# Patient Record
Sex: FEMALE | Race: WHITE | NOT HISPANIC OR LATINO | Employment: OTHER | URBAN - METROPOLITAN AREA
[De-identification: names, ages, dates, MRNs, and addresses within clinical notes are randomized per-mention and may not be internally consistent; named-entity substitution may affect disease eponyms.]

---

## 2017-01-01 ENCOUNTER — HOSPITAL ENCOUNTER (OUTPATIENT)
Dept: LAB | Facility: MEDICAL CENTER | Age: 72
End: 2017-10-05
Attending: NURSE PRACTITIONER
Payer: MEDICARE

## 2017-01-01 ENCOUNTER — APPOINTMENT (OUTPATIENT)
Dept: BEHAVIORAL HEALTH | Facility: PHYSICIAN GROUP | Age: 72
End: 2017-01-01
Payer: MEDICARE

## 2017-01-01 ENCOUNTER — TELEPHONE (OUTPATIENT)
Dept: MEDICAL GROUP | Facility: CLINIC | Age: 72
End: 2017-01-01

## 2017-01-01 ENCOUNTER — OFFICE VISIT (OUTPATIENT)
Dept: BEHAVIORAL HEALTH | Facility: PHYSICIAN GROUP | Age: 72
End: 2017-01-01
Payer: MEDICARE

## 2017-01-01 ENCOUNTER — HOSPITAL ENCOUNTER (OUTPATIENT)
Dept: RADIOLOGY | Facility: MEDICAL CENTER | Age: 72
End: 2017-07-11
Attending: NURSE PRACTITIONER
Payer: MEDICARE

## 2017-01-01 ENCOUNTER — HOSPITAL ENCOUNTER (OUTPATIENT)
Dept: LAB | Facility: MEDICAL CENTER | Age: 72
End: 2017-06-29
Attending: NURSE PRACTITIONER
Payer: MEDICARE

## 2017-01-01 ENCOUNTER — OFFICE VISIT (OUTPATIENT)
Dept: MEDICAL GROUP | Facility: CLINIC | Age: 72
End: 2017-01-01
Payer: MEDICARE

## 2017-01-01 ENCOUNTER — HOSPITAL ENCOUNTER (OUTPATIENT)
Facility: MEDICAL CENTER | Age: 72
End: 2017-08-08
Attending: NURSE PRACTITIONER
Payer: MEDICARE

## 2017-01-01 VITALS
SYSTOLIC BLOOD PRESSURE: 159 MMHG | WEIGHT: 165 LBS | DIASTOLIC BLOOD PRESSURE: 90 MMHG | BODY MASS INDEX: 29.23 KG/M2 | HEIGHT: 63 IN | HEART RATE: 125 BPM

## 2017-01-01 VITALS
HEIGHT: 63 IN | TEMPERATURE: 97 F | DIASTOLIC BLOOD PRESSURE: 80 MMHG | HEART RATE: 117 BPM | SYSTOLIC BLOOD PRESSURE: 120 MMHG | OXYGEN SATURATION: 94 % | BODY MASS INDEX: 28.53 KG/M2 | WEIGHT: 161 LBS | RESPIRATION RATE: 16 BRPM

## 2017-01-01 VITALS
RESPIRATION RATE: 18 BRPM | SYSTOLIC BLOOD PRESSURE: 108 MMHG | HEIGHT: 64 IN | TEMPERATURE: 98.1 F | OXYGEN SATURATION: 94 % | HEART RATE: 90 BPM | BODY MASS INDEX: 27.49 KG/M2 | DIASTOLIC BLOOD PRESSURE: 64 MMHG | WEIGHT: 161 LBS

## 2017-01-01 VITALS
RESPIRATION RATE: 16 BRPM | SYSTOLIC BLOOD PRESSURE: 126 MMHG | DIASTOLIC BLOOD PRESSURE: 72 MMHG | HEIGHT: 63 IN | OXYGEN SATURATION: 95 % | TEMPERATURE: 97.2 F | HEART RATE: 91 BPM | BODY MASS INDEX: 28.35 KG/M2 | WEIGHT: 160 LBS

## 2017-01-01 VITALS
RESPIRATION RATE: 20 BRPM | SYSTOLIC BLOOD PRESSURE: 120 MMHG | OXYGEN SATURATION: 92 % | WEIGHT: 160 LBS | HEART RATE: 113 BPM | DIASTOLIC BLOOD PRESSURE: 80 MMHG | HEIGHT: 64 IN | TEMPERATURE: 97.5 F | BODY MASS INDEX: 27.31 KG/M2

## 2017-01-01 VITALS
HEART RATE: 112 BPM | BODY MASS INDEX: 27.31 KG/M2 | DIASTOLIC BLOOD PRESSURE: 91 MMHG | HEIGHT: 64 IN | WEIGHT: 160 LBS | SYSTOLIC BLOOD PRESSURE: 119 MMHG

## 2017-01-01 DIAGNOSIS — E11.65 TYPE 2 DIABETES MELLITUS WITH HYPERGLYCEMIA, WITHOUT LONG-TERM CURRENT USE OF INSULIN (HCC): ICD-10-CM

## 2017-01-01 DIAGNOSIS — G89.29 CHRONIC BACK PAIN, UNSPECIFIED BACK LOCATION, UNSPECIFIED BACK PAIN LATERALITY: ICD-10-CM

## 2017-01-01 DIAGNOSIS — R53.83 FATIGUE, UNSPECIFIED TYPE: ICD-10-CM

## 2017-01-01 DIAGNOSIS — F32.A DEPRESSIVE DISORDER: ICD-10-CM

## 2017-01-01 DIAGNOSIS — Z12.11 COLON CANCER SCREENING: ICD-10-CM

## 2017-01-01 DIAGNOSIS — M25.512 CHRONIC LEFT SHOULDER PAIN: ICD-10-CM

## 2017-01-01 DIAGNOSIS — F51.04 CHRONIC INSOMNIA: ICD-10-CM

## 2017-01-01 DIAGNOSIS — Z12.31 ENCOUNTER FOR SCREENING MAMMOGRAM FOR BREAST CANCER: ICD-10-CM

## 2017-01-01 DIAGNOSIS — M62.838 NECK MUSCLE SPASM: ICD-10-CM

## 2017-01-01 DIAGNOSIS — Z79.891 CHRONIC USE OF OPIATE DRUGS THERAPEUTIC PURPOSES: ICD-10-CM

## 2017-01-01 DIAGNOSIS — E11.69 HYPERLIPIDEMIA ASSOCIATED WITH TYPE 2 DIABETES MELLITUS (HCC): ICD-10-CM

## 2017-01-01 DIAGNOSIS — E78.5 HYPERLIPIDEMIA ASSOCIATED WITH TYPE 2 DIABETES MELLITUS (HCC): ICD-10-CM

## 2017-01-01 DIAGNOSIS — F41.1 GAD (GENERALIZED ANXIETY DISORDER): Primary | ICD-10-CM

## 2017-01-01 DIAGNOSIS — H57.9 EYE SYMPTOM: ICD-10-CM

## 2017-01-01 DIAGNOSIS — T40.2X5A THERAPEUTIC OPIOID INDUCED CONSTIPATION: ICD-10-CM

## 2017-01-01 DIAGNOSIS — K59.03 THERAPEUTIC OPIOID INDUCED CONSTIPATION: ICD-10-CM

## 2017-01-01 DIAGNOSIS — F41.1 GAD (GENERALIZED ANXIETY DISORDER): ICD-10-CM

## 2017-01-01 DIAGNOSIS — G47.00 INSOMNIA, UNSPECIFIED TYPE: ICD-10-CM

## 2017-01-01 DIAGNOSIS — F13.20 SEDATIVE, HYPNOTIC OR ANXIOLYTIC DEPENDENCE (HCC): ICD-10-CM

## 2017-01-01 DIAGNOSIS — Z23 NEED FOR VACCINATION: ICD-10-CM

## 2017-01-01 DIAGNOSIS — G89.29 CHRONIC LEFT SHOULDER PAIN: ICD-10-CM

## 2017-01-01 DIAGNOSIS — M54.9 CHRONIC BACK PAIN, UNSPECIFIED BACK LOCATION, UNSPECIFIED BACK PAIN LATERALITY: ICD-10-CM

## 2017-01-01 DIAGNOSIS — F41.9 ANXIETY DISORDER, UNSPECIFIED TYPE: ICD-10-CM

## 2017-01-01 DIAGNOSIS — F33.1 MAJOR DEPRESSIVE DISORDER, RECURRENT EPISODE, MODERATE (HCC): ICD-10-CM

## 2017-01-01 DIAGNOSIS — E11.43 TYPE 2 DIABETES MELLITUS WITH DIABETIC AUTONOMIC NEUROPATHY, WITHOUT LONG-TERM CURRENT USE OF INSULIN (HCC): ICD-10-CM

## 2017-01-01 DIAGNOSIS — Z12.31 VISIT FOR SCREENING MAMMOGRAM: ICD-10-CM

## 2017-01-01 LAB
EST. AVERAGE GLUCOSE BLD GHB EST-MCNC: 148 MG/DL
HBA1C MFR BLD: 6.8 % (ref 0–5.6)
HBA1C MFR BLD: 7.1 % (ref ?–5.8)
HEMOCCULT STL QL IA: NEGATIVE
INT CON NEG: NEGATIVE
INT CON POS: POSITIVE
TSH SERPL DL<=0.005 MIU/L-ACNC: 0.59 UIU/ML (ref 0.3–3.7)
VIT B12 SERPL-MCNC: 374 PG/ML (ref 211–911)

## 2017-01-01 PROCEDURE — 99214 OFFICE O/P EST MOD 30 MIN: CPT | Performed by: NURSE PRACTITIONER

## 2017-01-01 PROCEDURE — 36415 COLL VENOUS BLD VENIPUNCTURE: CPT

## 2017-01-01 PROCEDURE — 82607 VITAMIN B-12: CPT

## 2017-01-01 PROCEDURE — 83036 HEMOGLOBIN GLYCOSYLATED A1C: CPT | Performed by: NURSE PRACTITIONER

## 2017-01-01 PROCEDURE — 83036 HEMOGLOBIN GLYCOSYLATED A1C: CPT

## 2017-01-01 PROCEDURE — 99214 OFFICE O/P EST MOD 30 MIN: CPT | Mod: 25 | Performed by: NURSE PRACTITIONER

## 2017-01-01 PROCEDURE — 90662 IIV NO PRSV INCREASED AG IM: CPT | Performed by: NURSE PRACTITIONER

## 2017-01-01 PROCEDURE — 77063 BREAST TOMOSYNTHESIS BI: CPT

## 2017-01-01 PROCEDURE — 99214 OFFICE O/P EST MOD 30 MIN: CPT | Performed by: PSYCHIATRY & NEUROLOGY

## 2017-01-01 PROCEDURE — G0008 ADMIN INFLUENZA VIRUS VAC: HCPCS | Performed by: NURSE PRACTITIONER

## 2017-01-01 PROCEDURE — 84443 ASSAY THYROID STIM HORMONE: CPT

## 2017-01-01 PROCEDURE — 82274 ASSAY TEST FOR BLOOD FECAL: CPT

## 2017-01-01 RX ORDER — HYDROCODONE BITARTRATE AND ACETAMINOPHEN 5; 300 MG/1; MG/1
1-1.5 TABLET ORAL 3 TIMES DAILY PRN
Qty: 105 TAB | Refills: 0 | Status: SHIPPED | OUTPATIENT
Start: 2017-01-01 | End: 2017-01-01 | Stop reason: SDUPTHER

## 2017-01-01 RX ORDER — TRAZODONE HYDROCHLORIDE 50 MG/1
50 TABLET ORAL NIGHTLY PRN
Qty: 90 TAB | Refills: 0 | Status: SHIPPED | OUTPATIENT
Start: 2017-01-01 | End: 2017-01-01 | Stop reason: SDUPTHER

## 2017-01-01 RX ORDER — VENLAFAXINE HYDROCHLORIDE 75 MG/1
CAPSULE, EXTENDED RELEASE ORAL
Qty: 90 CAP | Refills: 0
Start: 2017-01-01 | End: 2017-01-01

## 2017-01-01 RX ORDER — MUPIROCIN CALCIUM 20 MG/G
1 CREAM TOPICAL 2 TIMES DAILY
Qty: 1 TUBE | Refills: 0 | Status: SHIPPED | OUTPATIENT
Start: 2017-01-01 | End: 2017-01-01 | Stop reason: SDUPTHER

## 2017-01-01 RX ORDER — MUPIROCIN CALCIUM 20 MG/G
1 CREAM TOPICAL 2 TIMES DAILY
Qty: 1 TUBE | Refills: 5 | Status: SHIPPED | OUTPATIENT
Start: 2017-01-01 | End: 2018-01-01

## 2017-01-01 RX ORDER — GABAPENTIN 100 MG/1
100 CAPSULE ORAL 3 TIMES DAILY
Qty: 90 CAP | Refills: 11 | Status: SHIPPED | OUTPATIENT
Start: 2017-01-01 | End: 2018-01-01 | Stop reason: SDUPTHER

## 2017-01-01 RX ORDER — RANITIDINE 150 MG/1
150 TABLET ORAL 2 TIMES DAILY
COMMUNITY

## 2017-01-01 RX ORDER — HYDROCODONE BITARTRATE AND ACETAMINOPHEN 5; 300 MG/1; MG/1
1-1.5 TABLET ORAL 3 TIMES DAILY PRN
Qty: 105 TAB | Refills: 0 | Status: SHIPPED | OUTPATIENT
Start: 2017-01-01 | End: 2018-01-01 | Stop reason: SDUPTHER

## 2017-01-01 RX ORDER — BACLOFEN 10 MG/1
TABLET ORAL
COMMUNITY
Start: 2017-05-31 | End: 2018-01-01

## 2017-01-01 RX ORDER — ALPRAZOLAM 1 MG/1
1 TABLET ORAL 3 TIMES DAILY PRN
Qty: 70 TAB | Refills: 2 | Status: SHIPPED
Start: 2017-01-01 | End: 2018-01-01 | Stop reason: SDUPTHER

## 2017-01-01 RX ORDER — ALPRAZOLAM 1 MG/1
1 TABLET ORAL 3 TIMES DAILY PRN
Qty: 70 TAB | Refills: 2 | Status: SHIPPED
Start: 2017-01-01 | End: 2017-01-01 | Stop reason: SDUPTHER

## 2017-01-01 RX ORDER — TRAZODONE HYDROCHLORIDE 50 MG/1
50 TABLET ORAL NIGHTLY PRN
Qty: 90 TAB | Refills: 0 | Status: SHIPPED | OUTPATIENT
Start: 2017-01-01 | End: 2018-01-01

## 2017-01-01 RX ORDER — DOCUSATE SODIUM 100 MG/1
100 CAPSULE, LIQUID FILLED ORAL 2 TIMES DAILY PRN
Qty: 180 CAP | Refills: 3 | Status: SHIPPED | OUTPATIENT
Start: 2017-01-01

## 2017-01-01 ASSESSMENT — PATIENT HEALTH QUESTIONNAIRE - PHQ9
CLINICAL INTERPRETATION OF PHQ2 SCORE: 1
5. POOR APPETITE OR OVEREATING: 0 - NOT AT ALL

## 2017-01-01 ASSESSMENT — PAIN SCALES - GENERAL: PAINLEVEL: 4=SLIGHT-MODERATE PAIN

## 2017-01-03 ENCOUNTER — TELEPHONE (OUTPATIENT)
Dept: RADIOLOGY | Facility: MEDICAL CENTER | Age: 72
End: 2017-01-03

## 2017-01-03 NOTE — TELEPHONE ENCOUNTER
"Pt contacted APRN regarding her \"jumping eyes.\" Dr. Lam declined the referral, recommending neurologist referral instead (per pt report). She is asking about a referral from this office to Dr. Lam or other neurologist. She has seen her ophthalmologist. Pt has a current PCP and an upcoming appt next week and was advised that this is a new problem and she should seek the referral from her PCP. It is unlikely her problem is related to the aneurysm. 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 Billings determined that no further routine surveillance was indicated. She contacted our office in December with concerns that the aneurysm was causing this new problem, but she has not had the MRA she requested in December due to copay. Pt indicates she will continue to f/u with her PCP for the referral.     "

## 2017-01-11 ENCOUNTER — OFFICE VISIT (OUTPATIENT)
Dept: MEDICAL GROUP | Facility: CLINIC | Age: 72
End: 2017-01-11
Payer: MEDICARE

## 2017-01-11 VITALS
SYSTOLIC BLOOD PRESSURE: 126 MMHG | RESPIRATION RATE: 16 BRPM | BODY MASS INDEX: 27.32 KG/M2 | DIASTOLIC BLOOD PRESSURE: 76 MMHG | HEART RATE: 76 BPM | TEMPERATURE: 97.3 F | OXYGEN SATURATION: 94 % | HEIGHT: 65 IN | WEIGHT: 164 LBS

## 2017-01-11 DIAGNOSIS — M62.838 NECK MUSCLE SPASM: ICD-10-CM

## 2017-01-11 DIAGNOSIS — M25.512 CHRONIC LEFT SHOULDER PAIN: ICD-10-CM

## 2017-01-11 DIAGNOSIS — M54.9 CHRONIC BACK PAIN, UNSPECIFIED BACK LOCATION, UNSPECIFIED BACK PAIN LATERALITY: ICD-10-CM

## 2017-01-11 DIAGNOSIS — E11.65 TYPE 2 DIABETES MELLITUS WITH HYPERGLYCEMIA, WITHOUT LONG-TERM CURRENT USE OF INSULIN (HCC): ICD-10-CM

## 2017-01-11 DIAGNOSIS — G89.29 CHRONIC LEFT SHOULDER PAIN: ICD-10-CM

## 2017-01-11 DIAGNOSIS — H55.09 VERTICAL NYSTAGMUS: ICD-10-CM

## 2017-01-11 DIAGNOSIS — E55.9 VITAMIN D DEFICIENCY DISEASE: ICD-10-CM

## 2017-01-11 DIAGNOSIS — F11.90 CHRONIC, CONTINUOUS USE OF OPIOIDS: ICD-10-CM

## 2017-01-11 DIAGNOSIS — G89.29 CHRONIC BACK PAIN, UNSPECIFIED BACK LOCATION, UNSPECIFIED BACK PAIN LATERALITY: ICD-10-CM

## 2017-01-11 PROCEDURE — 99214 OFFICE O/P EST MOD 30 MIN: CPT | Performed by: NURSE PRACTITIONER

## 2017-01-11 RX ORDER — HYDROCODONE BITARTRATE AND ACETAMINOPHEN 5; 300 MG/1; MG/1
1-1.5 TABLET ORAL 3 TIMES DAILY PRN
Qty: 105 TAB | Refills: 0 | Status: SHIPPED | OUTPATIENT
Start: 2017-01-11 | End: 2017-01-11 | Stop reason: SDUPTHER

## 2017-01-11 RX ORDER — HYDROCODONE BITARTRATE AND ACETAMINOPHEN 5; 300 MG/1; MG/1
1-1.5 TABLET ORAL 3 TIMES DAILY PRN
Qty: 105 TAB | Refills: 0 | Status: SHIPPED | OUTPATIENT
Start: 2017-01-11 | End: 2017-04-11 | Stop reason: SDUPTHER

## 2017-01-11 NOTE — PROGRESS NOTES
CC: Follow-Up; Medication Refill; and Referral Update Request        HPI:     Rosangela presents today for the followin. Type 2 diabetes mellitus with hyperglycemia, without long-term current use of insulin ([facility])  Here to follow up on diabetes. She did bring a small amount of blood sugar checks over the last 2 or 3 weeks. Highest fasting was 180 however usually she's falling around 140. Fasting and postprandially. No reports of hypoglycemia. Was unable to get her blood work done.    2. Vertical nystagmus  Cheerful today with a lot of concerns related to her eye jerking movements. She did see Dr. Olguin who did send a letter to her office. She is called and had a lot of concerns related to referrals to neuro-ophthalmology. Upon review of Dr. Olguin's office office note he referred her to neurology only. She did also call and talk to neurology interventional list regarding her history of aneurysms and coiling. They did not feel that this was related. They did offer her MRA however she states she would have difficulty affording this is that she didn't pursue.  Symptoms of an ongoing for 5 or 6 months. Next the difficult for her to read small print her to focus her eyes on things. Glasses or magnifying glasses help.    3. Chronic left shoulder pain/Chronic back pain, unspecified back location, unspecified back pain laterality/Chronic, continuous use of opioids/Neck muscle spasm  Chronic Opiate therapy:  (5 A's)  Analgesia:  improved  Activity:  improved  Adverse Events:  denies  Aberrant Behaviors:  denies  Affect/Mood: normal reasoning, tearful today.  She sees psych for this as this is chronic      See Opiate risk score 5    7. Vitamin D deficiency disease  Asked for refills of her vitamin D supplement. She does still have refills at the pharmacy. Once no fish and recheck labs.    Current Outpatient Prescriptions   Medication Sig Dispense Refill   • Hydrocodone-Acetaminophen 5-300 MG Tab Take 1-1.5 Tabs by mouth 3  "times a day as needed. 105 Tab 0   • metformin (GLUCOPHAGE) 1000 MG tablet TAKE ONE TABLET BY MOUTH TWICE DAILY WITH MEALS 180 Tab 3   • alprazolam (XANAX) 1 MG Tab Take 1 Tab by mouth 2 times a day as needed for Anxiety. 60 Tab 2   • alprazolam (XANAX) 0.5 MG Tab Take 1 Tab by mouth 1 time daily as needed for Anxiety. 30 Tab 2   • venlafaxine (EFFEXOR XR) 150 MG extended-release capsule Take 1 Cap by mouth every day. Take in addition to 37.5mg. Dose is 187.5mg. 30 Cap 1   • venlafaxine XR (EFFEXOR XR) 37.5 MG CAPSULE SR 24 HR Take 1 Cap by mouth every day. Take in addition to 150mg. Dose is 187.5mg. 30 Cap 1   • trazodone (DESYREL) 50 MG Tab Take 1 Tab by mouth at bedtime as needed for Sleep (as needed for sleep). 30 Tab 2   • lisinopril (PRINIVIL) 2.5 MG Tab Take 1 Tab by mouth every day. 90 Tab 3   • glipiZIDE SR (GLUCOTROL) 2.5 MG TABLET SR 24 HR Take 1 Tab by mouth every day. 90 Tab 1   • rosuvastatin (CRESTOR) 20 MG Tab Take 1 Tab by mouth every evening. 90 Tab 3   • ONE TOUCH ULTRA TEST strip      • Omega-3 Fatty Acids (FISH OIL PO) Take  by mouth.     • vitamin D, Ergocalciferol, (DRISDOL) 01386 UNITS Cap capsule Take 1 Cap by mouth every 7 days. 12 Cap 3   • aspirin (ASA) 81 MG CHEW Take 81 mg by mouth every day.     • therapeutic multivitamin-minerals (THERAGRAN-M) TABS Take 1 Tab by mouth every day.       No current facility-administered medications for this visit.     Social History   Substance Use Topics   • Smoking status: Former Smoker -- 1.00 packs/day for 40 years     Types: Cigarettes     Quit date: 01/01/2006   • Smokeless tobacco: Never Used   • Alcohol Use: No     I reviewed patients allergies, problem list and medications today in EPIC.    ROS: Any/all pertinent positives listed in the HPI, otherwise all others reviewed are negative today.      /76 mmHg  Pulse 76  Temp(Src) 36.3 °C (97.3 °F)  Resp 16  Ht 1.651 m (5' 5\")  Wt 74.39 kg (164 lb)  BMI 27.29 kg/m2  SpO2 94%    Exam:  "   Gen: Alert and oriented, No apparent distress. WDWN  Psych: A+Ox3, normal affect and mood  Skin: Warm, dry and intact. Good turgor   No rashes in visible areas.  Eye: Conjunctiva clear, lids normal.  No nystagmus noted in the office  ENMT: Lips without lesions, good dentition  Lungs: Clear to auscultation bilaterally, no rales or rhonchi   Unlabored respiratory effort.   CV: Regular rate and rhythm, S1, S2. No murmurs.   No Edema        Assessment and Plan.   71 y.o. female with the following issues.    1. Type 2 diabetes mellitus with hyperglycemia, without long-term current use of insulin (HCC)  Stable. She has a pending labs to complete prior to next appointment. Currently will continue current medication  - VITAMIN D,25 HYDROXY; Future    2. Vertical nystagmus  Stable. Referral placed to neurology for further evaluation. If needed at that point they can be referred to neuro-ophthalmology  - REFERRAL TO NEUROLOGY    3. Chronic left shoulder pain/Chronic back pain, unspecified back location, unspecified back pain laterality/Chronic, continuous use of opioids/Neck muscle spasm  Clinically stable. No reports of new symptoms. Well-controlled on current medication.  obtained/reviewed from state pharmacy database.  Medications found to be medically necessary/appropriate.  3 months of medication supply at this visit. Followup in the office for further refills.  Urine drug test completed today.    - PAIN MANAGEMENT SCRN, UR; Future  - Hydrocodone-Acetaminophen 5-300 MG Tab; Take 1-1.5 Tabs by mouth 3 times a day as needed.  Dispense: 105 Tab; Refill: 0  - Controlled Substance Treatment Agreement    7. Vitamin D deficiency disease  Stable can continue refills. Labs at her next routine drop            Last dose of controlled substance medication: last pain pill 1/10/17   reviewed:  Yes      Current Drug contract date: today  Current UDT date: routine repeat today  Signs of oversedation: Negative   Non-narcotic  treatments used in past or currently:  Doing yoga and her old PT harry    I have advised patient to keep medication in a safe place and caution with driving

## 2017-01-11 NOTE — MR AVS SNAPSHOT
"        Rosangela Burnette   2017 11:00 AM   Office Visit   MRN: [id number]    Department:  [facility]   Dept Phone:  [phone]    Description:  Female : 1945   Provider:  PILI Humphries           Reason for Visit     Follow-Up diabetes    Medication Refill norco 5-300mg    Referral Update Request to dr. lawrence       Allergies as of 2017     Allergen Noted Reactions    Keflex 2009         You were diagnosed with     Type 2 diabetes mellitus with hyperglycemia, without long-term current use of insulin ([facility])   [7040141]       Vertical nystagmus   [570536]       Chronic left shoulder pain   [696030]       Chronic back pain, unspecified back location, unspecified back pain laterality   [0553677]       Chronic, continuous use of opioids   [492652]       Neck muscle spasm   [234952]         Vital Signs     Blood Pressure Pulse Temperature Respirations Height Weight    126/76 mmHg 76 36.3 °C (97.3 °F) 16 1.651 m (5' 5\") 74.39 kg (164 lb)    Body Mass Index Oxygen Saturation Smoking Status             27.29 kg/m2 94% Former Smoker         Basic Information     Date Of Birth Sex Race Ethnicity Preferred Language    1945 Female White Non- English      Your appointments     2017 10:30 AM   Follow Up Med Management with Angelica Ogden M.D.   [facility] [address] [place] [address]   [phone]            2017 11:00 AM   Individual Therapy with TERRI Malone   [facility] [address] [place] [address])    [address] [place] [address]   [phone]            2017 10:00 AM   Individual Therapy with TERRI Malone   [facility] [address] [place] [address]   [phone]            2017 11:00 AM   Established Patient with PILI Humphries   [address]  [place] [address]   [phone]           You will be receiving a confirmation call a few days before your appointment from our automated call confirmation system.              Problem List              ICD-10-CM Priority Class Noted - Resolved    History of subarachnoid hemorrhage Z86.79 Low  8/12/2012 - Present    Type 2 diabetes mellitus with hyperglycemia (HCC) E11.65 Medium  8/13/2012 - Present    History of atrial fibrillation without current medication Z86.79 Low  8/18/2012 - Present    Cerebral aneurysm, nonruptured  s/p coiling I67.1 High  2/25/2014 - Present    Chronic left shoulder pain M25.512, G89.29 Low  1/11/2016 - Present    Insomnia G47.00 Low  1/11/2016 - Present    Neck muscle spasm M62.838 Low  1/11/2016 - Present    Chronic back pain M54.9, G89.29 Low  3/9/2016 - Present    Chronic, continuous use of opioids  ORT=5 F11.90 Low  8/24/2016 - Present    Sedative, 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 Low  8/24/2016 - Present    Major depressive disorder, recurrent episode, moderate (HCC) F33.1 Low  9/29/2016 - Present    Anxiety disorder F41.9 Low  9/29/2016 - Present    Hyperlipidemia associated with type 2 diabetes mellitus (HCC) E11.69, E78.5 Medium  10/11/2016 - Present    Mild benzodiazepine use disorder F13.10   10/27/2016 - Present    Depressive disorder F32.9   10/27/2016 - Present      Health Maintenance        Date Due Completion Dates    DIABETES MONOFILAMTENT / LE EXAM 1945 ---    IMM ZOSTER VACCINE 6/1/2004 ---    URINE ACR / MICROALBUMIN 3/9/2013 3/9/2012    IMM PNEUMOCOCCAL 65+ (ADULT) LOW/MEDIUM RISK SERIES (2 of 2 - PCV13) 12/16/2015 12/16/2014, 7/25/2013    FASTING LIPID PROFILE 11/3/2016 11/3/2015, 10/12/2015, 8/10/2015, 5/8/2015, 2/11/2015, 11/10/2014, 8/8/2014, 6/16/2014, 4/15/2014, 12/27/2012, 10/3/2012, 9/6/2012, 3/9/2012, 9/9/2011    SERUM CREATININE 11/3/2016 11/3/2015, 8/10/2015, 7/10/2015, 7/7/2015, 5/8/2015, 2/11/2015, 11/10/2014, 9/11/2014, 8/8/2014,  6/16/2014, 2/27/2014, 2/21/2014, 9/9/2013, 7/26/2013, 7/25/2013, 7/24/2013, 7/16/2013, 7/5/2013, 9/6/2012, 8/23/2012, 8/22/2012, 8/21/2012, 8/20/2012, 8/19/2012, 8/18/2012, 8/17/2012, 8/16/2012, 8/15/2012, 8/14/2012, 8/13/2012, 8/12/2012, 9/9/2011    A1C SCREENING 12/2/2016 6/2/2016, 3/9/2016, 11/3/2015, 10/12/2015, 8/10/2015, 5/8/2015, 2/11/2015, 11/10/2014, 8/8/2014, 6/16/2014, 4/15/2014, 2/12/2014, 11/18/2013, 9/9/2013, 6/24/2013, 3/18/2013, 12/27/2012, 10/3/2012, 9/6/2012, 8/13/2012, 3/9/2012    MAMMOGRAM 12/29/2016 12/29/2015, 9/25/2014, 9/24/2014, 9/24/2014, 10/25/2011    COLON CANCER SCREENING ANNUAL FIT 1/11/2017 1/11/2016 (Postponed)    Override on 1/11/2016: Postponed    RETINAL SCREENING 10/24/2017 10/24/2016, 6/2/2016    BONE DENSITY 9/16/2021 9/16/2016, 1/1/2006 (Done)    Override on 1/1/2006: Done (nl)    IMM DTaP/Tdap/Td Vaccine (2 - Td) 9/15/2023 9/15/2013            Current Immunizations     Influenza TIV (IM) 1/18/2011    Influenza Vaccine Adult HD 10/3/2016, 11/7/2015    Pneumococcal Vaccine (UF)Historical Data 8/18/1970    Pneumococcal polysaccharide vaccine (PPSV-23) 12/16/2014, 7/25/2013  9:30 AM    Tdap Vaccine 9/15/2013  8:00 PM    Tuberculin Skin Test 6/25/2014      Below and/or attached are the medications your provider expects you to take. Review all of your home medications and newly ordered medications with your provider and/or pharmacist. Follow medication instructions as directed by your provider and/or pharmacist. Please keep your medication list with you and share with your provider. Update the information when medications are discontinued, doses are changed, or new medications (including over-the-counter products) are added; and carry medication information at all times in the event of emergency situations     Allergies:  KEFLEX - (reactions not documented)               Medications  Valid as of: January 11, 2017 - 12:08 PM    Generic Name Brand Name Tablet Size Instructions for use     ALPRAZolam (Tab) XANAX 1 MG Take 1 Tab by mouth 2 times a day as needed for Anxiety.        ALPRAZolam (Tab) XANAX 0.5 MG Take 1 Tab by mouth 1 time daily as needed for Anxiety.        Aspirin (Chew Tab) ASA 81 MG Take 81 mg by mouth every day.        Ergocalciferol (Cap) DRISDOL 74934 UNITS Take 1 Cap by mouth every 7 days.        GlipiZIDE (TABLET SR 24 HR) GLUCOTROL 2.5 MG Take 1 Tab by mouth every day.        Glucose Blood (Strip) ONE TOUCH ULTRA TEST          Hydrocodone-Acetaminophen (Tab) Hydrocodone-Acetaminophen 5-300 MG Take 1-1.5 Tabs by mouth 3 times a day as needed.        Lisinopril (Tab) PRINIVIL 2.5 MG Take 1 Tab by mouth every day.        MetFORMIN HCl (Tab) GLUCOPHAGE 1000 MG TAKE ONE TABLET BY MOUTH TWICE DAILY WITH MEALS        Multiple Vitamins-Minerals (Tab) THERAGRAN-M  Take 1 Tab by mouth every day.        Omega-3 Fatty Acids   Take  by mouth.        Rosuvastatin Calcium (Tab) CRESTOR 20 MG Take 1 Tab by mouth every evening.        TraZODone HCl (Tab) DESYREL 50 MG Take 1 Tab by mouth at bedtime as needed for Sleep (as needed for sleep).        Venlafaxine HCl (CAPSULE SR 24 HR) EFFEXOR- MG Take 1 Cap by mouth every day. Take in addition to 37.5mg. Dose is 187.5mg.        Venlafaxine HCl (CAPSULE SR 24 HR) EFFEXOR XR 37.5 MG Take 1 Cap by mouth every day. Take in addition to 150mg. Dose is 187.5mg.        .                 Medicines prescribed today were sent to:     [facility] NICOLE BOLDEN - [address] [address] [facility] [place] [address]    Phone: [phone] Fax: [phone]    Open 24 Hours?: No      Medication refill instructions:       If your prescription bottle indicates you have medication refills left, it is not necessary to call your provider’s office. Please contact your pharmacy and they will refill your medication.    If your prescription bottle indicates you do not have any refills left, you may request refills at any time through one of the  following ways: The online [employer] system (except Urgent Care), by calling your provider’s office, or by asking your pharmacy to contact your provider’s office with a refill request. Medication refills are processed only during regular business hours and may not be available until the next business day. Your provider may request additional information or to have a follow-up visit with you prior to refilling your medication.   *Please Note: Medication refills are assigned a new Rx number when refilled electronically. Your pharmacy may indicate that no refills were authorized even though a new prescription for the same medication is available at the pharmacy. Please request the medicine by name with the pharmacy before contacting your provider for a refill.        Your To Do List     Future Labs/Procedures Complete By Mercedes    PAIN MANAGEMENT NED NETTLES  As directed 1/11/2018    Comments:    Current Meds (name, sig, last dose):   Current outpatient prescriptions:   •  metformin, TAKE ONE TABLET BY MOUTH TWICE DAILY WITH MEALS  •  alprazolam, 1 mg, Oral, BID PRN  •  alprazolam, 0.5 mg, Oral, QDAY PRN  •  venlafaxine, 150 mg, Oral, DAILY  •  venlafaxine XR, 37.5 mg, Oral, DAILY  •  trazodone, 50 mg, Oral, HS PRN  •  lisinopril, 2.5 mg, Oral, DAILY  •  Hydrocodone-Acetaminophen, 1-1.5 Tab, Oral, TID PRN  •  glipiZIDE SR, 2.5 mg, Oral, DAILY  •  rosuvastatin, 20 mg, Oral, Q EVENING  •  ONE TOUCH ULTRA TEST, , 1/11/2016  •  Omega-3 Fatty Acids (FISH OIL PO), Take  by mouth.  •  vitamin D (Ergocalciferol), 50,000 Units, Oral, Q7 DAYS  •  aspirin, 81 mg, Oral, DAILY  •  therapeutic multivitamin-minerals, 1 Tab, Oral, DAILY, 1/11/2016 at 1000          VITAMIN D,25 HYDROXY  As directed 1/12/2018      Referral     A referral request has been sent to our patient care coordination department. Please allow 3-5 business days for us to process this request and contact you either by phone or mail. If you do not hear from us by the  5th business day, please call us at [phone].           MyChart Status: Patient Declined

## 2017-01-24 RX ORDER — VENLAFAXINE HYDROCHLORIDE 37.5 MG/1
37.5 CAPSULE, EXTENDED RELEASE ORAL DAILY
Qty: 90 CAP | Refills: 0 | Status: SHIPPED | OUTPATIENT
Start: 2017-01-24 | End: 2017-01-26

## 2017-01-24 RX ORDER — VENLAFAXINE HYDROCHLORIDE 150 MG/1
150 CAPSULE, EXTENDED RELEASE ORAL DAILY
Qty: 90 CAP | Refills: 0 | Status: SHIPPED | OUTPATIENT
Start: 2017-01-24 | End: 2017-04-26

## 2017-01-26 ENCOUNTER — OFFICE VISIT (OUTPATIENT)
Dept: BEHAVIORAL HEALTH | Facility: PHYSICIAN GROUP | Age: 72
End: 2017-01-26
Payer: MEDICARE

## 2017-01-26 ENCOUNTER — APPOINTMENT (OUTPATIENT)
Dept: BEHAVIORAL HEALTH | Facility: PHYSICIAN GROUP | Age: 72
End: 2017-01-26
Payer: MEDICARE

## 2017-01-26 VITALS
WEIGHT: 165 LBS | BODY MASS INDEX: 27.49 KG/M2 | SYSTOLIC BLOOD PRESSURE: 141 MMHG | DIASTOLIC BLOOD PRESSURE: 80 MMHG | HEIGHT: 65 IN | HEART RATE: 112 BPM

## 2017-01-26 DIAGNOSIS — F32.A DEPRESSIVE DISORDER: ICD-10-CM

## 2017-01-26 DIAGNOSIS — F41.9 ANXIETY DISORDER, UNSPECIFIED TYPE: Primary | ICD-10-CM

## 2017-01-26 PROCEDURE — 1101F PT FALLS ASSESS-DOCD LE1/YR: CPT | Performed by: PSYCHIATRY & NEUROLOGY

## 2017-01-26 PROCEDURE — 3014F SCREEN MAMMO DOC REV: CPT | Performed by: PSYCHIATRY & NEUROLOGY

## 2017-01-26 PROCEDURE — 1036F TOBACCO NON-USER: CPT | Performed by: PSYCHIATRY & NEUROLOGY

## 2017-01-26 PROCEDURE — 99214 OFFICE O/P EST MOD 30 MIN: CPT | Performed by: PSYCHIATRY & NEUROLOGY

## 2017-01-26 PROCEDURE — G8432 DEP SCR NOT DOC, RNG: HCPCS | Performed by: PSYCHIATRY & NEUROLOGY

## 2017-01-26 PROCEDURE — 4040F PNEUMOC VAC/ADMIN/RCVD: CPT | Performed by: PSYCHIATRY & NEUROLOGY

## 2017-01-26 PROCEDURE — G8420 CALC BMI NORM PARAMETERS: HCPCS | Performed by: PSYCHIATRY & NEUROLOGY

## 2017-01-26 PROCEDURE — 3017F COLORECTAL CA SCREEN DOC REV: CPT | Mod: 8P | Performed by: PSYCHIATRY & NEUROLOGY

## 2017-01-26 PROCEDURE — G8482 FLU IMMUNIZE ORDER/ADMIN: HCPCS | Performed by: PSYCHIATRY & NEUROLOGY

## 2017-01-26 RX ORDER — ALPRAZOLAM 0.25 MG/1
0.25 TABLET ORAL NIGHTLY PRN
Qty: 30 TAB | Refills: 2 | Status: SHIPPED
Start: 2017-01-26 | End: 2017-04-26

## 2017-01-26 RX ORDER — ALPRAZOLAM 1 MG/1
1 TABLET ORAL 2 TIMES DAILY PRN
Qty: 60 TAB | Refills: 2 | Status: SHIPPED
Start: 2017-01-26 | End: 2017-04-26 | Stop reason: SDUPTHER

## 2017-01-26 RX ORDER — VENLAFAXINE HYDROCHLORIDE 75 MG/1
75 CAPSULE, EXTENDED RELEASE ORAL DAILY
Qty: 90 CAP | Refills: 0 | Status: SHIPPED | OUTPATIENT
Start: 2017-01-26 | End: 2017-04-26 | Stop reason: SDUPTHER

## 2017-01-26 RX ORDER — TRAZODONE HYDROCHLORIDE 50 MG/1
50 TABLET ORAL NIGHTLY PRN
Qty: 90 TAB | Refills: 0 | Status: SHIPPED | OUTPATIENT
Start: 2017-01-26 | End: 2017-04-26 | Stop reason: SDUPTHER

## 2017-01-26 NOTE — PROGRESS NOTES
"PSYCHIATRY FOLLOW-UP NOTE      Chief Complaint   Patient presents with   • Follow-Up     anxiety, depression         History Of Present Illness:  Rosangela Burnette is a 71 y.o. old female with depressive disorder, anxiety disorder, anxiolytic (Xanax) use disorder, DM, HTN, HLD, cerebral aneurysm, atrial fibrillation, chronic low back and shoulder pain comes in today for follow up, was last seen 3 months ago. She reports doing okay since her last visit. She had a tough time in December as that is when her  passed away. She is also having some issues with her eyes and recently saw a neurologist for the same. She has been having some vertical nystagmus in her eyes for almost a year which is causing her extreme discomfort and even some pain at the end of the day. She was told that she might have had \"a silent stroke\" and they just need some records from her aneurysm coiling and will follow up with her on that. She was told that it might be prominent symptom for her. She continues to have anxiety mainly surrounding her physical health and her kids. She has really good support from her daughters and grand kids. She continues to have crying spells which looks like this because she is a very sensitive person. She denies feeling depressed since her last visit that anxiety has been up and down. 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 Denies any recent balance problems or falls. Energy has been up and down. Denies problems with motivation. She has been using Xanax half a tablet whenever she needs and there are times when she doesn't take her normal dosage. She feels that trazodone really helped her with sleep and she has used the 0.5 mg Xanax less often now. Continues to be compliant with Effexor but is not sure if it really helps her anxiety are not. Denies having thoughts of wanting to hurt herself or others.    Social History:   Single and lives " alone in an apartment in [place].  and  x 1, was  for 27 years and   in . She has 2 daughters and 4 grand kids. Both her daughters live in [place] but one of her daughters is moving to [place] soon. 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    Substance Use:  Alcohol - Denies  Nicotine - Denies    Illicit drugs - Denies     Medications:  Current Outpatient Prescriptions   Medication Sig Dispense Refill   • venlafaxine (EFFEXOR XR) 150 MG extended-release capsule Take 1 Cap by mouth every day. Take in addition to 37.5mg. Dose is 187.5mg. 90 Cap 0   • venlafaxine XR (EFFEXOR XR) 37.5 MG CAPSULE SR 24 HR Take 1 Cap by mouth every day. Take in addition to 150mg. Dose is 187.5mg. 90 Cap 0   • Hydrocodone-Acetaminophen 5-300 MG Tab Take 1-1.5 Tabs by mouth 3 times a day as needed. 105 Tab 0   • metformin (GLUCOPHAGE) 1000 MG tablet TAKE ONE TABLET BY MOUTH TWICE DAILY WITH MEALS 180 Tab 3   • alprazolam (XANAX) 1 MG Tab Take 1 Tab by mouth 2 times a day as needed for Anxiety. 60 Tab 2   • alprazolam (XANAX) 0.5 MG Tab Take 1 Tab by mouth 1 time daily as needed for Anxiety. 30 Tab 2   • trazodone (DESYREL) 50 MG Tab Take 1 Tab by mouth at bedtime as needed for Sleep (as needed for sleep). 30 Tab 2   • lisinopril (PRINIVIL) 2.5 MG Tab Take 1 Tab by mouth every day. 90 Tab 3   • glipiZIDE SR (GLUCOTROL) 2.5 MG TABLET SR 24 HR Take 1 Tab by mouth every day. 90 Tab 1   • rosuvastatin (CRESTOR) 20 MG Tab Take 1 Tab by mouth every evening. 90 Tab 3   • ONE TOUCH ULTRA TEST strip      • Omega-3 Fatty Acids (FISH OIL PO) Take  by mouth.     • vitamin D, Ergocalciferol, (DRISDOL) 14265 UNITS Cap capsule Take 1 Cap by mouth every 7 days. 12 Cap 3   • aspirin (ASA) 81 MG CHEW Take 81 mg by mouth every day.     • therapeutic multivitamin-minerals (THERAGRAN-M) TABS Take 1 Tab by mouth every day.       No current  "facility-administered medications for this visit.       Review Of Systems:    Constitutional - Negative for fatigue  HEENT - Positive for nystagmus   Respiratory - Negative for shortness of breath, cough  CVS - Negative for chest pain, palpitations  GI - Negative for nausea, vomiting, abdominal pain, diarrhea, constipation  Musculoskeletal - Positive for back and shoulder pain  Neurological - Negative for headaches  Psychiatric - Positive for anxiety (improved)    Physical Examination:  Vital signs: /80 mmHg  Pulse 112  Ht 1.651 m (5' 5\")  Wt 74.844 kg (165 lb)  BMI 27.46 kg/m2    Musculoskeletal: Slow but stable gait. No abnormal movements.     Mental Status Evaluation:   General: Elderly white female, tearful few times, dressed in casual attire, good grooming and hygiene, in no apparent distress, calm and cooperative, good eye contact, no psychomotor agitation or retardation  Orientation: Alert and oriented to person, place and time  Recent and remote memory: Grossly intact  Attention span and concentration: Grossly intact  Speech: Spontaneous, normal rate, rhythm and tone  Thought Process: Linear, logical and goal directed  Thought Content: Denies suicidal or homicidal ideations, intent or plan  Perception: Denies auditory or visual hallucinations. No delusions noted  Associations: Intact  Language: Appropriate  Fund of knowledge and vocabulary: Grossly adequate  Mood: \"am fine\"  Affect: Anxious, mood congruent  Insight: Good  Judgment: Good      Impression:  1. Unspecified anxiety disorder  2. Unspecified depressive disorder  3. Sedative, hypnotic and anxiolytic (Xanax) use disorder    Medical Records/Labs/Diagnostic Tests Reviewed:  [facility] records - no abuse suspected, appropriate refills.     Plan:  1. Decrease Xanax to 2.25 mg daily as needed for anxiety. Faxed 2 scripts to her pharmacy - 1 mg with 60 tabs & 2 refills and 0.25 mg with 30 tabs & 2 refills. Instructed her to refill her Xanax only when " she has 2 days of supply left, so that we can assess how much Xanax as she actually using as she tends to use half a tablet at one time. Will continue to slowly taper her off Xanax.  2. Increase Effexor to 225 mg in the morning for anxiety and depression  3. Continue Trazodone 50 mg at bedtime as needed for sleep.    4. Continue psychotherapy with DAVID Johnson.     Return to clinic in 3 months or sooner if symptoms worsen    The proposed treatment plan was discussed with the patient who was provided the opportunity to ask questions and make suggestions regarding alternative treatment. Patient verbalized understanding and expressed agreement with the plan.     Angelica Ogden M.D.  01/26/2017    This note was created using voice recognition software (Dragon). The accuracy of the dictation is limited by the abilities of the software. I have reviewed the note prior to signing, however some errors in grammar and context are still possible. If you have any questions related to this note please do not hesitate to contact our office.

## 2017-01-26 NOTE — MR AVS SNAPSHOT
"        Rosangela Burnette   2017 10:30 AM   Office Visit   MRN: [id number]    Department[facility]  [facility]    Dept Phone:  [phone]    Description:  Female : 1945   Provider:  Angelica Ogden M.D.           Reason for Visit     Follow-Up anxiety, depression      Allergies as of 2017     Allergen Noted Reactions    Keflex 2009         You were diagnosed with     Anxiety disorder, unspecified type   [6619808]  -  Primary     Depressive disorder   [560711]         Vital Signs     Blood Pressure Pulse Height Weight Body Mass Index Smoking Status    141/80 mmHg 112 1.651 m (5' 5\") 74.844 kg (165 lb) 27.46 kg/m2 Former Smoker      Basic Information     Date Of Birth Sex Race Ethnicity Preferred Language    1945 Female White Non- English      Your appointments     2017 10:00 AM   Individual Therapy with TERRI Malone   [facility] [address]    [address] [place] [address]  [facility] [address]   [phone]            2017  3:00 PM   Individual Therapy with TERRI Malone   BE[facility] [address])    [address] [place] [address]  [facility] [address]   [phone]            2017 11:00 AM   Established Patient with PILI Humphries   [facility]    [address] [place] [address] [address]  [facility] [id number] [phone]           You will be receiving a confirmation call a few days before your appointment from our automated call confirmation system.              Problem List              ICD-10-CM Priority Class Noted - Resolved    History of subarachnoid hemorrhage Z86.79   2012 - Present    Type 2 diabetes mellitus with hyperglycemia (CMS-HCC) E11.65   2012 - Present    History of atrial fibrillation without current medication Z86.79   2012 - Present    Cerebral aneurysm, nonruptured  s/p coiling I67.1   2014 - Present    Chronic left shoulder pain M25.512, G89.29   " 1/11/2016 - Present    Insomnia G47.00   1/11/2016 - Present    Neck muscle spasm M62.838   1/11/2016 - Present    Chronic back pain M54.9, G89.29   3/9/2016 - Present    Chronic, continuous use of opioids  ORT=5 F11.90   8/24/2016 - Present    Sedative, 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   8/24/2016 - Present    Major depressive disorder, recurrent episode, moderate (CMS-HCC) F33.1   9/29/2016 - Present    Anxiety disorder F41.9   9/29/2016 - Present    Hyperlipidemia associated with type 2 diabetes mellitus (CMS-HCC) E11.69, E78.5   10/11/2016 - Present    Mild benzodiazepine use disorder F13.10   10/27/2016 - Present    Depressive disorder F32.9   10/27/2016 - Present      Health Maintenance        Date Due Completion Dates    DIABETES MONOFILAMENT / LE EXAM 1945 ---    IMM ZOSTER VACCINE 6/1/2004 ---    URINE ACR / MICROALBUMIN 3/9/2013 3/9/2012    IMM PNEUMOCOCCAL 65+ (ADULT) LOW/MEDIUM RISK SERIES (2 of 2 - PCV13) 12/16/2015 12/16/2014, 7/25/2013    FASTING LIPID PROFILE 11/3/2016 11/3/2015, 10/12/2015, 8/10/2015, 5/8/2015, 2/11/2015, 11/10/2014, 8/8/2014, 6/16/2014, 4/15/2014, 12/27/2012, 10/3/2012, 9/6/2012, 3/9/2012, 9/9/2011    SERUM CREATININE 11/3/2016 11/3/2015, 8/10/2015, 7/10/2015, 7/7/2015, 5/8/2015, 2/11/2015, 11/10/2014, 9/11/2014, 8/8/2014, 6/16/2014, 2/27/2014, 2/21/2014, 9/9/2013, 7/26/2013, 7/25/2013, 7/24/2013, 7/16/2013, 7/5/2013, 9/6/2012, 8/23/2012, 8/22/2012, 8/21/2012, 8/20/2012, 8/19/2012, 8/18/2012, 8/17/2012, 8/16/2012, 8/15/2012, 8/14/2012, 8/13/2012, 8/12/2012, 9/9/2011    A1C SCREENING 12/2/2016 6/2/2016, 3/9/2016, 11/3/2015, 10/12/2015, 8/10/2015, 5/8/2015, 2/11/2015, 11/10/2014, 8/8/2014, 6/16/2014, 4/15/2014, 2/12/2014, 11/18/2013, 9/9/2013, 6/24/2013, 3/18/2013, 12/27/2012, 10/3/2012, 9/6/2012, 8/13/2012, 3/9/2012    MAMMOGRAM 12/29/2016 12/29/2015, 9/25/2014, 9/24/2014, 9/24/2014, 10/25/2011    COLON CANCER SCREENING ANNUAL FIT 1/11/2017 1/11/2016  (Postponed)    Override on 1/11/2016: Postponed    RETINAL SCREENING 10/24/2017 10/24/2016, 6/2/2016    BONE DENSITY 9/16/2021 9/16/2016, 1/1/2006 (Done)    Override on 1/1/2006: Done (nl)    IMM DTaP/Tdap/Td Vaccine (2 - Td) 9/15/2023 9/15/2013            Current Immunizations     Influenza TIV (IM) 1/18/2011    Influenza Vaccine Adult HD 10/3/2016, 11/7/2015    Pneumococcal Vaccine (UF)Historical Data 8/18/1970    Pneumococcal polysaccharide vaccine (PPSV-23) 12/16/2014, 7/25/2013  9:30 AM    Tdap Vaccine 9/15/2013  8:00 PM    Tuberculin Skin Test 6/25/2014      Below and/or attached are the medications your provider expects you to take. Review all of your home medications and newly ordered medications with your provider and/or pharmacist. Follow medication instructions as directed by your provider and/or pharmacist. Please keep your medication list with you and share with your provider. Update the information when medications are discontinued, doses are changed, or new medications (including over-the-counter products) are added; and carry medication information at all times in the event of emergency situations     Allergies:  KEFLEX - (reactions not documented)               Medications  Valid as of: January 26, 2017 - 10:58 AM    Generic Name Brand Name Tablet Size Instructions for use    ALPRAZolam (Tab) XANAX 0.25 MG Take 1 Tab by mouth at bedtime as needed for Sleep.        ALPRAZolam (Tab) XANAX 1 MG Take 1 Tab by mouth 2 times a day as needed for Anxiety.        Aspirin (Chew Tab) ASA 81 MG Take 81 mg by mouth every day.        Ergocalciferol (Cap) DRISDOL 03277 UNITS Take 1 Cap by mouth every 7 days.        GlipiZIDE (TABLET SR 24 HR) GLUCOTROL 2.5 MG Take 1 Tab by mouth every day.        Glucose Blood (Strip) ONE TOUCH ULTRA TEST          Hydrocodone-Acetaminophen (Tab) Hydrocodone-Acetaminophen 5-300 MG Take 1-1.5 Tabs by mouth 3 times a day as needed.        Lisinopril (Tab) PRINIVIL 2.5 MG Take 1 Tab  by mouth every day.        MetFORMIN HCl (Tab) GLUCOPHAGE 1000 MG TAKE ONE TABLET BY MOUTH TWICE DAILY WITH MEALS        Multiple Vitamins-Minerals (Tab) THERAGRAN-M  Take 1 Tab by mouth every day.        Omega-3 Fatty Acids   Take  by mouth.        Rosuvastatin Calcium (Tab) CRESTOR 20 MG Take 1 Tab by mouth every evening.        TraZODone HCl (Tab) DESYREL 50 MG Take 1 Tab by mouth at bedtime as needed for Sleep (as needed for sleep).        Venlafaxine HCl (CAPSULE SR 24 HR) EFFEXOR- MG Take 1 Cap by mouth every day. Take in addition to 37.5mg. Dose is 187.5mg.        Venlafaxine HCl (CAPSULE SR 24 HR) EFFEXOR XR 75 MG Take 1 Cap by mouth every day. To be taken along with 150 mg. Total dose - 225 mg.        .                 Medicines prescribed today were sent to:     [facility] [address] [place] [address], [place] - [id number] [facility] [place] [address]    Phone: [phone] Fax: [phone]    Open 24 Hours?: No      Medication refill instructions:       If your prescription bottle indicates you have medication refills left, it is not necessary to call your provider’s office. Please contact your pharmacy and they will refill your medication.    If your prescription bottle indicates you do not have any refills left, you may request refills at any time through one of the following ways: The online eTipping system (except Urgent Care), by calling your provider’s office, or by asking your pharmacy to contact your provider’s office with a refill request. Medication refills are processed only during regular business hours and may not be available until the next business day. Your provider may request additional information or to have a follow-up visit with you prior to refilling your medication.   *Please Note: Medication refills are assigned a new Rx number when refilled electronically. Your pharmacy may indicate that no refills were authorized even though a new prescription for the same medication is  available at the pharmacy. Please request the medicine by name with the pharmacy before contacting your provider for a refill.        Instructions    - Increase Effexor to 225 mg. Take 1 capsule of 150 mg and 2 capsules of 37.5 mg daily. When you pick a refill, take 1 capsule of 150 mg and 1 capsule of 75 mg daily.  - Continue Trazodone 50 mg at bedtime as needed for sleep.  - Decreased Xanax to 1 mg twice daily as needed and 0.25 mg daily as needed for anxiety. Try not to take 0.25 mg tablet for sleep. Refill your Xanax only when you are left with 2 days supply left.           MyChart Status: Patient Declined

## 2017-01-26 NOTE — PATIENT INSTRUCTIONS
- Increase Effexor to 225 mg. Take 1 capsule of 150 mg and 2 capsules of 37.5 mg daily. When you pick a refill, take 1 capsule of 150 mg and 1 capsule of 75 mg daily.  - Continue Trazodone 50 mg at bedtime as needed for sleep.  - Decrease Xanax to 1 mg twice daily as needed and 0.25 mg daily as needed for anxiety. Try not to take 0.25 mg tablet for sleep. Refill your Xanax only when you are left with 2 days supply left.

## 2017-01-31 ENCOUNTER — PATIENT OUTREACH (OUTPATIENT)
Dept: HEALTH INFORMATION MANAGEMENT | Facility: OTHER | Age: 72
End: 2017-01-31

## 2017-01-31 NOTE — PROGRESS NOTES
Received referral from PCP indicating pt maybe interested in Home-Delivered Meals. Outreach call to pt to introduce self and discuss pt's identified needs. Pt did not answer. VM left requesting pt to call LSW back.     Plan:   · LSW will attempt to reach pt at later time/date.

## 2017-02-03 ENCOUNTER — PATIENT OUTREACH (OUTPATIENT)
Dept: HEALTH INFORMATION MANAGEMENT | Facility: OTHER | Age: 72
End: 2017-02-03

## 2017-02-03 NOTE — PROGRESS NOTES
Received referral from PCP indicating pt maybe interested in Home-Delivered Meals. 2nd attempt to reach pt to introduce self and discuss pt's identified needs, unsuccessful. Pt did not answer. VM left requesting pt to call LSW back.     Plan:  · Will attempt to reach pt at a later time/date.

## 2017-02-07 ENCOUNTER — PATIENT OUTREACH (OUTPATIENT)
Dept: HEALTH INFORMATION MANAGEMENT | Facility: OTHER | Age: 72
End: 2017-02-07

## 2017-02-07 NOTE — Clinical Note
February 7, 2017        Rosangela Burnette  [address] [place] [address]        Dear Rosangela:    Your primary care provider, YAYA Oreilly, placed a referral to  indicating you may have interest in Home-Delivered Meal program. Previous attempts to reach you via phone have been unsuccessful.     If you would like assistance or more information regarding this resources or others please contact the office. You may also contact [facility] (agency that hosts Home-Delivered Meals program) directly at [phone].    With this letter you fill find additional information and resources attached.     If you have any questions or concerns, please don't hesitate to call, [phone].        Sincerely,        BARBARA Mckeon, MSW    Electronically Signed

## 2017-02-07 NOTE — PROGRESS NOTES
Received referral from PCP indicating pt maybe interested in Home-Delivered Meals. 3rd attempt to reach pt to introduce self and discuss pt's identified needs, unsuccessful. Pt did not answer. VM left requesting pt to call LSW back.     Plan:  · Final attempt made to reach pt. Letter mailed to pt to offer services. At this time referral will be closed.

## 2017-02-09 ENCOUNTER — HOSPITAL ENCOUNTER (OUTPATIENT)
Dept: LAB | Facility: MEDICAL CENTER | Age: 72
End: 2017-02-09
Attending: NURSE PRACTITIONER
Payer: MEDICARE

## 2017-02-09 ENCOUNTER — HOSPITAL ENCOUNTER (OUTPATIENT)
Dept: LAB | Facility: MEDICAL CENTER | Age: 72
End: 2017-02-09
Attending: PSYCHIATRY & NEUROLOGY
Payer: MEDICARE

## 2017-02-09 DIAGNOSIS — G89.29 CHRONIC LEFT SHOULDER PAIN: ICD-10-CM

## 2017-02-09 DIAGNOSIS — F11.20 OPIOID TYPE DEPENDENCE, CONTINUOUS (HCC): ICD-10-CM

## 2017-02-09 DIAGNOSIS — G89.29 CHRONIC BACK PAIN: ICD-10-CM

## 2017-02-09 DIAGNOSIS — F13.20: ICD-10-CM

## 2017-02-09 DIAGNOSIS — E11.65 TYPE 2 DIABETES MELLITUS WITH HYPERGLYCEMIA (HCC): ICD-10-CM

## 2017-02-09 DIAGNOSIS — M25.512 CHRONIC LEFT SHOULDER PAIN: ICD-10-CM

## 2017-02-09 DIAGNOSIS — F41.1 GAD (GENERALIZED ANXIETY DISORDER): ICD-10-CM

## 2017-02-09 DIAGNOSIS — M54.9 CHRONIC BACK PAIN: ICD-10-CM

## 2017-02-09 DIAGNOSIS — E11.65 TYPE 2 DIABETES MELLITUS WITH HYPERGLYCEMIA, WITHOUT LONG-TERM CURRENT USE OF INSULIN (HCC): ICD-10-CM

## 2017-02-09 LAB
25(OH)D3 SERPL-MCNC: 42 NG/ML (ref 30–100)
ALBUMIN SERPL BCP-MCNC: 3.9 G/DL (ref 3.2–4.9)
ALBUMIN/GLOB SERPL: 1.3 G/DL
ALP SERPL-CCNC: 42 U/L (ref 30–99)
ALT SERPL-CCNC: 26 U/L (ref 2–50)
ANION GAP SERPL CALC-SCNC: 6 MMOL/L (ref 0–11.9)
AST SERPL-CCNC: 22 U/L (ref 12–45)
BILIRUB SERPL-MCNC: 0.4 MG/DL (ref 0.1–1.5)
BUN SERPL-MCNC: 12 MG/DL (ref 8–22)
CALCIUM SERPL-MCNC: 9.1 MG/DL (ref 8.5–10.5)
CHLORIDE SERPL-SCNC: 105 MMOL/L (ref 96–112)
CHOLEST SERPL-MCNC: 109 MG/DL (ref 100–199)
CO2 SERPL-SCNC: 27 MMOL/L (ref 20–33)
CREAT SERPL-MCNC: 1.01 MG/DL (ref 0.5–1.4)
CREAT UR-MCNC: 61.5 MG/DL
EST. AVERAGE GLUCOSE BLD GHB EST-MCNC: 151 MG/DL
GLOBULIN SER CALC-MCNC: 3.1 G/DL (ref 1.9–3.5)
GLUCOSE SERPL-MCNC: 126 MG/DL (ref 65–99)
HBA1C MFR BLD: 6.9 % (ref 0–5.6)
HDLC SERPL-MCNC: 36 MG/DL
LDLC SERPL CALC-MCNC: 51 MG/DL
MICROALBUMIN UR-MCNC: <0.7 MG/DL
MICROALBUMIN/CREAT UR: NORMAL MG/G (ref 0–30)
POTASSIUM SERPL-SCNC: 4.6 MMOL/L (ref 3.6–5.5)
PROT SERPL-MCNC: 7 G/DL (ref 6–8.2)
SODIUM SERPL-SCNC: 138 MMOL/L (ref 135–145)
TRIGL SERPL-MCNC: 108 MG/DL (ref 0–149)

## 2017-02-09 PROCEDURE — 80053 COMPREHEN METABOLIC PANEL: CPT

## 2017-02-09 PROCEDURE — 80061 LIPID PANEL: CPT

## 2017-02-09 PROCEDURE — 36415 COLL VENOUS BLD VENIPUNCTURE: CPT

## 2017-02-09 PROCEDURE — 82570 ASSAY OF URINE CREATININE: CPT

## 2017-02-09 PROCEDURE — 82043 UR ALBUMIN QUANTITATIVE: CPT

## 2017-02-09 PROCEDURE — 82306 VITAMIN D 25 HYDROXY: CPT

## 2017-02-09 PROCEDURE — 83036 HEMOGLOBIN GLYCOSYLATED A1C: CPT

## 2017-02-14 ENCOUNTER — HOSPITAL ENCOUNTER (OUTPATIENT)
Dept: RADIOLOGY | Facility: MEDICAL CENTER | Age: 72
End: 2017-02-14
Attending: PSYCHIATRY & NEUROLOGY
Payer: MEDICARE

## 2017-02-14 DIAGNOSIS — H53.9 UNSPECIFIED VISUAL DISTURBANCE: ICD-10-CM

## 2017-02-14 PROCEDURE — 700117 HCHG RX CONTRAST REV CODE 255: Performed by: PSYCHIATRY & NEUROLOGY

## 2017-02-14 PROCEDURE — 70553 MRI BRAIN STEM W/O & W/DYE: CPT

## 2017-02-14 PROCEDURE — A9577 INJ MULTIHANCE: HCPCS | Performed by: PSYCHIATRY & NEUROLOGY

## 2017-02-14 RX ADMIN — GADOBENATE DIMEGLUMINE 10 ML: 529 INJECTION, SOLUTION INTRAVENOUS at 15:22

## 2017-02-27 ENCOUNTER — TELEPHONE (OUTPATIENT)
Dept: RADIOLOGY | Facility: MEDICAL CENTER | Age: 72
End: 2017-02-27

## 2017-02-27 NOTE — TELEPHONE ENCOUNTER
Call placed to pt in f/u to MRI brain, ordered by Dr. Varela. She did not have MRA head yet- message left for pt to discuss. Will need MRA head to evaluate aneurysms.

## 2017-02-28 ENCOUNTER — APPOINTMENT (OUTPATIENT)
Dept: BEHAVIORAL HEALTH | Facility: PHYSICIAN GROUP | Age: 72
End: 2017-02-28
Payer: MEDICARE

## 2017-03-13 ENCOUNTER — TELEPHONE (OUTPATIENT)
Dept: MEDICAL GROUP | Facility: CLINIC | Age: 72
End: 2017-03-13

## 2017-03-13 NOTE — TELEPHONE ENCOUNTER
1. Caller Name: Rosangela Burnette                      Call Back Number: [phone] (home)       2. Message: pt called stating the day before yesterday she felt a small tickle now some light cough and usually z-pack takes care of it wants Katalina to send a Rx for z-pack to [facility] to treat her symptoms.     3. Patient approves office to leave a detailed voicemail/MyChart message: yes

## 2017-03-14 NOTE — TELEPHONE ENCOUNTER
Left message for pt that she needs to call [phone] scheduling dept for a visit due to office policy & making sure she receives the correct treatment.

## 2017-03-25 ENCOUNTER — HOSPITAL ENCOUNTER (OUTPATIENT)
Dept: RADIOLOGY | Facility: MEDICAL CENTER | Age: 72
End: 2017-03-25
Attending: NURSE PRACTITIONER
Payer: MEDICARE

## 2017-03-25 DIAGNOSIS — I67.1 CEREBRAL ANEURYSM, NONRUPTURED: ICD-10-CM

## 2017-03-25 PROCEDURE — 70544 MR ANGIOGRAPHY HEAD W/O DYE: CPT

## 2017-03-29 ENCOUNTER — OFFICE VISIT (OUTPATIENT)
Dept: URGENT CARE | Facility: CLINIC | Age: 72
End: 2017-03-29
Payer: MEDICARE

## 2017-03-29 ENCOUNTER — APPOINTMENT (OUTPATIENT)
Dept: RADIOLOGY | Facility: MEDICAL CENTER | Age: 72
DRG: 390 | End: 2017-03-29
Attending: EMERGENCY MEDICINE
Payer: MEDICARE

## 2017-03-29 ENCOUNTER — HOSPITAL ENCOUNTER (INPATIENT)
Facility: MEDICAL CENTER | Age: 72
LOS: 4 days | DRG: 390 | End: 2017-04-03
Attending: EMERGENCY MEDICINE | Admitting: HOSPITALIST
Payer: MEDICARE

## 2017-03-29 VITALS
SYSTOLIC BLOOD PRESSURE: 118 MMHG | RESPIRATION RATE: 14 BRPM | OXYGEN SATURATION: 95 % | BODY MASS INDEX: 26.99 KG/M2 | HEIGHT: 65 IN | HEART RATE: 100 BPM | TEMPERATURE: 98.5 F | DIASTOLIC BLOOD PRESSURE: 64 MMHG | WEIGHT: 162 LBS

## 2017-03-29 DIAGNOSIS — R11.2 NAUSEA AND VOMITING, INTRACTABILITY OF VOMITING NOT SPECIFIED, UNSPECIFIED VOMITING TYPE: ICD-10-CM

## 2017-03-29 DIAGNOSIS — R11.2 NON-INTRACTABLE VOMITING WITH NAUSEA, UNSPECIFIED VOMITING TYPE: ICD-10-CM

## 2017-03-29 DIAGNOSIS — R10.33 PERIUMBILICAL ABDOMINAL PAIN: ICD-10-CM

## 2017-03-29 DIAGNOSIS — R10.10 PAIN OF UPPER ABDOMEN: ICD-10-CM

## 2017-03-29 DIAGNOSIS — K56.609 SBO (SMALL BOWEL OBSTRUCTION) (HCC): ICD-10-CM

## 2017-03-29 DIAGNOSIS — D72.829 LEUKOCYTOSIS, UNSPECIFIED TYPE: ICD-10-CM

## 2017-03-29 DIAGNOSIS — R73.9 HYPERGLYCEMIA: ICD-10-CM

## 2017-03-29 LAB
ALBUMIN SERPL BCP-MCNC: 4.5 G/DL (ref 3.2–4.9)
ALBUMIN/GLOB SERPL: 1.3 G/DL
ALP SERPL-CCNC: 44 U/L (ref 30–99)
ALT SERPL-CCNC: 28 U/L (ref 2–50)
ANION GAP SERPL CALC-SCNC: 10 MMOL/L (ref 0–11.9)
AST SERPL-CCNC: 22 U/L (ref 12–45)
BASOPHILS # BLD AUTO: 0.5 % (ref 0–1.8)
BASOPHILS # BLD: 0.08 K/UL (ref 0–0.12)
BILIRUB SERPL-MCNC: 0.3 MG/DL (ref 0.1–1.5)
BUN SERPL-MCNC: 19 MG/DL (ref 8–22)
CALCIUM SERPL-MCNC: 10 MG/DL (ref 8.5–10.5)
CHLORIDE SERPL-SCNC: 101 MMOL/L (ref 96–112)
CO2 SERPL-SCNC: 27 MMOL/L (ref 20–33)
CREAT SERPL-MCNC: 1.07 MG/DL (ref 0.5–1.4)
EOSINOPHIL # BLD AUTO: 0.09 K/UL (ref 0–0.51)
EOSINOPHIL NFR BLD: 0.6 % (ref 0–6.9)
ERYTHROCYTE [DISTWIDTH] IN BLOOD BY AUTOMATED COUNT: 48.5 FL (ref 35.9–50)
GFR SERPL CREATININE-BSD FRML MDRD: 50 ML/MIN/1.73 M 2
GLOBULIN SER CALC-MCNC: 3.6 G/DL (ref 1.9–3.5)
GLUCOSE SERPL-MCNC: 194 MG/DL (ref 65–99)
HCT VFR BLD AUTO: 40.3 % (ref 37–47)
HGB BLD-MCNC: 13.2 G/DL (ref 12–16)
IMM GRANULOCYTES # BLD AUTO: 0.09 K/UL (ref 0–0.11)
IMM GRANULOCYTES NFR BLD AUTO: 0.6 % (ref 0–0.9)
LIPASE SERPL-CCNC: 56 U/L (ref 11–82)
LYMPHOCYTES # BLD AUTO: 2.13 K/UL (ref 1–4.8)
LYMPHOCYTES NFR BLD: 13 % (ref 22–41)
MCH RBC QN AUTO: 30.7 PG (ref 27–33)
MCHC RBC AUTO-ENTMCNC: 32.8 G/DL (ref 33.6–35)
MCV RBC AUTO: 93.7 FL (ref 81.4–97.8)
MONOCYTES # BLD AUTO: 0.45 K/UL (ref 0–0.85)
MONOCYTES NFR BLD AUTO: 2.8 % (ref 0–13.4)
NEUTROPHILS # BLD AUTO: 13.51 K/UL (ref 2–7.15)
NEUTROPHILS NFR BLD: 82.5 % (ref 44–72)
NRBC # BLD AUTO: 0 K/UL
NRBC BLD AUTO-RTO: 0 /100 WBC
PLATELET # BLD AUTO: 277 K/UL (ref 164–446)
PMV BLD AUTO: 10.8 FL (ref 9–12.9)
POTASSIUM SERPL-SCNC: 4.8 MMOL/L (ref 3.6–5.5)
PROT SERPL-MCNC: 8.1 G/DL (ref 6–8.2)
RBC # BLD AUTO: 4.3 M/UL (ref 4.2–5.4)
SODIUM SERPL-SCNC: 138 MMOL/L (ref 135–145)
WBC # BLD AUTO: 16.4 K/UL (ref 4.8–10.8)

## 2017-03-29 PROCEDURE — 96361 HYDRATE IV INFUSION ADD-ON: CPT

## 2017-03-29 PROCEDURE — 99285 EMERGENCY DEPT VISIT HI MDM: CPT

## 2017-03-29 PROCEDURE — G8432 DEP SCR NOT DOC, RNG: HCPCS | Performed by: NURSE PRACTITIONER

## 2017-03-29 PROCEDURE — 85025 COMPLETE CBC W/AUTO DIFF WBC: CPT

## 2017-03-29 PROCEDURE — 83690 ASSAY OF LIPASE: CPT

## 2017-03-29 PROCEDURE — 96376 TX/PRO/DX INJ SAME DRUG ADON: CPT

## 2017-03-29 PROCEDURE — 74177 CT ABD & PELVIS W/CONTRAST: CPT

## 2017-03-29 PROCEDURE — 96374 THER/PROPH/DIAG INJ IV PUSH: CPT

## 2017-03-29 PROCEDURE — 700111 HCHG RX REV CODE 636 W/ 250 OVERRIDE (IP): Performed by: EMERGENCY MEDICINE

## 2017-03-29 PROCEDURE — G8482 FLU IMMUNIZE ORDER/ADMIN: HCPCS | Performed by: NURSE PRACTITIONER

## 2017-03-29 PROCEDURE — G8417 CALC BMI ABV UP PARAM F/U: HCPCS | Performed by: NURSE PRACTITIONER

## 2017-03-29 PROCEDURE — 4040F PNEUMOC VAC/ADMIN/RCVD: CPT | Performed by: NURSE PRACTITIONER

## 2017-03-29 PROCEDURE — 80053 COMPREHEN METABOLIC PANEL: CPT

## 2017-03-29 PROCEDURE — 3017F COLORECTAL CA SCREEN DOC REV: CPT | Mod: 8P | Performed by: NURSE PRACTITIONER

## 2017-03-29 PROCEDURE — 1101F PT FALLS ASSESS-DOCD LE1/YR: CPT | Performed by: NURSE PRACTITIONER

## 2017-03-29 PROCEDURE — 3014F SCREEN MAMMO DOC REV: CPT | Performed by: NURSE PRACTITIONER

## 2017-03-29 PROCEDURE — 99214 OFFICE O/P EST MOD 30 MIN: CPT | Performed by: NURSE PRACTITIONER

## 2017-03-29 PROCEDURE — 700117 HCHG RX CONTRAST REV CODE 255: Performed by: EMERGENCY MEDICINE

## 2017-03-29 PROCEDURE — 1036F TOBACCO NON-USER: CPT | Performed by: NURSE PRACTITIONER

## 2017-03-29 RX ORDER — SODIUM CHLORIDE, SODIUM LACTATE, POTASSIUM CHLORIDE, CALCIUM CHLORIDE 600; 310; 30; 20 MG/100ML; MG/100ML; MG/100ML; MG/100ML
1000 INJECTION, SOLUTION INTRAVENOUS ONCE
Status: COMPLETED | OUTPATIENT
Start: 2017-03-29 | End: 2017-03-29

## 2017-03-29 RX ORDER — ONDANSETRON 4 MG/1
4 TABLET, ORALLY DISINTEGRATING ORAL ONCE
Status: COMPLETED | OUTPATIENT
Start: 2017-03-29 | End: 2017-03-29

## 2017-03-29 RX ADMIN — SODIUM CHLORIDE, POTASSIUM CHLORIDE, SODIUM LACTATE AND CALCIUM CHLORIDE 1000 ML: 600; 310; 30; 20 INJECTION, SOLUTION INTRAVENOUS at 21:49

## 2017-03-29 RX ADMIN — ONDANSETRON 4 MG: 4 TABLET, ORALLY DISINTEGRATING ORAL at 19:05

## 2017-03-29 RX ADMIN — IOHEXOL 100 ML: 350 INJECTION, SOLUTION INTRAVENOUS at 23:48

## 2017-03-29 RX ADMIN — HYDROMORPHONE HYDROCHLORIDE 0.5 MG: 1 INJECTION, SOLUTION INTRAMUSCULAR; INTRAVENOUS; SUBCUTANEOUS at 22:59

## 2017-03-29 RX ADMIN — HYDROMORPHONE HYDROCHLORIDE 0.5 MG: 1 INJECTION, SOLUTION INTRAMUSCULAR; INTRAVENOUS; SUBCUTANEOUS at 21:49

## 2017-03-29 ASSESSMENT — ENCOUNTER SYMPTOMS
VOMITING: 1
MYALGIAS: 0
CONSTIPATION: 0
FEVER: 0
ABDOMINAL PAIN: 1
BELCHING: 0
BRUISES/BLEEDS EASILY: 0
HEADACHES: 1
NAUSEA: 1
DIARRHEA: 0
FLATUS: 0
CHILLS: 0

## 2017-03-29 ASSESSMENT — PAIN SCALES - GENERAL: PAINLEVEL_OUTOF10: 6

## 2017-03-29 ASSESSMENT — CROHNS DISEASE ACTIVITY INDEX (CDAI): CDAI SCORE: 0

## 2017-03-29 NOTE — MR AVS SNAPSHOT
"        Rosangela Burnette   3/29/2017 5:45 PM   Office Visit   MRN: [id number]    Department:  [facility]   Dept Phone:  [phone]    Description:  Female : 1945   Provider:  JULIAN Alvarado           Reason for Visit     Other stomach pain since this morning feeling nauseous.      Allergies as of 3/29/2017     Allergen Noted Reactions    Keflex 2009         You were diagnosed with     Nausea and vomiting, intractability of vomiting not specified, unspecified vomiting type   [3775726]       Pain of upper abdomen   [116332]         Vital Signs     Blood Pressure Pulse Temperature Respirations Height Weight    118/64 mmHg 100 36.9 °C (98.5 °F) 14 1.651 m (5' 5\") 73.483 kg (162 lb)    Body Mass Index Oxygen Saturation Smoking Status             26.96 kg/m2 95% Former Smoker         Basic Information     Date Of Birth Sex Race Ethnicity Preferred Language    1945 Female White Non- English      Your appointments     2017 11:00 AM   Established Patient with PILI Humphries   [address]  [facility] [address] [phone]           You will be receiving a confirmation call a few days before your appointment from our automated call confirmation system.            2017 12:00 PM   Follow Up Med Management with Angelica Ogden M.D.   [facility] [address] [place] [address])    [address] [place] [address]  [address]  [facility] [address]   [phone]              Problem List              ICD-10-CM Priority Class Noted - Resolved    History of subarachnoid hemorrhage Z86.79 Low  2012 - Present    Type 2 diabetes mellitus with hyperglycemia (CMS-HCC) E11.65 Medium  2012 - Present    History of atrial fibrillation without current medication Z86.79 Low  2012 - Present    Cerebral aneurysm, nonruptured  s/p coiling I67.1 High  2014 - Present    Chronic left shoulder pain M25.512, G89.29 Low  2016 - Present   "    Insomnia G47.00 Low  1/11/2016 - Present    Neck muscle spasm M62.838 Low  1/11/2016 - Present    Chronic back pain M54.9, G89.29 Low  3/9/2016 - Present    Chronic, continuous use of opioids  ORT=5 F11.90 Low  8/24/2016 - Present    Sedative, 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 Low  8/24/2016 - Present    Major depressive disorder, recurrent episode, moderate (CMS-HCC) F33.1 Low  9/29/2016 - Present    Anxiety disorder F41.9 Low  9/29/2016 - Present    Hyperlipidemia associated with type 2 diabetes mellitus (CMS-HCC) E11.69, E78.5 Medium  10/11/2016 - Present    Mild benzodiazepine use disorder F13.10   10/27/2016 - Present    Depressive disorder F32.9   10/27/2016 - Present      Health Maintenance        Date Due Completion Dates    DIABETES MONOFILAMENT / LE EXAM 1945 ---    IMM ZOSTER VACCINE 6/1/2004 ---    IMM PNEUMOCOCCAL 65+ (ADULT) LOW/MEDIUM RISK SERIES (2 of 2 - PCV13) 12/16/2015 12/16/2014, 7/25/2013    MAMMOGRAM 12/29/2016 12/29/2015, 9/25/2014, 9/24/2014, 9/24/2014, 10/25/2011    COLON CANCER SCREENING ANNUAL FIT 1/11/2017 1/11/2016 (Postponed)    Override on 1/11/2016: Postponed    A1C SCREENING 8/9/2017 2/9/2017, 6/2/2016, 3/9/2016, 11/3/2015, 10/12/2015, 8/10/2015, 5/8/2015, 2/11/2015, 11/10/2014, 8/8/2014, 6/16/2014, 4/15/2014, 2/12/2014, 11/18/2013, 9/9/2013, 6/24/2013, 3/18/2013, 12/27/2012, 10/3/2012, 9/6/2012, 8/13/2012, 3/9/2012    RETINAL SCREENING 10/24/2017 10/24/2016, 6/2/2016    FASTING LIPID PROFILE 2/9/2018 2/9/2017, 11/3/2015, 10/12/2015, 8/10/2015, 5/8/2015, 2/11/2015, 11/10/2014, 8/8/2014, 6/16/2014, 4/15/2014, 12/27/2012, 10/3/2012, 9/6/2012, 3/9/2012, 9/9/2011    URINE ACR / MICROALBUMIN 2/9/2018 2/9/2017, 3/9/2012    SERUM CREATININE 2/9/2018 2/9/2017, 11/3/2015, 8/10/2015, 7/10/2015, 7/7/2015, 5/8/2015, 2/11/2015, 11/10/2014, 9/11/2014, 8/8/2014, 6/16/2014, 2/27/2014, 2/21/2014, 9/9/2013, 7/26/2013, 7/25/2013, 7/24/2013, 7/16/2013, 7/5/2013, 9/6/2012,  8/23/2012, 8/22/2012, 8/21/2012, 8/20/2012, 8/19/2012, 8/18/2012, 8/17/2012, 8/16/2012, 8/15/2012, 8/14/2012, 8/13/2012, 8/12/2012, 9/9/2011    BONE DENSITY 9/16/2021 9/16/2016, 1/1/2006 (Done)    Override on 1/1/2006: Done (nl)    IMM DTaP/Tdap/Td Vaccine (2 - Td) 9/15/2023 9/15/2013            Current Immunizations     Influenza TIV (IM) 1/18/2011    Influenza Vaccine Adult HD 10/3/2016, 11/7/2015    Pneumococcal Vaccine (UF)Historical Data 8/18/1970    Pneumococcal polysaccharide vaccine (PPSV-23) 12/16/2014, 7/25/2013  9:30 AM    Tdap Vaccine 9/15/2013  8:00 PM    Tuberculin Skin Test 6/25/2014      Below and/or attached are the medications your provider expects you to take. Review all of your home medications and newly ordered medications with your provider and/or pharmacist. Follow medication instructions as directed by your provider and/or pharmacist. Please keep your medication list with you and share with your provider. Update the information when medications are discontinued, doses are changed, or new medications (including over-the-counter products) are added; and carry medication information at all times in the event of emergency situations     Allergies:  KEFLEX - (reactions not documented)               Medications  Valid as of: March 29, 2017 -  7:08 PM    Generic Name Brand Name Tablet Size Instructions for use    ALPRAZolam (Tab) XANAX 0.25 MG Take 1 Tab by mouth at bedtime as needed for Sleep.        ALPRAZolam (Tab) XANAX 1 MG Take 1 Tab by mouth 2 times a day as needed for Anxiety.        Aspirin (Chew Tab) ASA 81 MG Take 81 mg by mouth every day.        Ergocalciferol (Cap) DRISDOL 56033 UNITS Take 1 Cap by mouth every 7 days.        GlipiZIDE (TABLET SR 24 HR) GLUCOTROL 2.5 MG Take 1 Tab by mouth every day.        Glucose Blood (Strip) ONE TOUCH ULTRA TEST          Hydrocodone-Acetaminophen (Tab) Hydrocodone-Acetaminophen 5-300 MG Take 1-1.5 Tabs by mouth 3 times a day as needed.         hyoscyamine-maalox plus-lidocaine viscous (GI COCKTAIL) GI COCKTAIL  Take 30 mL by mouth Once for 1 dose.        Lisinopril (Tab) PRINIVIL 2.5 MG Take 1 Tab by mouth every day.        MetFORMIN HCl (Tab) GLUCOPHAGE 1000 MG TAKE ONE TABLET BY MOUTH TWICE DAILY WITH MEALS        Multiple Vitamins-Minerals (Tab) THERAGRAN-M  Take 1 Tab by mouth every day.        Omega-3 Fatty Acids   Take  by mouth.        Rosuvastatin Calcium (Tab) CRESTOR 20 MG Take 1 Tab by mouth every evening.        TraZODone HCl (Tab) DESYREL 50 MG Take 1 Tab by mouth at bedtime as needed for Sleep (as needed for sleep).        Venlafaxine HCl (CAPSULE SR 24 HR) EFFEXOR- MG Take 1 Cap by mouth every day. Take in addition to 37.5mg. Dose is 187.5mg.        Venlafaxine HCl (CAPSULE SR 24 HR) EFFEXOR XR 75 MG Take 1 Cap by mouth every day. To be taken along with 150 mg. Total dose - 225 mg.        .                 Medicines prescribed today were sent to:     [id number] - [place], [place] - [address] [place] [address]    Phone: [phone] Fax: [phone]    Open 24 Hours?: No      Medication refill instructions:       If your prescription bottle indicates you have medication refills left, it is not necessary to call your provider’s office. Please contact your pharmacy and they will refill your medication.    If your prescription bottle indicates you do not have any refills left, you may request refills at any time through one of the following ways: The online [employer] system (except Urgent Care), by calling your provider’s office, or by asking your pharmacy to contact your provider’s office with a refill request. Medication refills are processed only during regular business hours and may not be available until the next business day. Your provider may request additional information or to have a follow-up visit with you prior to refilling your medication.   *Please Note: Medication refills are assigned a new Rx  number when refilled electronically. Your pharmacy may indicate that no refills were authorized even though a new prescription for the same medication is available at the pharmacy. Please request the medicine by name with the pharmacy before contacting your provider for a refill.           [employer] Access Code: [email or web address]  Expires: 4/28/2017  5:38 PM    [employer]  A secure, online tool to manage your health information     [employer]’s [employer]® is a secure, online tool that connects you to your personalized health information from the privacy of your home -- day or night - making it very easy for you to manage your healthcare. Once the activation process is completed, you can even access your medical information using the H[employer] Space yevgeniy, which is available for free in the Apple Yevgeniy store or Google Play store.     H[employer] Space provides the following levels of access (as shown below):   My Chart Features   [facility] Primary Care Doctor [facility]  Specialists [facility]  Urgent  Care Non-[facility]  Primary Care  Doctor   Email your healthcare team securely and privately 24/7 X X X    Manage appointments: schedule your next appointment; view details of past/upcoming appointments X      Request prescription refills. X      View recent personal medical records, including lab and immunizations X X X X   View health record, including health history, allergies, medications X X X X   Read reports about your outpatient visits, procedures, consult and ER notes X X X X   See your discharge summary, which is a recap of your hospital and/or ER visit that includes your diagnosis, lab results, and care plan. X X       How to register for H[employer]:  1. Go to  [email or web address].  2. Click on the Sign Up Now box, which takes you to the New Member Sign Up page. You will need to provide the following information:  a. Enter your [employer] Access Code exactly as it appears at the top of this page. (You will not need to use this code after you’ve  completed the sign-up process. If you do not sign up before the expiration date, you must request a new code.)   b. Enter your date of birth.   c. Enter your home email address.   d. Click Submit, and follow the next screen’s instructions.  3. Create a [employer]t ID. This will be your [employer]t login ID and cannot be changed, so think of one that is secure and easy to remember.  4. Create a [employer] password. You can change your password at any time.  5. Enter your Password Reset Question and Answer. This can be used at a later time if you forget your password.   6. Enter your e-mail address. This allows you to receive e-mail notifications when new information is available in [employer].  7. Click Sign Up. You can now view your health information.    For assistance activating your [employer] account, call [phone]

## 2017-03-29 NOTE — IP AVS SNAPSHOT
" Home Care Instructions                                                                                                                  Name:Rosangela Burnette  Medical Record Number:[id number]  CSN: [id number]    YOB: 1945   Age: 71 y.o.  Sex: female  HT:1.651 m (5' 5\") WT: 73.483 kg (162 lb)          Admit Date: 3/29/2017     Discharge Date:   Today's Date: 4/3/2017  Attending Doctor:  Rachele Drew M.D.                  Allergies:  Keflex            Discharge Instructions       Small Bowel Obstruction  A small bowel obstruction means something is blocking the small intestine. The small intestine is the long tube that connects the stomach to the colon. Treatment depends on what is causing the problem. Treatment also depends on how bad the problem is.  HOME CARE  Your doctor may let you go home if your small bowel is not completely blocked.  · Rest.  · Follow your diet as told by your doctor.  · Only drink clear liquids until you start to get better.  · Avoid solid foods as told by your doctor.  · Only take medicine as told by your doctor.  GET HELP RIGHT AWAY IF:  1. You have pain or cramps that get worse.  2. You throw up (vomit) blood.  3. You feel sick to your stomach (nauseous), or you cannot stop throwing up.  4. You cannot drink fluids.  5. You feel confused.  6. You feel dry or thirsty (dehydrated).  7. Your belly gets more puffy (bloated).  8. You have chills.  9. You have a fever.  10. You feel weak, or you pass out (faint).  MAKE SURE YOU:  1. Understand these instructions.  2. Will watch your condition.  3. Will get help right away if you are not doing well or get worse.     This information is not intended to replace advice given to you by your health care provider. Make sure you discuss any questions you have with your health care provider.     Document Released: 01/25/2006 Document Revised: 01/08/2016 Document Reviewed: 02/11/2016  Elsevier Interactive Patient Education ©2016 Elsevier " Inc.          Discharge Instructions    Discharged to home by car with relative. Discharged via wheelchair, hospital escort: Yes.  Special equipment needed: Not Applicable    Be sure to schedule a follow-up appointment with your primary care doctor or any specialists as instructed.     Discharge Plan:   Pneumococcal Vaccine Given - only chart on this line when given: Given (See MAR)  Influenza Vaccine Indication: Not indicated: Previously immunized this influenza season and > 8 years of age    I understand that a diet low in cholesterol, fat, and sodium is recommended for good health. Unless I have been given specific instructions below for another diet, I accept this instruction as my diet prescription.   Other diet: Diabetic    Special Instructions: None    · Is patient discharged on Warfarin / Coumadin?   No     · Is patient Post Blood Transfusion?  No    Depression / Suicide Risk    As you are discharged from this [facility] facility, it is important to learn how to keep safe from harming yourself.    Recognize the warning signs:  · Abrupt changes in personality, positive or negative- including increase in energy   · Giving away possessions  · Change in eating patterns- significant weight changes-  positive or negative  · Change in sleeping patterns- unable to sleep or sleeping all the time   · Unwillingness or inability to communicate  · Depression  · Unusual sadness, discouragement and loneliness  · Talk of wanting to die  · Neglect of personal appearance   · Rebelliousness- reckless behavior  · Withdrawal from people/activities they love  · Confusion- inability to concentrate     If you or a loved one observes any of these behaviors or has concerns about self-harm, here's what you can do:  · Talk about it- your feelings and reasons for harming yourself  · Remove any means that you might use to hurt yourself (examples: pills, rope, extension cords, firearm)  · Get professional help from the community  (Mental Health, Substance Abuse, psychological counseling)  · Do not be alone:Call your Safe Contact- someone whom you trust who will be there for you.  · Call your local CRISIS HOTLINE 697-3926 or 138-356-9521  · Call your local Children's Mobile Crisis Response Team [place] (314) 819-0818 or www.[employer]  · Call the toll free National Suicide Prevention Hotlines   · National Suicide Prevention Lifeline 993-361-LIZX (4809)  · [employer] Hope Line Network 800-SUICIDE (262-8532)          Your appointments     Apr 11, 2017 11:00 AM   Established Patient with PILI Humphries   [facility])    [address] [place] [address] [address]  [facility] [id number] [phone]           You will be receiving a confirmation call a few days before your appointment from our automated call confirmation system.            Apr 26, 2017 12:00 PM   Follow Up Med Management with Angelica Ogden M.D.   BEHAVIORAL HEALTH [address] [place] [address])    [address] [place] [address]   [phone]                 Discharge Medication Instructions:    Below are the medications your physician expects you to take upon discharge:    Review all your home medications and newly ordered medications with your doctor and/or pharmacist. Follow medication instructions as directed by your doctor and/or pharmacist.    Please keep your medication list with you and share with your physician.               Medication List      START taking these medications        Instructions    senna-docusate 8.6-50 MG Tabs   Last time this was given:  2 Tabs on 4/3/2017  8:45 AM   Commonly known as:  PERICOLACE or SENOKOT S   Next Dose Due:  4/3/17 PM    Take 2 Tabs by mouth every day.   Dose:  2 Tab         CONTINUE taking these medications        Instructions    * alprazolam 0.25 MG Tabs   Last time this was given:  1 mg on 4/3/2017  8:43 AM   Commonly known as:  XANAX    Take 1 Tab by mouth at bedtime as needed for Sleep.   Dose:  0.25  mg       * alprazolam 1 MG Tabs   Last time this was given:  1 mg on 4/3/2017  8:43 AM   Commonly known as:  XANAX    Take 1 Tab by mouth 2 times a day as needed for Anxiety.   Dose:  1 mg       aspirin 81 MG Chew chewable tablet   Commonly known as:  ASA   Next Dose Due:  Resume    Take 81 mg by mouth every day.   Dose:  81 mg       FISH OIL PO    Take  by mouth.       glipiZIDE SR 2.5 MG Tb24   Commonly known as:  GLUCOTROL   Next Dose Due:  Resume    Doctor's comments:  Decrease dose to 2.5! NOT MG   Take 1 Tab by mouth every day.   Dose:  2.5 mg       Hydrocodone-Acetaminophen 5-300 MG Tabs    Doctor's comments:  See 1/11/17 ok to fill 3/7/17   Take 1-1.5 Tabs by mouth 3 times a day as needed.   Dose:  1-1.5 Tab       lisinopril 2.5 MG Tabs   Commonly known as:  PRINIVIL   Next Dose Due:  Resume    Take 1 Tab by mouth every day.   Dose:  2.5 mg       metformin 1000 MG tablet   Commonly known as:  GLUCOPHAGE   Next Dose Due:  Resume    TAKE ONE TABLET BY MOUTH TWICE DAILY WITH MEALS       ONE TOUCH ULTRA TEST strip   Generic drug:  glucose blood        rosuvastatin 20 MG Tabs   Commonly known as:  CRESTOR   Next Dose Due:  Resume    Doctor's comments:  Cancel simvasatin   Take 1 Tab by mouth every evening.   Dose:  20 mg       therapeutic multivitamin-minerals Tabs   Next Dose Due:  Resume    Take 1 Tab by mouth every day.   Dose:  1 Tab       trazodone 50 MG Tabs   Commonly known as:  DESYREL    Take 1 Tab by mouth at bedtime as needed for Sleep (as needed for sleep).   Dose:  50 mg       * venlafaxine 150 MG extended-release capsule   Commonly known as:  EFFEXOR XR   Next Dose Due:  Resume    Take 1 Cap by mouth every day. Take in addition to 37.5mg. Dose is 187.5mg.   Dose:  150 mg       * venlafaxine XR 75 MG Cp24   Commonly known as:  EFFEXOR XR    Doctor's comments:  Dose change. Okay for an early refill.   Take 1 Cap by mouth every day. To be taken along with 150 mg. Total dose - 225 mg.   Dose:  75 mg        vitamin D (Ergocalciferol) 90461 UNITS Caps capsule   Commonly known as:  DRISDOL   Next Dose Due:  Resume    Take 1 Cap by mouth every 7 days.   Dose:  01416 Units       * Notice:  This list has 4 medication(s) that are the same as other medications prescribed for you. Read the directions carefully, and ask your doctor or other care provider to review them with you.      ASK your doctor about these medications        Instructions    hyoscyamine-maalox plus-lidocaine viscous   Commonly known as:  GI COCKTAIL   Ask about: Should I take this medication?    Take 30 mL by mouth Once for 1 dose.   Dose:  30 mL               Instructions           Diet / Nutrition:    Follow any diet instructions given to you by your doctor or the dietician, including how much salt (sodium) you are allowed each day.    If you are overweight, talk to your doctor about a weight reduction plan.    Activity:    Remain physically active following your doctor's instructions about exercise and activity.    Rest often.     Any time you become even a little tired or short of breath, SIT DOWN and rest.    Worsening Symptoms:    Report any of the following signs and symptoms to the doctor's office immediately:    *Pain of jaw, arm, or neck  *Chest pain not relieved by medication                               *Dizziness or loss of consciousness  *Difficulty breathing even when at rest   *More tired than usual                                       *Bleeding drainage or swelling of surgical site  *Swelling of feet, ankles, legs or stomach                 *Fever (>100ºF)  *Pink or blood tinged sputum  *Weight gain (3lbs/day or 5lbs /week)           *Shock from internal defibrillator (if applicable)  *Palpitations or irregular heartbeats                *Cool and/or numb extremities    Stroke Awareness    Common Risk Factors for Stroke include:    Age  Atrial Fibrillation  Carotid Artery Stenosis  Diabetes Mellitus  Excessive alcohol consumption  High  blood pressure  Overweight   Physical inactivity  Smoking    Warning signs and symptoms of a stroke include:    *Sudden numbness or weakness of the face, arm or leg (especially on one side of the body).  *Sudden confusion, trouble speaking or understanding.  *Sudden trouble seeing in one or both eyes.  *Sudden trouble walking, dizziness, loss of balance or coordination.Sudden severe headache with no known cause.    It is very important to get treatment quickly when a stroke occurs. If you experience any of the above warning signs, call 911 immediately.                   Disclaimer         Quit Smoking / Tobacco Use:    I understand the use of any tobacco products increases my chance of suffering from future heart disease or stroke and could cause other illnesses which may shorten my life. Quitting the use of tobacco products is the single most important thing I can do to improve my health. For further information on smoking / tobacco cessation call a Toll Free Quit Line at 1-533.329.2752 (*National Cancer [place]) or 1-951.380.6904 (American Lung Association) or you can access the web based program at www.[employer].org.    [place] Tobacco Users Help Line:  (616) 577-5297       Toll Free: 1-378.549.2519  Quit Tobacco Program [facility] (636)304-9532    Crisis Hotline:    [place] Crisis Hotline:  2-818-XGTLUGD or 1-961.695.1230    [place] Crisis Hotline:    1-354.193.3376 or 515-612-4699    Discharge Survey:   Thank you for choosing [facility]. We hope we did everything we could to make your hospital stay a pleasant one. You may be receiving a phone survey and we would appreciate your time and participation in answering the questions. Your input is very valuable to us in our efforts to improve our service to our patients and their families.        My signature on this form indicates that:    1. I have reviewed and understand the above information.  2. My questions regarding this information  have been answered to my satisfaction.  3. I have formulated a plan with my discharge nurse to obtain my prescribed medications for home.                  Disclaimer         __________________________________                     __________       ________                       Patient Signature                                                 Date                    Time

## 2017-03-29 NOTE — IP AVS SNAPSHOT
" <p align=\"LEFT\"><IMG SRC=\"//[email or web address]/blob$/[email or web address]\" alt=\"Image\" WIDTH=\"50%\" HEIGHT=\"200\" BORDER=\"\"></p>                   Name:Rosangela Burnette  Medical Record Number:[id number]  CSN: [id number]    YOB: 1945   Age: 71 y.o.  Sex: female  HT:1.651 m (5' 5\") WT: 73.483 kg (162 lb)          Admit Date: 3/29/2017     Discharge Date:   Today's Date: 4/3/2017  Attending Doctor:  Rachele Drew M.D.                  Allergies:  Keflex          Your appointments     Apr 11, 2017 11:00 AM   Established Patient with PILI Humphries   [address]  [facility] [id number] [phone]           You will be receiving a confirmation call a few days before your appointment from our automated call confirmation system.            Apr 26, 2017 12:00 PM   Follow Up Med Management with Angelica Ogden M.D.   BEHAVIORAL HEALTH [address] [place] [address])    [address] [place] [address]   [phone]                 Medication List      Take these Medications        Instructions    * alprazolam 0.25 MG Tabs   Commonly known as:  XANAX    Take 1 Tab by mouth at bedtime as needed for Sleep.   Dose:  0.25 mg       * alprazolam 1 MG Tabs   Commonly known as:  XANAX    Take 1 Tab by mouth 2 times a day as needed for Anxiety.   Dose:  1 mg       aspirin 81 MG Chew chewable tablet   Commonly known as:  ASA    Take 81 mg by mouth every day.   Dose:  81 mg       FISH OIL PO    Take  by mouth.       glipiZIDE SR 2.5 MG Tb24   Commonly known as:  GLUCOTROL    Doctor's comments:  Decrease dose to 2.5! NOT MG   Take 1 Tab by mouth every day.   Dose:  2.5 mg       Hydrocodone-Acetaminophen 5-300 MG Tabs    Doctor's comments:  See 1/11/17 ok to fill 3/7/17   Take 1-1.5 Tabs by mouth 3 times a day as needed.   Dose:  1-1.5 Tab       lisinopril 2.5 MG Tabs   Commonly known as:  PRINIVIL    Take 1 Tab by mouth every day.   Dose:  2.5 mg       metformin 1000 MG tablet   "   Commonly known as:  GLUCOPHAGE    TAKE ONE TABLET BY MOUTH TWICE DAILY WITH MEALS       ONE TOUCH ULTRA TEST strip   Generic drug:  glucose blood        rosuvastatin 20 MG Tabs   Commonly known as:  CRESTOR    Doctor's comments:  Cancel simvasatin   Take 1 Tab by mouth every evening.   Dose:  20 mg       senna-docusate 8.6-50 MG Tabs   Commonly known as:  PERICOLACE or SENOKOT S    Take 2 Tabs by mouth every day.   Dose:  2 Tab       therapeutic multivitamin-minerals Tabs    Take 1 Tab by mouth every day.   Dose:  1 Tab       trazodone 50 MG Tabs   Commonly known as:  DESYREL    Take 1 Tab by mouth at bedtime as needed for Sleep (as needed for sleep).   Dose:  50 mg       * venlafaxine 150 MG extended-release capsule   Commonly known as:  EFFEXOR XR    Take 1 Cap by mouth every day. Take in addition to 37.5mg. Dose is 187.5mg.   Dose:  150 mg       * venlafaxine XR 75 MG Cp24   Commonly known as:  EFFEXOR XR    Doctor's comments:  Dose change. Okay for an early refill.   Take 1 Cap by mouth every day. To be taken along with 150 mg. Total dose - 225 mg.   Dose:  75 mg       vitamin D (Ergocalciferol) 22434 UNITS Caps capsule   Commonly known as:  DRISDOL    Take 1 Cap by mouth every 7 days.   Dose:  50556 Units       * Notice:  This list has 4 medication(s) that are the same as other medications prescribed for you. Read the directions carefully, and ask your doctor or other care provider to review them with you.      Ask your Physician about these medications        Instructions    hyoscyamine-maalox plus-lidocaine viscous   Commonly known as:  GI COCKTAIL   Ask about: Should I take this medication?    Take 30 mL by mouth Once for 1 dose.   Dose:  30 mL

## 2017-03-29 NOTE — IP AVS SNAPSHOT
4/3/2017          Rosangela Burnette  [address] [place] [address]  [place] [address]    Dear Rosangela:    Critical access hospital wants to ensure your discharge home is safe and you or your loved ones have had all your questions answered regarding your care after you leave the hospital.    You may receive a telephone call within two days of your discharge.  This call is to make certain you understand your discharge instructions as well as ensure we provided you with the best care possible during your stay with us.     The call will only last approximately 3-5 minutes and will be done by a nurse.    Once again, we want to ensure your discharge home is safe and that you have a clear understanding of any next steps in your care.  If you have any questions or concerns, please do not hesitate to contact us, we are here for you.  Thank you for choosing [facility] for your healthcare needs.    Sincerely,    Danny Vega    [facility]

## 2017-03-29 NOTE — IP AVS SNAPSHOT
[employer] Access Code: [email or web address]  Expires: 4/28/2017  5:38 PM    Manipal Ac[employer]ova  A secure, online tool to manage your health information     [employer]’s Manipal Acunova® is a secure, online tool that connects you to your personalized health information from the privacy of your home -- day or night - making it very easy for you to manage your healthcare. Once the activation process is completed, you can even access your medical information using the Manipal Acunova yevgeniy, which is available for free in the Apple Yevgeniy store or Google Play store.     [employer]ova provides the following levels of access (as shown below):   My Chart Features   [facility] Primary Care Doctor [facility]  Specialists [facility]  Urgent  Care Non-[facility]  Primary Care  Doctor   Email your healthcare team securely and privately 24/7 X X X X   Manage appointments: schedule your next appointment; view details of past/upcoming appointments X      Request prescription refills. X      View recent personal medical records, including lab and immunizations X X X X   View health record, including health history, allergies, medications X X X X   Read reports about your outpatient visits, procedures, consult and ER notes X X X X   See your discharge summary, which is a recap of your hospital and/or ER visit that includes your diagnosis, lab results, and care plan. X X       How to register for [employer]ipal [employer]ova:  1. Go to  [email or web address].  2. Click on the Sign Up Now box, which takes you to the New Member Sign Up page. You will need to provide the following information:  a. Enter your [employer] Access Code exactly as it appears at the top of this page. (You will not need to use this code after you’ve completed the sign-up process. If you do not sign up before the expiration date, you must request a new code.)   b. Enter your date of birth.   c. Enter your home email address.   d. Click Submit, and follow the next screen’s instructions.  3. Create a Manipal [employer]ova ID. This will be your [employer]ova  login ID and cannot be changed, so think of one that is secure and easy to remember.  4. Create a [employer] password. You can change your password at any time.  5. Enter your Password Reset Question and Answer. This can be used at a later time if you forget your password.   6. Enter your e-mail address. This allows you to receive e-mail notifications when new information is available in Quintura.  7. Click Sign Up. You can now view your health information.    For assistance activating your [employer] account, call [phone]

## 2017-03-30 ENCOUNTER — APPOINTMENT (OUTPATIENT)
Dept: RADIOLOGY | Facility: MEDICAL CENTER | Age: 72
DRG: 390 | End: 2017-03-30
Attending: HOSPITALIST
Payer: MEDICARE

## 2017-03-30 ENCOUNTER — TELEPHONE (OUTPATIENT)
Dept: RADIOLOGY | Facility: MEDICAL CENTER | Age: 72
End: 2017-03-30

## 2017-03-30 ENCOUNTER — RESOLUTE PROFESSIONAL BILLING HOSPITAL PROF FEE (OUTPATIENT)
Dept: HOSPITALIST | Facility: MEDICAL CENTER | Age: 72
End: 2017-03-30
Payer: MEDICARE

## 2017-03-30 PROBLEM — K56.609 SBO (SMALL BOWEL OBSTRUCTION) (HCC): Status: ACTIVE | Noted: 2017-03-30

## 2017-03-30 LAB
APPEARANCE UR: CLEAR
BILIRUB UR QL STRIP.AUTO: NEGATIVE
COLOR UR: NORMAL
GLUCOSE BLD-MCNC: 110 MG/DL (ref 65–99)
GLUCOSE BLD-MCNC: 116 MG/DL (ref 65–99)
GLUCOSE BLD-MCNC: 128 MG/DL (ref 65–99)
GLUCOSE BLD-MCNC: 134 MG/DL (ref 65–99)
GLUCOSE UR STRIP.AUTO-MCNC: NEGATIVE MG/DL
KETONES UR STRIP.AUTO-MCNC: NEGATIVE MG/DL
LEUKOCYTE ESTERASE UR QL STRIP.AUTO: NEGATIVE
MICRO URNS: NORMAL
NITRITE UR QL STRIP.AUTO: NEGATIVE
PH UR STRIP.AUTO: 6 [PH]
PROT UR QL STRIP: NEGATIVE MG/DL
RBC UR QL AUTO: NEGATIVE
SP GR UR STRIP.AUTO: 1.02

## 2017-03-30 PROCEDURE — 90471 IMMUNIZATION ADMIN: CPT

## 2017-03-30 PROCEDURE — 81003 URINALYSIS AUTO W/O SCOPE: CPT

## 2017-03-30 PROCEDURE — 90670 PCV13 VACCINE IM: CPT | Performed by: HOSPITALIST

## 2017-03-30 PROCEDURE — 3E0234Z INTRODUCTION OF SERUM, TOXOID AND VACCINE INTO MUSCLE, PERCUTANEOUS APPROACH: ICD-10-PCS | Performed by: INTERNAL MEDICINE

## 2017-03-30 PROCEDURE — 99223 1ST HOSP IP/OBS HIGH 75: CPT | Mod: AI | Performed by: HOSPITALIST

## 2017-03-30 PROCEDURE — 700111 HCHG RX REV CODE 636 W/ 250 OVERRIDE (IP): Performed by: NURSE PRACTITIONER

## 2017-03-30 PROCEDURE — A9270 NON-COVERED ITEM OR SERVICE: HCPCS | Performed by: NURSE PRACTITIONER

## 2017-03-30 PROCEDURE — 700111 HCHG RX REV CODE 636 W/ 250 OVERRIDE (IP): Performed by: HOSPITALIST

## 2017-03-30 PROCEDURE — 700105 HCHG RX REV CODE 258: Performed by: HOSPITALIST

## 2017-03-30 PROCEDURE — 96361 HYDRATE IV INFUSION ADD-ON: CPT

## 2017-03-30 PROCEDURE — 700102 HCHG RX REV CODE 250 W/ 637 OVERRIDE(OP): Performed by: NURSE PRACTITIONER

## 2017-03-30 PROCEDURE — 82962 GLUCOSE BLOOD TEST: CPT

## 2017-03-30 PROCEDURE — 700102 HCHG RX REV CODE 250 W/ 637 OVERRIDE(OP): Performed by: HOSPITALIST

## 2017-03-30 PROCEDURE — 96375 TX/PRO/DX INJ NEW DRUG ADDON: CPT

## 2017-03-30 PROCEDURE — 770006 HCHG ROOM/CARE - MED/SURG/GYN SEMI*

## 2017-03-30 PROCEDURE — 71010 DX-CHEST-LIMITED (1 VIEW): CPT

## 2017-03-30 RX ORDER — SODIUM CHLORIDE 9 MG/ML
INJECTION, SOLUTION INTRAVENOUS CONTINUOUS
Status: DISCONTINUED | OUTPATIENT
Start: 2017-03-30 | End: 2017-04-02

## 2017-03-30 RX ORDER — ALPRAZOLAM 0.5 MG/1
1 TABLET ORAL 2 TIMES DAILY
Status: DISCONTINUED | OUTPATIENT
Start: 2017-03-30 | End: 2017-03-30

## 2017-03-30 RX ORDER — ALPRAZOLAM 0.5 MG/1
1 TABLET ORAL 2 TIMES DAILY PRN
Status: DISCONTINUED | OUTPATIENT
Start: 2017-03-30 | End: 2017-04-03 | Stop reason: HOSPADM

## 2017-03-30 RX ORDER — SODIUM CHLORIDE, SODIUM LACTATE, POTASSIUM CHLORIDE, CALCIUM CHLORIDE 600; 310; 30; 20 MG/100ML; MG/100ML; MG/100ML; MG/100ML
INJECTION, SOLUTION INTRAVENOUS CONTINUOUS
Status: DISCONTINUED | OUTPATIENT
Start: 2017-03-30 | End: 2017-03-31

## 2017-03-30 RX ORDER — OXYCODONE HYDROCHLORIDE 5 MG/1
5 TABLET ORAL
Status: DISCONTINUED | OUTPATIENT
Start: 2017-03-30 | End: 2017-04-02

## 2017-03-30 RX ORDER — MORPHINE SULFATE 4 MG/ML
4 INJECTION, SOLUTION INTRAMUSCULAR; INTRAVENOUS
Status: DISCONTINUED | OUTPATIENT
Start: 2017-03-30 | End: 2017-03-31

## 2017-03-30 RX ORDER — LORAZEPAM 2 MG/ML
0.5 INJECTION INTRAMUSCULAR ONCE
Status: COMPLETED | OUTPATIENT
Start: 2017-03-30 | End: 2017-03-30

## 2017-03-30 RX ORDER — ENALAPRILAT 1.25 MG/ML
1.25 INJECTION INTRAVENOUS EVERY 6 HOURS PRN
Status: DISCONTINUED | OUTPATIENT
Start: 2017-03-30 | End: 2017-04-03 | Stop reason: HOSPADM

## 2017-03-30 RX ORDER — DEXTROSE MONOHYDRATE 25 G/50ML
25 INJECTION, SOLUTION INTRAVENOUS
Status: DISCONTINUED | OUTPATIENT
Start: 2017-03-30 | End: 2017-04-03 | Stop reason: HOSPADM

## 2017-03-30 RX ORDER — OXYCODONE HYDROCHLORIDE 10 MG/1
10 TABLET ORAL
Status: DISCONTINUED | OUTPATIENT
Start: 2017-03-30 | End: 2017-04-02

## 2017-03-30 RX ORDER — ONDANSETRON 2 MG/ML
4 INJECTION INTRAMUSCULAR; INTRAVENOUS EVERY 4 HOURS PRN
Status: DISCONTINUED | OUTPATIENT
Start: 2017-03-30 | End: 2017-04-03 | Stop reason: HOSPADM

## 2017-03-30 RX ORDER — ONDANSETRON 4 MG/1
4 TABLET, ORALLY DISINTEGRATING ORAL EVERY 4 HOURS PRN
Status: DISCONTINUED | OUTPATIENT
Start: 2017-03-30 | End: 2017-04-03 | Stop reason: HOSPADM

## 2017-03-30 RX ADMIN — SODIUM CHLORIDE: 9 INJECTION, SOLUTION INTRAVENOUS at 04:00

## 2017-03-30 RX ADMIN — MORPHINE SULFATE 4 MG: 4 INJECTION INTRAVENOUS at 01:27

## 2017-03-30 RX ADMIN — MORPHINE SULFATE 4 MG: 4 INJECTION INTRAVENOUS at 07:39

## 2017-03-30 RX ADMIN — ALPRAZOLAM 1 MG: 0.5 TABLET ORAL at 20:23

## 2017-03-30 RX ADMIN — MORPHINE SULFATE 4 MG: 4 INJECTION INTRAVENOUS at 17:38

## 2017-03-30 RX ADMIN — SODIUM CHLORIDE: 9 INJECTION, SOLUTION INTRAVENOUS at 22:00

## 2017-03-30 RX ADMIN — LORAZEPAM 0.5 MG: 2 INJECTION INTRAMUSCULAR; INTRAVENOUS at 13:10

## 2017-03-30 RX ADMIN — MORPHINE SULFATE 4 MG: 4 INJECTION INTRAVENOUS at 11:35

## 2017-03-30 RX ADMIN — ONDANSETRON 4 MG: 2 INJECTION, SOLUTION INTRAMUSCULAR; INTRAVENOUS at 08:53

## 2017-03-30 RX ADMIN — MORPHINE SULFATE 4 MG: 4 INJECTION INTRAVENOUS at 23:48

## 2017-03-30 RX ADMIN — PNEUMOCOCCAL 13-VALENT CONJUGATE VACCINE 0.5 ML: 2.2; 2.2; 2.2; 2.2; 2.2; 4.4; 2.2; 2.2; 2.2; 2.2; 2.2; 2.2; 2.2 INJECTION, SUSPENSION INTRAMUSCULAR at 08:47

## 2017-03-30 ASSESSMENT — PAIN SCALES - GENERAL
PAINLEVEL_OUTOF10: 5
PAINLEVEL_OUTOF10: 0

## 2017-03-30 ASSESSMENT — LIFESTYLE VARIABLES
EVER_SMOKED: YES
DO YOU DRINK ALCOHOL: NO

## 2017-03-30 NOTE — H&P
"CHIEF COMPLAINT:  Abdominal pain.    PRIMARY MEDICAL PHYSICIAN:  YAYA Oreilly    HISTORY OF PRESENT ILLNESS:  This is a 71-year-old female who comes in with   complaints of abdominal pain.  The patient states that her symptoms began   approximately 1 week ago with mild constipation.  She has frequently had the   urge to have a bowel movement, but has been unable to produce for the last   week.  Today around noon, she again had abdominal pain, which she described as   intermittent, centralized around the umbilicus, and \"grabbing\" pain.  She   initially thought this was related to acid reflux and took a proton pump   inhibitor, but had no alleviation.  At approximately 4 o'clock this afternoon,   she again have the urge to have a bowel movement, but was unable to.  She has   not had any flatus in the last day.  Because her pain continued to worsening,   she presented herself to an urgent care where she was prescribed an   antiemetic and a GI cocktail and was discharged home.  Unfortunately, she has   had no improvement and therefore came immediately to the emergency room.    Otherwise, the patient states that she has been in her usual state of health   with no headaches, chest pains or shortness of breath.  No cough, no dysuria   or diarrhea.    REVIEW OF SYSTEMS:  A full review of systems was completed and all pertinent   positives and negatives are included in the HPI above.    PAST MEDICAL HISTORY:  1.  Hypertension.  2.  Hyperlipidemia.  3.  Diabetes mellitus.  4.  Ruptured cerebral aneurysm.  5.  Bipolar disorder.    PAST SURGICAL HISTORY:  1.  Cataract repair.  2.  Cerebral aneurysm coil.  3.  .  4.  Hysterectomy.    SOCIAL HISTORY:  Patient is a former smoker, she quit in , but she does   carry a 40-pack-year history.  No alcohol or illicit drugs.    FAMILY HISTORY:  Mother with CHF, lymphoma and diabetes mellitus.  Sister with   lung cancer.    PHYSICAL EXAMINATION:  VITAL SIGNS:  " Temperature 96.6, heart rate 93, respirations 16, blood pressure   151/87, patient is saturating at 96% on room air.  GENERAL:  This is a well-appearing older white female, who is in no acute   distress.  HEENT:  Normocephalic, atraumatic.  EOMI, moist mucous membranes.  NECK:  Supple.  There is no JVD.  There is no supraclavicular adenopathy.  CARDIOVASCULAR:  Positive S1, S2, regular rate and rhythm.  No murmurs, rubs,   or gallops appreciated.  PULMONARY:  Clear to auscultation bilaterally.  No wheezes, rubs, or rhonchi   heard.  ABDOMEN:  Soft, nontender, and nondistended.  No rebound tenderness.  No   guarding.  Patient has hypoactive to no bowel sounds.  EXTREMITIES:  Warm, well-perfused:  No clubbing, cyanosis, or edema.    Capillary refill less than 2 seconds.  Distal pulses are intact.  NEUROLOGIC:  A and O x3.  Cranial nerves II-XII grossly intact.    STUDIES:  WBC 16.4, hemoglobin 13.2, hematocrit 40.3, platelet 277.  Sodium   138, potassium 4.8, chloride 101, CO2 27, glucose 194, BUN 19, creatinine   1.07, GFR is 50.  CT of the abdomen and pelvis:  Partial mechanical small   bowel obstruction with dilated small bowel loops in the left abdomen.    ASSESSMENT AND PLAN:  This is a 71-year-old female who comes in with abdominal   pain, further assessment on CT shows a mechanical small bowel obstruction.  1.  Mechanical small bowel obstruction:  The patient will be kept n.p.o. and   started on IV fluid resuscitation.  I will treat her symptomatically for her   nausea and pain.  At this point, she is fairly comfortable and there is no   need for NG tube placement; however, should she develop worsening pain   associated with nausea and vomiting, then I will have low tolerance for   inserting NG tube.  Dr. Mcdonald of general surgery has been consulted and I   look forward to his recommendations.  2.  Leukocytosis:  I do suspect that this is reactive to the pain and small   bowel obstruction.  She is otherwise  nontoxic appearing.  I will check a chest   x-ray and a UA for completeness, but I am holding off on antibiotic therapy   for now.  4.  Hyperlipidemia:  Holding home Crestor while n.p.o.  5.  Hypertension.  Holding home Prinivil while n.p.o.  I will cover her with   p.r.n. IV antihypertensive.  6.  Diabetes mellitus:  I will monitor the patient with Accu-Cheks and covered   with insulin sliding scale call, holding home oral hypoglycemics for now.  7.  Psychiatric disorder.  By report, patient has bipolar:  Holding home   Effexor until she is able to take p.o. medications.    DISPOSITION:  Surgical floor.    PROPHYLAXIS:  Sequential compression devices for DVT prophylaxis, no PPI at   this point, holding stool softeners until further assessment by surgery.    CODE:  Full.       ____________________________________     MD SENTHIL MAYA / DENNIS    DD:  03/30/2017 02:15:06  DT:  03/30/2017 04:26:44    D#:  [id number]  Job#:  [id number]

## 2017-03-30 NOTE — PROGRESS NOTES
Patient refusing bed alarm. Educated patient regarding the risk for falls and injury. Pt. demonstrates understanding and continues to refuse. Alternative high fall risk precautions in place: call light in reach, treaded sock, bed in lowest position, educated patient about the need to call prior to getting up. Will continue to monitor.

## 2017-03-30 NOTE — ED NOTES
Pt resting on cart, c/o increase ab pain. ERP made aware, Repeat dose order per ERP. PT medicated. Resp even unlabored, skin pink warm dry. Daughter remains at cart side

## 2017-03-30 NOTE — DISCHARGE PLANNING
Care Transition Team Assessment    IHD met with patient bedside. She stated she lives alone, and she has family who checks in with her and her neighbors check in on her as well. She stated she takes care of herself, but she does get lonely when she's cooking for herself. We discussed community resources and I was able to give her a list of communal lunch programs. She uses a walker and cane at home on a regular basis.     Information Source  Orientation : Oriented x 4  Information Given By: Patient  Informant's Name: Rosangela  Who is responsible for making decisions for patient? : Patient         Elopement Risk  Legal Hold: No  Ambulatory or Self Mobile in Wheelchair: Yes  Disoriented: No  Psychiatric Symptoms: None  History of Wandering: No  Elopement this Admit: No  Vocalizing Wanting to Leave: No  Displays Behaviors, Body Language Wanting to Leave: No-Not at Risk for Elopement    Interdisciplinary Discharge Planning  Does Admitting Nurse Feel This Could be a Complex Discharge?: No  Primary Care Physician: Dr. Katalina Nuñez  Lives with - Patient's Self Care Capacity: Alone and Able to Care For Self  Patient or legal guardian wants to designate a caregiver (see row info): No  Support Systems: Children, Family Member(s)  Housing / Facility: 2 Story Apartment / Condo (Has elevators to apartment)  Do You Take your Prescribed Medications Regularly: Yes  Able to Return to Previous ADL's: Yes  Mobility Issues: Yes  Prior Services: Home-Independent  Patient Expects to be Discharged to:: Home  Assistance Needed: No  Durable Medical Equipment: Walker, Other - Specify (Cane)    Discharge Preparedness  What is your plan after discharge?: Home with help  What are your discharge supports?: Child  Prior Functional Level: Uses Walker, Uses Cane, Independent with Medication Management, Drives Self, Independent with Activities of Daily Living  Difficulity with ADLs: Walking  Difficulty with ADLs Comment: Uses cane and  walker  Difficulity with IADLs: None    Functional Assesment  Prior Functional Level: Uses Walker, Uses Cane, Independent with Medication Management, Drives Self, Independent with Activities of Daily Living    Finances  Financial Barriers to Discharge: No  Average Monthly Income: 1700 $  Source of Income: Social Security, Employed  Prescription Coverage: Yes ([employer] [facility] [address])    Vision / Hearing Impairment  Vision Impairment : Yes  Right Eye Vision: Wears Glasses  Left Eye Vision: Wears Glasses  Hearing Impairment : No    Values / Beliefs / Concerns  Values / Beliefs Concerns : No    Advance Directive  Advance Directive?: DPOA for Health Care  Durable Power of  Name and Contact : Iram Carey    Domestic Abuse  Have you ever been the victim of abuse or violence?: No  Physical Abuse or Sexual Abuse: No  Verbal Abuse or Emotional Abuse: No  Possible Abuse Reported to:: Not Applicable    Psychological Assessment  History of Substance Abuse: None  History of Psychiatric Problems: No  Non-compliant with Treatment: No  Newly Diagnosed Illness: No    Discharge Risks or Barriers  Discharge risks or barriers?: Lives alone, no community support  Patient risk factors: Lives alone and no community support    Anticipated Discharge Information  Anticipated discharge disposition: Discharge needs currently unknown  Discharge Address: [address] [place] [address], [address], [place] [address]  Discharge Contact Phone Number: [phone]

## 2017-03-30 NOTE — ED NOTES
Pt medicated, fluids running. Pt resp even unlabored, skin pink warm dry. Denies needs, family at cart side.

## 2017-03-30 NOTE — CONSULTS
Surgical Consultation    Date: 3/30/2017    Requesting Physician: Dr. Arriaza  PCP: PILI Humphries  Attending Physician: Gregorio Mcdonald M.D. [facility]    CC: abdominal pain    HPI: This is a 71 y.o. female who is presenting with what sounds like a couple weeks of vague abdominal pain which became somewhat worse yesterday. She left work due to the pain and went to urgent care. She had one episode of vomiting yesterday/last night. Her pain is moderate, located near the umbilicus, does not radiate, is dull in nature, and has no exacerbating or alleviating symptoms. Denies flatus currently but had a bowel movement yesterday. She is currently quite nauseated.     Past Medical History   Diagnosis Date   • Indigestion    • Diabetes      oral meds   • Arthritis    • Psychiatric problem      poss bi polar per pt   • Dental disorder      upper and lower dentures   • Breath shortness      2015 denies    • CATARACT      bela iol   • Heart burn 2015     on zantac   • Arrhythmia      notation of A fib in her chart   • Stroke (CMS-HCC)      aneursym rupture    • Hyperlipidemia    • Hypertension        Past Surgical History   Procedure Laterality Date   • Cataract phaco with iol  11/21/2011     Performed by YANY COLEY at SURGERY SURGICAL [facility] ORS   • Cataract phaco with iol  12/19/2011     Performed by YANY COLEY at SURGERY SURGICAL [facility] ORS   • Other       cerebral aneurysm rupture with coil   • Recovery  7/23/2013     Performed by [facility] at SURGERY [facility] ORS   • Recovery  2/25/2014     Performed by [facility] at SURGERY SAME DAY [facility] ORS   • Recovery  7/8/2015     Procedure: IR2-CEREBRAL ANGIOGRAM-;  Surgeon: [facility];  [place] [facility]:    • Primary c section     • Hysterectomy, total abdominal       with bso done 1 week after her c/s       Current Facility-Administered Medications   Medication Dose  Route Frequency Provider Last Rate Last Dose   • lactated ringers infusion   Intravenous Continuous Mat TIMOTHY Pizarro D.O.       • NS infusion   Intravenous Continuous Sherrell Higgins M.D. 100 mL/hr at 03/30/17 0400     • enalaprilat (VASOTEC) injection 1.25 mg  1.25 mg Intravenous Q6HRS PRN Sherrell Higgins M.D.       • oxycodone immediate-release (ROXICODONE) tablet 5 mg  5 mg Oral Q3HRS PRN Sherrell Higgins M.D.        And   • oxycodone immediate release (ROXICODONE) tablet 10 mg  10 mg Oral Q3HRS PRN Sherrell Higgins M.D.        And   • morphine (pf) 4 mg/ml injection 4 mg  4 mg Intravenous Q3HRS PRN Sherrell Higgins M.D.   4 mg at 03/30/17 1135   • insulin lispro (HUMALOG) injection 2-9 Units  2-9 Units Subcutaneous Q6HRS Sherrell Higgins M.D.   Stopped at 03/30/17 0600   • glucose 4 g chewable tablet 16 g  16 g Oral Q15 MIN PRN Sherrell Higgins M.D.        And   • dextrose 50% (D50W) injection 25 mL  25 mL Intravenous Q15 MIN PRN Sherrell Higgins M.D.       • ondansetron (ZOFRAN) syringe/vial injection 4 mg  4 mg Intravenous Q4HRS PRN Sherrell Higgins M.D.   4 mg at 03/30/17 0853   • ondansetron (ZOFRAN ODT) dispertab 4 mg  4 mg Oral Q4HRS PRN Sherrell Higgins M.D.       • lorazepam (ATIVAN) injection 0.5 mg  0.5 mg Intravenous Once Scot Bruce, A.P.R.N.           Social History     Social History   • Marital Status:      Spouse Name: N/A   • Number of Children: N/A   • Years of Education: N/A     Occupational History   • Not on file.     Social History Main Topics   • Smoking status: Former Smoker -- 1.00 packs/day for 40 years     Types: Cigarettes     Quit date: 01/01/2006   • Smokeless tobacco: Never Used   • Alcohol Use: No   • Drug Use: No   • Sexual Activity: Not Currently     Other Topics Concern   • Not on file     Social History Narrative       Family History   Problem Relation Age of Onset   • Heart Disease Mother      chf   • Cancer Mother      chronic lymphoma   • Diabetes Mother    • Cancer Sister      lung ca  "  • Other Brother      brain aneursym       Allergies:  Keflex    Review of Systems:  Constitutional: Negative for fever, chills, weight loss, malaise/fatigue and diaphoresis.   HENT: Negative for hearing loss, ear pain, nosebleeds, congestion, sore throat, neck pain, tinnitus and ear discharge.    Eyes: Negative for blurred vision, double vision, photophobia, pain, discharge and redness.   Respiratory: Negative for cough, hemoptysis, sputum production, shortness of breath, wheezing and stridor.    Cardiovascular: Negative for chest pain, palpitations, orthopnea, claudication, leg swelling and PND.   Gastrointestinal: per hpi.  Genitourinary: Negative for dysuria, urgency, frequency, hematuria and flank pain.   Musculoskeletal: Negative for myalgias, back pain, joint pain and falls.   Skin: Negative for itching and rash.  Neurological: Negative for dizziness, tingling, tremors, sensory change, speech change, focal weakness, seizures, loss of consciousness, weakness and headaches.   Endo/Heme/Allergies: Negative for environmental allergies and polydipsia. Does not bruise/bleed easily.   Psychiatric/Behavioral: Negative for depression, memory loss, and substance abuse. The patient is nervous/anxious    Physical Exam:  Blood pressure 135/52, pulse 81, temperature 36.6 °C (97.8 °F), resp. rate 18, height 1.651 m (5' 5\"), weight 73.483 kg (162 lb), SpO2 93 %, not currently breastfeeding.    Constitutional: she is oriented to person, place, and time.  she appears well-developed and well-nourished. No distress.   Head: Normocephalic and atraumatic.   Neck: Normal range of motion. Neck supple. No JVD present. No tracheal deviation present. No thyromegaly present.   Cardiovascular: Normal rate, regular rhythm, normal heart sounds and intact distal pulses.  Exam reveals no gallop and no friction rub.  No murmur heard.  Pulmonary/Chest: Effort normal and breath sounds normal. No stridor. No respiratory distress. she has no " wheezes. There are no rales.   Abdominal: Soft. Lower midline scar. she exhibits no distension and no mass. She has mild tenderness to palpation in the upper abdomen. There is no rebound and no guarding.   Musculoskeletal: Normal range of motion. she exhibits no edema and no tenderness.   Neurological: she is alert and oriented to person, place, and time. she has normal reflexes. No cranial nerve deficit. Coordination normal.   Skin: Skin is warm and dry. No rash noted. she is not diaphoretic. No erythema. No pallor.   Psychiatric: she has a normal mood and affect.  Behavior is normal.       Labs:  Recent Labs      03/29/17 2134   WBC  16.4*   RBC  4.30   HEMOGLOBIN  13.2   HEMATOCRIT  40.3   MCV  93.7   MCH  30.7   MCHC  32.8*   RDW  48.5   PLATELETCT  277   MPV  10.8     Recent Labs      03/29/17 2134   SODIUM  138   POTASSIUM  4.8   CHLORIDE  101   CO2  27   GLUCOSE  194*   BUN  19   CREATININE  1.07   CALCIUM  10.0         Recent Labs      03/29/17 2134   ASTSGOT  22   ALTSGPT  28   TBILIRUBIN  0.3   ALKPHOSPHAT  44   GLOBULIN  3.6*       Radiology:  DX-CHEST-LIMITED (1 VIEW)   Final Result         1. No acute cardiopulmonary abnormalities are identified.      CT-ABDOMEN-PELVIS WITH   Final Result         1. Partial mechanical small bowel obstruction with dilated small bowel loops in the left abdomen.          Assessment: This is a 70 yo female with an early bowel obstruction. He is nauseated and retching and I have therefore ordered an NG tube.       Plan: conservative therapy. IVF, bowel rest, NG tube.  DVT prophylaxis ok with me.  Will follow    Thank you very much for this consultation.    Gregorio Mcdonald MD  [facility]  [phone]    Time spent: 39 minutes

## 2017-03-30 NOTE — PROGRESS NOTES
"Subjective:      Rosangela Burnette is a 71 y.o. female who presents with Other            HPI Comments: Medications, Allergies and Prior Medical Hx reviewed and updated in [facility].with patient/family today     Bib family     Abdominal Pain  This is a new problem. The current episode started today. The onset quality is sudden. The problem occurs constantly. The problem has been unchanged. The pain is located in the LUQ, RUQ and epigastric region. The pain is at a severity of 7/10. The quality of the pain is aching. The abdominal pain does not radiate. Associated symptoms include headaches, nausea and vomiting. Pertinent negatives include no belching, constipation, diarrhea, dysuria, fever, flatus, frequency, hematuria or myalgias. Nothing aggravates the pain. The pain is relieved by nothing. She has tried nothing for the symptoms. The treatment provided no relief. There is no history of abdominal surgery, colon cancer, Crohn's disease, gallstones, GERD, irritable bowel syndrome, pancreatitis, PUD or ulcerative colitis.       Review of Systems   Constitutional: Negative for fever, chills and malaise/fatigue.   Cardiovascular: Negative for chest pain.   Gastrointestinal: Positive for nausea, vomiting and abdominal pain. Negative for diarrhea, constipation and flatus.   Genitourinary: Negative for dysuria, frequency and hematuria.   Musculoskeletal: Negative for myalgias.   Neurological: Positive for headaches.   Endo/Heme/Allergies: Does not bruise/bleed easily.          Objective:     /64 mmHg  Pulse 100  Temp(Src) 36.9 °C (98.5 °F)  Resp 14  Ht 1.651 m (5' 5\")  Wt 73.483 kg (162 lb)  BMI 26.96 kg/m2  SpO2 95%     Physical Exam   Constitutional: She appears well-developed and well-nourished. No distress.   HENT:   Head: Normocephalic and atraumatic.   Eyes: Conjunctivae are normal. Pupils are equal, round, and reactive to light.   Neck: Neck supple.   Cardiovascular: Normal rate, regular rhythm and normal " heart sounds.    Pulmonary/Chest: Effort normal and breath sounds normal. No respiratory distress.   Abdominal: Soft. She exhibits no distension and no fluid wave. Bowel sounds are decreased. There is tenderness in the right upper quadrant, epigastric area and left upper quadrant. There is positive Velásquez's sign (questionable). There is no rigidity, no guarding, no CVA tenderness and no tenderness at McBurney's point.   Musculoskeletal: She exhibits no edema.   Neurological: She is alert.   Awake, alert, answering questions appropriately, moving all extremeties   Skin: Skin is warm and dry.   Psychiatric: She has a normal mood and affect. Her behavior is normal.   Vitals reviewed.              Assessment/Plan:       1. Nausea and vomiting, intractability of vomiting not specified, unspecified vomiting type  ondansetron (ZOFRAN ODT) dispertab 4 mg    hyoscyamine-maalox plus-lidocaine viscous (GI COCKTAIL)   2. Pain of upper abdomen  ondansetron (ZOFRAN ODT) dispertab 4 mg    hyoscyamine-maalox plus-lidocaine viscous (GI COCKTAIL)         In clinic zofran 4 mg odt with improvement in nausea   Gi cocktail without change in abdominal pain       D/w pt/family recommendation to transfer to ED for evaluation and treatment not available at this facility, they verbalize agreement and request [facility]  D/w Dr ZENOBIA Martini who accepted patient   Pt will be transported via pvt vehicle

## 2017-03-30 NOTE — ED NOTES
Pt on call light asking for pain meds. Rates ab pain 6/10. Pt resp even unlabored, skin pink warm dry. Pt medicated per prn orders. Denies further needs.

## 2017-03-30 NOTE — ED NOTES
Pt to floor at this time showing no signs of distress/discomfort. Offered further needs, pt denies. PT resp even unlabored, skin pink warm dry, alert and oriented x3. Pt denies further needs.

## 2017-03-30 NOTE — ED NOTES
"Chief Complaint   Patient presents with   • Abdominal Pain     started at noon, got worse at 1600. Denies GI hx.   • N/V   • Other     seen at , was given gi cocktail and po nausea medication, pt reports minimal improvement     Pt BIB family from  for above complaints. Pt appears uncomfortably but says the nausea is under control, just has pain. Educated on triage process, placed in senior lounge, instructed to notify staff of any issues. Abd pain protocol initiated.    /87 mmHg  Pulse 93  Temp(Src) 35.9 °C (96.6 °F)  Resp 16  Ht 1.651 m (5' 5\")  Wt 73.483 kg (162 lb)  BMI 26.96 kg/m2  SpO2 96%    "

## 2017-03-30 NOTE — TELEPHONE ENCOUNTER
MRA reviewed with Dr. 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 No routine follow up needed per Dr. Billings as the aneurysms have been stable for several years without evidence of recurrence.

## 2017-03-30 NOTE — ED PROVIDER NOTES
"ED Provider Note    Scribed for Mat Pizarro D.O. by Pattie Cooper. 3/29/2017  9:27 PM    Primary care provider: PILI Humphries   History obtained from: Patient and her daughter  History limited by: None     CHIEF COMPLAINT  Chief Complaint   Patient presents with   • Abdominal Pain     started at noon, got worse at 1600. Denies GI hx.   • N/V   • Other     seen at , was given gi cocktail and po nausea medication, pt reports minimal improvement        HPI    Rosangela Burnette is a 71 y.o. female who presents to the ED sent by urgent care complaining of constant abdominal pain with associated nausea and vomiting, onset 12:00PM today and worsening since onset. She was given a Gi cocktail and nausea medication at urgent care with minimal alleviation. She describes the pain as a \"nagging and wavy pain\". The patient denies any history of GI issues. She denies any radiation of the pain to her back or urinary symptoms. She denies any fevers, diarrhea, or constipation. She has a history of diabetes, stroke, brain aneurysm, and hypertension.     Patient presents with her daughter to the ED for constant periumbilical abdominal pain since noon today pain is constant and has progressively been worsening. She denies radiation of the pain or pain anywhere else. Nothing seems to be helping her pain. She tried eating some peanut butter and banana previously but it did not make the pain worse or better. She denies any fever. She was seen at urgent care and was told to come to the ED for evaluation.    REVIEW OF SYSTEMS  Please see HPI for pertinent positives/negatives.  All other systems reviewed and are negative. C.    PAST MEDICAL HISTORY  Past Medical History   Diagnosis Date   • Indigestion    • Diabetes      oral meds   • Arthritis    • Psychiatric problem      poss bi polar per pt   • Dental disorder      upper and lower dentures   • Breath shortness      2015 denies    • CATARACT      bela iol   • Heart " burn 2015     on zantac   • Arrhythmia      notation of A fib in her chart   • Stroke (CMS-HCC)      aneursym rupture    • Hyperlipidemia    • Hypertension         SURGICAL HISTORY  Past Surgical History   Procedure Laterality Date   • Cataract phaco with iol  11/21/2011     Performed by YANY COLEY at SURGERY SURG[facility] ORS   • Cataract phaco with iol  12/19/2011     Performed by YANY COLEY at SURGERY SURG[facility] ORS   • Other       cerebral aneurysm rupture with coil   • Recovery  7/23/2013     Performed by [facility] at SURGERY [facility] ORS   • Recovery  2/25/2014     Performed by [facility] at SURGERY SAME DAY [facility] ORS   • Recovery  7/8/2015     Procedure: IR2-CEREBRAL ANGIOGRAM-;  Surgeon: Recoveryonly Surgery;  Location: SURGERY PRE-POST PROC UNIT [facility];  Service:    • Primary c section     • Hysterectomy, total abdominal       with bso done 1 week after her c/s        SOCIAL HISTORY  Social History     Social History Main Topics   • Smoking status: Former Smoker -- 1.00 packs/day for 40 years     Types: Cigarettes     Quit date: 01/01/2006   • Smokeless tobacco: Never Used   • Alcohol Use: No   • Drug Use: No   • Sexual Activity: Not Currently        FAMILY HISTORY  Family History   Problem Relation Age of Onset   • Heart Disease Mother      chf   • Cancer Mother      chronic lymphoma   • Diabetes Mother    • Cancer Sister      lung ca   • Other Brother      brain aneursym        CURRENT MEDICATIONS  No current facility-administered medications on file prior to encounter.     Current Outpatient Prescriptions on File Prior to Encounter   Medication Sig Dispense Refill   • alprazolam (XANAX) 0.25 MG Tab Take 1 Tab by mouth at bedtime as needed for Sleep. 30 Tab 2   • alprazolam (XANAX) 1 MG Tab Take 1 Tab by mouth 2 times a day as needed for Anxiety. 60 Tab 2   • trazodone (DESYREL) 50 MG Tab Take 1 Tab by mouth at bedtime as needed for Sleep (as needed for  "sleep). 90 Tab 0   • venlafaxine XR (EFFEXOR XR) 75 MG CAPSULE SR 24 HR Take 1 Cap by mouth every day. To be taken along with 150 mg. Total dose - 225 mg. 90 Cap 0   • venlafaxine (EFFEXOR XR) 150 MG extended-release capsule Take 1 Cap by mouth every day. Take in addition to 37.5mg. Dose is 187.5mg. 90 Cap 0   • Hydrocodone-Acetaminophen 5-300 MG Tab Take 1-1.5 Tabs by mouth 3 times a day as needed. 105 Tab 0   • metformin (GLUCOPHAGE) 1000 MG tablet TAKE ONE TABLET BY MOUTH TWICE DAILY WITH MEALS 180 Tab 3   • lisinopril (PRINIVIL) 2.5 MG Tab Take 1 Tab by mouth every day. 90 Tab 3   • glipiZIDE SR (GLUCOTROL) 2.5 MG TABLET SR 24 HR Take 1 Tab by mouth every day. 90 Tab 1   • rosuvastatin (CRESTOR) 20 MG Tab Take 1 Tab by mouth every evening. 90 Tab 3   • ONE TOUCH ULTRA TEST strip      • Omega-3 Fatty Acids (FISH OIL PO) Take  by mouth.     • vitamin D, Ergocalciferol, (DRISDOL) 54634 UNITS Cap capsule Take 1 Cap by mouth every 7 days. 12 Cap 3   • aspirin (ASA) 81 MG CHEW Take 81 mg by mouth every day.     • therapeutic multivitamin-minerals (THERAGRAN-M) TABS Take 1 Tab by mouth every day.         ALLERGIES  Allergies   Allergen Reactions   • Keflex         PHYSICAL EXAM  VITAL SIGNS: /82 mmHg  Pulse 110  Temp(Src) 35.9 °C (96.6 °F)  Resp 20  Ht 1.651 m (5' 5\")  Wt 73.483 kg (162 lb)  BMI 26.96 kg/m2  SpO2 96%     Pulse ox interpretation: 96% I interpret this pulse ox as normal     Constitutional: Well developed, well nourished, alert in no apparent distress, nontoxic appearance    HENT: No external signs of trauma, normocephalic, bilateral external ears normal, oropharynx moist and clear, nose normal    Eyes: PERRL, conjunctiva without erythema, no discharge, no icterus    Neck: Soft and supple, trachea midline, no stridor, no tenderness, no LAD, no JVD, good ROM    Cardiovascular: Regular rate and rhythm, no murmurs/rubs/gallops, strong distal pulses and good perfusion    Thorax & Lungs: No " respiratory distress, CTAB, no chest tenderness    Abdomen: Soft, mild diffuse tenderness most tender in the periumbilical region with mild guarding, nondistended, no rebound, diminished bowel sounds  Back: No CVAT    Extremities: No clubbing, no cyanosis, no edema, no gross deformity, good ROM, no tenderness, intact distal pulses with brisk cap refill    Skin: Warm, dry, no pallor/cyanosis, no rash noted    Lymphatic: No lymphadenopathy noted    Neuro: A/O times 3, no focal deficits noted    Psychiatric: Cooperative, normal mood and affect, normal judgement, appropriate for clinical situation          DIAGNOSTIC STUDIES / PROCEDURES    LABS  Results for orders placed or performed during the hospital encounter of 03/29/17   CBC WITH DIFFERENTIAL   Result Value Ref Range    WBC 16.4 (H) 4.8 - 10.8 K/uL    RBC 4.30 4.20 - 5.40 M/uL    Hemoglobin 13.2 12.0 - 16.0 g/dL    Hematocrit 40.3 37.0 - 47.0 %    MCV 93.7 81.4 - 97.8 fL    MCH 30.7 27.0 - 33.0 pg    MCHC 32.8 (L) 33.6 - 35.0 g/dL    RDW 48.5 35.9 - 50.0 fL    Platelet Count 277 164 - 446 K/uL    MPV 10.8 9.0 - 12.9 fL    Neutrophils-Polys 82.50 (H) 44.00 - 72.00 %    Lymphocytes 13.00 (L) 22.00 - 41.00 %    Monocytes 2.80 0.00 - 13.40 %    Eosinophils 0.60 0.00 - 6.90 %    Basophils 0.50 0.00 - 1.80 %    Immature Granulocytes 0.60 0.00 - 0.90 %    Nucleated RBC 0.00 /100 WBC    Neutrophils (Absolute) 13.51 (H) 2.00 - 7.15 K/uL    Lymphs (Absolute) 2.13 1.00 - 4.80 K/uL    Monos (Absolute) 0.45 0.00 - 0.85 K/uL    Eos (Absolute) 0.09 0.00 - 0.51 K/uL    Baso (Absolute) 0.08 0.00 - 0.12 K/uL    Immature Granulocytes (abs) 0.09 0.00 - 0.11 K/uL    NRBC (Absolute) 0.00 K/uL   COMP METABOLIC PANEL   Result Value Ref Range    Sodium 138 135 - 145 mmol/L    Potassium 4.8 3.6 - 5.5 mmol/L    Chloride 101 96 - 112 mmol/L    Co2 27 20 - 33 mmol/L    Anion Gap 10.0 0.0 - 11.9    Glucose 194 (H) 65 - 99 mg/dL    Bun 19 8 - 22 mg/dL    Creatinine 1.07 0.50 - 1.40 mg/dL     Calcium 10.0 8.5 - 10.5 mg/dL    AST(SGOT) 22 12 - 45 U/L    ALT(SGPT) 28 2 - 50 U/L    Alkaline Phosphatase 44 30 - 99 U/L    Total Bilirubin 0.3 0.1 - 1.5 mg/dL    Albumin 4.5 3.2 - 4.9 g/dL    Total Protein 8.1 6.0 - 8.2 g/dL    Globulin 3.6 (H) 1.9 - 3.5 g/dL    A-G Ratio 1.3 g/dL   LIPASE   Result Value Ref Range    Lipase 56 11 - 82 U/L   ESTIMATED GFR   Result Value Ref Range    GFR If African American >60 >60 mL/min/1.73 m 2    GFR If Non African American 50 (A) >60 mL/min/1.73 m 2   All labs reviewed by me.      RADIOLOGY  CT-ABDOMEN-PELVIS WITH   Final Result         1. Partial mechanical small bowel obstruction with dilated small bowel loops in the left abdomen.      The radiologist's interpretation of all radiological studies have been reviewed by me.       COURSE & MEDICAL DECISION MAKING  Nursing notes, VS, PMSFHx reviewed in chart.     Review of past medical records shows the patient was seen at urgent care just prior to arrival in the ED.    Differential diagnoses considered include but are not limited to: Appendicitis, AMI, AAA, bowel perforation/obstruction, ileus, cholecystitis/ascending cholangitis, gallstone/biliary colic, diverticulitis, mesenteric ischemia, pancreatitis, PUD, gastritis, GERD, KS/renal colic, UTI/pyelo, gastroenteritis, colitis, constipation, muscle strain, hernia, pregnancy/ectopic, PID, endometriosis, ovarian cyst/torsion     9:31 PM Patient evaluated at bedside. CT abdomen, CBC with differential, CMP, lipase, and poc urinalysis was ordered.  Patient was treated with IV fluid and nausea and pain medicine. The patient was informed that general lab work and a CT scan of the abdomen will be ordered to evaluate. It was discussed with the patient that she will be given medication for the pain. She understood and verbalized agreement.    Patient presents with her daughter to the ED with above complaints. Labs and CT abdomen and pelvis were ordered. Patient received IV fluids as  well as nausea and pain medicine. Findings discussed with patient and her daughter. Patient reports feeling better with no further vomiting. This is a pleasant patient in no acute distress at this time and nontoxic in appearance. They agree to admission.    0025: D/W Dr. Mcdonald, general surgery.  He would like hospitalist to admit.  He states that we can hold off on placing a NG tube if pt is not having any persistent vomiting.    0030: D/W Dr. Higgins, hospitalist, who agreed to admit pt.      FINAL IMPRESSION  1. SBO (small bowel obstruction) (CMS-HCC)    2. Periumbilical abdominal pain    3. Non-intractable vomiting with nausea, unspecified vomiting type    4. Leukocytosis, unspecified type    5. Hyperglycemia           DISPOSITION  Patient will be admitted to hospitalist service.       Pattie HOSKINS (Rosie), am scribing for, and in the presence of, Mat Pizarro D.O..    Electronically signed by: Pattie Cooper (Rosie), 3/29/2017    Mat HOSKINS D.O. personally performed the services described in this documentation, as scribed by Pattie Cooper in my presence, and it is both accurate and complete.      Portions of this record were made with voice recognition software and by scribes.  Despite my review, spelling/grammar/context errors may still remain.  Interpretation of this chart should be taken in this context.

## 2017-03-30 NOTE — PROGRESS NOTES
Pt arrived to unit on [facility]. Pt assisted to ambulate to restroom then bed. Very talkative and appears to be manic. Pts attention continually needs to be redirected to point of focus. Otherwise A&O. Requiring contact guard assist to ambulate. Strip alarm in place. IVF started. Admission history obtained. Skin assessment negative for impairment. Call light placed within reach, educated patient on need for assistance. Bed in low position. Patient had one bottle of medications with stated valium, vicodin, ibuprofen, and xanax in one bottle. Meds sent to pharmacy.

## 2017-03-30 NOTE — PROGRESS NOTES
Received report from night shift nurse. Patient laying down in bed and complaining of 5/10 aching pain in lower mid abdomen. Pain medication given per MAR. Patient comfortable in bed with call light in reach, treaded slippers, bed in lowest position.

## 2017-03-30 NOTE — PROGRESS NOTES
Patient admitted with mechanical small bowel obstruction.  Patient does report passing gas.  + BS.  No BM.  Denies nausea and vomiting.  Continue pain control, NPO, and IVF.  Surgery is consulted.  Will wait for further recommendations.

## 2017-03-31 ENCOUNTER — APPOINTMENT (OUTPATIENT)
Dept: RADIOLOGY | Facility: MEDICAL CENTER | Age: 72
DRG: 390 | End: 2017-03-31
Attending: INTERNAL MEDICINE
Payer: MEDICARE

## 2017-03-31 LAB
ANION GAP SERPL CALC-SCNC: 8 MMOL/L (ref 0–11.9)
BUN SERPL-MCNC: 12 MG/DL (ref 8–22)
CALCIUM SERPL-MCNC: 8.4 MG/DL (ref 8.5–10.5)
CHLORIDE SERPL-SCNC: 103 MMOL/L (ref 96–112)
CO2 SERPL-SCNC: 29 MMOL/L (ref 20–33)
CREAT SERPL-MCNC: 0.97 MG/DL (ref 0.5–1.4)
ERYTHROCYTE [DISTWIDTH] IN BLOOD BY AUTOMATED COUNT: 51 FL (ref 35.9–50)
GFR SERPL CREATININE-BSD FRML MDRD: 56 ML/MIN/1.73 M 2
GLUCOSE BLD-MCNC: 120 MG/DL (ref 65–99)
GLUCOSE BLD-MCNC: 129 MG/DL (ref 65–99)
GLUCOSE BLD-MCNC: 135 MG/DL (ref 65–99)
GLUCOSE BLD-MCNC: 144 MG/DL (ref 65–99)
GLUCOSE BLD-MCNC: 150 MG/DL (ref 65–99)
GLUCOSE SERPL-MCNC: 137 MG/DL (ref 65–99)
HCT VFR BLD AUTO: 35.3 % (ref 37–47)
HGB BLD-MCNC: 11.2 G/DL (ref 12–16)
MCH RBC QN AUTO: 30.5 PG (ref 27–33)
MCHC RBC AUTO-ENTMCNC: 31.7 G/DL (ref 33.6–35)
MCV RBC AUTO: 96.2 FL (ref 81.4–97.8)
PLATELET # BLD AUTO: 229 K/UL (ref 164–446)
PMV BLD AUTO: 11.2 FL (ref 9–12.9)
POTASSIUM SERPL-SCNC: 4.1 MMOL/L (ref 3.6–5.5)
RBC # BLD AUTO: 3.67 M/UL (ref 4.2–5.4)
SODIUM SERPL-SCNC: 140 MMOL/L (ref 135–145)
WBC # BLD AUTO: 10.4 K/UL (ref 4.8–10.8)

## 2017-03-31 PROCEDURE — 82962 GLUCOSE BLOOD TEST: CPT

## 2017-03-31 PROCEDURE — 700105 HCHG RX REV CODE 258: Performed by: HOSPITALIST

## 2017-03-31 PROCEDURE — 74000 DX-ABDOMEN-1 VIEW: CPT

## 2017-03-31 PROCEDURE — 700102 HCHG RX REV CODE 250 W/ 637 OVERRIDE(OP): Performed by: NURSE PRACTITIONER

## 2017-03-31 PROCEDURE — 36415 COLL VENOUS BLD VENIPUNCTURE: CPT

## 2017-03-31 PROCEDURE — 85027 COMPLETE CBC AUTOMATED: CPT

## 2017-03-31 PROCEDURE — A9270 NON-COVERED ITEM OR SERVICE: HCPCS | Performed by: NURSE PRACTITIONER

## 2017-03-31 PROCEDURE — 80048 BASIC METABOLIC PNL TOTAL CA: CPT

## 2017-03-31 PROCEDURE — 770006 HCHG ROOM/CARE - MED/SURG/GYN SEMI*

## 2017-03-31 PROCEDURE — 99232 SBSQ HOSP IP/OBS MODERATE 35: CPT | Performed by: INTERNAL MEDICINE

## 2017-03-31 PROCEDURE — 700111 HCHG RX REV CODE 636 W/ 250 OVERRIDE (IP): Performed by: NURSE PRACTITIONER

## 2017-03-31 RX ORDER — KETOROLAC TROMETHAMINE 30 MG/ML
30 INJECTION, SOLUTION INTRAMUSCULAR; INTRAVENOUS EVERY 6 HOURS PRN
Status: DISCONTINUED | OUTPATIENT
Start: 2017-03-31 | End: 2017-04-02

## 2017-03-31 RX ADMIN — SODIUM CHLORIDE: 9 INJECTION, SOLUTION INTRAVENOUS at 09:31

## 2017-03-31 RX ADMIN — ALPRAZOLAM 1 MG: 0.5 TABLET ORAL at 05:56

## 2017-03-31 RX ADMIN — ALPRAZOLAM 1 MG: 0.5 TABLET ORAL at 20:49

## 2017-03-31 RX ADMIN — KETOROLAC TROMETHAMINE 30 MG: 30 INJECTION, SOLUTION INTRAMUSCULAR; INTRAVENOUS at 23:40

## 2017-03-31 RX ADMIN — KETOROLAC TROMETHAMINE 30 MG: 30 INJECTION, SOLUTION INTRAMUSCULAR; INTRAVENOUS at 11:32

## 2017-03-31 RX ADMIN — ENOXAPARIN SODIUM 40 MG: 100 INJECTION SUBCUTANEOUS at 09:31

## 2017-03-31 RX ADMIN — SODIUM CHLORIDE: 9 INJECTION, SOLUTION INTRAVENOUS at 17:32

## 2017-03-31 RX ADMIN — KETOROLAC TROMETHAMINE 30 MG: 30 INJECTION, SOLUTION INTRAMUSCULAR; INTRAVENOUS at 17:28

## 2017-03-31 ASSESSMENT — PAIN SCALES - GENERAL
PAINLEVEL_OUTOF10: 0
PAINLEVEL_OUTOF10: 2

## 2017-03-31 ASSESSMENT — ENCOUNTER SYMPTOMS
NAUSEA: 0
DIZZINESS: 0
FEVER: 0
HEADACHES: 1
PALPITATIONS: 0
BLURRED VISION: 0
VOMITING: 0
SPEECH CHANGE: 0
ABDOMINAL PAIN: 0
SHORTNESS OF BREATH: 0
CHILLS: 0
SENSORY CHANGE: 0
TREMORS: 0
TINGLING: 0
DIARRHEA: 0
COUGH: 0
DOUBLE VISION: 0
WEAKNESS: 0

## 2017-03-31 NOTE — PROGRESS NOTES
After changing NGT canister and noting no output and no audible air auscultation, NGT advanced aprox 10 cm until gastric air auscultated.

## 2017-03-31 NOTE — PROGRESS NOTES
"Surgical Progress Note:    Denies abdominal pain, nausea, or vomiting. Says she is passing gas but no BM. Overall much better.    PE:  /77 mmHg  Pulse 99  Temp(Src) 36.9 °C (98.4 °F)  Resp 17  Ht 1.651 m (5' 5\")  Wt 73.483 kg (162 lb)  BMI 26.96 kg/m2  SpO2 91%  Breastfeeding? No    I/O:   Intake/Output Summary (Last 24 hours) at 03/31/17 1042  Last data filed at 03/31/17 0552   Gross per 24 hour   Intake      0 ml   Output    300 ml   Net   -300 ml     NG output 300mLs?    GEN: NAD  COR: RRR  PULM: CTA B  ABD: Soft, nondistended. Very mild TTP around umbilicus  EXT: WWP    Labs:  Recent Labs      03/29/17 2134 03/31/17 0215   WBC  16.4*  10.4   RBC  4.30  3.67*   HEMOGLOBIN  13.2  11.2*   HEMATOCRIT  40.3  35.3*   MCV  93.7  96.2   MCH  30.7  30.5   RDW  48.5  51.0*   PLATELETCT  277  229   MPV  10.8  11.2   NEUTSPOLYS  82.50*   --    LYMPHOCYTES  13.00*   --    MONOCYTES  2.80   --    EOSINOPHILS  0.60   --    BASOPHILS  0.50   --      Recent Labs      03/29/17 2134 03/31/17 0215   SODIUM  138  140   POTASSIUM  4.8  4.1   CHLORIDE  101  103   CO2  27  29   GLUCOSE  194*  137*   BUN  19  12         A/P: SBO seems to be resolving with medical therapy.  Follow NG output, await return of bowel function.   Chemical DVT prophylaxis ok with me  Following    Gregorio Mcdonald MD  [facility]  [phone]      "

## 2017-03-31 NOTE — PROGRESS NOTES
NG tube clamped for 4 hours per APN Teo.    After abdominal Xray, keep NG tube clamped until further notice.

## 2017-03-31 NOTE — PROGRESS NOTES
Hospital Medicine Progress Note, Adult, Complex               Author: Scot LEEROY Bruce Date & Time created: 3/31/2017  11:13 AM     Interval History:  This is a 70 y/o female with a h/o anxiety, DM, and bipolar disorder.  She is admitted with SBO.    3/31:  NGT in place.  Denies nausea or abdominal pain.  Flatus overnight.  No BM.  VSS.      Review of Systems:  Review of Systems   Constitutional: Negative for fever and chills.   HENT: Negative for congestion.    Eyes: Negative for blurred vision and double vision.   Respiratory: Negative for cough and shortness of breath.    Cardiovascular: Negative for chest pain, palpitations and leg swelling.   Gastrointestinal: Negative for nausea, vomiting, abdominal pain and diarrhea.   Genitourinary: Negative for dysuria and urgency.   Musculoskeletal: Positive for joint pain.   Skin: Negative for itching and rash.   Neurological: Positive for headaches. Negative for dizziness, tingling, tremors, sensory change, speech change and weakness.       Physical Exam:  Physical Exam   Constitutional: She is oriented to person, place, and time. She appears well-developed and well-nourished. No distress.   HENT:   Head: Normocephalic and atraumatic.   Mouth/Throat: No oropharyngeal exudate.   Eyes: Conjunctivae are normal. Right eye exhibits no discharge. Left eye exhibits no discharge.   Neck: Normal range of motion. No tracheal deviation present.   Cardiovascular: Normal rate, regular rhythm, normal heart sounds and intact distal pulses.    No murmur heard.  Pulmonary/Chest: Effort normal and breath sounds normal. No stridor. No respiratory distress. She has no wheezes.   Abdominal: Soft. Bowel sounds are normal. She exhibits no distension. There is no tenderness.   Periumbilical tenderness.   Musculoskeletal: She exhibits no edema.   Neurological: She is alert and oriented to person, place, and time.   Skin: Skin is warm and dry. No rash noted. She is not diaphoretic. No erythema.        Labs:        Invalid input(s): [id number]      Recent Labs      17   SODIUM  138  140   POTASSIUM  4.8  4.1   CHLORIDE  101  103   CO2  27  29   BUN  19  12   CREATININE  1.07  0.97   CALCIUM  10.0  8.4*     Recent Labs      17   ALTSGPT  28   --    ASTSGOT  22   --    ALKPHOSPHAT  44   --    TBILIRUBIN  0.3   --    LIPASE  56   --    GLUCOSE  194*  137*     Recent Labs      17   RBC  4.30  3.67*   HEMOGLOBIN  13.2  11.2*   HEMATOCRIT  40.3  35.3*   PLATELETCT  277  229     Recent Labs      17   WBC  16.4*  10.4   NEUTSPOLYS  82.50*   --    LYMPHOCYTES  13.00*   --    MONOCYTES  2.80   --    EOSINOPHILS  0.60   --    BASOPHILS  0.50   --    ASTSGOT  22   --    ALTSGPT  28   --    ALKPHOSPHAT  44   --    TBILIRUBIN  0.3   --            Hemodynamics:  Temp (24hrs), Av.4 °C (97.6 °F), Min:36.3 °C (97.4 °F), Max:36.9 °C (98.4 °F)  Temperature: 36.9 °C (98.4 °F)  Pulse  Av.4  Min: 71  Max: 110   Blood Pressure : 128/77 mmHg     Respiratory:    Respiration: 17, Pulse Oximetry: 91 %           Fluids:    Intake/Output Summary (Last 24 hours) at 17 1113  Last data filed at 17 0552   Gross per 24 hour   Intake      0 ml   Output    300 ml   Net   -300 ml        GI/Nutrition:  Orders Placed This Encounter   Procedures   • Diet NPO     Standing Status: Standing      Number of Occurrences: 1      Standing Expiration Date:      Order Specific Question:  Restrict to:     Answer:  Strict [1]     Medical Decision Making, by Problem:  Active Hospital Problems    Diagnosis   • Type 2 diabetes mellitus with hyperglycemia (CMS-HCC) [E11.65]  -Continue SSI.   • Anxiety disorder [F41.9]  -Continue xanax as needed.   • SBO (small bowel obstruction) (CMS-HCC) [K56.69]  -Improving.  -Clamp NGT.  Will d/c if no nausea.  -Passing flatus.  -Continue bowel rest and IVF.  -Surgery following.        Medications reviewed, Labs reviewed and Radiology images reviewed  Jamison catheter: No Jamison      DVT Prophylaxis: Enoxaparin (Lovenox)    Ulcer prophylaxis: Not indicated

## 2017-04-01 LAB
ANION GAP SERPL CALC-SCNC: 8 MMOL/L (ref 0–11.9)
BUN SERPL-MCNC: 12 MG/DL (ref 8–22)
CALCIUM SERPL-MCNC: 8.6 MG/DL (ref 8.5–10.5)
CHLORIDE SERPL-SCNC: 107 MMOL/L (ref 96–112)
CO2 SERPL-SCNC: 24 MMOL/L (ref 20–33)
CREAT SERPL-MCNC: 0.81 MG/DL (ref 0.5–1.4)
GFR SERPL CREATININE-BSD FRML MDRD: >60 ML/MIN/1.73 M 2
GLUCOSE BLD-MCNC: 106 MG/DL (ref 65–99)
GLUCOSE BLD-MCNC: 124 MG/DL (ref 65–99)
GLUCOSE BLD-MCNC: 91 MG/DL (ref 65–99)
GLUCOSE BLD-MCNC: 92 MG/DL (ref 65–99)
GLUCOSE SERPL-MCNC: 113 MG/DL (ref 65–99)
POTASSIUM SERPL-SCNC: 3.5 MMOL/L (ref 3.6–5.5)
SODIUM SERPL-SCNC: 139 MMOL/L (ref 135–145)

## 2017-04-01 PROCEDURE — 99232 SBSQ HOSP IP/OBS MODERATE 35: CPT | Performed by: INTERNAL MEDICINE

## 2017-04-01 PROCEDURE — 36415 COLL VENOUS BLD VENIPUNCTURE: CPT

## 2017-04-01 PROCEDURE — 82962 GLUCOSE BLOOD TEST: CPT | Mod: 91

## 2017-04-01 PROCEDURE — A9270 NON-COVERED ITEM OR SERVICE: HCPCS | Performed by: NURSE PRACTITIONER

## 2017-04-01 PROCEDURE — 700111 HCHG RX REV CODE 636 W/ 250 OVERRIDE (IP): Performed by: HOSPITALIST

## 2017-04-01 PROCEDURE — 770006 HCHG ROOM/CARE - MED/SURG/GYN SEMI*

## 2017-04-01 PROCEDURE — 700102 HCHG RX REV CODE 250 W/ 637 OVERRIDE(OP): Performed by: NURSE PRACTITIONER

## 2017-04-01 PROCEDURE — 700111 HCHG RX REV CODE 636 W/ 250 OVERRIDE (IP): Performed by: NURSE PRACTITIONER

## 2017-04-01 PROCEDURE — 80048 BASIC METABOLIC PNL TOTAL CA: CPT

## 2017-04-01 RX ORDER — POTASSIUM CHLORIDE 20 MEQ/1
20 TABLET, EXTENDED RELEASE ORAL ONCE
Status: COMPLETED | OUTPATIENT
Start: 2017-04-01 | End: 2017-04-01

## 2017-04-01 RX ADMIN — KETOROLAC TROMETHAMINE 30 MG: 30 INJECTION, SOLUTION INTRAMUSCULAR; INTRAVENOUS at 17:20

## 2017-04-01 RX ADMIN — ENOXAPARIN SODIUM 40 MG: 100 INJECTION SUBCUTANEOUS at 08:05

## 2017-04-01 RX ADMIN — ALPRAZOLAM 1 MG: 0.5 TABLET ORAL at 10:46

## 2017-04-01 RX ADMIN — KETOROLAC TROMETHAMINE 30 MG: 30 INJECTION, SOLUTION INTRAMUSCULAR; INTRAVENOUS at 10:20

## 2017-04-01 RX ADMIN — ALPRAZOLAM 1 MG: 0.5 TABLET ORAL at 22:46

## 2017-04-01 RX ADMIN — KETOROLAC TROMETHAMINE 30 MG: 30 INJECTION, SOLUTION INTRAMUSCULAR; INTRAVENOUS at 04:25

## 2017-04-01 RX ADMIN — POTASSIUM CHLORIDE 20 MEQ: 1500 TABLET, EXTENDED RELEASE ORAL at 11:51

## 2017-04-01 RX ADMIN — ONDANSETRON 4 MG: 2 INJECTION, SOLUTION INTRAMUSCULAR; INTRAVENOUS at 23:23

## 2017-04-01 RX ADMIN — KETOROLAC TROMETHAMINE 30 MG: 30 INJECTION, SOLUTION INTRAMUSCULAR; INTRAVENOUS at 23:24

## 2017-04-01 ASSESSMENT — ENCOUNTER SYMPTOMS
TINGLING: 0
NAUSEA: 0
WEAKNESS: 0
FEVER: 0
CHILLS: 0
BLURRED VISION: 0
DIARRHEA: 0
TREMORS: 0
DIAPHORESIS: 0
FOCAL WEAKNESS: 0
CONSTIPATION: 1
DIZZINESS: 0
ABDOMINAL PAIN: 0
SENSORY CHANGE: 0
VOMITING: 0
SPEECH CHANGE: 0
HEADACHES: 0
SHORTNESS OF BREATH: 0

## 2017-04-01 ASSESSMENT — PAIN SCALES - GENERAL
PAINLEVEL_OUTOF10: 0
PAINLEVEL_OUTOF10: 0

## 2017-04-01 NOTE — CARE PLAN
Problem: Communication  Goal: The ability to communicate needs accurately and effectively will improve  Intervention: [place] patient and significant other/support system to call light to alert staff of needs  A/Ox4, able to make needs known       Problem: Safety  Goal: Will remain free from falls  Intervention: Assess risk factors for falls  Bed alarm, high risk to fall due to mobility

## 2017-04-01 NOTE — PROGRESS NOTES
Assumed care of pt. A/Ox4, very pleasant. BM this morning, small formed stool. NG tube clamped per MD. PIV in left hand 22g, with NS at 100cc/hr. No abdominal pain, nausea or vomiting. Mild pain, PRN med given. NPO, sips with meds. Pending surgeon see patient. Bed alarm in place. Call light in reach, bed in low position, will continue hourly rounding.

## 2017-04-01 NOTE — PROGRESS NOTES
Pt is denying nausea and wants ng tube taken out. Will follow up with day team to remove NG tube.

## 2017-04-01 NOTE — PROGRESS NOTES
Hospital Medicine Progress Note, Adult, Complex               Author: Scotyana Bruce Date & Time created: 4/1/2017  10:52 AM     Interval History:  This is a 70 y/o female with a h/o anxiety, DM, and bipolar disorder.  She is admitted with SBO.    4/1:  No nausea overnight.  Continues to pass flatus.  No BM.  Abdominal x-ray shows moderate stool.  NGT clamped.  VSS.    Review of Systems:  Review of Systems   Constitutional: Negative for fever, chills and diaphoresis.   HENT: Negative for congestion.    Eyes: Negative for blurred vision.   Respiratory: Negative for shortness of breath.    Cardiovascular: Negative for chest pain and leg swelling.   Gastrointestinal: Positive for constipation. Negative for nausea, vomiting, abdominal pain and diarrhea.   Genitourinary: Negative for dysuria.   Musculoskeletal: Positive for joint pain.   Skin: Negative for itching.   Neurological: Negative for dizziness, tingling, tremors, sensory change, speech change, focal weakness, weakness and headaches.       Physical Exam:  Physical Exam   Constitutional: She is oriented to person, place, and time. She appears well-developed and well-nourished.   HENT:   Head: Normocephalic and atraumatic.   Eyes: Conjunctivae are normal. No scleral icterus.   Neck: Normal range of motion. No JVD present.   Cardiovascular: Normal rate and regular rhythm.    No murmur heard.  Pulmonary/Chest: Effort normal and breath sounds normal. No stridor. No respiratory distress. She has no rales.   Abdominal: Soft. Bowel sounds are normal. She exhibits no distension. There is no rebound.   Periumbilical tenderness.   Musculoskeletal: She exhibits no edema or tenderness.   Neurological: She is alert and oriented to person, place, and time.   Skin: Skin is warm and dry. No rash noted. She is not diaphoretic. No erythema.       Labs:        Invalid input(s): [id number]      Recent Labs      03/29/17   2134  03/31/17   0215  04/01/17   0242   SODIUM  138  140   139   POTASSIUM  4.8  4.1  3.5*   CHLORIDE  101  103  107   CO2  27  29  24   BUN  19  12  12   CREATININE  1.07  0.97  0.81   CALCIUM  10.0  8.4*  8.6     Recent Labs      17   0242   ALTSGPT  28   --    --    ASTSGOT  22   --    --    ALKPHOSPHAT  44   --    --    TBILIRUBIN  0.3   --    --    LIPASE  56   --    --    GLUCOSE  194*  137*  113*     Recent Labs      17   RBC  4.30  3.67*   HEMOGLOBIN  13.2  11.2*   HEMATOCRIT  40.3  35.3*   PLATELETCT  277  229     Recent Labs      17   WBC  16.4*  10.4   NEUTSPOLYS  82.50*   --    LYMPHOCYTES  13.00*   --    MONOCYTES  2.80   --    EOSINOPHILS  0.60   --    BASOPHILS  0.50   --    ASTSGOT  22   --    ALTSGPT  28   --    ALKPHOSPHAT  44   --    TBILIRUBIN  0.3   --            Hemodynamics:  Temp (24hrs), Av.2 °C (97.1 °F), Min:35.9 °C (96.7 °F), Max:36.3 °C (97.3 °F)  Temperature: 35.9 °C (96.7 °F)  Pulse  Av.9  Min: 71  Max: 110   Blood Pressure : 138/67 mmHg     Respiratory:    Respiration: 17, Pulse Oximetry: 95 %           Fluids:  No intake or output data in the 24 hours ending 17 1052     GI/Nutrition:  Orders Placed This Encounter   Procedures   • DIET ORDER     Standing Status: Standing      Number of Occurrences: 1      Standing Expiration Date:      Order Specific Question:  Diet:     Answer:  Clear Liquid [10]     Order Specific Question:  Diet:     Answer:  Diabetic [3]     Medical Decision Making, by Problem:  Active Hospital Problems    Diagnosis   • Type 2 diabetes mellitus with hyperglycemia (CMS-HCC) [E11.65]  -Continue SSI.   • Anxiety disorder [F41.9]  -Continue xanax as needed.   • SBO (small bowel obstruction) (CMS-HCC) [K56.69]  -Abdominal x-ray shows moderate stool.  -No nausea overnight.  -Flatus but no BM.  -Remove NGT.  -Start clear liquid diet.  -Surgery following.       Disposition:  Will d/c home when SBO  resolved.    Medications reviewed, Labs reviewed and Radiology images reviewed  Jamison catheter: No Jamison      DVT Prophylaxis: Enoxaparin (Lovenox)    Ulcer prophylaxis: Not indicated

## 2017-04-01 NOTE — PROGRESS NOTES
"Surgical Progress Note:    Flatus and multiple small bowel movements. Denies n/v or abdominal pain. Wants NG out.      PE:  /67 mmHg  Pulse 73  Temp(Src) 35.9 °C (96.7 °F)  Resp 17  Ht 1.651 m (5' 5\")  Wt 73.483 kg (162 lb)  BMI 26.96 kg/m2  SpO2 95%  Breastfeeding? No    I/O: No intake or output data in the 24 hours ending 04/01/17 1143    GEN: NAD, talkative  Abd soft, NT, ND    Labs:  Recent Labs      03/29/17 2134 03/31/17   0215   WBC  16.4*  10.4   RBC  4.30  3.67*   HEMOGLOBIN  13.2  11.2*   HEMATOCRIT  40.3  35.3*   MCV  93.7  96.2   MCH  30.7  30.5   RDW  48.5  51.0*   PLATELETCT  277  229   MPV  10.8  11.2   NEUTSPOLYS  82.50*   --    LYMPHOCYTES  13.00*   --    MONOCYTES  2.80   --    EOSINOPHILS  0.60   --    BASOPHILS  0.50   --      Recent Labs      03/29/17 2134 03/31/17   0215  04/01/17   0242   SODIUM  138  140  139   POTASSIUM  4.8  4.1  3.5*   CHLORIDE  101  103  107   CO2  27  29  24   GLUCOSE  194*  137*  113*   BUN  19  12  12         A/P: SBO resolving  D/c NG, advance diet starting with liquids.  DVT prophylaxis ok with me.    Gregorio Mcdonald MD  [facility]  [phone]      "

## 2017-04-01 NOTE — PROGRESS NOTES
Pt AAOx4, THEODORE. No complaints of n/v, or pain. NG tube in place, clamped. Pt NPO w/ sips of water okay. Up with SBA assistance. Call light in reach.

## 2017-04-01 NOTE — CARE PLAN
Problem: Safety  Goal: Will remain free from falls  Outcome: PROGRESSING AS EXPECTED  Fall precautions in place     Problem: Urinary Elimination:  Goal: Ability to reestablish a normal urinary elimination pattern will improve  Outcome: PROGRESSING AS EXPECTED  Pt voiding per bathroom

## 2017-04-02 LAB
GLUCOSE BLD-MCNC: 112 MG/DL (ref 65–99)
GLUCOSE BLD-MCNC: 124 MG/DL (ref 65–99)
GLUCOSE BLD-MCNC: 94 MG/DL (ref 65–99)
GLUCOSE BLD-MCNC: 99 MG/DL (ref 65–99)

## 2017-04-02 PROCEDURE — 99232 SBSQ HOSP IP/OBS MODERATE 35: CPT | Performed by: INTERNAL MEDICINE

## 2017-04-02 PROCEDURE — 700102 HCHG RX REV CODE 250 W/ 637 OVERRIDE(OP): Performed by: NURSE PRACTITIONER

## 2017-04-02 PROCEDURE — 700111 HCHG RX REV CODE 636 W/ 250 OVERRIDE (IP): Performed by: NURSE PRACTITIONER

## 2017-04-02 PROCEDURE — 700111 HCHG RX REV CODE 636 W/ 250 OVERRIDE (IP): Performed by: HOSPITALIST

## 2017-04-02 PROCEDURE — A9270 NON-COVERED ITEM OR SERVICE: HCPCS | Performed by: NURSE PRACTITIONER

## 2017-04-02 PROCEDURE — 770006 HCHG ROOM/CARE - MED/SURG/GYN SEMI*

## 2017-04-02 PROCEDURE — 82962 GLUCOSE BLOOD TEST: CPT

## 2017-04-02 RX ORDER — HYDROCODONE BITARTRATE AND ACETAMINOPHEN 5; 325 MG/1; MG/1
1-2 TABLET ORAL EVERY 6 HOURS PRN
Status: DISCONTINUED | OUTPATIENT
Start: 2017-04-02 | End: 2017-04-02

## 2017-04-02 RX ORDER — BISACODYL 10 MG
10 SUPPOSITORY, RECTAL RECTAL DAILY
Status: DISCONTINUED | OUTPATIENT
Start: 2017-04-02 | End: 2017-04-02

## 2017-04-02 RX ORDER — BISACODYL 10 MG
10 SUPPOSITORY, RECTAL RECTAL
Status: DISCONTINUED | OUTPATIENT
Start: 2017-04-02 | End: 2017-04-02

## 2017-04-02 RX ORDER — BISACODYL 10 MG
10 SUPPOSITORY, RECTAL RECTAL DAILY
Status: DISCONTINUED | OUTPATIENT
Start: 2017-04-02 | End: 2017-04-03 | Stop reason: HOSPADM

## 2017-04-02 RX ORDER — AMOXICILLIN 250 MG
2 CAPSULE ORAL 2 TIMES DAILY
Status: DISCONTINUED | OUTPATIENT
Start: 2017-04-02 | End: 2017-04-02

## 2017-04-02 RX ORDER — POLYETHYLENE GLYCOL 3350 17 G/17G
1 POWDER, FOR SOLUTION ORAL
Status: DISCONTINUED | OUTPATIENT
Start: 2017-04-02 | End: 2017-04-02

## 2017-04-02 RX ORDER — HYDROCODONE BITARTRATE AND ACETAMINOPHEN 5; 325 MG/1; MG/1
1-2 TABLET ORAL EVERY 8 HOURS PRN
Status: DISCONTINUED | OUTPATIENT
Start: 2017-04-02 | End: 2017-04-03 | Stop reason: HOSPADM

## 2017-04-02 RX ORDER — AMOXICILLIN 250 MG
2 CAPSULE ORAL 2 TIMES DAILY
Status: DISCONTINUED | OUTPATIENT
Start: 2017-04-02 | End: 2017-04-03 | Stop reason: HOSPADM

## 2017-04-02 RX ORDER — POLYETHYLENE GLYCOL 3350 17 G/17G
1 POWDER, FOR SOLUTION ORAL
Status: DISCONTINUED | OUTPATIENT
Start: 2017-04-02 | End: 2017-04-03 | Stop reason: HOSPADM

## 2017-04-02 RX ADMIN — BISACODYL 10 MG: 10 SUPPOSITORY RECTAL at 13:52

## 2017-04-02 RX ADMIN — ONDANSETRON 4 MG: 2 INJECTION, SOLUTION INTRAMUSCULAR; INTRAVENOUS at 16:23

## 2017-04-02 RX ADMIN — STANDARDIZED SENNA CONCENTRATE AND DOCUSATE SODIUM 2 TABLET: 8.6; 5 TABLET, FILM COATED ORAL at 20:12

## 2017-04-02 RX ADMIN — ALPRAZOLAM 1 MG: 0.5 TABLET ORAL at 20:12

## 2017-04-02 RX ADMIN — ENOXAPARIN SODIUM 40 MG: 100 INJECTION SUBCUTANEOUS at 08:18

## 2017-04-02 RX ADMIN — ALPRAZOLAM 1 MG: 0.5 TABLET ORAL at 08:23

## 2017-04-02 RX ADMIN — KETOROLAC TROMETHAMINE 30 MG: 30 INJECTION, SOLUTION INTRAMUSCULAR; INTRAVENOUS at 08:18

## 2017-04-02 RX ADMIN — STANDARDIZED SENNA CONCENTRATE AND DOCUSATE SODIUM 2 TABLET: 8.6; 5 TABLET, FILM COATED ORAL at 10:57

## 2017-04-02 ASSESSMENT — ENCOUNTER SYMPTOMS
DIZZINESS: 0
CHILLS: 0
CONSTIPATION: 1
SPEECH CHANGE: 0
ABDOMINAL PAIN: 0
TINGLING: 0
WEAKNESS: 0
DIARRHEA: 0
FEVER: 0
COUGH: 0
BLURRED VISION: 0
HEADACHES: 0
FALLS: 0
DOUBLE VISION: 0
SENSORY CHANGE: 0
NAUSEA: 0
VOMITING: 0
TREMORS: 0
PALPITATIONS: 0
SHORTNESS OF BREATH: 0
HEARTBURN: 0

## 2017-04-02 ASSESSMENT — PAIN SCALES - GENERAL
PAINLEVEL_OUTOF10: 0
PAINLEVEL_OUTOF10: 0
PAINLEVEL_OUTOF10: 5
PAINLEVEL_OUTOF10: 2

## 2017-04-02 NOTE — PROGRESS NOTES
Hospital Medicine Progress Note, Adult, Complex               Author: Scot LEEROY Macedonk Date & Time created: 4/2/2017  10:28 AM     Interval History:  This is a 72 y/o female with a h/o anxiety, DM, and bipolar disorder.  She is admitted with SBO.    4/2:  No BM overnight.  One episode of nausea, although patient reported eating a bunch of ice cream at the time and felt is was related to that.  No vomiting.  VSS.      Review of Systems:  Review of Systems   Constitutional: Negative for fever and chills.   Eyes: Negative for blurred vision and double vision.   Respiratory: Negative for cough and shortness of breath.    Cardiovascular: Negative for chest pain, palpitations and leg swelling.   Gastrointestinal: Positive for constipation. Negative for heartburn, nausea, vomiting, abdominal pain and diarrhea.   Genitourinary: Negative for dysuria and urgency.   Musculoskeletal: Positive for joint pain. Negative for falls.   Skin: Negative for itching and rash.   Neurological: Negative for dizziness, tingling, tremors, sensory change, speech change, weakness and headaches.       Physical Exam:  Physical Exam   Constitutional: She is oriented to person, place, and time. She appears well-developed and well-nourished. No distress.   HENT:   Head: Normocephalic and atraumatic.   Mouth/Throat: No oropharyngeal exudate.   Eyes: Conjunctivae are normal. Right eye exhibits no discharge. Left eye exhibits no discharge.   Neck: Normal range of motion. No tracheal deviation present.   Cardiovascular: Normal rate, regular rhythm, normal heart sounds and intact distal pulses.    No murmur heard.  Pulmonary/Chest: Effort normal and breath sounds normal. No stridor. No respiratory distress. She has no wheezes.   Abdominal: Soft. Bowel sounds are normal. She exhibits no distension. There is no tenderness.   Periumbilical tenderness.   Musculoskeletal: She exhibits no tenderness.   Neurological: She is alert and oriented to person, place, and  time.   Skin: Skin is warm and dry. No rash noted. She is not diaphoretic.       Labs:        Invalid input(s): [id number]      Recent Labs      17   024   SODIUM  140  139   POTASSIUM  4.1  3.5*   CHLORIDE  103  107   CO2  29  24   BUN  12  12   CREATININE  0.97  0.81   CALCIUM  8.4*  8.6     Recent Labs      17   024   GLUCOSE  137*  113*     Recent Labs      17   RBC  3.67*   HEMOGLOBIN  11.2*   HEMATOCRIT  35.3*   PLATELETCT  229     Recent Labs      17   WBC  10.4           Hemodynamics:  Temp (24hrs), Av.4 °C (97.6 °F), Min:36.2 °C (97.1 °F), Max:36.8 °C (98.2 °F)  Temperature: 36.8 °C (98.2 °F)  Pulse  Av.4  Min: 70  Max: 110   Blood Pressure : 122/84 mmHg     Respiratory:    Respiration: 16, Pulse Oximetry: 98 %           Fluids:    Intake/Output Summary (Last 24 hours) at 17 1028  Last data filed at 17 0600   Gross per 24 hour   Intake   1200 ml   Output      0 ml   Net   1200 ml        GI/Nutrition:  Orders Placed This Encounter   Procedures   • DIET ORDER     Standing Status: Standing      Number of Occurrences: 1      Standing Expiration Date:      Order Specific Question:  Diet:     Answer:  Clear Liquid [10]     Order Specific Question:  Diet:     Answer:  Diabetic [3]     Medical Decision Making, by Problem:  Active Hospital Problems    Diagnosis   • Type 2 diabetes mellitus with hyperglycemia (CMS-[facility]) [E11.65]  -Continue SSI.   • Anxiety disorder [F41.9]  -Continue xanax as needed.   • SBO (small bowel obstruction) (CMS-[facility]) [K56.69]  -Abdominal x-ray shows moderate stool.  -Three small BMs yesterday.  -Continues to have flatus.  -Tolerating full liquid diet.  -Start bowel regimen.  -Surgery following.       Disposition:  Will d/c home when SBO resolved.    Medications reviewed, Labs reviewed and Radiology images reviewed  Jamison catheter: No Jamison      DVT Prophylaxis: Enoxaparin (Lovenox)    Ulcer  prophylaxis: Not indicated

## 2017-04-02 NOTE — CARE PLAN
Problem: Communication  Goal: The ability to communicate needs accurately and effectively will improve  Outcome: PROGRESSING AS EXPECTED  Pt uses call light appropiately     Problem: Knowledge Deficit  Goal: Knowledge of disease process/condition, treatment plan, diagnostic tests, and medications will improve  Outcome: PROGRESSING AS EXPECTED  Educated on plan of care for SBO

## 2017-04-02 NOTE — PROGRESS NOTES
Pt AAOx4, THEODORE. Back pain medicated per MAR, pt having some nausea. Medicated with positive effect. Tolerating full liquid diet. Up with SBA assistance. Call light in reach. Bed alarm on.

## 2017-04-02 NOTE — PROGRESS NOTES
"Surgical Progress Note:      Denies nausea or vomiting. Passing gas like normal.     PE:  /84 mmHg  Pulse 70  Temp(Src) 36.8 °C (98.2 °F)  Resp 16  Ht 1.651 m (5' 5\")  Wt 73.483 kg (162 lb)  BMI 26.96 kg/m2  SpO2 98%  Breastfeeding? No    I/O:   Intake/Output Summary (Last 24 hours) at 04/02/17 1218  Last data filed at 04/02/17 0600   Gross per 24 hour   Intake   1200 ml   Output      0 ml   Net   1200 ml       GEN: Tangential, NAD  COR: RRR  PULM: CTA B  ABD: Soft, ND, NT  EXT: WWP    Labs:  Recent Labs      03/31/17   0215   WBC  10.4   RBC  3.67*   HEMOGLOBIN  11.2*   HEMATOCRIT  35.3*   MCV  96.2   MCH  30.5   RDW  51.0*   PLATELETCT  229   MPV  11.2     Recent Labs      03/31/17   0215  04/01/17   0242   SODIUM  140  139   POTASSIUM  4.1  3.5*   CHLORIDE  103  107   CO2  29  24   GLUCOSE  137*  113*   BUN  12  12         A/P: SBO resolved. May discharge home from my standpoint.  Will sign off.    Gregorio Mcdonald MD  [facility]  [phone]      "

## 2017-04-02 NOTE — CARE PLAN
Problem: Safety  Goal: Will remain free from injury  Outcome: PROGRESSING AS EXPECTED  Bed alarm in place    Problem: Pain Management  Goal: Pain level will decrease to patient’s comfort goal  Outcome: PROGRESSING AS EXPECTED  Pain meds given per MAR

## 2017-04-02 NOTE — PROGRESS NOTES
Pt AAOx4. Denies N/V, abdominal pain. Reports positive flatus. Positive bowel sounds noted. Eating and voiding. VSS. C/O lower back pain, meds given per MAR.

## 2017-04-03 VITALS
DIASTOLIC BLOOD PRESSURE: 55 MMHG | HEART RATE: 72 BPM | TEMPERATURE: 98.5 F | HEIGHT: 65 IN | WEIGHT: 162 LBS | OXYGEN SATURATION: 95 % | BODY MASS INDEX: 26.99 KG/M2 | SYSTOLIC BLOOD PRESSURE: 127 MMHG | RESPIRATION RATE: 17 BRPM

## 2017-04-03 PROBLEM — K56.609 SBO (SMALL BOWEL OBSTRUCTION) (HCC): Status: RESOLVED | Noted: 2017-03-30 | Resolved: 2017-04-03

## 2017-04-03 LAB
ANION GAP SERPL CALC-SCNC: 11 MMOL/L (ref 0–11.9)
BUN SERPL-MCNC: 11 MG/DL (ref 8–22)
CALCIUM SERPL-MCNC: 9.4 MG/DL (ref 8.5–10.5)
CHLORIDE SERPL-SCNC: 106 MMOL/L (ref 96–112)
CO2 SERPL-SCNC: 22 MMOL/L (ref 20–33)
CREAT SERPL-MCNC: 0.85 MG/DL (ref 0.5–1.4)
GFR SERPL CREATININE-BSD FRML MDRD: >60 ML/MIN/1.73 M 2
GLUCOSE BLD-MCNC: 99 MG/DL (ref 65–99)
GLUCOSE SERPL-MCNC: 108 MG/DL (ref 65–99)
POTASSIUM SERPL-SCNC: 3.4 MMOL/L (ref 3.6–5.5)
SODIUM SERPL-SCNC: 139 MMOL/L (ref 135–145)

## 2017-04-03 PROCEDURE — 700102 HCHG RX REV CODE 250 W/ 637 OVERRIDE(OP): Performed by: INTERNAL MEDICINE

## 2017-04-03 PROCEDURE — A9270 NON-COVERED ITEM OR SERVICE: HCPCS | Performed by: NURSE PRACTITIONER

## 2017-04-03 PROCEDURE — 80048 BASIC METABOLIC PNL TOTAL CA: CPT

## 2017-04-03 PROCEDURE — 82962 GLUCOSE BLOOD TEST: CPT

## 2017-04-03 PROCEDURE — 99239 HOSP IP/OBS DSCHRG MGMT >30: CPT | Performed by: INTERNAL MEDICINE

## 2017-04-03 PROCEDURE — 36415 COLL VENOUS BLD VENIPUNCTURE: CPT

## 2017-04-03 PROCEDURE — 700111 HCHG RX REV CODE 636 W/ 250 OVERRIDE (IP): Performed by: NURSE PRACTITIONER

## 2017-04-03 PROCEDURE — 700102 HCHG RX REV CODE 250 W/ 637 OVERRIDE(OP): Performed by: NURSE PRACTITIONER

## 2017-04-03 PROCEDURE — A9270 NON-COVERED ITEM OR SERVICE: HCPCS | Performed by: INTERNAL MEDICINE

## 2017-04-03 RX ORDER — AMOXICILLIN 250 MG
2 CAPSULE ORAL DAILY
Qty: 30 TAB | Refills: 0 | Status: SHIPPED | OUTPATIENT
Start: 2017-04-03 | End: 2017-04-11 | Stop reason: CLARIF

## 2017-04-03 RX ADMIN — HYDROCODONE BITARTRATE AND ACETAMINOPHEN 2 TABLET: 5; 325 TABLET ORAL at 00:34

## 2017-04-03 RX ADMIN — ALPRAZOLAM 1 MG: 0.5 TABLET ORAL at 08:43

## 2017-04-03 RX ADMIN — ENOXAPARIN SODIUM 40 MG: 100 INJECTION SUBCUTANEOUS at 08:42

## 2017-04-03 RX ADMIN — STANDARDIZED SENNA CONCENTRATE AND DOCUSATE SODIUM 2 TABLET: 8.6; 5 TABLET, FILM COATED ORAL at 08:45

## 2017-04-03 RX ADMIN — BISACODYL 10 MG: 10 SUPPOSITORY RECTAL at 08:45

## 2017-04-03 ASSESSMENT — PAIN SCALES - GENERAL
PAINLEVEL_OUTOF10: 7
PAINLEVEL_OUTOF10: 2

## 2017-04-03 NOTE — DISCHARGE SUMMARY
CHIEF COMPLAINT ON ADMISSION:  Abdominal pain.    HOSPITAL COURSE:  A 71-year-old female with a past medical history of   hypertension, hyperlipidemia, diabetes mellitus, ruptured cerebral aneurysm   and bipolar disorder.  The patient presented to the emergency room with   complaints of abdominal pain and constipation that lasted approximately 1 week   prior to admission.  On admission, a CT of the abdomen and pelvis did reveal   a partial mechanical small-bowel obstruction with dilated small bowel loops in   the left abdomen.  The patient at that time was made n.p.o. for complete   bowel rest and was started on IV hydration.  Dr. Mcdonald from general surgery   assessed the patient and felt no need for any acute surgical intervention at   this time.  The patient did have an NG tube placed.  She did have some nausea.    The patient was continued on this in remission.  The patient improved   remarkably well, having no further nausea or vomiting.  The patient ultimately   was able to have a large bowel movement, and was tolerating oral diet.  The   patient's vital signs remained stable over her stay.  As the patient's   symptoms have resolved, she will be discharged home today.    DISCHARGE PROBLEM LIST:  1.  Small-bowel obstruction, resolved.  2.  Type 2 diabetes mellitus.  3.  Anxiety disorder.  4.  Bipolar disorder.    FOLLOWUP APPOINTMENTS:  The patient is recommended follow up with her primary   care physician in 1-2 weeks of discharge.    DISCHARGE MEDICATIONS:  1.  Xanax 0.25 mg tab, take 1 tab by mouth at bedtime as needed for sleep.  2.  Xanax 1 mg tab, take 1 tab by mouth 2 times a day as needed for anxiety.  3.  Desyrel 50 mg tablet, take 1 tab by mouth at bedtime as needed for sleep.  4.  Effexor 75 mg capsule, take 1 cap by mouth every day, along with 150 mg   for a total of 225.  5.  Hydrocodone, acetaminophen, 5 and 300 mg tab, take 1 to 1-1/2 tabs by   mouth 3 times a day as needed for moderate to  severe pain.  6.  Metformin 1000 mg tablet, take 1 tab by mouth twice daily with meals.  7.  Lisinopril 2.5 mg tablet, take 1 tab by mouth every day.  8.  Glipizide 2.5 mg tab, take 1 tab by mouth every day.  9.  Crestor 20 mg tab, take 1 tab by mouth every evening.  10.  Omega-3 fatty acids, take 1 by mouth every day.  11.  Drisdol 50,000 unit capsule, take 1 cap by mouth every 7 days.  12.  Aspirin 81 mg tab, take 81 mg by mouth every day.  13.  Multivitamin, take 1 tab by mouth daily.    DIET:  Diabetic diet.    ACTIVITY:  As tolerated.    CONSULTATIONS:  Dr. Mcdonald of general surgery.    LABORATORY DATA:  Sodium 139, potassium 3.4, chloride 106, CO2 of 22, glucose   108, BUN 11, creatinine 0.85.    The total time of discharge process was approximately 32 minutes.       ____________________________________     YAYA Govea    CSF / NTS    DD:  04/03/2017 10:17:04  DT:  04/03/2017 12:10:06    D#:  [id number]  Job#:  [id number]

## 2017-04-03 NOTE — DISCHARGE INSTRUCTIONS
Small Bowel Obstruction  A small bowel obstruction means something is blocking the small intestine. The small intestine is the long tube that connects the stomach to the colon. Treatment depends on what is causing the problem. Treatment also depends on how bad the problem is.  HOME CARE  Your doctor may let you go home if your small bowel is not completely blocked.  · Rest.  · Follow your diet as told by your doctor.  · Only drink clear liquids until you start to get better.  · Avoid solid foods as told by your doctor.  · Only take medicine as told by your doctor.  GET HELP RIGHT AWAY IF:  1. You have pain or cramps that get worse.  2. You throw up (vomit) blood.  3. You feel sick to your stomach (nauseous), or you cannot stop throwing up.  4. You cannot drink fluids.  5. You feel confused.  6. You feel dry or thirsty (dehydrated).  7. Your belly gets more puffy (bloated).  8. You have chills.  9. You have a fever.  10. You feel weak, or you pass out (faint).  MAKE SURE YOU:  1. Understand these instructions.  2. Will watch your condition.  3. Will get help right away if you are not doing well or get worse.     This information is not intended to replace advice given to you by your health care provider. Make sure you discuss any questions you have with your health care provider.     Document Released: 01/25/2006 Document Revised: 01/08/2016 Document Reviewed: 02/11/2016  [employer] Interactive Patient Education ©2016 [employer]          Discharge Instructions    Discharged to home by car with relative. Discharged via wheelchair, hospital escort: Yes.  Special equipment needed: Not Applicable    Be sure to schedule a follow-up appointment with your primary care doctor or any specialists as instructed.     Discharge Plan:   Pneumococcal Vaccine Given - only chart on this line when given: Given (See MAR)  Influenza Vaccine Indication: Not indicated: Previously immunized this influenza season and > 8 years of age    I  understand that a diet low in cholesterol, fat, and sodium is recommended for good health. Unless I have been given specific instructions below for another diet, I accept this instruction as my diet prescription.   Other diet: Diabetic    Special Instructions: None    · Is patient discharged on Warfarin / Coumadin?   No     · Is patient Post Blood Transfusion?  No    Depression / Suicide Risk    As you are discharged from this [facility] facility, it is important to learn how to keep safe from harming yourself.    Recognize the warning signs:  · Abrupt changes in personality, positive or negative- including increase in energy   · Giving away possessions  · Change in eating patterns- significant weight changes-  positive or negative  · Change in sleeping patterns- unable to sleep or sleeping all the time   · Unwillingness or inability to communicate  · Depression  · Unusual sadness, discouragement and loneliness  · Talk of wanting to die  · Neglect of personal appearance   · Rebelliousness- reckless behavior  · Withdrawal from people/activities they love  · Confusion- inability to concentrate     If you or a loved one observes any of these behaviors or has concerns about self-harm, here's what you can do:  · Talk about it- your feelings and reasons for harming yourself  · Remove any means that you might use to hurt yourself (examples: pills, rope, extension cords, firearm)  · Get professional help from the community (Mental Health, Substance Abuse, psychological counseling)  · Do not be alone:Call your Safe Contact- someone whom you trust who will be there for you.  · Call your local CRISIS HOTLINE 877-4529 or 224-696-9622  · Call your local Children's Mobile Crisis Response Team [place] (174) 844-4654 or www.Sprout  · Call the toll free National Suicide Prevention Hotlines   · National Suicide Prevention Lifeline 186-049-VBSX (7415)  · National Hope Line Network 800-SUICIDE (416-2730)

## 2017-04-03 NOTE — CARE PLAN
Problem: Safety  Goal: Will remain free from falls  Intervention: Implement fall precautions  Strip alarm activated.  Pt wearing treaded socks.  Personal items within reach.  Pt uses call bell for assistance.          Problem: Pain Management  Goal: Pain level will decrease to patient’s comfort goal  Intervention: Follow pain managment plan developed in collaboration with patient and Interdisciplinary Team  Pt c/o low back pain and medicated per MAR.

## 2017-04-03 NOTE — PROGRESS NOTES
Discontinued IV.  Discharge instructions reviewed in full & prescription given.  Pt requesting work note, NP able to print it, given to pt.  Pt meds retrieved from pharmacy.  Pt reports understanding of all discharge instructions.  Pt escorted safely off unit in  by hospital staff with all belongings at 1200.

## 2017-04-04 ENCOUNTER — PATIENT OUTREACH (OUTPATIENT)
Dept: HEALTH INFORMATION MANAGEMENT | Facility: OTHER | Age: 72
End: 2017-04-04

## 2017-04-04 NOTE — PROGRESS NOTES
· Placed discharge outreach phone call to patient s/p hospital discharge 4/3/17.  Left voicemail with my contact information and instructions to return my phone call.    · 4/6/17 at 2:46 PM--Second attempt at discharge outreach phone call to patient s/p hospital discharge 4/3/17.  Left voicemail with my contact information and instructions to return my phone call.

## 2017-04-11 ENCOUNTER — OFFICE VISIT (OUTPATIENT)
Dept: MEDICAL GROUP | Facility: CLINIC | Age: 72
End: 2017-04-11
Payer: MEDICARE

## 2017-04-11 VITALS
BODY MASS INDEX: 26.66 KG/M2 | HEART RATE: 64 BPM | WEIGHT: 160 LBS | RESPIRATION RATE: 18 BRPM | SYSTOLIC BLOOD PRESSURE: 96 MMHG | OXYGEN SATURATION: 96 % | TEMPERATURE: 96.6 F | DIASTOLIC BLOOD PRESSURE: 56 MMHG | HEIGHT: 65 IN

## 2017-04-11 DIAGNOSIS — M25.512 CHRONIC LEFT SHOULDER PAIN: ICD-10-CM

## 2017-04-11 DIAGNOSIS — Z87.19 HISTORY OF SMALL BOWEL OBSTRUCTION: ICD-10-CM

## 2017-04-11 DIAGNOSIS — Z79.891 CHRONIC USE OF OPIATE DRUGS THERAPEUTIC PURPOSES: ICD-10-CM

## 2017-04-11 DIAGNOSIS — Z09 HOSPITAL DISCHARGE FOLLOW-UP: ICD-10-CM

## 2017-04-11 DIAGNOSIS — K59.03 THERAPEUTIC OPIOID INDUCED CONSTIPATION: ICD-10-CM

## 2017-04-11 DIAGNOSIS — M54.9 CHRONIC BACK PAIN, UNSPECIFIED BACK LOCATION, UNSPECIFIED BACK PAIN LATERALITY: ICD-10-CM

## 2017-04-11 DIAGNOSIS — G89.29 CHRONIC BACK PAIN, UNSPECIFIED BACK LOCATION, UNSPECIFIED BACK PAIN LATERALITY: ICD-10-CM

## 2017-04-11 DIAGNOSIS — T40.2X5A THERAPEUTIC OPIOID INDUCED CONSTIPATION: ICD-10-CM

## 2017-04-11 DIAGNOSIS — G89.29 CHRONIC LEFT SHOULDER PAIN: ICD-10-CM

## 2017-04-11 DIAGNOSIS — M62.838 NECK MUSCLE SPASM: ICD-10-CM

## 2017-04-11 PROCEDURE — 1036F TOBACCO NON-USER: CPT | Performed by: NURSE PRACTITIONER

## 2017-04-11 PROCEDURE — 1101F PT FALLS ASSESS-DOCD LE1/YR: CPT | Performed by: NURSE PRACTITIONER

## 2017-04-11 PROCEDURE — 1111F DSCHRG MED/CURRENT MED MERGE: CPT | Performed by: NURSE PRACTITIONER

## 2017-04-11 PROCEDURE — 99214 OFFICE O/P EST MOD 30 MIN: CPT | Performed by: NURSE PRACTITIONER

## 2017-04-11 PROCEDURE — 3014F SCREEN MAMMO DOC REV: CPT | Performed by: NURSE PRACTITIONER

## 2017-04-11 PROCEDURE — G8432 DEP SCR NOT DOC, RNG: HCPCS | Performed by: NURSE PRACTITIONER

## 2017-04-11 PROCEDURE — 4040F PNEUMOC VAC/ADMIN/RCVD: CPT | Performed by: NURSE PRACTITIONER

## 2017-04-11 PROCEDURE — 3017F COLORECTAL CA SCREEN DOC REV: CPT | Performed by: NURSE PRACTITIONER

## 2017-04-11 PROCEDURE — G8417 CALC BMI ABV UP PARAM F/U: HCPCS | Performed by: NURSE PRACTITIONER

## 2017-04-11 RX ORDER — HYDROCODONE BITARTRATE AND ACETAMINOPHEN 5; 300 MG/1; MG/1
1-1.5 TABLET ORAL 3 TIMES DAILY PRN
Qty: 105 TAB | Refills: 0 | Status: SHIPPED | OUTPATIENT
Start: 2017-04-11 | End: 2017-04-11 | Stop reason: SDUPTHER

## 2017-04-11 RX ORDER — DOCUSATE SODIUM 100 MG/1
100 CAPSULE, LIQUID FILLED ORAL 2 TIMES DAILY PRN
Qty: 60 CAP | Refills: 11 | Status: SHIPPED | OUTPATIENT
Start: 2017-04-11 | End: 2017-01-01 | Stop reason: SDUPTHER

## 2017-04-11 RX ORDER — HYDROCODONE BITARTRATE AND ACETAMINOPHEN 5; 300 MG/1; MG/1
1-1.5 TABLET ORAL 3 TIMES DAILY PRN
Qty: 105 TAB | Refills: 0 | Status: SHIPPED | OUTPATIENT
Start: 2017-04-11 | End: 2017-04-26

## 2017-04-11 RX ORDER — HYDROCODONE BITARTRATE AND ACETAMINOPHEN 5; 300 MG/1; MG/1
1-1.5 TABLET ORAL 3 TIMES DAILY PRN
Qty: 105 TAB | Refills: 0 | Status: SHIPPED | OUTPATIENT
Start: 2017-04-11 | End: 2017-01-01 | Stop reason: SDUPTHER

## 2017-04-11 NOTE — MR AVS SNAPSHOT
"        Rosangela Burnette   2017 11:00 AM   Office Visit   MRN: [id number]    Department:  [facility]   Dept Phone:  [phone]    Description:  Female : 1945   Provider:  PILI Humphries           Reason for Visit     Follow-Up Fv 3 months for medication refills;       Allergies as of 2017     Allergen Noted Reactions    Keflex 2009         You were diagnosed with     Hospital discharge follow-up   [390851]       History of small bowel obstruction   [496279]       Chronic left shoulder pain   [080784]       Chronic back pain, unspecified back location, unspecified back pain laterality   [8161723]       Neck muscle spasm   [157542]       Chronic use of opiate drugs therapeutic purposes   [7682129]       Therapeutic opioid induced constipation   [1397163]         Vital Signs     Blood Pressure Pulse Temperature Respirations Height Weight    96/56 mmHg 64 35.9 °C (96.6 °F) 18 1.651 m (5' 5\") 72.576 kg (160 lb)    Body Mass Index Oxygen Saturation Breastfeeding? Smoking Status          26.63 kg/m2 96% No Former Smoker        Basic Information     Date Of Birth Sex Race Ethnicity Preferred Language    1945 Female White Non- English      Your appointments     2017 12:00 PM   Follow Up Med Management with Angelica Ogden M.D.   [facility] [address] [place] [address])    [address] [place] [id number]   [phone]              Problem List              ICD-10-CM Priority Class Noted - Resolved    History of subarachnoid hemorrhage Z86.79 Low  2012 - Present    Type 2 diabetes mellitus with hyperglycemia (CMS-HCC) E11.65 Medium  2012 - Present    History of atrial fibrillation without current medication Z86.79 Low  2012 - Present    Cerebral aneurysm, nonruptured  s/p coiling I67.1 High  2014 - Present    Chronic left shoulder pain M25.512, G89.29 Low  2016 - Present    Insomnia G47.00 Low  2016 - Present    Neck muscle " spasm M62.838 Low  1/11/2016 - Present    Chronic back pain M54.9, G89.29 Low  3/9/2016 - Present    Chronic use of opiate drugs therapeutic purposes ORT=5 Z79.899 Low  8/24/2016 - Present    Sedative, 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 Low  8/24/2016 - Present    Major depressive disorder, recurrent episode, moderate (CMS-HCC) F33.1 Low  9/29/2016 - Present    Anxiety disorder F41.9 Low  9/29/2016 - Present    Hyperlipidemia associated with type 2 diabetes mellitus (CMS-HCC) E11.69, E78.5 Medium  10/11/2016 - Present    Mild benzodiazepine use disorder F13.10 Low  10/27/2016 - Present    Depressive disorder F32.9 Low  10/27/2016 - Present    History of partial small bowel obstruction-no surgical intervention Z87.19 Medium  3/29/2017 - Present    Therapeutic opioid induced constipation K59.03, T40.2X5A   4/11/2017 - Present      Health Maintenance        Date Due Completion Dates    DIABETES MONOFILAMENT / LE EXAM 1945 ---    IMM ZOSTER VACCINE 6/1/2004 ---    MAMMOGRAM 12/29/2016 12/29/2015, 9/25/2014, 9/24/2014, 9/24/2014, 10/25/2011    COLON CANCER SCREENING ANNUAL FIT 1/11/2017 1/11/2016 (Postponed)    Override on 1/11/2016: Postponed    A1C SCREENING 8/9/2017 2/9/2017, 6/2/2016, 3/9/2016, 11/3/2015, 10/12/2015, 8/10/2015, 5/8/2015, 2/11/2015, 11/10/2014, 8/8/2014, 6/16/2014, 4/15/2014, 2/12/2014, 11/18/2013, 9/9/2013, 6/24/2013, 3/18/2013, 12/27/2012, 10/3/2012, 9/6/2012, 8/13/2012, 3/9/2012    RETINAL SCREENING 10/24/2017 10/24/2016, 6/2/2016    FASTING LIPID PROFILE 2/9/2018 2/9/2017, 11/3/2015, 10/12/2015, 8/10/2015, 5/8/2015, 2/11/2015, 11/10/2014, 8/8/2014, 6/16/2014, 4/15/2014, 12/27/2012, 10/3/2012, 9/6/2012, 3/9/2012, 9/9/2011    URINE ACR / MICROALBUMIN 2/9/2018 2/9/2017, 3/9/2012    SERUM CREATININE 4/3/2018 4/3/2017, 4/1/2017, 3/31/2017, 3/29/2017, 2/9/2017, 11/3/2015, 8/10/2015, 7/10/2015, 7/7/2015, 5/8/2015, 2/11/2015, 11/10/2014, 9/11/2014, 8/8/2014, 6/16/2014, 2/27/2014,  2/21/2014, 9/9/2013, 7/26/2013, 7/25/2013, 7/24/2013, 7/16/2013, 7/5/2013, 9/6/2012, 8/23/2012, 8/22/2012, 8/21/2012, 8/20/2012, 8/19/2012, 8/18/2012, 8/17/2012, 8/16/2012, 8/15/2012, 8/14/2012, 8/13/2012, 8/12/2012, 9/9/2011    BONE DENSITY 9/16/2021 9/16/2016, 1/1/2006 (Done)    Override on 1/1/2006: Done (nl)    IMM DTaP/Tdap/Td Vaccine (2 - Td) 9/15/2023 9/15/2013            Current Immunizations     13-VALENT PCV PREVNAR 3/30/2017  8:47 AM    Influenza TIV (IM) 1/18/2011    Influenza Vaccine Adult HD 10/3/2016, 11/7/2015    Pneumococcal Vaccine (UF)Historical Data 8/18/1970    Pneumococcal polysaccharide vaccine (PPSV-23) 12/16/2014, 7/25/2013  9:30 AM    Tdap Vaccine 9/15/2013  8:00 PM    Tuberculin Skin Test 6/25/2014      Below and/or attached are the medications your provider expects you to take. Review all of your home medications and newly ordered medications with your provider and/or pharmacist. Follow medication instructions as directed by your provider and/or pharmacist. Please keep your medication list with you and share with your provider. Update the information when medications are discontinued, doses are changed, or new medications (including over-the-counter products) are added; and carry medication information at all times in the event of emergency situations     Allergies:  KEFLEX - (reactions not documented)               Medications  Valid as of: April 11, 2017 - 11:31 AM    Generic Name Brand Name Tablet Size Instructions for use    ALPRAZolam (Tab) XANAX 0.25 MG Take 1 Tab by mouth at bedtime as needed for Sleep.        ALPRAZolam (Tab) XANAX 1 MG Take 1 Tab by mouth 2 times a day as needed for Anxiety.        Aspirin (Chew Tab) ASA 81 MG Take 81 mg by mouth every day.        Docusate Sodium (Cap) COLACE 100 MG Take 1 Cap by mouth 2 times a day as needed for Constipation.        Ergocalciferol (Cap) DRISDOL 08296 UNITS Take 1 Cap by mouth every 7 days.        GlipiZIDE (TABLET SR 24 HR)  GLUCOTROL 2.5 MG Take 1 Tab by mouth every day.        Glucose Blood (Strip) ONE TOUCH ULTRA TEST          Hydrocodone-Acetaminophen (Tab) Hydrocodone-Acetaminophen 5-300 MG Take 1-1.5 Tabs by mouth 3 times a day as needed.        Hydrocodone-Acetaminophen (Tab) Hydrocodone-Acetaminophen 5-300 MG Take 1-1.5 Tabs by mouth 3 times a day as needed.        Hydrocodone-Acetaminophen (Tab) Hydrocodone-Acetaminophen 5-300 MG Take 1-1.5 Tabs by mouth 3 times a day as needed.        Lisinopril (Tab) PRINIVIL 2.5 MG Take 1 Tab by mouth every day.        MetFORMIN HCl (Tab) GLUCOPHAGE 1000 MG TAKE ONE TABLET BY MOUTH TWICE DAILY WITH MEALS        Multiple Vitamins-Minerals (Tab) THERAGRAN-M  Take 1 Tab by mouth every day.        Omega-3 Fatty Acids   Take  by mouth.        Rosuvastatin Calcium (Tab) CRESTOR 20 MG Take 1 Tab by mouth every evening.        TraZODone HCl (Tab) DESYREL 50 MG Take 1 Tab by mouth at bedtime as needed for Sleep (as needed for sleep).        Venlafaxine HCl (CAPSULE SR 24 HR) EFFEXOR- MG Take 1 Cap by mouth every day. Take in addition to 37.5mg. Dose is 187.5mg.        Venlafaxine HCl (CAPSULE SR 24 HR) EFFEXOR XR 75 MG Take 1 Cap by mouth every day. To be taken along with 150 mg. Total dose - 225 mg.        .                 Medicines prescribed today were sent to:     [facility][id number] - [place] - [address] [address]    Phone: [phone] Fax: [phone]    Open 24 Hours?: No    [facility] - [place] - [address] [facility]    [address] [facility] [address]    Phone: [phone] Fax: [phone]    Open 24 Hours?: No      Medication refill instructions:       If your prescription bottle indicates you have medication refills left, it is not necessary to call your provider’s office. Please contact your pharmacy and they will refill your medication.    If your prescription bottle indicates you do not have any refills left, you may request  refills at any time through one of the following ways: The online Webspy system (except Urgent Care), by calling your provider’s office, or by asking your pharmacy to contact your provider’s office with a refill request. Medication refills are processed only during regular business hours and may not be available until the next business day. Your provider may request additional information or to have a follow-up visit with you prior to refilling your medication.   *Please Note: Medication refills are assigned a new Rx number when refilled electronically. Your pharmacy may indicate that no refills were authorized even though a new prescription for the same medication is available at the pharmacy. Please request the medicine by name with the pharmacy before contacting your provider for a refill.           Webspy Access Code: [email or web address]  Expires: 4/28/2017  5:38 PM    Webspy  A secure, online tool to manage your health information     [employer]’s Webspy® is a secure, online tool that connects you to your personalized health information from the privacy of your home -- day or night - making it very easy for you to manage your healthcare. Once the activation process is completed, you can even access your medical information using the Webspy yevgeniy, which is available for free in the Apple Yevgeniy store or Google Play store.     Webspy provides the following levels of access (as shown below):   My Chart Features   [facility] Doctor [facility]  Urgent  Care Non-[facility]  Primary Care  Doctor   Email your healthcare team securely and privately 24/7 X X X    Manage appointments: schedule your next appointment; view details of past/upcoming appointments X      Request prescription refills. X      View recent personal medical records, including lab and immunizations X X X X   View health record, including health history, allergies, medications X X X X   Read reports about your outpatient visits,  procedures, consult and ER notes X X X X   See your discharge summary, which is a recap of your hospital and/or ER visit that includes your diagnosis, lab results, and care plan. X X       How to register for Liquipel:  1. Go to  [email or web address].  2. Click on the Sign Up Now box, which takes you to the New Member Sign Up page. You will need to provide the following information:  a. Enter your Liquipel Access Code exactly as it appears at the top of this page. (You will not need to use this code after you’ve completed the sign-up process. If you do not sign up before the expiration date, you must request a new code.)   b. Enter your date of birth.   c. Enter your home email address.   d. Click Submit, and follow the next screen’s instructions.  3. Create a Eponymt ID. This will be your Eponymt login ID and cannot be changed, so think of one that is secure and easy to remember.  4. Create a Eponymt password. You can change your password at any time.  5. Enter your Password Reset Question and Answer. This can be used at a later time if you forget your password.   6. Enter your e-mail address. This allows you to receive e-mail notifications when new information is available in Liquipel.  7. Click Sign Up. You can now view your health information.    For assistance activating your Liquipel account, call [phone]

## 2017-04-11 NOTE — PROGRESS NOTES
CC: Follow-Up        HPI:     Rosangela presents today for the followin. Hospital discharge follow-up/History of partial small bowel obstruction-no surgical intervention  Presentation last month for a few days related to a partial small bowel obstruction. Resolved with NG placement and nothing by mouth. No surgical intervention was necessary. She was sent home on Senokot. She felt that after she was sent home she did have multiple bowel movements and cleaned herself out quite well. She did feel in general she was having a bowel movement daily she just didn't realize that it wasn't as complete as it should've been. She does take opioids daily and realizes that the risk factor for her constipation.    3. Chronic left shoulder pain/Chronic back pain, unspecified back location, unspecified back pain laterality/Neck muscle spasm/Chronic use of opiate drugs therapeutic purposes ORT=5  Chronic pain recheck:   Last dose of controlled substance: hydrocodone 4/10 PM  Chronic pain treated with hydrocodone taken 3 times a day    She  reports that she does not drink alcohol.  She  reports that she does not use illicit drugs.    Consequences of Chronic Opiate therapy:  (5 A's)  Analgesia: Compared to no treatment or prior treatment, pain is currently improved  Activity: improved  Adverse Events: She denies dry mouth, itchy skin, nausea and sedation  Aberrant Behaviors: She reports she is taking medication as prescribed and is not veering from agreed treatment regimen. There have been no inappropriate refills or lost/stolen meds reported.   Affect/Mood: Pain is not impacting patient's mood.  Patient reports depression/anxiety. Chronic-sees psychriatry    Nonnarcotic treatments that are being used: trigger point injections.   Treatment goals discussed.    Opioid Risk Score: 5    Interpretation of Opioid Risk Score   Score 0-3 = Low risk of abuse. Do UDS at least once per year.  Score 4-7 = Moderate risk of abuse. Do UDS 1-4  times per year.  Score 8+ = High risk of abuse. Refer to specialist.    Last order of CONTROLLED SUBSTANCE TREATMENT AGREEMENT was found on 1/11/2017 from Office Visit on 1/11/2017     UDS Summary     Patient has no health maintenance due at this time        Most recent UDS reviewed today and is consistent with prescribed medications.    I have reviewed the medical records, the Prescription Monitoring Program and I have determined that controlled substance treatment is medically indicated.      Current Outpatient Prescriptions   Medication Sig Dispense Refill   • docusate sodium (COLACE) 100 MG Cap Take 1 Cap by mouth 2 times a day as needed for Constipation. 60 Cap 11   • Hydrocodone-Acetaminophen 5-300 MG Tab Take 1-1.5 Tabs by mouth 3 times a day as needed. 105 Tab 0   • Hydrocodone-Acetaminophen 5-300 MG Tab Take 1-1.5 Tabs by mouth 3 times a day as needed. 105 Tab 0   • Hydrocodone-Acetaminophen 5-300 MG Tab Take 1-1.5 Tabs by mouth 3 times a day as needed. 105 Tab 0   • alprazolam (XANAX) 0.25 MG Tab Take 1 Tab by mouth at bedtime as needed for Sleep. 30 Tab 2   • alprazolam (XANAX) 1 MG Tab Take 1 Tab by mouth 2 times a day as needed for Anxiety. 60 Tab 2   • trazodone (DESYREL) 50 MG Tab Take 1 Tab by mouth at bedtime as needed for Sleep (as needed for sleep). 90 Tab 0   • venlafaxine XR (EFFEXOR XR) 75 MG CAPSULE SR 24 HR Take 1 Cap by mouth every day. To be taken along with 150 mg. Total dose - 225 mg. 90 Cap 0   • venlafaxine (EFFEXOR XR) 150 MG extended-release capsule Take 1 Cap by mouth every day. Take in addition to 37.5mg. Dose is 187.5mg. 90 Cap 0   • metformin (GLUCOPHAGE) 1000 MG tablet TAKE ONE TABLET BY MOUTH TWICE DAILY WITH MEALS 180 Tab 3   • lisinopril (PRINIVIL) 2.5 MG Tab Take 1 Tab by mouth every day. 90 Tab 3   • glipiZIDE SR (GLUCOTROL) 2.5 MG TABLET SR 24 HR Take 1 Tab by mouth every day. 90 Tab 1   • rosuvastatin (CRESTOR) 20 MG Tab Take 1 Tab by mouth every evening. 90 Tab 3   • ONE  "TOUCH ULTRA TEST strip      • Omega-3 Fatty Acids (FISH OIL PO) Take  by mouth.     • vitamin D, Ergocalciferol, (DRISDOL) 04080 UNITS Cap capsule Take 1 Cap by mouth every 7 days. 12 Cap 3   • aspirin (ASA) 81 MG CHEW Take 81 mg by mouth every day.     • therapeutic multivitamin-minerals (THERAGRAN-M) TABS Take 1 Tab by mouth every day.       No current facility-administered medications for this visit.     Social History   Substance Use Topics   • Smoking status: Former Smoker -- 1.00 packs/day for 40 years     Types: Cigarettes     Quit date: 01/01/2006   • Smokeless tobacco: Never Used   • Alcohol Use: No     I reviewed patients allergies, problem list and medications today in EPIC.    ROS: Any/all pertinent positives listed in the HPI, otherwise all others reviewed are negative today.      BP 96/56 mmHg  Pulse 64  Temp(Src) 35.9 °C (96.6 °F)  Resp 18  Ht 1.651 m (5' 5\")  Wt 72.576 kg (160 lb)  BMI 26.63 kg/m2  SpO2 96%  Breastfeeding? No    Exam:    Gen: Alert and oriented, No apparent distress. WDWN  Psych: A+Ox3, normal affect and mood  Skin: Warm, dry and intact. Good turgor   No rashes in visible areas.  Eye: Conjunctiva clear, lids normal  ENMT: Lips without lesions, good dentition  Lungs: Clear to auscultation bilaterally, no rales or rhonchi   Unlabored respiratory effort.   CV: Regular rate and rhythm, S1, S2. No murmurs.   No Edema        Assessment and Plan.   71 y.o. female with the following issues.    1. Hospital discharge follow-up/ History of partial small bowel obstruction-no surgical intervention/Therapeutic opioid induced constipation  Stable. Reviewed bowel health. Discussed high-fiber diet. Discussed Colace as needed. Discussed opiates as a risk  - docusate sodium (COLACE) 100 MG Cap; Take 1 Cap by mouth 2 times a day as needed for Constipation.  Dispense: 60 Cap; Refill: 11      3. Chronic left shoulder pain/Chronic back pain, unspecified back location, unspecified back pain " laterality/Neck muscle spasm/Chronic use of opiate drugs therapeutic purposes ORT=5  Clinically stable. No reports of new symptoms. Well-controlled on current medication.  obtained/reviewed from state pharmacy database.  Medications found to be medically necessary/appropriate.  3 months of medication supply at this visit. Followup in the office for further refills.    - Hydrocodone-Acetaminophen 5-300 MG Tab; Take 1-1.5 Tabs by mouth 3 times a day as needed.  Dispense: 105 Tab; Refill: 0  - Hydrocodone-Acetaminophen 5-300 MG Tab; Take 1-1.5 Tabs by mouth 3 times a day as needed.  Dispense: 105 Tab; Refill: 0  - Hydrocodone-Acetaminophen 5-300 MG Tab; Take 1-1.5 Tabs by mouth 3 times a day as needed.  Dispense: 105 Tab; Refill: 0

## 2017-04-13 ENCOUNTER — TELEPHONE (OUTPATIENT)
Dept: MEDICAL GROUP | Facility: CLINIC | Age: 72
End: 2017-04-13

## 2017-04-13 NOTE — TELEPHONE ENCOUNTER
VOICEMAIL  1. Caller Name: Rosangela Burnette                      Call Back Number: [phone] (home)       2. Message: Patient left message asking about the name for her stool softener.  Called patient back and informed her the name is colace and a new request has been filled and sent to her pharmacy.    3. Patient approves office to leave a detailed voicemail/MyChart message: yes

## 2017-04-26 ENCOUNTER — OFFICE VISIT (OUTPATIENT)
Dept: BEHAVIORAL HEALTH | Facility: PHYSICIAN GROUP | Age: 72
End: 2017-04-26
Payer: MEDICARE

## 2017-04-26 VITALS
SYSTOLIC BLOOD PRESSURE: 149 MMHG | WEIGHT: 165 LBS | BODY MASS INDEX: 27.49 KG/M2 | DIASTOLIC BLOOD PRESSURE: 97 MMHG | HEIGHT: 65 IN | HEART RATE: 81 BPM

## 2017-04-26 DIAGNOSIS — F32.A DEPRESSIVE DISORDER: ICD-10-CM

## 2017-04-26 DIAGNOSIS — F41.9 ANXIETY DISORDER, UNSPECIFIED TYPE: ICD-10-CM

## 2017-04-26 PROCEDURE — 1101F PT FALLS ASSESS-DOCD LE1/YR: CPT | Performed by: PSYCHIATRY & NEUROLOGY

## 2017-04-26 PROCEDURE — 99214 OFFICE O/P EST MOD 30 MIN: CPT | Performed by: PSYCHIATRY & NEUROLOGY

## 2017-04-26 PROCEDURE — 1036F TOBACCO NON-USER: CPT | Performed by: PSYCHIATRY & NEUROLOGY

## 2017-04-26 PROCEDURE — 4040F PNEUMOC VAC/ADMIN/RCVD: CPT | Performed by: PSYCHIATRY & NEUROLOGY

## 2017-04-26 PROCEDURE — 3017F COLORECTAL CA SCREEN DOC REV: CPT | Performed by: PSYCHIATRY & NEUROLOGY

## 2017-04-26 PROCEDURE — 3014F SCREEN MAMMO DOC REV: CPT | Performed by: PSYCHIATRY & NEUROLOGY

## 2017-04-26 PROCEDURE — G8432 DEP SCR NOT DOC, RNG: HCPCS | Performed by: PSYCHIATRY & NEUROLOGY

## 2017-04-26 PROCEDURE — G8417 CALC BMI ABV UP PARAM F/U: HCPCS | Performed by: PSYCHIATRY & NEUROLOGY

## 2017-04-26 PROCEDURE — 1111F DSCHRG MED/CURRENT MED MERGE: CPT | Performed by: PSYCHIATRY & NEUROLOGY

## 2017-04-26 RX ORDER — TRAZODONE HYDROCHLORIDE 50 MG/1
50 TABLET ORAL NIGHTLY PRN
Qty: 90 TAB | Refills: 0 | Status: SHIPPED | OUTPATIENT
Start: 2017-04-26 | End: 2017-01-01 | Stop reason: SDUPTHER

## 2017-04-26 RX ORDER — ALPRAZOLAM 1 MG/1
1 TABLET ORAL 2 TIMES DAILY PRN
Qty: 60 TAB | Refills: 2 | Status: SHIPPED
Start: 2017-04-26 | End: 2017-06-01 | Stop reason: SDUPTHER

## 2017-04-26 RX ORDER — VENLAFAXINE HYDROCHLORIDE 75 MG/1
225 CAPSULE, EXTENDED RELEASE ORAL DAILY
Qty: 270 CAP | Refills: 0 | Status: SHIPPED | OUTPATIENT
Start: 2017-04-26 | End: 2017-01-01 | Stop reason: SDUPTHER

## 2017-05-01 RX ORDER — GLIPIZIDE 2.5 MG/1
TABLET, EXTENDED RELEASE ORAL
Qty: 90 TAB | Refills: 3 | Status: SHIPPED | OUTPATIENT
Start: 2017-05-01 | End: 2018-01-01 | Stop reason: SDUPTHER

## 2017-06-01 ENCOUNTER — TELEPHONE (OUTPATIENT)
Dept: MEDICAL GROUP | Facility: CLINIC | Age: 72
End: 2017-06-01

## 2017-06-01 ENCOUNTER — TELEPHONE (OUTPATIENT)
Dept: BEHAVIORAL HEALTH | Facility: PHYSICIAN GROUP | Age: 72
End: 2017-06-01

## 2017-06-01 RX ORDER — ALPRAZOLAM 1 MG/1
1 TABLET ORAL 3 TIMES DAILY PRN
Qty: 70 TAB | Refills: 0 | Status: SHIPPED
Start: 2017-06-01 | End: 2017-01-01 | Stop reason: SDUPTHER

## 2017-06-01 NOTE — TELEPHONE ENCOUNTER
1. Caller Name: Rosangela Burnette                                         Call Back Number: [phone] (home)       Patient approves a detailed voicemail message: yes    2. What are the patient's symptoms (location & severity)? URI, cough not dripping no other symptoms exposed to younger kids with URI problems wants a Z-pack.     3. Is this a new symptom Yes    4. When did it start? Couple days    5. Action taken per Active Symptom Guide: Same day appointment scheduled    6. Patient does not agree to recommended action per active symptom guide. Patient was advised again of recommendation and verbalized understanding.   pt stated I know what it is and I know that usually I get z-pack. Rosangela informed unfortunately all URI needs to be seen per our providers unable to know what it is over the phone. Offered an apt tomorrow with Dr. Caputo Pt got upset said bye and hang up the phone.

## 2017-06-01 NOTE — TELEPHONE ENCOUNTER
"Called the patient back and she was crying the whole time while she spoke with me. She has been having a tough time in regards to her anxiety with her decreased dose of Xanax. She is currently on 1 mg twice daily of Xanax but has been having increased anxiety due to health stressors. She recently saw her Neurologist who told her that she might have multiple sclerosis and was started on baclofen for the same. She has been crying a lot and is worried about her health. She is wanting to get 70 tablets instead of 60 for a month so that she has some \"safety net\" in case she takes more than the 2 prescribed. She has an appointment with me later this month and is willing to decrease the quantity down to 65 during that visit. Discussed that I can increase the quantity temporarily and advised her to watch her balance and cognition. I did inform her that combination of Baclofen, Xanax and her opioid pain medications can definitely cause balance problems, falls and cognitive changes and she understands the risk.    ----- Message from Kenyatta Moreno sent at 6/1/2017  9:33 AM PDT -----  Contact: [phone]  Pt called and has been taking 90 XANAX a month. She got a rx  From you. She was not able to tell me what the rx was for because she stated she cant read the rx. She would like her rx for 90mg. She thinks its for 60 1mg a month. Please call pt today after 1.   She sees you June 27th   "

## 2017-06-01 NOTE — TELEPHONE ENCOUNTER
Agree, Patient does need to be seen to make sure she gets appropriate treatment.    (No hx of copd/rad)

## 2017-06-02 ENCOUNTER — OFFICE VISIT (OUTPATIENT)
Dept: MEDICAL GROUP | Facility: CLINIC | Age: 72
End: 2017-06-02
Payer: MEDICARE

## 2017-06-02 VITALS
TEMPERATURE: 95.2 F | OXYGEN SATURATION: 93 % | WEIGHT: 159 LBS | HEIGHT: 65 IN | BODY MASS INDEX: 26.49 KG/M2 | RESPIRATION RATE: 16 BRPM | SYSTOLIC BLOOD PRESSURE: 108 MMHG | HEART RATE: 114 BPM | DIASTOLIC BLOOD PRESSURE: 68 MMHG

## 2017-06-02 DIAGNOSIS — J40 BRONCHITIS: ICD-10-CM

## 2017-06-02 PROCEDURE — 1101F PT FALLS ASSESS-DOCD LE1/YR: CPT | Performed by: NURSE PRACTITIONER

## 2017-06-02 PROCEDURE — 99213 OFFICE O/P EST LOW 20 MIN: CPT | Performed by: NURSE PRACTITIONER

## 2017-06-02 PROCEDURE — 3017F COLORECTAL CA SCREEN DOC REV: CPT | Performed by: NURSE PRACTITIONER

## 2017-06-02 PROCEDURE — G8417 CALC BMI ABV UP PARAM F/U: HCPCS | Performed by: NURSE PRACTITIONER

## 2017-06-02 PROCEDURE — 4040F PNEUMOC VAC/ADMIN/RCVD: CPT | Performed by: NURSE PRACTITIONER

## 2017-06-02 PROCEDURE — 1036F TOBACCO NON-USER: CPT | Performed by: NURSE PRACTITIONER

## 2017-06-02 PROCEDURE — G8432 DEP SCR NOT DOC, RNG: HCPCS | Performed by: NURSE PRACTITIONER

## 2017-06-02 PROCEDURE — 3014F SCREEN MAMMO DOC REV: CPT | Performed by: NURSE PRACTITIONER

## 2017-06-02 RX ORDER — AZITHROMYCIN 250 MG/1
TABLET, FILM COATED ORAL
Qty: 6 TAB | Refills: 0 | Status: SHIPPED | OUTPATIENT
Start: 2017-06-02 | End: 2017-01-01

## 2017-06-02 RX ORDER — CODEINE PHOSPHATE AND GUAIFENESIN 10; 100 MG/5ML; MG/5ML
5 SOLUTION ORAL NIGHTLY PRN
Qty: 200 ML | Refills: 0 | Status: SHIPPED | OUTPATIENT
Start: 2017-06-02 | End: 2017-01-01

## 2017-06-02 ASSESSMENT — ENCOUNTER SYMPTOMS
WHEEZING: 0
SORE THROAT: 0
SPUTUM PRODUCTION: 0
CHILLS: 0
SHORTNESS OF BREATH: 0
DIAPHORESIS: 1
FEVER: 1
COUGH: 1

## 2017-06-02 NOTE — MR AVS SNAPSHOT
"        Rosangela Burnette   2017 10:20 AM   Office Visit   MRN: [id number]    Department:  [facility]   Dept Phone:  [phone]    Description:  Female : 1945   Provider:  PILI Machuca           Reason for Visit     URI dry cough/ sweating/ bodyaches/ x 1 week       Allergies as of 2017     Allergen Noted Reactions    Keflex 2009         You were diagnosed with     Bronchitis   [[id number]]         Vital Signs     Blood Pressure Pulse Temperature Respirations Height Weight    108/68 mmHg 114 35.1 °C (95.2 °F) 16 1.651 m (5' 5\") 72.122 kg (159 lb)    Body Mass Index Oxygen Saturation Smoking Status             26.46 kg/m2 93% Former Smoker         Basic Information     Date Of Birth Sex Race Ethnicity Preferred Language    1945 Female White Non- English      Your appointments     2017  3:30 PM   Follow Up Med Management with Angelica Ogden M.D.   BE[facility]AVIORAL [facility] [address] [place] [address])    [address] [place] [address]   [phone]            2017 11:00 AM   Established Patient with PILI Humphries   [address] [id number] [phone]           You will be receiving a confirmation call a few days before your appointment from our automated call confirmation system.            2017 10:00 AM   Follow Up Med Management with Angelica Ogden M.D.   [facility] [address] [place] [address]   [phone]              Problem List              ICD-10-CM Priority Class Noted - Resolved    History of subarachnoid hemorrhage Z86.79 Low  2012 - Present    Type 2 diabetes mellitus with hyperglycemia (CMS-HCC) E11.65 Medium  2012 - Present    History of atrial fibrillation without current medication Z86.79 Low  2012 - Present    Cerebral aneurysm, nonruptured  s/p coiling I67.1 High  2014 - Present    Chronic " left shoulder pain M25.512, G89.29 Low  1/11/2016 - Present    Insomnia G47.00 Low  1/11/2016 - Present    Neck muscle spasm M62.838 Low  1/11/2016 - Present    Chronic back pain M54.9, G89.29 Low  3/9/2016 - Present    Chronic use of opiate drugs therapeutic purposes ORT=5 Z79.891 Low  8/24/2016 - Present    Sedative, 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 Low  8/24/2016 - Present    Major depressive disorder, recurrent episode, moderate (CMS-HCC) F33.1 Low  9/29/2016 - Present    Anxiety disorder F41.9 Low  9/29/2016 - Present    Hyperlipidemia associated with type 2 diabetes mellitus (CMS-HCC) E11.69, E78.5 Medium  10/11/2016 - Present    Depressive disorder F32.9 Low  10/27/2016 - Present    History of partial small bowel obstruction-no surgical intervention Z87.19 Medium  3/29/2017 - Present    Therapeutic opioid induced constipation K59.03, T40.2X5A   4/11/2017 - Present      Health Maintenance        Date Due Completion Dates    DIABETES MONOFILAMENT / LE EXAM 1945 ---    IMM ZOSTER VACCINE 6/1/2004 ---    MAMMOGRAM 12/29/2016 12/29/2015, 9/24/2014, 10/25/2011    COLON CANCER SCREENING ANNUAL FIT 1/11/2017 1/11/2016 (Postponed)    Override on 1/11/2016: Postponed    A1C SCREENING 8/9/2017 2/9/2017, 6/2/2016, 3/9/2016, 11/3/2015, 10/12/2015, 8/10/2015, 5/8/2015, 2/11/2015, 11/10/2014, 8/8/2014, 6/16/2014, 4/15/2014, 2/12/2014, 11/18/2013, 9/9/2013, 6/24/2013, 3/18/2013, 12/27/2012, 10/3/2012, 9/6/2012, 8/13/2012, 3/9/2012    RETINAL SCREENING 10/24/2017 10/24/2016, 6/2/2016    FASTING LIPID PROFILE 2/9/2018 2/9/2017, 11/3/2015, 10/12/2015, 8/10/2015, 5/8/2015, 2/11/2015, 11/10/2014, 8/8/2014, 6/16/2014, 4/15/2014, 12/27/2012, 10/3/2012, 9/6/2012, 3/9/2012, 9/9/2011    URINE ACR / MICROALBUMIN 2/9/2018 2/9/2017, 3/9/2012    SERUM CREATININE 4/3/2018 4/3/2017, 4/1/2017, 3/31/2017, 3/29/2017, 2/9/2017, 11/3/2015, 8/10/2015, 7/10/2015, 7/7/2015, 5/8/2015, 2/11/2015, 11/10/2014, 9/11/2014,  8/8/2014, 6/16/2014, 2/27/2014, 2/21/2014, 9/9/2013, 7/26/2013, 7/25/2013, 7/24/2013, 7/16/2013, 7/5/2013, 9/6/2012, 8/23/2012, 8/22/2012, 8/21/2012, 8/20/2012, 8/19/2012, 8/18/2012, 8/17/2012, 8/16/2012, 8/15/2012, 8/14/2012, 8/13/2012, 8/12/2012, 9/9/2011    BONE DENSITY 9/16/2021 9/16/2016, 1/1/2006 (Done)    Override on 1/1/2006: Done (nl)    IMM DTaP/Tdap/Td Vaccine (2 - Td) 9/15/2023 9/15/2013            Current Immunizations     13-VALENT PCV PREVNAR 3/30/2017  8:47 AM    Influenza TIV (IM) 1/18/2011    Influenza Vaccine Adult HD 10/3/2016, 11/7/2015    Pneumococcal Vaccine (UF)Historical Data 8/18/1970    Pneumococcal polysaccharide vaccine (PPSV-23) 12/16/2014, 7/25/2013  9:30 AM    Tdap Vaccine 9/15/2013  8:00 PM    Tuberculin Skin Test 6/25/2014      Below and/or attached are the medications your provider expects you to take. Review all of your home medications and newly ordered medications with your provider and/or pharmacist. Follow medication instructions as directed by your provider and/or pharmacist. Please keep your medication list with you and share with your provider. Update the information when medications are discontinued, doses are changed, or new medications (including over-the-counter products) are added; and carry medication information at all times in the event of emergency situations     Allergies:  KEFLEX - (reactions not documented)               Medications  Valid as of: June 02, 2017 - 12:18 PM    Generic Name Brand Name Tablet Size Instructions for use    ALPRAZolam (Tab) XANAX 1 MG Take 1 Tab by mouth 3 times a day as needed for Anxiety.        Aspirin (Chew Tab) ASA 81 MG Take 81 mg by mouth every day.        Azithromycin (Tab) ZITHROMAX 250 MG As directed        Docusate Sodium (Cap) COLACE 100 MG Take 1 Cap by mouth 2 times a day as needed for Constipation.        Ergocalciferol (Cap) DRISDOL 57848 UNITS Take 1 Cap by mouth every 7 days.        GlipiZIDE (TABLET SR 24 HR)  GLUCOTROL 2.5 MG TAKE ONE TABLET BY MOUTH ONCE DAILY        Glucose Blood (Strip) ONE TOUCH ULTRA TEST          Guaifenesin-Codeine (Solution) ROBITUSSIN -10 mg/5mL Take 5 mL by mouth at bedtime as needed.        Hydrocodone-Acetaminophen (Tab) Hydrocodone-Acetaminophen 5-300 MG Take 1-1.5 Tabs by mouth 3 times a day as needed.        Lisinopril (Tab) PRINIVIL 2.5 MG Take 1 Tab by mouth every day.        MetFORMIN HCl (Tab) GLUCOPHAGE 1000 MG TAKE ONE TABLET BY MOUTH TWICE DAILY WITH MEALS        Multiple Vitamins-Minerals (Tab) THERAGRAN-M  Take 1 Tab by mouth every day.        Omega-3 Fatty Acids   Take  by mouth.        Rosuvastatin Calcium (Tab) CRESTOR 20 MG Take 1 Tab by mouth every evening.        TraZODone HCl (Tab) DESYREL 50 MG Take 1 Tab by mouth at bedtime as needed for Sleep.        Venlafaxine HCl (CAPSULE SR 24 HR) EFFEXOR XR 75 MG Take 3 Caps by mouth every day.        .                 Medicines prescribed today were sent to:     [facility] [address] [place] [address] - [address] [id number] [facility] [address]    Phone: [phone] Fax: [phone]    Open 24 Hours?: No      Medication refill instructions:       If your prescription bottle indicates you have medication refills left, it is not necessary to call your provider’s office. Please contact your pharmacy and they will refill your medication.    If your prescription bottle indicates you do not have any refills left, you may request refills at any time through one of the following ways: The online [employer] system (except Urgent Care), by calling your provider’s office, or by asking your pharmacy to contact your provider’s office with a refill request. Medication refills are processed only during regular business hours and may not be available until the next business day. Your provider may request additional information or to have a follow-up visit with you prior to refilling your medication.   *Please Note: Medication refills  are assigned a new Rx number when refilled electronically. Your pharmacy may indicate that no refills were authorized even though a new prescription for the same medication is available at the pharmacy. Please request the medicine by name with the pharmacy before contacting your provider for a refill.           TuneGO Access Code: [email or web address]  Expires: 6/6/2017 11:49 AM    TuneGO  A secure, online tool to manage your health information     [employer] TuneGO® is a secure, online tool that connects you to your personalized health information from the privacy of your home -- day or night - making it very easy for you to manage your healthcare. Once the activation process is completed, you can even access your medical information using the TuneGO yevgeniy, which is available for free in the Apple Yevgeniy store or Google Play store.     TuneGO provides the following levels of access (as shown below):   My Chart Features   [facility] Primary Care Doctor [facility]  Specialists [facility]  Urgent  Care Non-[facility]  Primary Care  Doctor   Email your healthcare team securely and privately 24/7 X X X    Manage appointments: schedule your next appointment; view details of past/upcoming appointments X      Request prescription refills. X      View recent personal medical records, including lab and immunizations X X X X   View health record, including health history, allergies, medications X X X X   Read reports about your outpatient visits, procedures, consult and ER notes X X X X   See your discharge summary, which is a recap of your hospital and/or ER visit that includes your diagnosis, lab results, and care plan. X X       How to register for TuneGO:  1. Go to  [email or web address].  2. Click on the Sign Up Now box, which takes you to the New Member Sign Up page. You will need to provide the following information:  a. Enter your TuneGO Access Code exactly as it appears at the top of this page. (You will not need to use  this code after you’ve completed the sign-up process. If you do not sign up before the expiration date, you must request a new code.)   b. Enter your date of birth.   c. Enter your home email address.   d. Click Submit, and follow the next screen’s instructions.  3. Create a [employer] ID. This will be your [employer] login ID and cannot be changed, so think of one that is secure and easy to remember.  4. Create a [employer] password. You can change your password at any time.  5. Enter your Password Reset Question and Answer. This can be used at a later time if you forget your password.   6. Enter your e-mail address. This allows you to receive e-mail notifications when new information is available in [employer].  7. Click Sign Up. You can now view your health information.    For assistance activating your [employer] account, call [phone]

## 2017-06-02 NOTE — PROGRESS NOTES
Chief Complaint   Patient presents with   • URI     dry cough/ sweating/ bodyaches/ x 1 week        HISTORY OF PRESENT ILLNESS: Patient is a 72 y.o. female established patient who presents today due to 5 days hx of dry coughing, feverish, sweating, sickness, bodyaches. Pt reports that the symptom started on Monday and she has to call in sick on Tuesday. She has been waiting to see if she will get better but her symptoms persist and she is still not feeling any better. She is taking prn ibuprofen for her symptoms but not helping too much, just more sweating (post fever?). She denied sob, wheezing, ear pain or sore throat. A little bit sinus pressure but not too bad.       Patient Active Problem List    Diagnosis Date Noted   • Cerebral aneurysm, nonruptured  s/p coiling 02/25/2014     Priority: High   • History of partial small bowel obstruction-no surgical intervention 03/29/2017     Priority: Medium   • Hyperlipidemia associated with type 2 diabetes mellitus (CMS-HCC) 10/11/2016     Priority: Medium   • Type 2 diabetes mellitus with hyperglycemia (CMS-HCC) 08/13/2012     Priority: Medium   • Depressive disorder 10/27/2016     Priority: Low   • Major depressive disorder, recurrent episode, moderate (CMS-HCC) 09/29/2016     Priority: Low   • Anxiety disorder 09/29/2016     Priority: Low   • Chronic use of opiate drugs therapeutic purposes ORT=5 08/24/2016     Priority: Low   • Sedative, hypnotic or anxiolytic dependence (CMS-HCC) 08/24/2016     Priority: Low   • Chronic back pain 03/09/2016     Priority: Low   • Chronic left shoulder pain 01/11/2016     Priority: Low   • Insomnia 01/11/2016     Priority: Low   • Neck muscle spasm 01/11/2016     Priority: Low   • History of atrial fibrillation without current medication 08/18/2012     Priority: Low   • History of subarachnoid hemorrhage 08/12/2012     Priority: Low   • Therapeutic opioid induced constipation 04/11/2017       Allergies:Keflex    Current Outpatient  Prescriptions   Medication Sig Dispense Refill   • alprazolam (XANAX) 1 MG Tab Take 1 Tab by mouth 3 times a day as needed for Anxiety. 70 Tab 0   • glipiZIDE SR (GLUCOTROL) 2.5 MG TABLET SR 24 HR TAKE ONE TABLET BY MOUTH ONCE DAILY 90 Tab 3   • venlafaxine XR (EFFEXOR XR) 75 MG CAPSULE SR 24 HR Take 3 Caps by mouth every day. 270 Cap 0   • trazodone (DESYREL) 50 MG Tab Take 1 Tab by mouth at bedtime as needed for Sleep. 90 Tab 0   • docusate sodium (COLACE) 100 MG Cap Take 1 Cap by mouth 2 times a day as needed for Constipation. 60 Cap 11   • Hydrocodone-Acetaminophen 5-300 MG Tab Take 1-1.5 Tabs by mouth 3 times a day as needed. 105 Tab 0   • metformin (GLUCOPHAGE) 1000 MG tablet TAKE ONE TABLET BY MOUTH TWICE DAILY WITH MEALS 180 Tab 3   • lisinopril (PRINIVIL) 2.5 MG Tab Take 1 Tab by mouth every day. 90 Tab 3   • rosuvastatin (CRESTOR) 20 MG Tab Take 1 Tab by mouth every evening. 90 Tab 3   • ONE TOUCH ULTRA TEST strip      • Omega-3 Fatty Acids (FISH OIL PO) Take  by mouth.     • vitamin D, Ergocalciferol, (DRISDOL) 34287 UNITS Cap capsule Take 1 Cap by mouth every 7 days. 12 Cap 3   • aspirin (ASA) 81 MG CHEW Take 81 mg by mouth every day.     • therapeutic multivitamin-minerals (THERAGRAN-M) TABS Take 1 Tab by mouth every day.       No current facility-administered medications for this visit.       Social History   Substance Use Topics   • Smoking status: Former Smoker -- 1.00 packs/day for 40 years     Types: Cigarettes     Quit date: 2006   • Smokeless tobacco: Never Used   • Alcohol Use: No       Family Status   Relation Status Death Age   • Mother     • Father       kidneys or liver   • Sister  60   • Brother Alive    • Brother Alive    • Brother       Family History   Problem Relation Age of Onset   • Heart Disease Mother      chf   • Cancer Mother      chronic lymphoma   • Diabetes Mother    • Cancer Sister      lung ca   • Other Brother      brain aneursym  "        ROS:  Review of Systems   Constitutional: Positive for fever and diaphoresis. Negative for chills.   HENT: Positive for congestion. Negative for sore throat.    Respiratory: Positive for cough. Negative for sputum production, shortness of breath and wheezing.         Objective:     Blood pressure 108/68, pulse 114, temperature 35.1 °C (95.2 °F), resp. rate 16, height 1.651 m (5' 5\"), weight 72.122 kg (159 lb), SpO2 93 %.     Physical Exam:  Physical Exam   Constitutional: She is oriented to person, place, and time and well-developed, well-nourished, and in no distress.   HENT:   Head: Normocephalic and atraumatic.   Right Ear: External ear normal.   Left Ear: External ear normal.   Nose: Rhinorrhea present. Right sinus exhibits no maxillary sinus tenderness and no frontal sinus tenderness. Left sinus exhibits no maxillary sinus tenderness and no frontal sinus tenderness.   Mouth/Throat: No oropharyngeal exudate, posterior oropharyngeal edema, posterior oropharyngeal erythema or tonsillar abscesses.   Eyes: Conjunctivae are normal.   Neck: Neck supple. No JVD present.   Cardiovascular: Normal rate.    Pulmonary/Chest: Effort normal. No respiratory distress. She has no wheezes. She has rales ( mild diminished to right lower lobe and crackles to left lower lobe).   Neurological: She is alert and oriented to person, place, and time. Gait normal.   Skin: Skin is warm. No erythema.   Vitals reviewed.        Assessment/Plan:  1. Bronchitis  - azithromycin (ZITHROMAX) 250 MG Tab; As directed  Dispense: 6 Tab; Refill: 0  - guaifenesin-codeine (ROBITUSSIN AC) Solution oral solution; Take 5 mL by mouth at bedtime as needed.  Dispense: 200 mL; Refill: 0    Differential diagnoses and indications for immediate follow-up discussed with patient.    Instructed to return to clinic or nearest emergency department for any change in condition, further concerns, or worsening of symptoms.    The patient demonstrated a good " understanding and agreed with the treatment plan.

## 2017-06-08 ENCOUNTER — PATIENT OUTREACH (OUTPATIENT)
Dept: HEALTH INFORMATION MANAGEMENT | Facility: OTHER | Age: 72
End: 2017-06-08

## 2017-06-08 NOTE — PROGRESS NOTES
6/8/17  -   Outcome: Left Message    WebIZ Checked & Epic Updated:  yes    [facility] Verified: yes    Attempt # 1

## 2017-06-26 NOTE — PROGRESS NOTES
Attempt #:4    WebIZ Checked & Epic Updated: yes  HealthConnect Verified: yes  Verify PCP: yes    Communication Preference Obtained: yes     Review Care Team: yes    Annual Wellness Visit Scheduling  1. Scheduling Status:Scheduled        Care Gap Scheduling (Attempt to Schedule EACH Overdue Care Gap!)     Health Maintenance Due   Topic Date Due   • DIABETES MONOFILAMENT / LE EXAM  scheduled   • MAMMOGRAM  scheduled   • COLON CANCER SCREENING ANNUAL FIT  Mailed FIT to pt.          [employer] Activation: sent activation code  [employer] Yevgeniy: no  Virtual Visits: no  Opt In to Text Messages: no

## 2017-06-26 NOTE — TELEPHONE ENCOUNTER
ANNUAL WELLNESS VISIT PRE-VISIT PLANNING     1.  Reviewed last PCP office visit assessment and plan notes: Yes    2.  If any orders were placed last visit do we have Results/Consult Notes?        •  Labs? No        •  Imaging? No        •  Referrals? No     3.  Patient Care Coordination Note was updated with diagnosis information:  No    4.  Patient is due for these Health Maintenance Topics:   Health Maintenance Due   Topic Date Due   • DIABETES MONOFILAMENT / LE EXAM  1945   • MAMMOGRAM  12/29/2016   • COLON CANCER SCREENING ANNUAL FIT  01/11/2017           5.  Immunizations were updated in [employer] using WebIZ?: Yes       •  Web Iz Recommendations:  Td (adult), adsorbed Hep B, adult Hep A, adult         •  Is patient due for Tdap/Shingles? No.     6.  Patient has:       •   Diabetes       •   Depression    7.  Updated Care Team with [employer] and all specialists?        •   Gait devices, O2, CPAP, etc: yes        •   Eye professional: yes       •   Other specialists (GYN, cardiology, endo, etc): yes    8.  Is patient in need of any refills prior to office visit? No       •    Separate refill encounter created?: N\A    9.  Patient was informed to arrive 15 min prior to their scheduled appointment and bring in their medication bottles? yes    10.  Patient was advised: “This is a free wellness visit. The provider will screen for medical conditions to help you stay healthy. If you have other concerns to address you may be asked to discuss these at a separate visit or there may be an additional fee.”  Yes    N/A L/M 6/26/17 4:05

## 2017-06-28 NOTE — MR AVS SNAPSHOT
"        Rosangela Peaceell   2017 3:40 PM   Office Visit   MRN: [id number]    Department:  [facility]   Dept Phone:  [phone]    Description:  Female : 1945   Provider:  PILI Humphries; [facility]            Reason for Visit     Annual Wellness Visit           Allergies as of 2017     Allergen Noted Reactions    Keflex 2009         You were diagnosed with     Type 2 diabetes mellitus with hyperglycemia, without long-term current use of insulin (CMS-HCC)   [8782811]       Hyperlipidemia associated with type 2 diabetes mellitus (CMS-HCC)   [1160477]       Colon cancer screening   [039915]       Encounter for screening mammogram for breast cancer   [5278339]       Insomnia, unspecified type   [5791472]       Sedative, hypnotic or anxiolytic dependence (CMS-[facility])   [2267167]       Major depressive disorder, recurrent episode, moderate (CMS-HCC)   [296.32.ICD-9-CM]       Anxiety disorder, unspecified type   [0961820]       Depressive disorder   [619972]       Therapeutic opioid induced constipation   [4362866]       Chronic use of opiate drugs therapeutic purposes   [7428165]       Chronic back pain, unspecified back location, unspecified back pain laterality   [9206675]       Neck muscle spasm   [092204]         Vital Signs     Blood Pressure Pulse Temperature Respirations Height Weight    120/80 mmHg 113 36.4 °C (97.5 °F) 20 1.613 m (5' 3.5\") 72.576 kg (160 lb)    Body Mass Index Oxygen Saturation Smoking Status             27.89 kg/m2 92% Former Smoker         Basic Information     Date Of Birth Sex Race Ethnicity Preferred Language    1945 Female White Non- English      Your appointments     Jul 10, 2017 12:00 PM   Established Patient with PILI Humphries   [address] [facility]  [place] [address] [phone]           You will be receiving a confirmation call a few days before your appointment from " our automated call confirmation system.            Jul 11, 2017  3:20 PM   MA SCREENING (10) with [facility] 2   [facility] ( [address])    [address]  [place] [id number] [phone]           No deodorant, powder, perfume or lotion under the arm or breast area.            Jul 26, 2017 10:00 AM   Follow Up Med Management with Angelica Ogden M.D.   [facility] [address] [place] [address] ([facility])    [address] [facility]  [address]  [place] [address]   [phone]              Problem List              ICD-10-CM Priority Class Noted - Resolved    History of subarachnoid hemorrhage Z86.79 Low  8/12/2012 - Present    Type 2 diabetes mellitus with hyperglycemia (CMS-HCC) E11.65 Medium  8/13/2012 - Present    History of atrial fibrillation without current medication Z86.79 Low  8/18/2012 - Present    Chronic left shoulder pain M25.512, G89.29 Low  1/11/2016 - Present    Insomnia G47.00 Low  1/11/2016 - Present    Neck muscle spasm M62.838 Low  1/11/2016 - Present    Chronic back pain M54.9, G89.29 Low  3/9/2016 - Present    Chronic use of opiate drugs therapeutic purposes ORT=5 Z79.891 Low  8/24/2016 - Present    Sedative, 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 Low  8/24/2016 - Present    Major depressive disorder, recurrent episode, moderate (CMS-HCC) F33.1 Low  9/29/2016 - Present    Anxiety disorder F41.9 Low  9/29/2016 - Present    Hyperlipidemia associated with type 2 diabetes mellitus (CMS-HCC) E11.69, E78.5 Medium  10/11/2016 - Present    Depressive disorder F32.9 Low  10/27/2016 - Present    History of partial small bowel obstruction-no surgical intervention Z87.19 Medium  3/29/2017 - Present    Therapeutic opioid induced constipation K59.03, T40.2X5A   4/11/2017 - Present      Health Maintenance        Date Due Completion Dates    DIABETES MONOFILAMENT / LE EXAM 1945 ---    MAMMOGRAM 12/29/2016 12/29/2015, 9/24/2014, 10/25/2011    COLON CANCER SCREENING ANNUAL FIT  1/11/2017 1/11/2016 (Postponed)    Override on 1/11/2016: Postponed    A1C SCREENING 8/9/2017 2/9/2017, 6/2/2016, 3/9/2016, 11/3/2015, 10/12/2015, 8/10/2015, 5/8/2015, 2/11/2015, 11/10/2014, 8/8/2014, 6/16/2014, 4/15/2014, 2/12/2014, 11/18/2013, 9/9/2013, 6/24/2013, 3/18/2013, 12/27/2012, 10/3/2012, 9/6/2012, 8/13/2012, 3/9/2012    RETINAL SCREENING 10/24/2017 10/24/2016, 6/2/2016    FASTING LIPID PROFILE 2/9/2018 2/9/2017, 11/3/2015, 10/12/2015, 8/10/2015, 5/8/2015, 2/11/2015, 11/10/2014, 8/8/2014, 6/16/2014, 4/15/2014, 12/27/2012, 10/3/2012, 9/6/2012, 3/9/2012, 9/9/2011    URINE ACR / MICROALBUMIN 2/9/2018 2/9/2017, 3/9/2012    SERUM CREATININE 4/3/2018 4/3/2017, 4/1/2017, 3/31/2017, 3/29/2017, 2/9/2017, 11/3/2015, 8/10/2015, 7/10/2015, 7/7/2015, 5/8/2015, 2/11/2015, 11/10/2014, 9/11/2014, 8/8/2014, 6/16/2014, 2/27/2014, 2/21/2014, 9/9/2013, 7/26/2013, 7/25/2013, 7/24/2013, 7/16/2013, 7/5/2013, 9/6/2012, 8/23/2012, 8/22/2012, 8/21/2012, 8/20/2012, 8/19/2012, 8/18/2012, 8/17/2012, 8/16/2012, 8/15/2012, 8/14/2012, 8/13/2012, 8/12/2012, 9/9/2011    BONE DENSITY 9/16/2021 9/16/2016, 1/1/2006 (Done)    Override on 1/1/2006: Done (nl)    IMM DTaP/Tdap/Td Vaccine (2 - Td) 9/15/2023 9/15/2013            Current Immunizations     13-VALENT PCV PREVNAR 3/30/2017  8:47 AM    Influenza TIV (IM) 1/18/2011    Influenza Vaccine Adult HD 10/3/2016, 11/7/2015    Pneumococcal Vaccine (UF)Historical Data 8/18/1970    Pneumococcal polysaccharide vaccine (PPSV-23) 12/16/2014, 7/25/2013  9:30 AM    SHINGLES VACCINE 5/9/2015    Tdap Vaccine 9/15/2013  8:00 PM    Tuberculin Skin Test 6/25/2014      Below and/or attached are the medications your provider expects you to take. Review all of your home medications and newly ordered medications with your provider and/or pharmacist. Follow medication instructions as directed by your provider and/or pharmacist. Please keep your medication list with you and share with your provider. Update the  information when medications are discontinued, doses are changed, or new medications (including over-the-counter products) are added; and carry medication information at all times in the event of emergency situations     Allergies:  KEFLEX - (reactions not documented)               Medications  Valid as of: June 28, 2017 -  4:56 PM    Generic Name Brand Name Tablet Size Instructions for use    ALPRAZolam (Tab) XANAX 1 MG Take 1 Tab by mouth 3 times a day as needed for Anxiety.        Aspirin (Chew Tab) ASA 81 MG Take 81 mg by mouth every day.        Azithromycin (Tab) ZITHROMAX 250 MG As directed        Docusate Sodium (Cap) COLACE 100 MG Take 1 Cap by mouth 2 times a day as needed for Constipation.        Ergocalciferol (Cap) DRISDOL 37812 UNITS Take 1 Cap by mouth every 7 days.        GlipiZIDE (TABLET SR 24 HR) GLUCOTROL 2.5 MG TAKE ONE TABLET BY MOUTH ONCE DAILY        Glucose Blood (Strip) ONE TOUCH ULTRA TEST          Guaifenesin-Codeine (Solution) ROBITUSSIN -10 mg/5mL Take 5 mL by mouth at bedtime as needed.        Hydrocodone-Acetaminophen (Tab) Hydrocodone-Acetaminophen 5-300 MG Take 1-1.5 Tabs by mouth 3 times a day as needed.        Lisinopril (Tab) PRINIVIL 2.5 MG Take 1 Tab by mouth every day.        MetFORMIN HCl (Tab) GLUCOPHAGE 1000 MG TAKE ONE TABLET BY MOUTH TWICE DAILY WITH MEALS        Multiple Vitamins-Minerals (Tab) THERAGRAN-M  Take 1 Tab by mouth every day.        Omega-3 Fatty Acids   Take  by mouth.        RaNITidine HCl (Tab) ZANTAC 150 MG Take 150 mg by mouth 2 times a day.        Rosuvastatin Calcium (Tab) CRESTOR 20 MG Take 1 Tab by mouth every evening.        TraZODone HCl (Tab) DESYREL 50 MG Take 1 Tab by mouth at bedtime as needed for Sleep.        Venlafaxine HCl (CAPSULE SR 24 HR) EFFEXOR XR 75 MG Take 3 Caps by mouth every day.        .                 Medicines prescribed today were sent to:     [facility] [place], [place] - [address] [address]    [address]  [place] [address]    Phone: [phone] Fax: [phone]    Open 24 Hours?: No      Medication refill instructions:       If your prescription bottle indicates you have medication refills left, it is not necessary to call your provider’s office. Please contact your pharmacy and they will refill your medication.    If your prescription bottle indicates you do not have any refills left, you may request refills at any time through one of the following ways: The online [employer] Plan system (except Urgent Care), by calling your provider’s office, or by asking your pharmacy to contact your provider’s office with a refill request. Medication refills are processed only during regular business hours and may not be available until the next business day. Your provider may request additional information or to have a follow-up visit with you prior to refilling your medication.   *Please Note: Medication refills are assigned a new Rx number when refilled electronically. Your pharmacy may indicate that no refills were authorized even though a new prescription for the same medication is available at the pharmacy. Please request the medicine by name with the pharmacy before contacting your provider for a refill.        Your To Do List     Future Labs/Procedures Complete By Expires    HEMOGLOBIN A1C  As directed 6/29/2018    MA-SCREEN MAMMO W/CAD-BILAT  As directed 7/30/2018    OCCULT BLOOD FECES IMMUNOASSAY  As directed 6/29/2018    TSH  As directed 6/29/2018         [employer] Access Code: [email or web address]  Expires: 7/5/2017  3:57 AM    [employer]  A secure, online tool to manage your health information     [employer] Plan® is a secure, online tool that connects you to your personalized health information from the privacy of your home -- day or night - making it very easy for you to manage your healthcare. Once the activation process is completed, you can even access your medical information using the [employer] Business Plan anatoly, which is available for  free in the [employer] store or Google Play store.     [employer] provides the following levels of access (as shown below):   My Chart Features   [facility] Doctor [facility]  Specialists [facility]  Urgent  Care Non-[facility]  Primary Care  Doctor   Email your healthcare team securely and privately 24/7 X X X    Manage appointments: schedule your next appointment; view details of past/upcoming appointments X      Request prescription refills. X      View recent personal medical records, including lab and immunizations X X X X   View health record, including health history, allergies, medications X X X X   Read reports about your outpatient visits, procedures, consult and ER notes X X X X   See your discharge summary, which is a recap of your hospital and/or ER visit that includes your diagnosis, lab results, and care plan. X X       How to register for [employer]:  1. Go to  [email or web address].  2. Click on the Sign Up Now box, which takes you to the New Member Sign Up page. You will need to provide the following information:  a. Enter your [employer] Access Code exactly as it appears at the top of this page. (You will not need to use this code after you’ve completed the sign-up process. If you do not sign up before the expiration date, you must request a new code.)   b. Enter your date of birth.   c. Enter your home email address.   d. Click Submit, and follow the next screen’s instructions.  3. Create a [employer] ID. This will be your [employer] login ID and cannot be changed, so think of one that is secure and easy to remember.  4. Create a [employer] password. You can change your password at any time.  5. Enter your Password Reset Question and Answer. This can be used at a later time if you forget your password.   6. Enter your e-mail address. This allows you to receive e-mail notifications when new information is available in [employer].  7. Click Sign Up. You can now view your health information.    For assistance  activating your [employer] account, call [phone]

## 2017-07-10 NOTE — MR AVS SNAPSHOT
"        Rosangela Burnette   7/10/2017 12:00 PM   Office Visit   MRN: [id number]    Department:  [facility]   Dept Phone:  [phone]    Description:  Female : 1945   Provider:  PILI Humphries           Allergies as of 7/10/2017     Allergen Noted Reactions    Keflex 2009         You were diagnosed with     Fatigue, unspecified type   [8318789]       Therapeutic opioid induced constipation   [8367915]       Chronic left shoulder pain   [141141]       Chronic back pain, unspecified back location, unspecified back pain laterality   [3497168]       Neck muscle spasm   [408342]       Chronic use of opiate drugs therapeutic purposes   [5389912]         Vital Signs     Blood Pressure Pulse Temperature Respirations Height Weight    108/64 mmHg 90 36.7 °C (98.1 °F) 18 1.613 m (5' 3.5\") 73.029 kg (161 lb)    Body Mass Index Oxygen Saturation Breastfeeding? Smoking Status          28.07 kg/m2 94% No Former Smoker        Basic Information     Date Of Birth Sex Race Ethnicity Preferred Language    1945 Female White Non- English      Your appointments     2017  3:20 PM   MA SCREENING (10) with [facility] MG 2   [facility] ([address])    [address] [place] [address] [address]  [facility] [address] [phone]           No deodorant, powder, perfume or lotion under the arm or breast area.            2017 10:00 AM   Follow Up Med Management with Angelica Ogden M.D.   [facility] [address] [place] [address])    [address] [place] [address]  [address]  [facility] [address]   [phone]            Oct 10, 2017 11:00 AM   Established Patient with PILI Humphries   [address]  [facility] [id number] [phone]           You will be receiving a confirmation call a few days before your appointment from our automated call confirmation system.              Problem List              ICD-10-CM Priority Class Noted - Resolved   " History of subarachnoid hemorrhage Z86.79 Low  8/12/2012 - Present    Type 2 diabetes mellitus with hyperglycemia (CMS-HCC) E11.65 Medium  8/13/2012 - Present    History of atrial fibrillation without current medication Z86.79 Low  8/18/2012 - Present    Chronic left shoulder pain M25.512, G89.29 Low  1/11/2016 - Present    Insomnia G47.00 Low  1/11/2016 - Present    Neck muscle spasm M62.838 Low  1/11/2016 - Present    Chronic back pain M54.9, G89.29 Low  3/9/2016 - Present    Chronic use of opiate drugs therapeutic purposes ORT=5 Z79.891 Low  8/24/2016 - Present    Sedative, 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 Low  8/24/2016 - Present    Major depressive disorder, recurrent episode, moderate (CMS-HCC) F33.1 Low  9/29/2016 - Present    Anxiety disorder F41.9 Low  9/29/2016 - Present    Hyperlipidemia associated with type 2 diabetes mellitus (CMS-HCC) E11.69, E78.5 Medium  10/11/2016 - Present    Depressive disorder F32.9 Low  10/27/2016 - Present    History of partial small bowel obstruction-no surgical intervention Z87.19 Medium  3/29/2017 - Present    Therapeutic opioid induced constipation K59.03, T40.2X5A   4/11/2017 - Present      Health Maintenance        Date Due Completion Dates    MAMMOGRAM 12/29/2016 12/29/2015, 9/24/2014, 10/25/2011    COLON CANCER SCREENING ANNUAL FIT 1/11/2017 1/11/2016 (Postponed)    Override on 1/11/2016: Postponed    IMM INFLUENZA (1) 9/1/2017 10/3/2016, 11/7/2015, 1/18/2011    RETINAL SCREENING 10/24/2017 10/24/2016, 6/2/2016    A1C SCREENING 12/29/2017 6/29/2017, 2/9/2017, 6/2/2016, 3/9/2016, 11/3/2015, 10/12/2015, 8/10/2015, 5/8/2015, 2/11/2015, 11/10/2014, 8/8/2014, 6/16/2014, 4/15/2014, 2/12/2014, 11/18/2013, 9/9/2013, 6/24/2013, 3/18/2013, 12/27/2012, 10/3/2012, 9/6/2012, 8/13/2012, 3/9/2012    FASTING LIPID PROFILE 2/9/2018 2/9/2017, 11/3/2015, 10/12/2015, 8/10/2015, 5/8/2015, 2/11/2015, 11/10/2014, 8/8/2014, 6/16/2014, 4/15/2014, 12/27/2012, 10/3/2012,  9/6/2012, 3/9/2012, 9/9/2011    URINE ACR / MICROALBUMIN 2/9/2018 2/9/2017, 3/9/2012    SERUM CREATININE 4/3/2018 4/3/2017, 4/1/2017, 3/31/2017, 3/29/2017, 2/9/2017, 11/3/2015, 8/10/2015, 7/10/2015, 7/7/2015, 5/8/2015, 2/11/2015, 11/10/2014, 9/11/2014, 8/8/2014, 6/16/2014, 2/27/2014, 2/21/2014, 9/9/2013, 7/26/2013, 7/25/2013, 7/24/2013, 7/16/2013, 7/5/2013, 9/6/2012, 8/23/2012, 8/22/2012, 8/21/2012, 8/20/2012, 8/19/2012, 8/18/2012, 8/17/2012, 8/16/2012, 8/15/2012, 8/14/2012, 8/13/2012, 8/12/2012, 9/9/2011    DIABETES MONOFILAMENT / LE EXAM 6/28/2018 6/28/2017    BONE DENSITY 9/16/2021 9/16/2016, 1/1/2006 (Done)    Override on 1/1/2006: Done (nl)    IMM DTaP/Tdap/Td Vaccine (2 - Td) 9/15/2023 9/15/2013            Current Immunizations     13-VALENT PCV PREVNAR 3/30/2017  8:47 AM    Influenza TIV (IM) 1/18/2011    Influenza Vaccine Adult HD 10/3/2016, 11/7/2015    Pneumococcal Vaccine (UF)Historical Data 8/18/1970    Pneumococcal polysaccharide vaccine (PPSV-23) 12/16/2014, 7/25/2013  9:30 AM    SHINGLES VACCINE 5/9/2015    Tdap Vaccine 9/15/2013  8:00 PM    Tuberculin Skin Test 6/25/2014      Below and/or attached are the medications your provider expects you to take. Review all of your home medications and newly ordered medications with your provider and/or pharmacist. Follow medication instructions as directed by your provider and/or pharmacist. Please keep your medication list with you and share with your provider. Update the information when medications are discontinued, doses are changed, or new medications (including over-the-counter products) are added; and carry medication information at all times in the event of emergency situations     Allergies:  KEFLEX - (reactions not documented)               Medications  Valid as of: July 10, 2017 -  1:11 PM    Generic Name Brand Name Tablet Size Instructions for use    ALPRAZolam (Tab) XANAX 1 MG Take 1 Tab by mouth 3 times a day as needed for Anxiety.        Aspirin  (Chew Tab) ASA 81 MG Take 81 mg by mouth every day.        Baclofen (Tab) LIORESAL 10 MG         Docusate Sodium (Cap) COLACE 100 MG Take 1 Cap by mouth 2 times a day as needed for Constipation.        Ergocalciferol (Cap) DRISDOL 19346 UNITS Take 1 Cap by mouth every 7 days.        GlipiZIDE (TABLET SR 24 HR) GLUCOTROL 2.5 MG TAKE ONE TABLET BY MOUTH ONCE DAILY        Glucose Blood (Strip) ONE TOUCH ULTRA TEST          Hydrocodone-Acetaminophen (Tab) Hydrocodone-Acetaminophen 5-300 MG Take 1-1.5 Tabs by mouth 3 times a day as needed.        Lisinopril (Tab) PRINIVIL 2.5 MG Take 1 Tab by mouth every day.        MetFORMIN HCl (Tab) GLUCOPHAGE 1000 MG TAKE ONE TABLET BY MOUTH TWICE DAILY WITH MEALS        Multiple Vitamins-Minerals (Tab) THERAGRAN-M  Take 1 Tab by mouth every day.        Mupirocin Calcium (Cream) BACTROBAN 2 % Apply 1 Application to affected area(s) 2 times a day.        Omega-3 Fatty Acids   Take  by mouth.        RaNITidine HCl (Tab) ZANTAC 150 MG Take 150 mg by mouth 2 times a day.        Rosuvastatin Calcium (Tab) CRESTOR 20 MG Take 1 Tab by mouth every evening.        TraZODone HCl (Tab) DESYREL 50 MG Take 1 Tab by mouth at bedtime as needed for Sleep.        Venlafaxine HCl (CAPSULE SR 24 HR) EFFEXOR XR 75 MG Take 3 Caps by mouth every day.        .                 Medicines prescribed today were sent to:     [facility] [address] [place] [address] [place] - [address] [address] [facility] [address]    Phone: [phone] Fax: [phone]    Open 24 Hours?: No      Medication refill instructions:       If your prescription bottle indicates you have medication refills left, it is not necessary to call your provider’s office. Please contact your pharmacy and they will refill your medication.    If your prescription bottle indicates you do not have any refills left, you may request refills at any time through one of the following ways: The online [employer] system (except Urgent Care), by calling your  provider’s office, or by asking your pharmacy to contact your provider’s office with a refill request. Medication refills are processed only during regular business hours and may not be available until the next business day. Your provider may request additional information or to have a follow-up visit with you prior to refilling your medication.   *Please Note: Medication refills are assigned a new Rx number when refilled electronically. Your pharmacy may indicate that no refills were authorized even though a new prescription for the same medication is available at the pharmacy. Please request the medicine by name with the pharmacy before contacting your provider for a refill.        Your To Do List     Future Labs/Procedures Complete By [employer] PAIN MANAGEMENT SCREEN  As directed 7/10/2018    Comments:    Current Meds (name, sig, last dose):   Current outpatient prescriptions:   •  mupirocin calcium, 1 Application, Topical, BID  •  docusate sodium, 100 mg, Oral, BID PRN  •  baclofen,   •  ranitidine, 150 mg, Oral, BID  •  alprazolam, 1 mg, Oral, TID PRN  •  glipiZIDE SR, TAKE ONE TABLET BY MOUTH ONCE DAILY  •  venlafaxine XR, 225 mg, Oral, DAILY  •  trazodone, 50 mg, Oral, HS PRN  •  Hydrocodone-Acetaminophen, 1-1.5 Tab, Oral, TID PRN  •  metformin, TAKE ONE TABLET BY MOUTH TWICE DAILY WITH MEALS  •  lisinopril, 2.5 mg, Oral, DAILY  •  rosuvastatin, 20 mg, Oral, Q EVENING  •  ONE TOUCH ULTRA TEST, , 1/11/2016  •  Omega-3 Fatty Acids (FISH OIL PO), Take  by mouth., 3/29/2017  •  vitamin D (Ergocalciferol), 50,000 Units, Oral, Q7 DAYS  •  aspirin, 81 mg, Oral, DAILY  •  therapeutic multivitamin-minerals, 1 Tab, Oral, DAILY, 1/11/2016 at 1000          VITAMIN B12  As directed 7/11/2018         Tavern Access Code: [email or web address]  Expires: 8/3/2017  4:14 AM    Tavern  A secure, online tool to manage your health information     [employer]® is a secure, online tool that connects you to your  personalized health information from the privacy of your home -- day or night - making it very easy for you to manage your healthcare. Once the activation process is completed, you can even access your medical information using the HomeCon yevgeniy, which is available for free in the Apple Yevgeniy store or Google Play store.     HomeCon provides the following levels of access (as shown below):   My Chart Features   [facility] Doctor [facility]  Specialists [facility]  Urgent  Care Non-[facility]  Primary Care  Doctor   Email your healthcare team securely and privately 24/7 X X X    Manage appointments: schedule your next appointment; view details of past/upcoming appointments X      Request prescription refills. X      View recent personal medical records, including lab and immunizations X X X X   View health record, including health history, allergies, medications X X X X   Read reports about your outpatient visits, procedures, consult and ER notes X X X X   See your discharge summary, which is a recap of your hospital and/or ER visit that includes your diagnosis, lab results, and care plan. X X       How to register for HomeCon:  1. Go to  [email or web address].  2. Click on the Sign Up Now box, which takes you to the New Member Sign Up page. You will need to provide the following information:  a. Enter your HomeCon Access Code exactly as it appears at the top of this page. (You will not need to use this code after you’ve completed the sign-up process. If you do not sign up before the expiration date, you must request a new code.)   b. Enter your date of birth.   c. Enter your home email address.   d. Click Submit, and follow the next screen’s instructions.  3. Create a HomeCon ID. This will be your HomeCon login ID and cannot be changed, so think of one that is secure and easy to remember.  4. Create a HomeCon password. You can change your password at any time.  5. Enter your Password Reset Question and Answer. This can  be used at a later time if you forget your password.   6. Enter your e-mail address. This allows you to receive e-mail notifications when new information is available in [employer].  7. Click Sign Up. You can now view your health information.    For assistance activating your [employer] account, call [phone]

## 2017-07-11 NOTE — PROGRESS NOTES
CC: No chief complaint on file.        HPI:     Rosangela presents today for the followin. Eye symptom  Patient is still concerned that possibly some of her eye jerking could be related to the venlafaxine. She did contact her psychiatrist about possibly reducing this dose and has a follow-up appointment with her later this month. She has on her own started to reduce this dose however she has not stopped it.  She has consulted with neurology and ophthalmology and they have not found another physical causes for her eye jerking. It very stressful and bothering her some for her    2. Fatigue, unspecified type  Complains of generalized fatigue. Previously she was treated with IM vitamin B injections. She wants know if she would benefit from these currently.    3. Therapeutic opioid induced constipation  Like refills of Colace that she uses to help with constipation.    4. Chronic left shoulder pain/Chronic back pain, unspecified back location, unspecified back pain laterality/ Neck muscle spasm/Chronic use of opiate drugs therapeutic purposes ORT=5  Chronic pain recheck:   Last dose of controlled substance:7/10/17  Chronic pain treated with norco taken 3 times a day    She  reports that she does not drink alcohol.  She  reports that she does not use illicit drugs.    Consequences of Chronic Opiate therapy:  (5 A's)  Analgesia: Compared to no treatment or prior treatment, pain is currently improved  Activity: improved  Adverse Events: She denies dry mouth, itchy skin, nausea and sedation  Aberrant Behaviors: She reports she is taking medication as prescribed and is not veering from agreed treatment regimen. There have been no inappropriate refills or lost/stolen meds reported.   Affect/Mood: Pain is not impacting patient's mood.  Patient reports depression/anxiety (chronic)    Nonnarcotic treatments that are being used: muscle relaxers and SSRI/SNRI.   Treatment goals discussed.    Opioid Risk Score: 5    Interpretation  of Opioid Risk Score   Score 0-3 = Low risk of abuse. Do UDS at least once per year.  Score 4-7 = Moderate risk of abuse. Do UDS 1-4 times per year.  Score 8+ = High risk of abuse. Refer to specialist.    Last order of CONTROLLED SUBSTANCE TREATMENT AGREEMENT was found on 1/11/2017 from Office Visit on 1/11/2017     UDS Summary                URINE DRUG SCREEN Next Due 1/6/2018      Done 1/11/2017 PAIN MANAGEMENT PANEL, SCRN W/ RFLX TO QNT     Patient has more history with this topic...        Most recent UDS reviewed today and is consistent with prescribed medications.    I have reviewed the medical records, the Prescription Monitoring Program and I have determined that controlled substance treatment is medically indicated.          Current Outpatient Prescriptions   Medication Sig Dispense Refill   • mupirocin calcium (BACTROBAN) 2 % Cream Apply 1 Application to affected area(s) 2 times a day. 1 Tube 0   • docusate sodium (COLACE) 100 MG Cap Take 1 Cap by mouth 2 times a day as needed for Constipation. 180 Cap 3   • Hydrocodone-Acetaminophen 5-300 MG Tab Take 1-1.5 Tabs by mouth 3 times a day as needed. 105 Tab 0   • baclofen (LIORESAL) 10 MG Tab      • ranitidine (ZANTAC) 150 MG Tab Take 150 mg by mouth 2 times a day.     • alprazolam (XANAX) 1 MG Tab Take 1 Tab by mouth 3 times a day as needed for Anxiety. 70 Tab 0   • glipiZIDE SR (GLUCOTROL) 2.5 MG TABLET SR 24 HR TAKE ONE TABLET BY MOUTH ONCE DAILY 90 Tab 3   • venlafaxine XR (EFFEXOR XR) 75 MG CAPSULE SR 24 HR Take 3 Caps by mouth every day. 270 Cap 0   • trazodone (DESYREL) 50 MG Tab Take 1 Tab by mouth at bedtime as needed for Sleep. 90 Tab 0   • metformin (GLUCOPHAGE) 1000 MG tablet TAKE ONE TABLET BY MOUTH TWICE DAILY WITH MEALS 180 Tab 3   • lisinopril (PRINIVIL) 2.5 MG Tab Take 1 Tab by mouth every day. 90 Tab 3   • rosuvastatin (CRESTOR) 20 MG Tab Take 1 Tab by mouth every evening. 90 Tab 3   • ONE TOUCH ULTRA TEST strip      • Omega-3 Fatty Acids  "(FISH OIL PO) Take  by mouth.     • vitamin D, Ergocalciferol, (DRISDOL) 74217 UNITS Cap capsule Take 1 Cap by mouth every 7 days. 12 Cap 3   • aspirin (ASA) 81 MG CHEW Take 81 mg by mouth every day.     • therapeutic multivitamin-minerals (THERAGRAN-M) TABS Take 1 Tab by mouth every day.       No current facility-administered medications for this visit.     Social History   Substance Use Topics   • Smoking status: Former Smoker -- 1.00 packs/day for 40 years     Types: Cigarettes     Quit date: 01/01/2006   • Smokeless tobacco: Never Used   • Alcohol Use: No     I reviewed patients allergies, problem list and medications today in EPIC.    ROS: Any/all pertinent positives listed in the HPI, otherwise all others reviewed are negative today.      /64 mmHg  Pulse 90  Temp(Src) 36.7 °C (98.1 °F)  Resp 18  Ht 1.613 m (5' 3.5\")  Wt 73.029 kg (161 lb)  BMI 28.07 kg/m2  SpO2 94%  Breastfeeding? No    Exam:    Gen: Alert and oriented, No apparent distress. WDWN  Psych: A+Ox3, normal affect and mood  Skin: Warm, dry and intact. Good turgor   No rashes in visible areas.  Eye: Conjunctiva clear, lids normal  ENMT: Lips without lesions, good dentition  Lungs: Clear to auscultation bilaterally, no rales or rhonchi   Unlabored respiratory effort.   Ext: No clubbing, cyanosis, edema.       Assessment and Plan.   72 y.o. female with the following issues.    1. Eye symptom  Stable. She'll consult with psychiatry regarding her medication.    2. Fatigue, unspecified type  Stable. Check level. Discussed if she's not well on vitamin B12 is no benefit for an injection  - VITAMIN B12; Future    3. Therapeutic opioid induced constipation  Stable. Continue Colace needed  - docusate sodium (COLACE) 100 MG Cap; Take 1 Cap by mouth 2 times a day as needed for Constipation.  Dispense: 180 Cap; Refill: 3    4. Chronic left shoulder pain/Chronic back pain, unspecified back location, unspecified back pain laterality  /Neck muscle " spasm/Chronic use of opiate drugs therapeutic purposes ORT=5  Clinically stable. No reports of new symptoms. Well-controlled on current medication.  obtained/reviewed from state pharmacy database.  Medications found to be medically necessary/appropriate.  3 months of medication supply at this visit. Followup in the office for further refills.    - [facility] PAIN MANAGEMENT SCREEN; Future  - Hydrocodone-Acetaminophen 5-300 MG Tab; Take 1-1.5 Tabs by mouth 3 times a day as needed.  Dispense: 105 Tab; Refill: 0

## 2017-07-26 PROBLEM — F41.1 GAD (GENERALIZED ANXIETY DISORDER): Status: ACTIVE | Noted: 2017-01-01

## 2017-07-26 NOTE — PROGRESS NOTES
PSYCHIATRY FOLLOW-UP NOTE      Chief Complaint   Patient presents with   • Follow-Up     anxiety, depression         History Of Present Illness:  Rosangela Burnette is a 72 y.o. old female with depressive disorder, anxiety disorder, anxiolytic (Xanax) use disorder, DM, HTN, HLD, cerebral aneurysm, atrial fibrillation, chronic low back and shoulder pain comes in today for follow up, was last seen 3 months ago. Over a telephone conversation her Xanax prescription was changed from 60 tablets to 70 tablets for a month. She has been using 2 Xanax tablets for majority of the days and infrequently uses the third tablet for the day. However, she feels much less anxious if she knows that she has some Xanax with her in case she needs it. Denies any recent panic attacks. She continues to complain of her eye twitching which she feels is because of Effexor. She cut back on her dose of Effexor from 225 mg to 151 g about 2 weeks ago and so far has not noticed any changes in her eye twitching. She has also not noticed any decline in her anxiety or mood symptoms. She denies feeling depressed but does feel overwhelmed by her physical health problems. She continues to miss her daughter who recently moved to [place]. She is really good support from her kids and is thankful for that. Denies any recent balance problems or falls continues to use a cane for ambulation. She has been having some nighttime vision problems and is no longer driving at night. Sleep has been okay. Continues to take trazodone which does help her but she still wakes up in the middle of the night and sometimes takes Xanax or Benadryl for the same. Denies having active or passive thoughts of wanting to hurt herself.    Social History:   Single and lives alone in an apartment in [place].  and  x 1, was  for 27 years and   in . She has 2 daughters and 4 grand kids. 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    Substance Use:  Alcohol - Denies  Nicotine - Denies    Illicit drugs - Denies     Previous medication trials:  Paxil (s/e - headaches)    Medications:  Current Outpatient Prescriptions   Medication Sig Dispense Refill   • trazodone (DESYREL) 50 MG Tab Take 1 Tab by mouth at bedtime as needed for Sleep. 90 Tab 0   • venlafaxine XR (EFFEXOR XR) 75 MG CAPSULE SR 24 HR Take 2 capsules daily x 1 month and then 1 capsule daily x 1 month and then discontinue 90 Cap 0   • alprazolam (XANAX) 1 MG Tab Take 1 Tab by mouth 3 times a day as needed for Anxiety. 70 Tab 2   • baclofen (LIORESAL) 10 MG Tab      • mupirocin calcium (BACTROBAN) 2 % Cream Apply 1 Application to affected area(s) 2 times a day. 1 Tube 0   • docusate sodium (COLACE) 100 MG Cap Take 1 Cap by mouth 2 times a day as needed for Constipation. 180 Cap 3   • Hydrocodone-Acetaminophen 5-300 MG Tab Take 1-1.5 Tabs by mouth 3 times a day as needed. 105 Tab 0   • ranitidine (ZANTAC) 150 MG Tab Take 150 mg by mouth 2 times a day.     • glipiZIDE SR (GLUCOTROL) 2.5 MG TABLET SR 24 HR TAKE ONE TABLET BY MOUTH ONCE DAILY 90 Tab 3   • metformin (GLUCOPHAGE) 1000 MG tablet TAKE ONE TABLET BY MOUTH TWICE DAILY WITH MEALS 180 Tab 3   • lisinopril (PRINIVIL) 2.5 MG Tab Take 1 Tab by mouth every day. 90 Tab 3   • rosuvastatin (CRESTOR) 20 MG Tab Take 1 Tab by mouth every evening. 90 Tab 3   • ONE TOUCH ULTRA TEST strip      • Omega-3 Fatty Acids (FISH OIL PO) Take  by mouth.     • vitamin D, Ergocalciferol, (DRISDOL) 28708 UNITS Cap capsule Take 1 Cap by mouth every 7 days. 12 Cap 3   • aspirin (ASA) 81 MG CHEW Take 81 mg by mouth every day.     • therapeutic multivitamin-minerals (THERAGRAN-M) TABS Take 1 Tab by mouth every day.       No current facility-administered medications for this visit.       Review Of Systems:    Constitutional - Negative for fatigue  HEENT - Positive for b/l eye twitching   Respiratory -  "Negative for shortness of breath, cough  CVS - Negative for chest pain, palpitations  GI - Negative for nausea, vomiting, abdominal pain, diarrhea, constipation  Musculoskeletal - Positive for back and shoulder pain  Neurological - Negative for headaches  Psychiatric - Positive for anxiety (improved), poor sleep    Physical Examination:  Vital signs: /91 mmHg  Pulse 112  Ht 1.613 m (5' 3.5\")  Wt 72.576 kg (160 lb)  BMI 27.89 kg/m2    Musculoskeletal: Slow but stable gait, ambulating with assistance of cane. No abnormal movements.     Mental Status Evaluation:   General: Elderly white female, ambulating with assistance of cane, dressed in casual attire, good grooming and hygiene, in no apparent distress, calm and cooperative, good eye contact, no psychomotor agitation or retardation  Orientation: Alert and oriented to person, place and time  Recent and remote memory: Grossly intact  Attention span and concentration: Grossly intact  Speech: Spontaneous, normal rate, rhythm and tone  Thought Process: Linear, logical and goal directed  Thought Content: Denies suicidal or homicidal ideations, intent or plan  Perception: Denies auditory or visual hallucinations. No delusions noted  Associations: Intact  Language: Appropriate  Fund of knowledge and vocabulary: Grossly adequate  Mood: \"am okay\"  Affect: Anxious at times, mood congruent  Insight: Good  Judgment: Good      Impression:  1. Generalized anxiety disorder  2. Unspecified depressive disorder  3. 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 Continue Xanax 1 mg 2-3 times daily as needed for anxiety. # 70 tabs with 2 refills faxed to her pharmacy.   2. Taper down the Effexor to 150 mg for one month followed by 75 mg for one month and then discontinue. Eye twitching is not reported as a side effect from Effexor but she is " really hesitant in continuing Effexor at this time. Will reassess her symptoms after she has been off Effexor for a month and at that time will discuss starting another antidepressant or not.  3. Continue Trazodone 50 mg at bedtime as needed for sleep.    4. Monitor mood and anxiety with Effexor taper down. I have encouraged her to get my office a call if her anxiety or depression worsens with Effexor discontinuation.    Return to clinic in 3 months or sooner if symptoms worsen    The proposed treatment plan was discussed with the patient who was provided the opportunity to ask questions and make suggestions regarding alternative treatment. Patient verbalized understanding and expressed agreement with the plan.     Angelica Ogden M.D.  07/26/2017    This note was created using voice recognition software (Dragon). The accuracy of the dictation is limited by the abilities of the software. I have reviewed the note prior to signing, however some errors in grammar and context are still possible. If you have any questions related to this note please do not hesitate to contact our office.

## 2017-09-06 NOTE — TELEPHONE ENCOUNTER
VOICEMAIL  1. Caller Name: Denise                      Call Back Number: [phone] (home)     2. Message: Is going out of town and requesting early fills on medications.     3. Patient approves office to leave a detailed voicemail/MyChart message: N\A    4. Called patient back to find out what medication she was requesting. Patient said it was for Vicodin because she can pick it up on the 7th but is leaving tonight via plane and doesn't return from [place], [place] until the 18th. She said not to worry about it however and that she would just break hers in half and try to make them work / take ibuprofen.     No action taken since patient said to disregard.

## 2017-10-03 NOTE — TELEPHONE ENCOUNTER
VOICEMAIL  1. Caller Name: Rosangela Burnette                      Call Back Number: [phone] (home)     2. Message: pt called stating she has a swollen leg will like to know what to do or what do you advise. Please call     3. Patient approves office to leave a detailed voicemail/MyChart message: yes

## 2017-10-04 NOTE — TELEPHONE ENCOUNTER
If just one leg-and if it is red, hot or painful-she needs to be seen in UC or ER to evaluate this     If both legs (and none of the other concerning sx listed above), have her elevated her legs, reduce salt and use compression stockings.  FU if not resolution when doing all of those.

## 2017-10-04 NOTE — TELEPHONE ENCOUNTER
First attempt: phone rings but has no answering machine. Let ring for 2 minutes, no answer. Will try later.

## 2017-10-09 NOTE — PROGRESS NOTES
CC: Follow-Up (Med refill) and Itching (R knee x 3 day)        HPI:     Rosangela presents today for the followin. Chronic left shoulder pain/Chronic back pain, unspecified back location, unspecified back pain laterality/ Neck muscle spasm/ Chronic use of opiate drugs therapeutic purposes ORT=5  Chronic pain recheck:   Last dose of controlled substance: 10/9   taken 3 times a day    She  reports that she does not drink alcohol.  She  reports that she does not use drugs.    Consequences of Chronic Opiate therapy:  (5 A's)  Analgesia: Compared to no treatment or prior treatment, pain is currently improved  Activity: improved  Adverse Events: She denies constipation, dry mouth, itchy skin, nausea and sedation  Aberrant Behaviors: She reports she is taking medication as prescribed and is not veering from agreed treatment regimen. There have been no inappropriate refills or lost/stolen meds reported.   Affect/Mood: Pain is not impacting patient's mood.  Patient reports depression/anxiety. No related to pain    Nonnarcotic treatments that are being used:effexor, baclofen, sees orthopedics for shoulder injections of the left side intermittently  Treatment goals discussed.    Opioid Risk Score: 5    Interpretation of Opioid Risk Score   Score 0-3 = Low risk of abuse. Do UDS at least once per year.  Score 4-7 = Moderate risk of abuse. Do UDS 1-4 times per year.  Score 8+ = High risk of abuse. Refer to specialist.    Last order of CONTROLLED SUBSTANCE TREATMENT AGREEMENT was found on 2017 from Office Visit on 2017     UDS Summary                URINE DRUG SCREEN Next Due 1/10/2018      Done 7/10/2017 [facility] PAIN MANAGEMENT SCREEN     Patient has more history with this topic...        Most recent UDS reviewed today and is consistent with prescribed medications.    I have reviewed the medical records, the Prescription Monitoring Program and I have determined that controlled substance treatment is medically  indicated.    5. Fatigue, unspecified type  States she donated blood last week and they checked her iron level. She is unsure if it's normal or not, she thought that we would have those results.    6. Type 2 diabetes mellitus with hyperglycemia, without long-term current use of insulin (CMS-HCC)  Compliant with her medication    7. Major depressive disorder, recurrent episode, moderate (CMS-HCC)/ Sedative, hypnotic or anxiolytic dependence (CMS-HCC)/Depressive disorder/JASMIN (generalized anxiety disorder)  Continues of concerns related to venlafaxine and her eye twitches. She did feel it got better when she reduced her dose however appears to wax and wane. She has consulted neurology and ophthalmology related to this. She did talk to her psychiatrist to didn't fill it this was contributing. She states her daughters all did well with Zoloft and she would like to try this    Current Outpatient Prescriptions   Medication Sig Dispense Refill   • mupirocin calcium (BACTROBAN) 2 % Cream Apply 1 Application to affected area(s) 2 times a day. 1 Tube 5   • Hydrocodone-Acetaminophen 5-300 MG Tab Take 1-1.5 Tabs by mouth 3 times a day as needed. 105 Tab 0   • trazodone (DESYREL) 50 MG Tab Take 1 Tab by mouth at bedtime as needed for Sleep. 90 Tab 0   • venlafaxine XR (EFFEXOR XR) 75 MG CAPSULE SR 24 HR Take 2 capsules daily x 1 month and then 1 capsule daily x 1 month and then discontinue 90 Cap 0   • alprazolam (XANAX) 1 MG Tab Take 1 Tab by mouth 3 times a day as needed for Anxiety. 70 Tab 2   • baclofen (LIORESAL) 10 MG Tab      • docusate sodium (COLACE) 100 MG Cap Take 1 Cap by mouth 2 times a day as needed for Constipation. 180 Cap 3   • ranitidine (ZANTAC) 150 MG Tab Take 150 mg by mouth 2 times a day.     • glipiZIDE SR (GLUCOTROL) 2.5 MG TABLET SR 24 HR TAKE ONE TABLET BY MOUTH ONCE DAILY 90 Tab 3   • metformin (GLUCOPHAGE) 1000 MG tablet TAKE ONE TABLET BY MOUTH TWICE DAILY WITH MEALS 180 Tab 3   • lisinopril  "(PRINIVIL) 2.5 MG Tab Take 1 Tab by mouth every day. 90 Tab 3   • rosuvastatin (CRESTOR) 20 MG Tab Take 1 Tab by mouth every evening. 90 Tab 3   • ONE TOUCH ULTRA TEST strip      • Omega-3 Fatty Acids (FISH OIL PO) Take  by mouth.     • vitamin D, Ergocalciferol, (DRISDOL) 08372 UNITS Cap capsule Take 1 Cap by mouth every 7 days. 12 Cap 3   • aspirin (ASA) 81 MG CHEW Take 81 mg by mouth every day.     • therapeutic multivitamin-minerals (THERAGRAN-M) TABS Take 1 Tab by mouth every day.       No current facility-administered medications for this visit.      Social History   Substance Use Topics   • Smoking status: Former Smoker     Packs/day: 1.00     Years: 40.00     Types: Cigarettes     Quit date: 1/1/2006   • Smokeless tobacco: Never Used   • Alcohol use No     I reviewed patients allergies, problem list and medications today in EPIC.    ROS: Any/all pertinent positives listed in the HPI, otherwise all others reviewed are negative today.      /80   Pulse (!) 117   Temp 36.1 °C (97 °F)   Resp 16   Ht 1.6 m (5' 3\")   Wt 73 kg (161 lb)   SpO2 94%   BMI 28.52 kg/m²     Exam:    Gen: Alert and oriented, No apparent distress. WDWN  Psych: A+Ox3, normal affect and mood  Skin: Warm, dry and intact. Good turgor   No rashes in visible areas.  Eye: Conjunctiva clear, lids normal  ENMT: Lips without lesions, good dentition  Lungs: Clear to auscultation bilaterally, no rales or rhonchi   Unlabored respiratory effort.   CV: Regular rate and rhythm, S1, S2. No murmurs.   No Edema   Ext: No clubbing, cyanosis, edema.       Assessment and Plan.   72 y.o. female with the following issues.    1. Chronic left shoulder pain/Chronic back pain, unspecified back location, unspecified back pain laterality/ Neck muscle spasm/Chronic use of opiate drugs therapeutic purposes ORT=5  Clinically stable. No reports of new symptoms. Well-controlled on current medication.  obtained/reviewed from state pharmacy " database.  Medications found to be medically necessary/appropriate.  3 months of medication supply at this visit. Followup in the office for further refills.    - Hydrocodone-Acetaminophen 5-300 MG Tab; Take 1-1.5 Tabs by mouth 3 times a day as needed.  Dispense: 105 Tab; Refill: 0    5. Fatigue, unspecified type  Chronic    6. Type 2 diabetes mellitus with hyperglycemia, without long-term current use of insulin (CMS-HCC)  Stable controlled, continue current medication  - POCT  A1C    7. Major depressive disorder, recurrent episode, moderate (CMS-[facility])/Sedative, hypnotic or anxiolytic dependence (CMS-HCC)/ Depressive disorder/JASMIN (generalized anxiety disorder)  Stable. Discussed with patient and she is open to following up with her psychiatrist next week at her scheduled appointment. Recommended that point talking and switching to Zoloft being off her venlafaxine. She does have a new referral to establish care with Dawn Julien as well.  she'll contact me if she is having any problems with this  - REFERRAL TO PSYCHIATRY    11. Need for vaccination  I have placed the below orders and discussed them with an approved delegating provider. The MA is performing the below orders under the direction of an office MD.  -Given today.    - INFLUENZA VACCINE, HIGH DOSE (65+ ONLY)

## 2017-10-10 NOTE — TELEPHONE ENCOUNTER
Denise wanted me to pass on to you Amanda that she remembered that when her mom was at the end (before passing away), her legs would swell up to 4 times their normal size and the doctors figured out it was her moms kidney's. But her mom was in her 80's. I let her know that I would pass the message on.

## 2017-10-20 NOTE — PROGRESS NOTES
PSYCHIATRY FOLLOW-UP NOTE      Chief Complaint   Patient presents with   • Follow-Up     anxiety, depression         History Of Present Illness:  Rosangela Burnette is a 72 y.o. old female with depressive disorder, anxiety disorder, anxiolytic (Xanax) use disorder, DM, HTN, HLD, cerebral aneurysm, atrial fibrillation, chronic low back and shoulder pain comes in today for follow up, was last seen 3 months ago. She was here alone that had her daughter on a conference call for most of the appointment. She was tearful for parts of her appointment. Her daughter stated that her mother has difficulty standing up for herself. She continues to have eye twitching which she believes is because of Effexor and although she has cut back on her dose it has not helped her symptoms. She did not stop her Effexor like we discussed on her last appointment and she continues to take 75 mg daily. She has felt a decline in her mood and anxiety with the dose decrease and has been crying more than before. She has been feeling overwhelmed that her Xanax will be taken away and she won't be able to handle that. She continues to take 2 Xanax every day and a third one if she is having a bad in regards to anxiety. She continues to work a few days a week which she really enjoys. Denies any recent balance problems or falls. Sleep continues to be up and down. Continues to have pain issues which continue to be stable for now. Denies having active or passive thoughts of wanting to hurt herself or others    Social History:   Single and lives alone in an apartment in [place].  and  x 1, was  for 27 years and   in . She has 2 daughters and 4 grand kids. 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    Substance Use:  Alcohol - Denies  Nicotine - Denies    Illicit drugs - Denies     Previous medication trials:  Paxil (s/e -  headaches)    Medications:  Current Outpatient Prescriptions   Medication Sig Dispense Refill   • [START ON 10/27/2017] alprazolam (XANAX) 1 MG Tab Take 1 Tab by mouth 3 times a day as needed for Anxiety. 70 Tab 2   • sertraline (ZOLOFT) 50 MG Tab Take 1 Tab by mouth every day. 90 Tab 0   • trazodone (DESYREL) 50 MG Tab Take 1 Tab by mouth at bedtime as needed for Sleep. 90 Tab 0   • rosuvastatin (CRESTOR) 20 MG Tab TAKE ONE TABLET BY MOUTH EVERY EVENING 90 Tab 3   • mupirocin calcium (BACTROBAN) 2 % Cream Apply 1 Application to affected area(s) 2 times a day. 1 Tube 5   • Hydrocodone-Acetaminophen 5-300 MG Tab Take 1-1.5 Tabs by mouth 3 times a day as needed. 105 Tab 0   • baclofen (LIORESAL) 10 MG Tab      • docusate sodium (COLACE) 100 MG Cap Take 1 Cap by mouth 2 times a day as needed for Constipation. 180 Cap 3   • ranitidine (ZANTAC) 150 MG Tab Take 150 mg by mouth 2 times a day.     • glipiZIDE SR (GLUCOTROL) 2.5 MG TABLET SR 24 HR TAKE ONE TABLET BY MOUTH ONCE DAILY 90 Tab 3   • metformin (GLUCOPHAGE) 1000 MG tablet TAKE ONE TABLET BY MOUTH TWICE DAILY WITH MEALS 180 Tab 3   • lisinopril (PRINIVIL) 2.5 MG Tab Take 1 Tab by mouth every day. 90 Tab 3   • ONE TOUCH ULTRA TEST strip      • Omega-3 Fatty Acids (FISH OIL PO) Take  by mouth.     • vitamin D, Ergocalciferol, (DRISDOL) 14127 UNITS Cap capsule Take 1 Cap by mouth every 7 days. 12 Cap 3   • aspirin (ASA) 81 MG CHEW Take 81 mg by mouth every day.     • therapeutic multivitamin-minerals (THERAGRAN-M) TABS Take 1 Tab by mouth every day.       No current facility-administered medications for this visit.        Review Of Systems:    Constitutional - Negative for fatigue  HEENT - Positive for b/l eye twitching   Respiratory - Negative for shortness of breath, cough  CVS - Negative for chest pain, palpitations  GI - Negative for nausea, vomiting, abdominal pain, diarrhea, constipation  Musculoskeletal - Positive for back and shoulder pain  Neurological -  "Negative for headaches  Psychiatric - Positive for anxiety, depression, poor sleep    Physical Examination:  Vital signs: /90   Pulse (!) 125   Ht 1.6 m (5' 3\")   Wt 74.8 kg (165 lb)   BMI 29.23 kg/m²     Musculoskeletal: Slow but stable gait, ambulating with assistance of cane. No abnormal movements.     Mental Status Evaluation:   General: Elderly white female, ambulating with assistance of cane, dressed in casual attire, good grooming and hygiene, in no apparent distress, calm and cooperative, good eye contact, no psychomotor agitation or retardation  Orientation: Alert and oriented to person, place and time  Recent and remote memory: Grossly intact  Attention span and concentration: Grossly intact  Speech: Spontaneous, normal rate, rhythm and tone  Thought Process: Linear, logical and goal directed  Thought Content: Denies suicidal or homicidal ideations, intent or plan  Perception: Denies auditory or visual hallucinations. No delusions noted  Associations: Intact  Language: Appropriate  Fund of knowledge and vocabulary: Grossly adequate  Mood: \"am not doing good\"  Affect: Anxious and dysphoric, mood congruent  Insight: Good  Judgment: Good      Impression:  1. Generalized anxiety disorder  2. Unspecified depressive disorder  3. Sedative, hypnotic and anxiolytic (Xanax) use disorder - mild  4. Chronic insomnia - multifactorial     Medical Records/Labs/Diagnostic Tests Reviewed:  [place]  records - no abuse suspected, appropriate refills    Plan:  1. Continue Xanax 1 mg 2-3 times daily as needed for anxiety. # 70 tabs with 2 refills faxed to her pharmacy.   2. Discontinue Effexor due to reported side effect of eye twitching.  3. Start Zoloft 50 mg daily for depression and anxiety. Discussed 4-6 week period to assess for efficacy. I have asked her to give my office a follow-up phone call in about 6 weeks to let me know how she is doing as she wants to see me in 3 months due to financial reasons.  4. " Continue Trazodone 50 mg at bedtime as needed for sleep.      Return to clinic in 3 months or sooner if symptoms worsen    The proposed treatment plan was discussed with the patient who was provided the opportunity to ask questions and make suggestions regarding alternative treatment. Patient verbalized understanding and expressed agreement with the plan.     Angelica Ogden M.D.  10/20/17    This note was created using voice recognition software (Dragon). The accuracy of the dictation is limited by the abilities of the software. I have reviewed the note prior to signing, however some errors in grammar and context are still possible. If you have any questions related to this note please do not hesitate to contact our office.

## 2017-12-12 NOTE — PROGRESS NOTES
CC: Follow-Up (Back issues; ) and Blood Sugar Problem (BS dropping fast lately; )        HPI:     Rosangela presents today for the followin. Type 2 diabetes mellitus with diabetic autonomic neuropathy, without long-term current use of insulin (CMS-HCC)  Here today stating she is making good attempts eating better for example she states she has fish and rice. She states she's had some occasions where she feels significantly hypoglycemic including sweating and jittery. She does not like her history of panic attacks. She states she has checked in her numbers were much lower than they have been before but she is unable to give me a number of what her blood sugar was like when she felt symptomatic hypoglycemia. She does state part of it may be her followed as she goes a little bit of time between eating    Last like a hemoglobin 7.1 in October  Complains of some generalized tingling that goes from her feet to her midcalf bilaterally. Occurs only at night.    2. Depressive disorder/Sedative, hypnotic or anxiolytic dependence (CMS-HCC)/Chronic insomnia/JASMIN (generalized anxiety disorder)  No longer on venlafaxine. Currently started on Zoloft 50 mg. Believes it may be helping. She states both of her daughters had good success on Zoloft. She is working on decreasing her alprazolam and has gone from 90 down to 70 pills over the last year or so.    Current Outpatient Prescriptions   Medication Sig Dispense Refill   • gabapentin (NEURONTIN) 100 MG Cap Take 1 Cap by mouth 3 times a day. 90 Cap 11   • lisinopril (PRINIVIL) 2.5 MG Tab TAKE ONE TABLET BY MOUTH ONCE DAILY 90 Tab 3   • metformin (GLUCOPHAGE) 1000 MG tablet TAKE ONE TABLET BY MOUTH TWICE DAILY WITH MEALS 180 Tab 3   • SLAVA CONTOUR NEXT TEST strip USE ONE STRIP TO CHECK GLUCOSE THREE TIMES DAILY 100 Strip 11   • alprazolam (XANAX) 1 MG Tab Take 1 Tab by mouth 3 times a day as needed for Anxiety. 70 Tab 2   • sertraline (ZOLOFT) 50 MG Tab Take 1 Tab by mouth every  "day. 90 Tab 0   • trazodone (DESYREL) 50 MG Tab Take 1 Tab by mouth at bedtime as needed for Sleep. 90 Tab 0   • rosuvastatin (CRESTOR) 20 MG Tab TAKE ONE TABLET BY MOUTH EVERY EVENING 90 Tab 3   • mupirocin calcium (BACTROBAN) 2 % Cream Apply 1 Application to affected area(s) 2 times a day. 1 Tube 5   • Hydrocodone-Acetaminophen 5-300 MG Tab Take 1-1.5 Tabs by mouth 3 times a day as needed. 105 Tab 0   • baclofen (LIORESAL) 10 MG Tab      • docusate sodium (COLACE) 100 MG Cap Take 1 Cap by mouth 2 times a day as needed for Constipation. 180 Cap 3   • ranitidine (ZANTAC) 150 MG Tab Take 150 mg by mouth 2 times a day.     • glipiZIDE SR (GLUCOTROL) 2.5 MG TABLET SR 24 HR TAKE ONE TABLET BY MOUTH ONCE DAILY 90 Tab 3   • Omega-3 Fatty Acids (FISH OIL PO) Take  by mouth.     • vitamin D, Ergocalciferol, (DRISDOL) 82904 UNITS Cap capsule Take 1 Cap by mouth every 7 days. 12 Cap 3   • aspirin (ASA) 81 MG CHEW Take 81 mg by mouth every day.     • therapeutic multivitamin-minerals (THERAGRAN-M) TABS Take 1 Tab by mouth every day.       No current facility-administered medications for this visit.      Social History   Substance Use Topics   • Smoking status: Former Smoker     Packs/day: 1.00     Years: 40.00     Types: Cigarettes     Quit date: 1/1/2006   • Smokeless tobacco: Never Used   • Alcohol use No     I reviewed patients allergies, problem list and medications today in [facility].    ROS: Any/all pertinent positives listed in the HPI, otherwise all others reviewed are negative today.      /72   Pulse 91   Temp 36.2 °C (97.2 °F)   Resp 16   Ht 1.6 m (5' 3\")   Wt 72.6 kg (160 lb)   SpO2 95%   Breastfeeding? No   BMI 28.34 kg/m²     Exam:    Gen: Alert and oriented, No apparent distress. WDWN  Psych: A+Ox3, normal affect and mood  Skin: Warm, dry and intact. Good turgor   No rashes in visible areas.  Eye: Conjunctiva clear, lids normal  ENMT: Lips without lesions, good dentition  Lungs: Clear to auscultation " bilaterally, no rales or rhonchi   Unlabored respiratory effort.   CV: Regular rate and rhythm, S1, S2. No murmurs.   No Edema        Assessment and Plan.   72 y.o. female with the following issues.    1. Type 2 diabetes mellitus with diabetic autonomic neuropathy, without long-term current use of insulin (CMS-HCC)  Suspicious for neuropathy. Start gabapentin one pill at night. She'll follow-up with me in a month.  - gabapentin (NEURONTIN) 100 MG Cap; Take 1 Cap by mouth 3 times a day.  Dispense: 90 Cap; Refill: 11    2. Depressive disorder/ Sedative, hypnotic or anxiolytic dependence (CMS-HCC)/ Chronic insomnia/ JASMIN (generalized anxiety disorder)  Stable. Long discussion with the patient. We will for now continue writing her medications of Xanax and Zoloft from this office as she feels more comfortable with this and is more affordable for her senior [facility]. I did discuss over time I will want to decrease her Xanax use and she verbalizes complete understanding is open to this plan of pharmacist and slowly as we discussed. Will follow up with her Zoloft at her scheduled appointment next month and we may change her increase this. I also discussed, and she voiced understanding, that if he had a hard time finding a good antidepressant I mainly on the psychiatrist again.

## 2018-01-01 ENCOUNTER — APPOINTMENT (OUTPATIENT)
Dept: RADIOLOGY | Facility: MEDICAL CENTER | Age: 73
DRG: 853 | End: 2018-01-01
Attending: INTERNAL MEDICINE
Payer: MEDICARE

## 2018-01-01 ENCOUNTER — OFFICE VISIT (OUTPATIENT)
Dept: MEDICAL GROUP | Facility: CLINIC | Age: 73
End: 2018-01-01
Payer: MEDICARE

## 2018-01-01 ENCOUNTER — TELEPHONE (OUTPATIENT)
Dept: MEDICAL GROUP | Facility: CLINIC | Age: 73
End: 2018-01-01

## 2018-01-01 ENCOUNTER — HOSPITAL ENCOUNTER (INPATIENT)
Facility: MEDICAL CENTER | Age: 73
LOS: 13 days | DRG: 853 | End: 2018-06-22
Attending: EMERGENCY MEDICINE | Admitting: HOSPITALIST
Payer: MEDICARE

## 2018-01-01 ENCOUNTER — APPOINTMENT (OUTPATIENT)
Dept: RADIOLOGY | Facility: MEDICAL CENTER | Age: 73
End: 2018-01-01
Attending: NURSE PRACTITIONER
Payer: MEDICARE

## 2018-01-01 ENCOUNTER — HOSPICE ADMISSION (OUTPATIENT)
Dept: HOSPICE | Facility: HOSPICE | Age: 73
End: 2018-01-01
Payer: MEDICARE

## 2018-01-01 ENCOUNTER — HOME CARE VISIT (OUTPATIENT)
Dept: HOSPICE | Facility: HOSPICE | Age: 73
End: 2018-01-01
Payer: MEDICARE

## 2018-01-01 ENCOUNTER — TELEPHONE (OUTPATIENT)
Dept: BEHAVIORAL HEALTH | Facility: PHYSICIAN GROUP | Age: 73
End: 2018-01-01

## 2018-01-01 ENCOUNTER — APPOINTMENT (OUTPATIENT)
Dept: RADIOLOGY | Facility: MEDICAL CENTER | Age: 73
DRG: 853 | End: 2018-01-01
Attending: HOSPITALIST
Payer: MEDICARE

## 2018-01-01 ENCOUNTER — PATIENT OUTREACH (OUTPATIENT)
Dept: HEALTH INFORMATION MANAGEMENT | Facility: OTHER | Age: 73
End: 2018-01-01

## 2018-01-01 ENCOUNTER — APPOINTMENT (OUTPATIENT)
Dept: RADIOLOGY | Facility: MEDICAL CENTER | Age: 73
DRG: 853 | End: 2018-01-01
Attending: EMERGENCY MEDICINE
Payer: MEDICARE

## 2018-01-01 VITALS
SYSTOLIC BLOOD PRESSURE: 161 MMHG | BODY MASS INDEX: 29.83 KG/M2 | HEART RATE: 66 BPM | TEMPERATURE: 100 F | WEIGHT: 179.01 LBS | DIASTOLIC BLOOD PRESSURE: 68 MMHG | HEIGHT: 65 IN | OXYGEN SATURATION: 25 % | RESPIRATION RATE: 4 BRPM

## 2018-01-01 VITALS
HEIGHT: 63 IN | WEIGHT: 161 LBS | RESPIRATION RATE: 16 BRPM | TEMPERATURE: 97.7 F | HEART RATE: 89 BPM | SYSTOLIC BLOOD PRESSURE: 140 MMHG | BODY MASS INDEX: 28.53 KG/M2 | OXYGEN SATURATION: 94 % | DIASTOLIC BLOOD PRESSURE: 70 MMHG

## 2018-01-01 VITALS
OXYGEN SATURATION: 92 % | DIASTOLIC BLOOD PRESSURE: 72 MMHG | BODY MASS INDEX: 29.95 KG/M2 | HEART RATE: 78 BPM | HEIGHT: 63 IN | TEMPERATURE: 96.8 F | RESPIRATION RATE: 16 BRPM | SYSTOLIC BLOOD PRESSURE: 122 MMHG | WEIGHT: 169 LBS

## 2018-01-01 VITALS
TEMPERATURE: 97.7 F | OXYGEN SATURATION: 95 % | SYSTOLIC BLOOD PRESSURE: 118 MMHG | RESPIRATION RATE: 16 BRPM | BODY MASS INDEX: 28.35 KG/M2 | HEIGHT: 63 IN | WEIGHT: 160 LBS | HEART RATE: 90 BPM | DIASTOLIC BLOOD PRESSURE: 66 MMHG

## 2018-01-01 DIAGNOSIS — F32.A DEPRESSIVE DISORDER: ICD-10-CM

## 2018-01-01 DIAGNOSIS — Z79.891 CHRONIC USE OF OPIATE DRUGS THERAPEUTIC PURPOSES: ICD-10-CM

## 2018-01-01 DIAGNOSIS — G89.29 CHRONIC LEFT SHOULDER PAIN: ICD-10-CM

## 2018-01-01 DIAGNOSIS — M25.512 CHRONIC LEFT SHOULDER PAIN: ICD-10-CM

## 2018-01-01 DIAGNOSIS — J96.01 ACUTE RESPIRATORY FAILURE WITH HYPOXIA (HCC): ICD-10-CM

## 2018-01-01 DIAGNOSIS — M62.838 NECK MUSCLE SPASM: ICD-10-CM

## 2018-01-01 DIAGNOSIS — M54.9 CHRONIC BACK PAIN, UNSPECIFIED BACK LOCATION, UNSPECIFIED BACK PAIN LATERALITY: ICD-10-CM

## 2018-01-01 DIAGNOSIS — R09.89 DECREASED PEDAL PULSES: ICD-10-CM

## 2018-01-01 DIAGNOSIS — A41.9 SEVERE SEPSIS (HCC): ICD-10-CM

## 2018-01-01 DIAGNOSIS — G89.29 CHRONIC BACK PAIN, UNSPECIFIED BACK LOCATION, UNSPECIFIED BACK PAIN LATERALITY: ICD-10-CM

## 2018-01-01 DIAGNOSIS — R09.02 HYPOXIA: ICD-10-CM

## 2018-01-01 DIAGNOSIS — E11.65 TYPE 2 DIABETES MELLITUS WITH HYPERGLYCEMIA, WITHOUT LONG-TERM CURRENT USE OF INSULIN (HCC): ICD-10-CM

## 2018-01-01 DIAGNOSIS — J18.9 PNEUMONIA OF BOTH LUNGS DUE TO INFECTIOUS ORGANISM, UNSPECIFIED PART OF LUNG: ICD-10-CM

## 2018-01-01 DIAGNOSIS — E11.43 TYPE 2 DIABETES MELLITUS WITH DIABETIC AUTONOMIC NEUROPATHY, WITHOUT LONG-TERM CURRENT USE OF INSULIN (HCC): ICD-10-CM

## 2018-01-01 DIAGNOSIS — F41.1 GAD (GENERALIZED ANXIETY DISORDER): ICD-10-CM

## 2018-01-01 DIAGNOSIS — N28.9 RENAL INSUFFICIENCY: ICD-10-CM

## 2018-01-01 DIAGNOSIS — E86.0 DEHYDRATION: ICD-10-CM

## 2018-01-01 DIAGNOSIS — R65.20 SEVERE SEPSIS (HCC): ICD-10-CM

## 2018-01-01 DIAGNOSIS — G93.9 BRAIN LESION: ICD-10-CM

## 2018-01-01 DIAGNOSIS — F13.20 SEDATIVE, HYPNOTIC OR ANXIOLYTIC DEPENDENCE (HCC): ICD-10-CM

## 2018-01-01 DIAGNOSIS — I73.9 PVD (PERIPHERAL VASCULAR DISEASE) (HCC): ICD-10-CM

## 2018-01-01 LAB
ACTION RANGE TRIGGERED IACRT: NO
ACTION RANGE TRIGGERED IACRT: YES
ALBUMIN SERPL BCP-MCNC: 2.1 G/DL (ref 3.2–4.9)
ALBUMIN SERPL BCP-MCNC: 2.2 G/DL (ref 3.2–4.9)
ALBUMIN SERPL BCP-MCNC: 2.6 G/DL (ref 3.2–4.9)
ALBUMIN SERPL BCP-MCNC: 3.1 G/DL (ref 3.2–4.9)
ALBUMIN SERPL BCP-MCNC: 3.3 G/DL (ref 3.2–4.9)
ALBUMIN/GLOB SERPL: 0.6 G/DL
ALBUMIN/GLOB SERPL: 0.7 G/DL
ALBUMIN/GLOB SERPL: 0.8 G/DL
ALP SERPL-CCNC: 142 U/L (ref 30–99)
ALP SERPL-CCNC: 176 U/L (ref 30–99)
ALP SERPL-CCNC: 79 U/L (ref 30–99)
ALP SERPL-CCNC: 88 U/L (ref 30–99)
ALP SERPL-CCNC: 91 U/L (ref 30–99)
ALT SERPL-CCNC: 192 U/L (ref 2–50)
ALT SERPL-CCNC: 357 U/L (ref 2–50)
ALT SERPL-CCNC: 36 U/L (ref 2–50)
ALT SERPL-CCNC: 40 U/L (ref 2–50)
ALT SERPL-CCNC: 50 U/L (ref 2–50)
AMMONIA PLAS-SCNC: 27 UMOL/L (ref 11–45)
AMMONIA PLAS-SCNC: 27 UMOL/L (ref 11–45)
AMMONIA PLAS-SCNC: 31 UMOL/L (ref 11–45)
AMMONIA PLAS-SCNC: 33 UMOL/L (ref 11–45)
AMMONIA PLAS-SCNC: 44 UMOL/L (ref 11–45)
AMORPH CRY #/AREA URNS HPF: PRESENT /HPF
ANION GAP SERPL CALC-SCNC: 10 MMOL/L (ref 0–11.9)
ANION GAP SERPL CALC-SCNC: 11 MMOL/L (ref 0–11.9)
ANION GAP SERPL CALC-SCNC: 13 MMOL/L (ref 0–11.9)
ANION GAP SERPL CALC-SCNC: 6 MMOL/L (ref 0–11.9)
ANION GAP SERPL CALC-SCNC: 7 MMOL/L (ref 0–11.9)
ANION GAP SERPL CALC-SCNC: 7 MMOL/L (ref 0–11.9)
ANION GAP SERPL CALC-SCNC: 9 MMOL/L (ref 0–11.9)
ANION GAP SERPL CALC-SCNC: 9 MMOL/L (ref 0–11.9)
ANISOCYTOSIS BLD QL SMEAR: ABNORMAL
APPEARANCE UR: ABNORMAL
APPEARANCE UR: CLEAR
APTT PPP: 30 SEC (ref 24.7–36)
APTT PPP: 68.7 SEC (ref 24.7–36)
APTT PPP: 74.6 SEC (ref 24.7–36)
APTT PPP: 82.6 SEC (ref 24.7–36)
AST SERPL-CCNC: 150 U/L (ref 12–45)
AST SERPL-CCNC: 293 U/L (ref 12–45)
AST SERPL-CCNC: 49 U/L (ref 12–45)
AST SERPL-CCNC: 51 U/L (ref 12–45)
AST SERPL-CCNC: 68 U/L (ref 12–45)
BACTERIA #/AREA URNS HPF: NEGATIVE /HPF
BACTERIA BLD CULT: NORMAL
BACTERIA BRONCH AEROBE CULT: ABNORMAL
BACTERIA BRONCH AEROBE CULT: ABNORMAL
BACTERIA SPEC RESP CULT: ABNORMAL
BACTERIA SPEC RESP CULT: ABNORMAL
BACTERIA SPEC RESP CULT: NORMAL
BASE EXCESS BLDA CALC-SCNC: -10 MMOL/L (ref -4–3)
BASE EXCESS BLDA CALC-SCNC: -2 MMOL/L (ref -4–3)
BASE EXCESS BLDA CALC-SCNC: -4 MMOL/L (ref -4–3)
BASE EXCESS BLDA CALC-SCNC: -5 MMOL/L (ref -4–3)
BASE EXCESS BLDA CALC-SCNC: -6 MMOL/L (ref -4–3)
BASE EXCESS BLDA CALC-SCNC: -6 MMOL/L (ref -4–3)
BASE EXCESS BLDA CALC-SCNC: -7 MMOL/L (ref -4–3)
BASE EXCESS BLDA CALC-SCNC: -8 MMOL/L (ref -4–3)
BASE EXCESS BLDA CALC-SCNC: 1 MMOL/L (ref -4–3)
BASE EXCESS BLDA CALC-SCNC: 10 MMOL/L (ref -4–3)
BASE EXCESS BLDA CALC-SCNC: 3 MMOL/L (ref -4–3)
BASE EXCESS BLDA CALC-SCNC: 6 MMOL/L (ref -4–3)
BASE EXCESS BLDA CALC-SCNC: 9 MMOL/L (ref -4–3)
BASE EXCESS BLDA CALC-SCNC: 9 MMOL/L (ref -4–3)
BASOPHILS # BLD AUTO: 0 % (ref 0–1.8)
BASOPHILS # BLD AUTO: 0.3 % (ref 0–1.8)
BASOPHILS # BLD AUTO: 0.4 % (ref 0–1.8)
BASOPHILS # BLD AUTO: 0.4 % (ref 0–1.8)
BASOPHILS # BLD AUTO: 0.6 % (ref 0–1.8)
BASOPHILS # BLD AUTO: 0.9 % (ref 0–1.8)
BASOPHILS # BLD AUTO: 1.9 % (ref 0–1.8)
BASOPHILS # BLD: 0 K/UL (ref 0–0.12)
BASOPHILS # BLD: 0.04 K/UL (ref 0–0.12)
BASOPHILS # BLD: 0.06 K/UL (ref 0–0.12)
BASOPHILS # BLD: 0.07 K/UL (ref 0–0.12)
BASOPHILS # BLD: 0.12 K/UL (ref 0–0.12)
BASOPHILS # BLD: 0.13 K/UL (ref 0–0.12)
BASOPHILS # BLD: 0.32 K/UL (ref 0–0.12)
BILIRUB SERPL-MCNC: 0.3 MG/DL (ref 0.1–1.5)
BILIRUB SERPL-MCNC: 0.4 MG/DL (ref 0.1–1.5)
BILIRUB SERPL-MCNC: 0.4 MG/DL (ref 0.1–1.5)
BILIRUB SERPL-MCNC: 0.5 MG/DL (ref 0.1–1.5)
BILIRUB SERPL-MCNC: 0.6 MG/DL (ref 0.1–1.5)
BILIRUB UR QL STRIP.AUTO: NEGATIVE
BILIRUB UR QL STRIP.AUTO: NEGATIVE
BNP SERPL-MCNC: 218 PG/ML (ref 0–100)
BODY TEMPERATURE: ABNORMAL DEGREES
BODY TEMPERATURE: NORMAL DEGREES
BUN SERPL-MCNC: 24 MG/DL (ref 8–22)
BUN SERPL-MCNC: 25 MG/DL (ref 8–22)
BUN SERPL-MCNC: 31 MG/DL (ref 8–22)
BUN SERPL-MCNC: 32 MG/DL (ref 8–22)
BUN SERPL-MCNC: 35 MG/DL (ref 8–22)
BUN SERPL-MCNC: 41 MG/DL (ref 8–22)
BUN SERPL-MCNC: 42 MG/DL (ref 8–22)
BUN SERPL-MCNC: 43 MG/DL (ref 8–22)
BUN SERPL-MCNC: 44 MG/DL (ref 8–22)
BUN SERPL-MCNC: 52 MG/DL (ref 8–22)
BUN SERPL-MCNC: 53 MG/DL (ref 8–22)
BUN SERPL-MCNC: 68 MG/DL (ref 8–22)
BURR CELLS BLD QL SMEAR: NORMAL
CALCIUM SERPL-MCNC: 10.2 MG/DL (ref 8.5–10.5)
CALCIUM SERPL-MCNC: 7.6 MG/DL (ref 8.5–10.5)
CALCIUM SERPL-MCNC: 7.7 MG/DL (ref 8.5–10.5)
CALCIUM SERPL-MCNC: 7.8 MG/DL (ref 8.5–10.5)
CALCIUM SERPL-MCNC: 7.9 MG/DL (ref 8.5–10.5)
CALCIUM SERPL-MCNC: 7.9 MG/DL (ref 8.5–10.5)
CALCIUM SERPL-MCNC: 8.1 MG/DL (ref 8.5–10.5)
CALCIUM SERPL-MCNC: 8.2 MG/DL (ref 8.5–10.5)
CALCIUM SERPL-MCNC: 8.3 MG/DL (ref 8.5–10.5)
CALCIUM SERPL-MCNC: 8.3 MG/DL (ref 8.5–10.5)
CALCIUM SERPL-MCNC: 8.5 MG/DL (ref 8.5–10.5)
CALCIUM SERPL-MCNC: 8.5 MG/DL (ref 8.5–10.5)
CALCIUM SERPL-MCNC: 8.6 MG/DL (ref 8.5–10.5)
CALCIUM SERPL-MCNC: 9.3 MG/DL (ref 8.5–10.5)
CHLORIDE SERPL-SCNC: 103 MMOL/L (ref 96–112)
CHLORIDE SERPL-SCNC: 103 MMOL/L (ref 96–112)
CHLORIDE SERPL-SCNC: 105 MMOL/L (ref 96–112)
CHLORIDE SERPL-SCNC: 105 MMOL/L (ref 96–112)
CHLORIDE SERPL-SCNC: 106 MMOL/L (ref 96–112)
CHLORIDE SERPL-SCNC: 107 MMOL/L (ref 96–112)
CHLORIDE SERPL-SCNC: 107 MMOL/L (ref 96–112)
CHLORIDE SERPL-SCNC: 108 MMOL/L (ref 96–112)
CHLORIDE SERPL-SCNC: 109 MMOL/L (ref 96–112)
CHLORIDE SERPL-SCNC: 95 MMOL/L (ref 96–112)
CHLORIDE SERPL-SCNC: 97 MMOL/L (ref 96–112)
CHLORIDE SERPL-SCNC: 98 MMOL/L (ref 96–112)
CHLORIDE UR-SCNC: 107 MMOL/L
CHLORIDE UR-SCNC: 85 MMOL/L
CK SERPL-CCNC: 92 U/L (ref 0–154)
CO2 BLDA-SCNC: 15 MMOL/L (ref 20–33)
CO2 BLDA-SCNC: 19 MMOL/L (ref 20–33)
CO2 BLDA-SCNC: 20 MMOL/L (ref 20–33)
CO2 BLDA-SCNC: 22 MMOL/L (ref 20–33)
CO2 BLDA-SCNC: 24 MMOL/L (ref 20–33)
CO2 BLDA-SCNC: 27 MMOL/L (ref 20–33)
CO2 BLDA-SCNC: 30 MMOL/L (ref 20–33)
CO2 BLDA-SCNC: 33 MMOL/L (ref 20–33)
CO2 BLDA-SCNC: 36 MMOL/L (ref 20–33)
CO2 SERPL-SCNC: 16 MMOL/L (ref 20–33)
CO2 SERPL-SCNC: 17 MMOL/L (ref 20–33)
CO2 SERPL-SCNC: 18 MMOL/L (ref 20–33)
CO2 SERPL-SCNC: 21 MMOL/L (ref 20–33)
CO2 SERPL-SCNC: 24 MMOL/L (ref 20–33)
CO2 SERPL-SCNC: 25 MMOL/L (ref 20–33)
CO2 SERPL-SCNC: 27 MMOL/L (ref 20–33)
CO2 SERPL-SCNC: 29 MMOL/L (ref 20–33)
CO2 SERPL-SCNC: 31 MMOL/L (ref 20–33)
CO2 SERPL-SCNC: 32 MMOL/L (ref 20–33)
CO2 SERPL-SCNC: 32 MMOL/L (ref 20–33)
COLOR UR: YELLOW
COLOR UR: YELLOW
COMMENT 1642: NORMAL
CREAT SERPL-MCNC: 0.91 MG/DL (ref 0.5–1.4)
CREAT SERPL-MCNC: 0.95 MG/DL (ref 0.5–1.4)
CREAT SERPL-MCNC: 0.98 MG/DL (ref 0.5–1.4)
CREAT SERPL-MCNC: 1.06 MG/DL (ref 0.5–1.4)
CREAT SERPL-MCNC: 1.11 MG/DL (ref 0.5–1.4)
CREAT SERPL-MCNC: 1.11 MG/DL (ref 0.5–1.4)
CREAT SERPL-MCNC: 1.12 MG/DL (ref 0.5–1.4)
CREAT SERPL-MCNC: 1.17 MG/DL (ref 0.5–1.4)
CREAT SERPL-MCNC: 1.21 MG/DL (ref 0.5–1.4)
CREAT SERPL-MCNC: 1.21 MG/DL (ref 0.5–1.4)
CREAT SERPL-MCNC: 1.51 MG/DL (ref 0.5–1.4)
CREAT SERPL-MCNC: 1.82 MG/DL (ref 0.5–1.4)
CREAT SERPL-MCNC: 1.97 MG/DL (ref 0.5–1.4)
CREAT SERPL-MCNC: 2.71 MG/DL (ref 0.5–1.4)
CREAT UR-MCNC: 55.8 MG/DL
CRP SERPL HS-MCNC: 22.78 MG/DL (ref 0–0.75)
CRP SERPL HS-MCNC: 7.91 MG/DL (ref 0–0.75)
EKG IMPRESSION: NORMAL
EOSINOPHIL # BLD AUTO: 0 K/UL (ref 0–0.51)
EOSINOPHIL # BLD AUTO: 0.04 K/UL (ref 0–0.51)
EOSINOPHIL # BLD AUTO: 0.15 K/UL (ref 0–0.51)
EOSINOPHIL # BLD AUTO: 0.21 K/UL (ref 0–0.51)
EOSINOPHIL # BLD AUTO: 0.22 K/UL (ref 0–0.51)
EOSINOPHIL # BLD AUTO: 0.59 K/UL (ref 0–0.51)
EOSINOPHIL # BLD AUTO: 0.65 K/UL (ref 0–0.51)
EOSINOPHIL # BLD AUTO: 1.02 K/UL (ref 0–0.51)
EOSINOPHIL # BLD AUTO: 1.14 K/UL (ref 0–0.51)
EOSINOPHIL # BLD AUTO: 1.41 K/UL (ref 0–0.51)
EOSINOPHIL # BLD AUTO: 1.42 K/UL (ref 0–0.51)
EOSINOPHIL # BLD AUTO: 1.45 K/UL (ref 0–0.51)
EOSINOPHIL # BLD AUTO: 2.18 K/UL (ref 0–0.51)
EOSINOPHIL NFR BLD: 0 % (ref 0–6.9)
EOSINOPHIL NFR BLD: 0.2 % (ref 0–6.9)
EOSINOPHIL NFR BLD: 0.9 % (ref 0–6.9)
EOSINOPHIL NFR BLD: 0.9 % (ref 0–6.9)
EOSINOPHIL NFR BLD: 1.2 % (ref 0–6.9)
EOSINOPHIL NFR BLD: 11 % (ref 0–6.9)
EOSINOPHIL NFR BLD: 12.2 % (ref 0–6.9)
EOSINOPHIL NFR BLD: 3.6 % (ref 0–6.9)
EOSINOPHIL NFR BLD: 4.4 % (ref 0–6.9)
EOSINOPHIL NFR BLD: 6.8 % (ref 0–6.9)
EOSINOPHIL NFR BLD: 7.1 % (ref 0–6.9)
EOSINOPHIL NFR BLD: 8.4 % (ref 0–6.9)
EOSINOPHIL NFR BLD: 9.5 % (ref 0–6.9)
EPI CELLS #/AREA URNS HPF: ABNORMAL /HPF
ERYTHROCYTE [DISTWIDTH] IN BLOOD BY AUTOMATED COUNT: 50.2 FL (ref 35.9–50)
ERYTHROCYTE [DISTWIDTH] IN BLOOD BY AUTOMATED COUNT: 50.7 FL (ref 35.9–50)
ERYTHROCYTE [DISTWIDTH] IN BLOOD BY AUTOMATED COUNT: 51.4 FL (ref 35.9–50)
ERYTHROCYTE [DISTWIDTH] IN BLOOD BY AUTOMATED COUNT: 51.7 FL (ref 35.9–50)
ERYTHROCYTE [DISTWIDTH] IN BLOOD BY AUTOMATED COUNT: 51.8 FL (ref 35.9–50)
ERYTHROCYTE [DISTWIDTH] IN BLOOD BY AUTOMATED COUNT: 51.9 FL (ref 35.9–50)
ERYTHROCYTE [DISTWIDTH] IN BLOOD BY AUTOMATED COUNT: 52.6 FL (ref 35.9–50)
ERYTHROCYTE [DISTWIDTH] IN BLOOD BY AUTOMATED COUNT: 52.9 FL (ref 35.9–50)
ERYTHROCYTE [DISTWIDTH] IN BLOOD BY AUTOMATED COUNT: 53.1 FL (ref 35.9–50)
ERYTHROCYTE [DISTWIDTH] IN BLOOD BY AUTOMATED COUNT: 53.1 FL (ref 35.9–50)
ERYTHROCYTE [DISTWIDTH] IN BLOOD BY AUTOMATED COUNT: 53.3 FL (ref 35.9–50)
ERYTHROCYTE [DISTWIDTH] IN BLOOD BY AUTOMATED COUNT: 54 FL (ref 35.9–50)
ERYTHROCYTE [DISTWIDTH] IN BLOOD BY AUTOMATED COUNT: 54.3 FL (ref 35.9–50)
ERYTHROCYTE [DISTWIDTH] IN BLOOD BY AUTOMATED COUNT: 54.6 FL (ref 35.9–50)
GLOBULIN SER CALC-MCNC: 3.2 G/DL (ref 1.9–3.5)
GLOBULIN SER CALC-MCNC: 3.4 G/DL (ref 1.9–3.5)
GLOBULIN SER CALC-MCNC: 3.7 G/DL (ref 1.9–3.5)
GLOBULIN SER CALC-MCNC: 3.7 G/DL (ref 1.9–3.5)
GLOBULIN SER CALC-MCNC: 4.3 G/DL (ref 1.9–3.5)
GLUCOSE BLD-MCNC: 120 MG/DL (ref 65–99)
GLUCOSE BLD-MCNC: 133 MG/DL (ref 65–99)
GLUCOSE BLD-MCNC: 135 MG/DL (ref 65–99)
GLUCOSE BLD-MCNC: 135 MG/DL (ref 65–99)
GLUCOSE BLD-MCNC: 136 MG/DL (ref 65–99)
GLUCOSE BLD-MCNC: 136 MG/DL (ref 65–99)
GLUCOSE BLD-MCNC: 144 MG/DL (ref 65–99)
GLUCOSE BLD-MCNC: 149 MG/DL (ref 65–99)
GLUCOSE BLD-MCNC: 149 MG/DL (ref 65–99)
GLUCOSE BLD-MCNC: 151 MG/DL (ref 65–99)
GLUCOSE BLD-MCNC: 151 MG/DL (ref 65–99)
GLUCOSE BLD-MCNC: 152 MG/DL (ref 65–99)
GLUCOSE BLD-MCNC: 153 MG/DL (ref 65–99)
GLUCOSE BLD-MCNC: 154 MG/DL (ref 65–99)
GLUCOSE BLD-MCNC: 154 MG/DL (ref 65–99)
GLUCOSE BLD-MCNC: 157 MG/DL (ref 65–99)
GLUCOSE BLD-MCNC: 157 MG/DL (ref 65–99)
GLUCOSE BLD-MCNC: 159 MG/DL (ref 65–99)
GLUCOSE BLD-MCNC: 159 MG/DL (ref 65–99)
GLUCOSE BLD-MCNC: 160 MG/DL (ref 65–99)
GLUCOSE BLD-MCNC: 164 MG/DL (ref 65–99)
GLUCOSE BLD-MCNC: 165 MG/DL (ref 65–99)
GLUCOSE BLD-MCNC: 170 MG/DL (ref 65–99)
GLUCOSE BLD-MCNC: 171 MG/DL (ref 65–99)
GLUCOSE BLD-MCNC: 171 MG/DL (ref 65–99)
GLUCOSE BLD-MCNC: 172 MG/DL (ref 65–99)
GLUCOSE BLD-MCNC: 174 MG/DL (ref 65–99)
GLUCOSE BLD-MCNC: 176 MG/DL (ref 65–99)
GLUCOSE BLD-MCNC: 177 MG/DL (ref 65–99)
GLUCOSE BLD-MCNC: 178 MG/DL (ref 65–99)
GLUCOSE BLD-MCNC: 180 MG/DL (ref 65–99)
GLUCOSE BLD-MCNC: 181 MG/DL (ref 65–99)
GLUCOSE BLD-MCNC: 182 MG/DL (ref 65–99)
GLUCOSE BLD-MCNC: 186 MG/DL (ref 65–99)
GLUCOSE BLD-MCNC: 187 MG/DL (ref 65–99)
GLUCOSE BLD-MCNC: 187 MG/DL (ref 65–99)
GLUCOSE BLD-MCNC: 188 MG/DL (ref 65–99)
GLUCOSE BLD-MCNC: 189 MG/DL (ref 65–99)
GLUCOSE BLD-MCNC: 193 MG/DL (ref 65–99)
GLUCOSE BLD-MCNC: 194 MG/DL (ref 65–99)
GLUCOSE BLD-MCNC: 198 MG/DL (ref 65–99)
GLUCOSE BLD-MCNC: 198 MG/DL (ref 65–99)
GLUCOSE BLD-MCNC: 205 MG/DL (ref 65–99)
GLUCOSE BLD-MCNC: 211 MG/DL (ref 65–99)
GLUCOSE BLD-MCNC: 214 MG/DL (ref 65–99)
GLUCOSE BLD-MCNC: 215 MG/DL (ref 65–99)
GLUCOSE BLD-MCNC: 219 MG/DL (ref 65–99)
GLUCOSE BLD-MCNC: 222 MG/DL (ref 65–99)
GLUCOSE BLD-MCNC: 238 MG/DL (ref 65–99)
GLUCOSE BLD-MCNC: 239 MG/DL (ref 65–99)
GLUCOSE BLD-MCNC: 265 MG/DL (ref 65–99)
GLUCOSE SERPL-MCNC: 118 MG/DL (ref 65–99)
GLUCOSE SERPL-MCNC: 139 MG/DL (ref 65–99)
GLUCOSE SERPL-MCNC: 144 MG/DL (ref 65–99)
GLUCOSE SERPL-MCNC: 153 MG/DL (ref 65–99)
GLUCOSE SERPL-MCNC: 170 MG/DL (ref 65–99)
GLUCOSE SERPL-MCNC: 172 MG/DL (ref 65–99)
GLUCOSE SERPL-MCNC: 173 MG/DL (ref 65–99)
GLUCOSE SERPL-MCNC: 175 MG/DL (ref 65–99)
GLUCOSE SERPL-MCNC: 179 MG/DL (ref 65–99)
GLUCOSE SERPL-MCNC: 185 MG/DL (ref 65–99)
GLUCOSE SERPL-MCNC: 193 MG/DL (ref 65–99)
GLUCOSE SERPL-MCNC: 218 MG/DL (ref 65–99)
GLUCOSE SERPL-MCNC: 242 MG/DL (ref 65–99)
GLUCOSE SERPL-MCNC: 252 MG/DL (ref 65–99)
GLUCOSE UR STRIP.AUTO-MCNC: NEGATIVE MG/DL
GLUCOSE UR STRIP.AUTO-MCNC: NEGATIVE MG/DL
GRAM STN SPEC: ABNORMAL
GRAM STN SPEC: ABNORMAL
GRAM STN SPEC: NORMAL
GRAN CASTS #/AREA URNS LPF: ABNORMAL /LPF
HCO3 BLDA-SCNC: 14.5 MMOL/L (ref 17–25)
HCO3 BLDA-SCNC: 17.5 MMOL/L (ref 17–25)
HCO3 BLDA-SCNC: 17.7 MMOL/L (ref 17–25)
HCO3 BLDA-SCNC: 17.7 MMOL/L (ref 17–25)
HCO3 BLDA-SCNC: 18.4 MMOL/L (ref 17–25)
HCO3 BLDA-SCNC: 18.6 MMOL/L (ref 17–25)
HCO3 BLDA-SCNC: 18.9 MMOL/L (ref 17–25)
HCO3 BLDA-SCNC: 19.3 MMOL/L (ref 17–25)
HCO3 BLDA-SCNC: 20.6 MMOL/L (ref 17–25)
HCO3 BLDA-SCNC: 22.9 MMOL/L (ref 17–25)
HCO3 BLDA-SCNC: 25.6 MMOL/L (ref 17–25)
HCO3 BLDA-SCNC: 28.5 MMOL/L (ref 17–25)
HCO3 BLDA-SCNC: 31.3 MMOL/L (ref 17–25)
HCO3 BLDA-SCNC: 34.2 MMOL/L (ref 17–25)
HCO3 BLDA-SCNC: 34.3 MMOL/L (ref 17–25)
HCO3 BLDA-SCNC: 34.8 MMOL/L (ref 17–25)
HCT VFR BLD AUTO: 22.9 % (ref 37–47)
HCT VFR BLD AUTO: 24.9 % (ref 37–47)
HCT VFR BLD AUTO: 25 % (ref 37–47)
HCT VFR BLD AUTO: 26 % (ref 37–47)
HCT VFR BLD AUTO: 26.7 % (ref 37–47)
HCT VFR BLD AUTO: 27.2 % (ref 37–47)
HCT VFR BLD AUTO: 27.3 % (ref 37–47)
HCT VFR BLD AUTO: 27.9 % (ref 37–47)
HCT VFR BLD AUTO: 28 % (ref 37–47)
HCT VFR BLD AUTO: 28.6 % (ref 37–47)
HCT VFR BLD AUTO: 28.7 % (ref 37–47)
HCT VFR BLD AUTO: 29.2 % (ref 37–47)
HCT VFR BLD AUTO: 33.3 % (ref 37–47)
HCT VFR BLD AUTO: 33.7 % (ref 37–47)
HGB BLD-MCNC: 10.7 G/DL (ref 12–16)
HGB BLD-MCNC: 10.7 G/DL (ref 12–16)
HGB BLD-MCNC: 7.2 G/DL (ref 12–16)
HGB BLD-MCNC: 7.6 G/DL (ref 12–16)
HGB BLD-MCNC: 7.9 G/DL (ref 12–16)
HGB BLD-MCNC: 8.1 G/DL (ref 12–16)
HGB BLD-MCNC: 8.3 G/DL (ref 12–16)
HGB BLD-MCNC: 8.4 G/DL (ref 12–16)
HGB BLD-MCNC: 8.7 G/DL (ref 12–16)
HGB BLD-MCNC: 8.8 G/DL (ref 12–16)
HGB BLD-MCNC: 9 G/DL (ref 12–16)
HGB BLD-MCNC: 9.3 G/DL (ref 12–16)
HYPOCHROMIA BLD QL SMEAR: ABNORMAL
HYPOCHROMIA BLD QL SMEAR: ABNORMAL
IMM GRANULOCYTES # BLD AUTO: 0.37 K/UL (ref 0–0.11)
IMM GRANULOCYTES # BLD AUTO: 0.82 K/UL (ref 0–0.11)
IMM GRANULOCYTES # BLD AUTO: 0.87 K/UL (ref 0–0.11)
IMM GRANULOCYTES # BLD AUTO: 0.93 K/UL (ref 0–0.11)
IMM GRANULOCYTES NFR BLD AUTO: 1.7 % (ref 0–0.9)
IMM GRANULOCYTES NFR BLD AUTO: 4.8 % (ref 0–0.9)
IMM GRANULOCYTES NFR BLD AUTO: 5.7 % (ref 0–0.9)
IMM GRANULOCYTES NFR BLD AUTO: 6.2 % (ref 0–0.9)
INR PPP: 1.26 (ref 0.87–1.13)
INST. QUALIFIED PATIENT IIQPT: YES
KETONES UR STRIP.AUTO-MCNC: ABNORMAL MG/DL
KETONES UR STRIP.AUTO-MCNC: NEGATIVE MG/DL
LACTATE BLD-SCNC: 1.2 MMOL/L (ref 0.5–2)
LACTATE BLD-SCNC: 1.4 MMOL/L (ref 0.5–2)
LACTATE BLD-SCNC: 1.8 MMOL/L (ref 0.5–2)
LACTATE BLD-SCNC: 2.3 MMOL/L (ref 0.5–2)
LEUKOCYTE ESTERASE UR QL STRIP.AUTO: NEGATIVE
LEUKOCYTE ESTERASE UR QL STRIP.AUTO: NEGATIVE
LG PLATELETS BLD QL SMEAR: NORMAL
LV EJECT FRACT  99904: 75
LV EJECT FRACT MOD 2C 99903: 55.92
LV EJECT FRACT MOD 4C 99902: 75.29
LV EJECT FRACT MOD BP 99901: 69.88
LYMPHOCYTES # BLD AUTO: 0.59 K/UL (ref 1–4.8)
LYMPHOCYTES # BLD AUTO: 1.04 K/UL (ref 1–4.8)
LYMPHOCYTES # BLD AUTO: 1.09 K/UL (ref 1–4.8)
LYMPHOCYTES # BLD AUTO: 1.32 K/UL (ref 1–4.8)
LYMPHOCYTES # BLD AUTO: 1.36 K/UL (ref 1–4.8)
LYMPHOCYTES # BLD AUTO: 1.45 K/UL (ref 1–4.8)
LYMPHOCYTES # BLD AUTO: 1.69 K/UL (ref 1–4.8)
LYMPHOCYTES # BLD AUTO: 1.8 K/UL (ref 1–4.8)
LYMPHOCYTES # BLD AUTO: 2.11 K/UL (ref 1–4.8)
LYMPHOCYTES # BLD AUTO: 2.2 K/UL (ref 1–4.8)
LYMPHOCYTES # BLD AUTO: 2.2 K/UL (ref 1–4.8)
LYMPHOCYTES # BLD AUTO: 2.65 K/UL (ref 1–4.8)
LYMPHOCYTES # BLD AUTO: 3.11 K/UL (ref 1–4.8)
LYMPHOCYTES NFR BLD: 10.2 % (ref 22–41)
LYMPHOCYTES NFR BLD: 10.7 % (ref 22–41)
LYMPHOCYTES NFR BLD: 11 % (ref 22–41)
LYMPHOCYTES NFR BLD: 11.7 % (ref 22–41)
LYMPHOCYTES NFR BLD: 14.7 % (ref 22–41)
LYMPHOCYTES NFR BLD: 14.8 % (ref 22–41)
LYMPHOCYTES NFR BLD: 15.9 % (ref 22–41)
LYMPHOCYTES NFR BLD: 17.3 % (ref 22–41)
LYMPHOCYTES NFR BLD: 2.6 % (ref 22–41)
LYMPHOCYTES NFR BLD: 5.3 % (ref 22–41)
LYMPHOCYTES NFR BLD: 6.5 % (ref 22–41)
LYMPHOCYTES NFR BLD: 7.3 % (ref 22–41)
LYMPHOCYTES NFR BLD: 8.4 % (ref 22–41)
MAGNESIUM SERPL-MCNC: 1.7 MG/DL (ref 1.5–2.5)
MAGNESIUM SERPL-MCNC: 1.8 MG/DL (ref 1.5–2.5)
MAGNESIUM SERPL-MCNC: 2.2 MG/DL (ref 1.5–2.5)
MANUAL DIFF BLD: NORMAL
MCH RBC QN AUTO: 28.1 PG (ref 27–33)
MCH RBC QN AUTO: 28.7 PG (ref 27–33)
MCH RBC QN AUTO: 28.8 PG (ref 27–33)
MCH RBC QN AUTO: 28.8 PG (ref 27–33)
MCH RBC QN AUTO: 28.9 PG (ref 27–33)
MCH RBC QN AUTO: 28.9 PG (ref 27–33)
MCH RBC QN AUTO: 29.2 PG (ref 27–33)
MCH RBC QN AUTO: 29.2 PG (ref 27–33)
MCH RBC QN AUTO: 29.3 PG (ref 27–33)
MCH RBC QN AUTO: 29.4 PG (ref 27–33)
MCH RBC QN AUTO: 29.6 PG (ref 27–33)
MCHC RBC AUTO-ENTMCNC: 30.3 G/DL (ref 33.6–35)
MCHC RBC AUTO-ENTMCNC: 30.5 G/DL (ref 33.6–35)
MCHC RBC AUTO-ENTMCNC: 30.8 G/DL (ref 33.6–35)
MCHC RBC AUTO-ENTMCNC: 31.1 G/DL (ref 33.6–35)
MCHC RBC AUTO-ENTMCNC: 31.2 G/DL (ref 33.6–35)
MCHC RBC AUTO-ENTMCNC: 31.2 G/DL (ref 33.6–35)
MCHC RBC AUTO-ENTMCNC: 31.4 G/DL (ref 33.6–35)
MCHC RBC AUTO-ENTMCNC: 31.4 G/DL (ref 33.6–35)
MCHC RBC AUTO-ENTMCNC: 31.5 G/DL (ref 33.6–35)
MCHC RBC AUTO-ENTMCNC: 31.6 G/DL (ref 33.6–35)
MCHC RBC AUTO-ENTMCNC: 31.8 G/DL (ref 33.6–35)
MCHC RBC AUTO-ENTMCNC: 31.8 G/DL (ref 33.6–35)
MCHC RBC AUTO-ENTMCNC: 32 G/DL (ref 33.6–35)
MCHC RBC AUTO-ENTMCNC: 32.1 G/DL (ref 33.6–35)
MCV RBC AUTO: 90.1 FL (ref 81.4–97.8)
MCV RBC AUTO: 90.7 FL (ref 81.4–97.8)
MCV RBC AUTO: 91.6 FL (ref 81.4–97.8)
MCV RBC AUTO: 91.8 FL (ref 81.4–97.8)
MCV RBC AUTO: 92.3 FL (ref 81.4–97.8)
MCV RBC AUTO: 92.5 FL (ref 81.4–97.8)
MCV RBC AUTO: 92.6 FL (ref 81.4–97.8)
MCV RBC AUTO: 92.7 FL (ref 81.4–97.8)
MCV RBC AUTO: 93.1 FL (ref 81.4–97.8)
MCV RBC AUTO: 93.5 FL (ref 81.4–97.8)
MCV RBC AUTO: 93.9 FL (ref 81.4–97.8)
MCV RBC AUTO: 94.2 FL (ref 81.4–97.8)
MCV RBC AUTO: 94.3 FL (ref 81.4–97.8)
MCV RBC AUTO: 94.8 FL (ref 81.4–97.8)
METAMYELOCYTES NFR BLD MANUAL: 1.8 %
METAMYELOCYTES NFR BLD MANUAL: 1.9 %
METAMYELOCYTES NFR BLD MANUAL: 3.5 %
METAMYELOCYTES NFR BLD MANUAL: 6.8 %
MICRO URNS: ABNORMAL
MICRO URNS: NORMAL
MICROCYTES BLD QL SMEAR: ABNORMAL
MONOCYTES # BLD AUTO: 0 K/UL (ref 0–0.85)
MONOCYTES # BLD AUTO: 0.17 K/UL (ref 0–0.85)
MONOCYTES # BLD AUTO: 0.25 K/UL (ref 0–0.85)
MONOCYTES # BLD AUTO: 0.35 K/UL (ref 0–0.85)
MONOCYTES # BLD AUTO: 0.36 K/UL (ref 0–0.85)
MONOCYTES # BLD AUTO: 0.41 K/UL (ref 0–0.85)
MONOCYTES # BLD AUTO: 0.63 K/UL (ref 0–0.85)
MONOCYTES # BLD AUTO: 0.76 K/UL (ref 0–0.85)
MONOCYTES # BLD AUTO: 0.79 K/UL (ref 0–0.85)
MONOCYTES # BLD AUTO: 0.92 K/UL (ref 0–0.85)
MONOCYTES # BLD AUTO: 0.92 K/UL (ref 0–0.85)
MONOCYTES # BLD AUTO: 0.95 K/UL (ref 0–0.85)
MONOCYTES # BLD AUTO: 1.28 K/UL (ref 0–0.85)
MONOCYTES NFR BLD AUTO: 0 % (ref 0–13.4)
MONOCYTES NFR BLD AUTO: 1 % (ref 0–13.4)
MONOCYTES NFR BLD AUTO: 1.8 % (ref 0–13.4)
MONOCYTES NFR BLD AUTO: 1.8 % (ref 0–13.4)
MONOCYTES NFR BLD AUTO: 1.9 % (ref 0–13.4)
MONOCYTES NFR BLD AUTO: 2.7 % (ref 0–13.4)
MONOCYTES NFR BLD AUTO: 3.5 % (ref 0–13.4)
MONOCYTES NFR BLD AUTO: 4.7 % (ref 0–13.4)
MONOCYTES NFR BLD AUTO: 4.7 % (ref 0–13.4)
MONOCYTES NFR BLD AUTO: 5.3 % (ref 0–13.4)
MONOCYTES NFR BLD AUTO: 5.9 % (ref 0–13.4)
MONOCYTES NFR BLD AUTO: 6.2 % (ref 0–13.4)
MONOCYTES NFR BLD AUTO: 6.4 % (ref 0–13.4)
MORPHOLOGY BLD-IMP: NORMAL
MYELOCYTES NFR BLD MANUAL: 0.8 %
MYELOCYTES NFR BLD MANUAL: 0.9 %
MYELOCYTES NFR BLD MANUAL: 0.9 %
MYELOCYTES NFR BLD MANUAL: 1 %
MYELOCYTES NFR BLD MANUAL: 1.8 %
MYELOCYTES NFR BLD MANUAL: 1.9 %
MYELOCYTES NFR BLD MANUAL: 4.5 %
NEUTROPHILS # BLD AUTO: 12.06 K/UL (ref 2–7.15)
NEUTROPHILS # BLD AUTO: 12.3 K/UL (ref 2–7.15)
NEUTROPHILS # BLD AUTO: 12.76 K/UL (ref 2–7.15)
NEUTROPHILS # BLD AUTO: 12.87 K/UL (ref 2–7.15)
NEUTROPHILS # BLD AUTO: 13.93 K/UL (ref 2–7.15)
NEUTROPHILS # BLD AUTO: 14.99 K/UL (ref 2–7.15)
NEUTROPHILS # BLD AUTO: 17.64 K/UL (ref 2–7.15)
NEUTROPHILS # BLD AUTO: 17.86 K/UL (ref 2–7.15)
NEUTROPHILS # BLD AUTO: 21.39 K/UL (ref 2–7.15)
NEUTROPHILS # BLD AUTO: 9.39 K/UL (ref 2–7.15)
NEUTROPHILS # BLD AUTO: 9.54 K/UL (ref 2–7.15)
NEUTROPHILS # BLD AUTO: 9.57 K/UL (ref 2–7.15)
NEUTROPHILS # BLD AUTO: 9.94 K/UL (ref 2–7.15)
NEUTROPHILS NFR BLD: 63 % (ref 44–72)
NEUTROPHILS NFR BLD: 68.7 % (ref 44–72)
NEUTROPHILS NFR BLD: 68.8 % (ref 44–72)
NEUTROPHILS NFR BLD: 68.9 % (ref 44–72)
NEUTROPHILS NFR BLD: 69.7 % (ref 44–72)
NEUTROPHILS NFR BLD: 69.9 % (ref 44–72)
NEUTROPHILS NFR BLD: 71 % (ref 44–72)
NEUTROPHILS NFR BLD: 74.7 % (ref 44–72)
NEUTROPHILS NFR BLD: 75.5 % (ref 44–72)
NEUTROPHILS NFR BLD: 81.4 % (ref 44–72)
NEUTROPHILS NFR BLD: 84.1 % (ref 44–72)
NEUTROPHILS NFR BLD: 84.9 % (ref 44–72)
NEUTROPHILS NFR BLD: 93.8 % (ref 44–72)
NEUTS BAND NFR BLD MANUAL: 1.8 % (ref 0–10)
NEUTS BAND NFR BLD MANUAL: 1.9 % (ref 0–10)
NEUTS BAND NFR BLD MANUAL: 1.9 % (ref 0–10)
NEUTS BAND NFR BLD MANUAL: 2.8 % (ref 0–10)
NEUTS BAND NFR BLD MANUAL: 2.9 % (ref 0–10)
NEUTS BAND NFR BLD MANUAL: 6.2 % (ref 0–10)
NEUTS BAND NFR BLD MANUAL: 7.3 % (ref 0–10)
NITRITE UR QL STRIP.AUTO: NEGATIVE
NITRITE UR QL STRIP.AUTO: NEGATIVE
NRBC # BLD AUTO: 0.02 K/UL
NRBC # BLD AUTO: 0.03 K/UL
NRBC # BLD AUTO: 0.04 K/UL
NRBC # BLD AUTO: 0.05 K/UL
NRBC # BLD AUTO: 0.05 K/UL
NRBC # BLD AUTO: 0.07 K/UL
NRBC # BLD AUTO: 0.11 K/UL
NRBC # BLD AUTO: 0.16 K/UL
NRBC BLD-RTO: 0.1 /100 WBC
NRBC BLD-RTO: 0.2 /100 WBC
NRBC BLD-RTO: 0.3 /100 WBC
NRBC BLD-RTO: 0.3 /100 WBC
NRBC BLD-RTO: 0.4 /100 WBC
NRBC BLD-RTO: 0.4 /100 WBC
NRBC BLD-RTO: 0.6 /100 WBC
NRBC BLD-RTO: 0.9 /100 WBC
O2/TOTAL GAS SETTING VFR VENT: 0.8 %
O2/TOTAL GAS SETTING VFR VENT: 100 %
O2/TOTAL GAS SETTING VFR VENT: 40 %
O2/TOTAL GAS SETTING VFR VENT: 50 %
O2/TOTAL GAS SETTING VFR VENT: 55 %
O2/TOTAL GAS SETTING VFR VENT: 55 %
O2/TOTAL GAS SETTING VFR VENT: 60 %
O2/TOTAL GAS SETTING VFR VENT: 88 %
OSMOLALITY UR: 558 MOSM/KG H2O (ref 300–900)
P JIROVECII AG SPEC QL IF: NORMAL
PCO2 BLDA: 26.9 MMHG (ref 26–37)
PCO2 BLDA: 28.6 MMHG (ref 26–37)
PCO2 BLDA: 28.6 MMHG (ref 26–37)
PCO2 BLDA: 33 MMHG (ref 26–37)
PCO2 BLDA: 35.1 MMHG (ref 26–37)
PCO2 BLDA: 35.4 MMHG (ref 26–37)
PCO2 BLDA: 36.4 MMHG (ref 26–37)
PCO2 BLDA: 37.2 MMHG (ref 26–37)
PCO2 BLDA: 38.3 MMHG (ref 26–37)
PCO2 BLDA: 40 MMHG (ref 26–37)
PCO2 BLDA: 40.9 MMHG (ref 26–37)
PCO2 BLDA: 45.9 MMHG (ref 26–37)
PCO2 BLDA: 46.4 MMHG (ref 26–37)
PCO2 BLDA: 47.2 MMHG (ref 26–37)
PCO2 BLDA: 49.6 MMHG (ref 26–37)
PCO2 BLDA: 50.5 MMHG (ref 26–37)
PCO2 TEMP ADJ BLDA: 26.2 MMHG (ref 26–37)
PCO2 TEMP ADJ BLDA: 28.3 MMHG (ref 26–37)
PCO2 TEMP ADJ BLDA: 29.4 MMHG (ref 26–37)
PCO2 TEMP ADJ BLDA: 34.6 MMHG (ref 26–37)
PCO2 TEMP ADJ BLDA: 35.7 MMHG (ref 26–37)
PCO2 TEMP ADJ BLDA: 36.2 MMHG (ref 26–37)
PCO2 TEMP ADJ BLDA: 36.3 MMHG (ref 26–37)
PCO2 TEMP ADJ BLDA: 40.1 MMHG (ref 26–37)
PCO2 TEMP ADJ BLDA: 41.5 MMHG (ref 26–37)
PCO2 TEMP ADJ BLDA: 46.3 MMHG (ref 26–37)
PCO2 TEMP ADJ BLDA: 46.4 MMHG (ref 26–37)
PCO2 TEMP ADJ BLDA: 47.4 MMHG (ref 26–37)
PCO2 TEMP ADJ BLDA: 49.4 MMHG (ref 26–37)
PCO2 TEMP ADJ BLDA: 51.4 MMHG (ref 26–37)
PH BLDA: 7.26 [PH] (ref 7.4–7.5)
PH BLDA: 7.27 [PH] (ref 7.4–7.5)
PH BLDA: 7.31 [PH] (ref 7.4–7.5)
PH BLDA: 7.31 [PH] (ref 7.4–7.5)
PH BLDA: 7.34 [PH] (ref 7.4–7.5)
PH BLDA: 7.34 [PH] (ref 7.4–7.5)
PH BLDA: 7.39 [PH] (ref 7.4–7.5)
PH BLDA: 7.4 [PH] (ref 7.4–7.5)
PH BLDA: 7.4 [PH] (ref 7.4–7.5)
PH BLDA: 7.41 [PH] (ref 7.4–7.5)
PH BLDA: 7.43 [PH] (ref 7.4–7.5)
PH BLDA: 7.44 [PH] (ref 7.4–7.5)
PH BLDA: 7.48 [PH] (ref 7.4–7.5)
PH BLDA: 7.48 [PH] (ref 7.4–7.5)
PH TEMP ADJ BLDA: 7.25 [PH] (ref 7.4–7.5)
PH TEMP ADJ BLDA: 7.27 [PH] (ref 7.4–7.5)
PH TEMP ADJ BLDA: 7.29 [PH] (ref 7.4–7.5)
PH TEMP ADJ BLDA: 7.34 [PH] (ref 7.4–7.5)
PH TEMP ADJ BLDA: 7.35 [PH] (ref 7.4–7.5)
PH TEMP ADJ BLDA: 7.39 [PH] (ref 7.4–7.5)
PH TEMP ADJ BLDA: 7.39 [PH] (ref 7.4–7.5)
PH TEMP ADJ BLDA: 7.4 [PH] (ref 7.4–7.5)
PH TEMP ADJ BLDA: 7.41 [PH] (ref 7.4–7.5)
PH TEMP ADJ BLDA: 7.41 [PH] (ref 7.4–7.5)
PH TEMP ADJ BLDA: 7.42 [PH] (ref 7.4–7.5)
PH TEMP ADJ BLDA: 7.43 [PH] (ref 7.4–7.5)
PH TEMP ADJ BLDA: 7.47 [PH] (ref 7.4–7.5)
PH TEMP ADJ BLDA: 7.48 [PH] (ref 7.4–7.5)
PH UR STRIP.AUTO: 5 [PH]
PH UR STRIP.AUTO: 5 [PH]
PHOSPHATE SERPL-MCNC: 3.6 MG/DL (ref 2.5–4.5)
PHOSPHATE SERPL-MCNC: 3.7 MG/DL (ref 2.5–4.5)
PHOSPHATE SERPL-MCNC: 4.8 MG/DL (ref 2.5–4.5)
PLATELET # BLD AUTO: 256 K/UL (ref 164–446)
PLATELET # BLD AUTO: 256 K/UL (ref 164–446)
PLATELET # BLD AUTO: 267 K/UL (ref 164–446)
PLATELET # BLD AUTO: 290 K/UL (ref 164–446)
PLATELET # BLD AUTO: 308 K/UL (ref 164–446)
PLATELET # BLD AUTO: 325 K/UL (ref 164–446)
PLATELET # BLD AUTO: 335 K/UL (ref 164–446)
PLATELET # BLD AUTO: 337 K/UL (ref 164–446)
PLATELET # BLD AUTO: 357 K/UL (ref 164–446)
PLATELET # BLD AUTO: 395 K/UL (ref 164–446)
PLATELET # BLD AUTO: 433 K/UL (ref 164–446)
PLATELET # BLD AUTO: 465 K/UL (ref 164–446)
PLATELET # BLD AUTO: 490 K/UL (ref 164–446)
PLATELET # BLD AUTO: 497 K/UL (ref 164–446)
PLATELET BLD QL SMEAR: NORMAL
PMV BLD AUTO: 10.1 FL (ref 9–12.9)
PMV BLD AUTO: 10.3 FL (ref 9–12.9)
PMV BLD AUTO: 10.3 FL (ref 9–12.9)
PMV BLD AUTO: 10.5 FL (ref 9–12.9)
PMV BLD AUTO: 10.8 FL (ref 9–12.9)
PMV BLD AUTO: 10.8 FL (ref 9–12.9)
PMV BLD AUTO: 10.9 FL (ref 9–12.9)
PMV BLD AUTO: 10.9 FL (ref 9–12.9)
PMV BLD AUTO: 11.1 FL (ref 9–12.9)
PMV BLD AUTO: 11.2 FL (ref 9–12.9)
PMV BLD AUTO: 11.3 FL (ref 9–12.9)
PMV BLD AUTO: 11.4 FL (ref 9–12.9)
PMV BLD AUTO: 11.6 FL (ref 9–12.9)
PMV BLD AUTO: 9.9 FL (ref 9–12.9)
PO2 BLDA: 129 MMHG (ref 64–87)
PO2 BLDA: 51 MMHG (ref 64–87)
PO2 BLDA: 52 MMHG (ref 64–87)
PO2 BLDA: 53 MMHG (ref 64–87)
PO2 BLDA: 59 MMHG (ref 64–87)
PO2 BLDA: 61 MMHG (ref 64–87)
PO2 BLDA: 62 MMHG (ref 64–87)
PO2 BLDA: 63 MMHG (ref 64–87)
PO2 BLDA: 64 MMHG (ref 64–87)
PO2 BLDA: 64 MMHG (ref 64–87)
PO2 BLDA: 65 MMHG (ref 64–87)
PO2 BLDA: 67 MMHG (ref 64–87)
PO2 BLDA: 72 MMHG (ref 64–87)
PO2 BLDA: 85 MMHG (ref 64–87)
PO2 BLDA: 85 MMHG (ref 64–87)
PO2 BLDA: 86 MMHG (ref 64–87)
PO2 TEMP ADJ BLDA: 50 MMHG (ref 64–87)
PO2 TEMP ADJ BLDA: 50 MMHG (ref 64–87)
PO2 TEMP ADJ BLDA: 52 MMHG (ref 64–87)
PO2 TEMP ADJ BLDA: 62 MMHG (ref 64–87)
PO2 TEMP ADJ BLDA: 63 MMHG (ref 64–87)
PO2 TEMP ADJ BLDA: 65 MMHG (ref 64–87)
PO2 TEMP ADJ BLDA: 66 MMHG (ref 64–87)
PO2 TEMP ADJ BLDA: 66 MMHG (ref 64–87)
PO2 TEMP ADJ BLDA: 67 MMHG (ref 64–87)
PO2 TEMP ADJ BLDA: 70 MMHG (ref 64–87)
PO2 TEMP ADJ BLDA: 70 MMHG (ref 64–87)
PO2 TEMP ADJ BLDA: 74 MMHG (ref 64–87)
PO2 TEMP ADJ BLDA: 83 MMHG (ref 64–87)
PO2 TEMP ADJ BLDA: 85 MMHG (ref 64–87)
POIKILOCYTOSIS BLD QL SMEAR: NORMAL
POIKILOCYTOSIS BLD QL SMEAR: NORMAL
POLYCHROMASIA BLD QL SMEAR: NORMAL
POTASSIUM SERPL-SCNC: 4.2 MMOL/L (ref 3.6–5.5)
POTASSIUM SERPL-SCNC: 4.2 MMOL/L (ref 3.6–5.5)
POTASSIUM SERPL-SCNC: 4.3 MMOL/L (ref 3.6–5.5)
POTASSIUM SERPL-SCNC: 4.3 MMOL/L (ref 3.6–5.5)
POTASSIUM SERPL-SCNC: 4.4 MMOL/L (ref 3.6–5.5)
POTASSIUM SERPL-SCNC: 4.4 MMOL/L (ref 3.6–5.5)
POTASSIUM SERPL-SCNC: 4.5 MMOL/L (ref 3.6–5.5)
POTASSIUM SERPL-SCNC: 4.6 MMOL/L (ref 3.6–5.5)
POTASSIUM SERPL-SCNC: 4.8 MMOL/L (ref 3.6–5.5)
POTASSIUM SERPL-SCNC: 5 MMOL/L (ref 3.6–5.5)
POTASSIUM SERPL-SCNC: 5.3 MMOL/L (ref 3.6–5.5)
POTASSIUM SERPL-SCNC: 5.4 MMOL/L (ref 3.6–5.5)
POTASSIUM SERPL-SCNC: 5.6 MMOL/L (ref 3.6–5.5)
POTASSIUM SERPL-SCNC: 6.5 MMOL/L (ref 3.6–5.5)
POTASSIUM UR-SCNC: 30.1 MMOL/L
POTASSIUM UR-SCNC: 32.9 MMOL/L
PREALB SERPL-MCNC: 6 MG/DL (ref 18–38)
PREALB SERPL-MCNC: <3 MG/DL (ref 18–38)
PROCALCITONIN SERPL-MCNC: 0.83 NG/ML
PROCALCITONIN SERPL-MCNC: 1.62 NG/ML
PROT SERPL-MCNC: 5.4 G/DL (ref 6–8.2)
PROT SERPL-MCNC: 5.8 G/DL (ref 6–8.2)
PROT SERPL-MCNC: 6 G/DL (ref 6–8.2)
PROT SERPL-MCNC: 6.8 G/DL (ref 6–8.2)
PROT SERPL-MCNC: 7.6 G/DL (ref 6–8.2)
PROT UR QL STRIP: 30 MG/DL
PROT UR QL STRIP: NEGATIVE MG/DL
PROT UR-MCNC: 32.8 MG/DL (ref 0–15)
PROT UR-MCNC: 77.7 MG/DL (ref 0–15)
PROTHROMBIN TIME: 15.5 SEC (ref 12–14.6)
RBC # BLD AUTO: 2.46 M/UL (ref 4.2–5.4)
RBC # BLD AUTO: 2.64 M/UL (ref 4.2–5.4)
RBC # BLD AUTO: 2.73 M/UL (ref 4.2–5.4)
RBC # BLD AUTO: 2.76 M/UL (ref 4.2–5.4)
RBC # BLD AUTO: 2.88 M/UL (ref 4.2–5.4)
RBC # BLD AUTO: 2.88 M/UL (ref 4.2–5.4)
RBC # BLD AUTO: 2.94 M/UL (ref 4.2–5.4)
RBC # BLD AUTO: 2.97 M/UL (ref 4.2–5.4)
RBC # BLD AUTO: 3.05 M/UL (ref 4.2–5.4)
RBC # BLD AUTO: 3.06 M/UL (ref 4.2–5.4)
RBC # BLD AUTO: 3.1 M/UL (ref 4.2–5.4)
RBC # BLD AUTO: 3.24 M/UL (ref 4.2–5.4)
RBC # BLD AUTO: 3.65 M/UL (ref 4.2–5.4)
RBC # BLD AUTO: 3.67 M/UL (ref 4.2–5.4)
RBC # URNS HPF: ABNORMAL /HPF
RBC BLD AUTO: NORMAL
RBC BLD AUTO: PRESENT
RBC UR QL AUTO: ABNORMAL
RBC UR QL AUTO: NEGATIVE
SAO2 % BLDA: 85 % (ref 93–99)
SAO2 % BLDA: 85 % (ref 93–99)
SAO2 % BLDA: 86 % (ref 93–99)
SAO2 % BLDA: 88 % (ref 93–99)
SAO2 % BLDA: 89 % (ref 93–99)
SAO2 % BLDA: 90 % (ref 93–99)
SAO2 % BLDA: 90 % (ref 93–99)
SAO2 % BLDA: 93 % (ref 93–99)
SAO2 % BLDA: 93 % (ref 93–99)
SAO2 % BLDA: 94 % (ref 93–99)
SAO2 % BLDA: 94 % (ref 93–99)
SAO2 % BLDA: 95 % (ref 93–99)
SAO2 % BLDA: 96 % (ref 93–99)
SAO2 % BLDA: 97 % (ref 93–99)
SAO2 % BLDA: 97 % (ref 93–99)
SAO2 % BLDA: 99 % (ref 93–99)
SIGNIFICANT IND 70042: ABNORMAL
SIGNIFICANT IND 70042: ABNORMAL
SIGNIFICANT IND 70042: NORMAL
SITE SITE: ABNORMAL
SITE SITE: ABNORMAL
SITE SITE: NORMAL
SMUDGE CELLS BLD QL SMEAR: NORMAL
SODIUM SERPL-SCNC: 132 MMOL/L (ref 135–145)
SODIUM SERPL-SCNC: 135 MMOL/L (ref 135–145)
SODIUM SERPL-SCNC: 136 MMOL/L (ref 135–145)
SODIUM SERPL-SCNC: 137 MMOL/L (ref 135–145)
SODIUM SERPL-SCNC: 138 MMOL/L (ref 135–145)
SODIUM SERPL-SCNC: 138 MMOL/L (ref 135–145)
SODIUM SERPL-SCNC: 141 MMOL/L (ref 135–145)
SODIUM UR-SCNC: 84 MMOL/L
SODIUM UR-SCNC: 95 MMOL/L
SOURCE SOURCE: ABNORMAL
SOURCE SOURCE: ABNORMAL
SOURCE SOURCE: NORMAL
SP GR UR STRIP.AUTO: 1.01
SP GR UR STRIP.AUTO: 1.02
SPECIMEN DRAWN FROM PATIENT: ABNORMAL
SPECIMEN DRAWN FROM PATIENT: NORMAL
STOMATOCYTES BLD QL SMEAR: NORMAL
TOXIC GRANULES BLD QL SMEAR: SLIGHT
TOXIC GRANULES BLD QL SMEAR: SLIGHT
TRIGL SERPL-MCNC: 108 MG/DL (ref 0–149)
TRIGL SERPL-MCNC: 182 MG/DL (ref 0–149)
TROPONIN I SERPL-MCNC: 0.03 NG/ML (ref 0–0.04)
TROPONIN I SERPL-MCNC: 0.06 NG/ML (ref 0–0.04)
UROBILINOGEN UR STRIP.AUTO-MCNC: 0.2 MG/DL
UROBILINOGEN UR STRIP.AUTO-MCNC: 0.2 MG/DL
WBC # BLD AUTO: 13.2 K/UL (ref 4.8–10.8)
WBC # BLD AUTO: 13.3 K/UL (ref 4.8–10.8)
WBC # BLD AUTO: 14.4 K/UL (ref 4.8–10.8)
WBC # BLD AUTO: 14.9 K/UL (ref 4.8–10.8)
WBC # BLD AUTO: 16.2 K/UL (ref 4.8–10.8)
WBC # BLD AUTO: 16.8 K/UL (ref 4.8–10.8)
WBC # BLD AUTO: 16.8 K/UL (ref 4.8–10.8)
WBC # BLD AUTO: 17.9 K/UL (ref 4.8–10.8)
WBC # BLD AUTO: 18 K/UL (ref 4.8–10.8)
WBC # BLD AUTO: 18.1 K/UL (ref 4.8–10.8)
WBC # BLD AUTO: 19.6 K/UL (ref 4.8–10.8)
WBC # BLD AUTO: 21.7 K/UL (ref 4.8–10.8)
WBC # BLD AUTO: 22.8 K/UL (ref 4.8–10.8)
WBC # BLD AUTO: 23.8 K/UL (ref 4.8–10.8)
WBC #/AREA URNS HPF: ABNORMAL /HPF

## 2018-01-01 PROCEDURE — 83735 ASSAY OF MAGNESIUM: CPT

## 2018-01-01 PROCEDURE — 700102 HCHG RX REV CODE 250 W/ 637 OVERRIDE(OP): Performed by: INTERNAL MEDICINE

## 2018-01-01 PROCEDURE — 71045 X-RAY EXAM CHEST 1 VIEW: CPT

## 2018-01-01 PROCEDURE — 700111 HCHG RX REV CODE 636 W/ 250 OVERRIDE (IP): Performed by: INTERNAL MEDICINE

## 2018-01-01 PROCEDURE — A9270 NON-COVERED ITEM OR SERVICE: HCPCS | Performed by: INTERNAL MEDICINE

## 2018-01-01 PROCEDURE — 36600 WITHDRAWAL OF ARTERIAL BLOOD: CPT

## 2018-01-01 PROCEDURE — 82803 BLOOD GASES ANY COMBINATION: CPT

## 2018-01-01 PROCEDURE — 770022 HCHG ROOM/CARE - ICU (200)

## 2018-01-01 PROCEDURE — 82436 ASSAY OF URINE CHLORIDE: CPT

## 2018-01-01 PROCEDURE — 80053 COMPREHEN METABOLIC PANEL: CPT

## 2018-01-01 PROCEDURE — 700105 HCHG RX REV CODE 258: Performed by: INTERNAL MEDICINE

## 2018-01-01 PROCEDURE — 302154 K THERMIA BLANKET 24X60: Performed by: INTERNAL MEDICINE

## 2018-01-01 PROCEDURE — 85007 BL SMEAR W/DIFF WBC COUNT: CPT

## 2018-01-01 PROCEDURE — 85025 COMPLETE CBC W/AUTO DIFF WBC: CPT

## 2018-01-01 PROCEDURE — 31500 INSERT EMERGENCY AIRWAY: CPT | Performed by: INTERNAL MEDICINE

## 2018-01-01 PROCEDURE — 80048 BASIC METABOLIC PNL TOTAL CA: CPT

## 2018-01-01 PROCEDURE — 94640 AIRWAY INHALATION TREATMENT: CPT

## 2018-01-01 PROCEDURE — 302978 HCHG BRONCHOSCOPY-DIAGNOSTIC

## 2018-01-01 PROCEDURE — 87281 PNEUMOCYSTIS CARINII AG IF: CPT

## 2018-01-01 PROCEDURE — 84156 ASSAY OF PROTEIN URINE: CPT

## 2018-01-01 PROCEDURE — 99291 CRITICAL CARE FIRST HOUR: CPT | Performed by: INTERNAL MEDICINE

## 2018-01-01 PROCEDURE — 700101 HCHG RX REV CODE 250: Performed by: INTERNAL MEDICINE

## 2018-01-01 PROCEDURE — 700111 HCHG RX REV CODE 636 W/ 250 OVERRIDE (IP): Performed by: HOSPITALIST

## 2018-01-01 PROCEDURE — 99232 SBSQ HOSP IP/OBS MODERATE 35: CPT | Performed by: HOSPITALIST

## 2018-01-01 PROCEDURE — 31645 BRNCHSC W/THER ASPIR 1ST: CPT | Mod: 59 | Performed by: INTERNAL MEDICINE

## 2018-01-01 PROCEDURE — 99233 SBSQ HOSP IP/OBS HIGH 50: CPT | Performed by: INTERNAL MEDICINE

## 2018-01-01 PROCEDURE — 70551 MRI BRAIN STEM W/O DYE: CPT

## 2018-01-01 PROCEDURE — 82962 GLUCOSE BLOOD TEST: CPT

## 2018-01-01 PROCEDURE — 84478 ASSAY OF TRIGLYCERIDES: CPT

## 2018-01-01 PROCEDURE — 94003 VENT MGMT INPAT SUBQ DAY: CPT

## 2018-01-01 PROCEDURE — 84484 ASSAY OF TROPONIN QUANT: CPT

## 2018-01-01 PROCEDURE — 70552 MRI BRAIN STEM W/DYE: CPT

## 2018-01-01 PROCEDURE — 85027 COMPLETE CBC AUTOMATED: CPT

## 2018-01-01 PROCEDURE — 93010 ELECTROCARDIOGRAM REPORT: CPT | Performed by: INTERNAL MEDICINE

## 2018-01-01 PROCEDURE — 99233 SBSQ HOSP IP/OBS HIGH 50: CPT | Performed by: HOSPITALIST

## 2018-01-01 PROCEDURE — 81001 URINALYSIS AUTO W/SCOPE: CPT

## 2018-01-01 PROCEDURE — 36556 INSERT NON-TUNNEL CV CATH: CPT | Mod: RT | Performed by: INTERNAL MEDICINE

## 2018-01-01 PROCEDURE — 87040 BLOOD CULTURE FOR BACTERIA: CPT | Mod: 91

## 2018-01-01 PROCEDURE — C1751 CATH, INF, PER/CENT/MIDLINE: HCPCS

## 2018-01-01 PROCEDURE — 306802 CATHETER,INSTAFLOW BOWEL: Performed by: INTERNAL MEDICINE

## 2018-01-01 PROCEDURE — 85730 THROMBOPLASTIN TIME PARTIAL: CPT | Mod: 91

## 2018-01-01 PROCEDURE — 84134 ASSAY OF PREALBUMIN: CPT

## 2018-01-01 PROCEDURE — 82962 GLUCOSE BLOOD TEST: CPT | Mod: 91

## 2018-01-01 PROCEDURE — 71260 CT THORAX DX C+: CPT

## 2018-01-01 PROCEDURE — 700117 HCHG RX CONTRAST REV CODE 255: Performed by: HOSPITALIST

## 2018-01-01 PROCEDURE — 96361 HYDRATE IV INFUSION ADD-ON: CPT

## 2018-01-01 PROCEDURE — 700102 HCHG RX REV CODE 250 W/ 637 OVERRIDE(OP): Performed by: EMERGENCY MEDICINE

## 2018-01-01 PROCEDURE — 83605 ASSAY OF LACTIC ACID: CPT

## 2018-01-01 PROCEDURE — 700102 HCHG RX REV CODE 250 W/ 637 OVERRIDE(OP): Performed by: HOSPITALIST

## 2018-01-01 PROCEDURE — 84133 ASSAY OF URINE POTASSIUM: CPT

## 2018-01-01 PROCEDURE — 0B9F8ZZ DRAINAGE OF RIGHT LOWER LUNG LOBE, VIA NATURAL OR ARTIFICIAL OPENING ENDOSCOPIC: ICD-10-PCS | Performed by: INTERNAL MEDICINE

## 2018-01-01 PROCEDURE — 85610 PROTHROMBIN TIME: CPT

## 2018-01-01 PROCEDURE — 86140 C-REACTIVE PROTEIN: CPT

## 2018-01-01 PROCEDURE — 93005 ELECTROCARDIOGRAM TRACING: CPT | Performed by: INTERNAL MEDICINE

## 2018-01-01 PROCEDURE — 83880 ASSAY OF NATRIURETIC PEPTIDE: CPT

## 2018-01-01 PROCEDURE — 82550 ASSAY OF CK (CPK): CPT

## 2018-01-01 PROCEDURE — 700105 HCHG RX REV CODE 258

## 2018-01-01 PROCEDURE — 84145 PROCALCITONIN (PCT): CPT

## 2018-01-01 PROCEDURE — 0B9J8ZZ DRAINAGE OF LEFT LOWER LUNG LOBE, VIA NATURAL OR ARTIFICIAL OPENING ENDOSCOPIC: ICD-10-PCS | Performed by: INTERNAL MEDICINE

## 2018-01-01 PROCEDURE — 99214 OFFICE O/P EST MOD 30 MIN: CPT | Performed by: NURSE PRACTITIONER

## 2018-01-01 PROCEDURE — 84300 ASSAY OF URINE SODIUM: CPT

## 2018-01-01 PROCEDURE — 94002 VENT MGMT INPAT INIT DAY: CPT

## 2018-01-01 PROCEDURE — A9577 INJ MULTIHANCE: HCPCS | Performed by: INTERNAL MEDICINE

## 2018-01-01 PROCEDURE — 99292 CRITICAL CARE ADDL 30 MIN: CPT | Performed by: INTERNAL MEDICINE

## 2018-01-01 PROCEDURE — 0BH17EZ INSERTION OF ENDOTRACHEAL AIRWAY INTO TRACHEA, VIA NATURAL OR ARTIFICIAL OPENING: ICD-10-PCS | Performed by: INTERNAL MEDICINE

## 2018-01-01 PROCEDURE — 700105 HCHG RX REV CODE 258: Performed by: HOSPITALIST

## 2018-01-01 PROCEDURE — A9270 NON-COVERED ITEM OR SERVICE: HCPCS | Performed by: EMERGENCY MEDICINE

## 2018-01-01 PROCEDURE — 81003 URINALYSIS AUTO W/O SCOPE: CPT

## 2018-01-01 PROCEDURE — 700111 HCHG RX REV CODE 636 W/ 250 OVERRIDE (IP)

## 2018-01-01 PROCEDURE — 31500 INSERT EMERGENCY AIRWAY: CPT

## 2018-01-01 PROCEDURE — 82140 ASSAY OF AMMONIA: CPT

## 2018-01-01 PROCEDURE — 82803 BLOOD GASES ANY COMBINATION: CPT | Mod: 91

## 2018-01-01 PROCEDURE — B548ZZA ULTRASONOGRAPHY OF SUPERIOR VENA CAVA, GUIDANCE: ICD-10-PCS | Performed by: INTERNAL MEDICINE

## 2018-01-01 PROCEDURE — 02HV33Z INSERTION OF INFUSION DEVICE INTO SUPERIOR VENA CAVA, PERCUTANEOUS APPROACH: ICD-10-PCS | Performed by: INTERNAL MEDICINE

## 2018-01-01 PROCEDURE — A9270 NON-COVERED ITEM OR SERVICE: HCPCS | Performed by: HOSPITALIST

## 2018-01-01 PROCEDURE — 99291 CRITICAL CARE FIRST HOUR: CPT | Mod: 25 | Performed by: INTERNAL MEDICINE

## 2018-01-01 PROCEDURE — 87040 BLOOD CULTURE FOR BACTERIA: CPT

## 2018-01-01 PROCEDURE — 99292 CRITICAL CARE ADDL 30 MIN: CPT | Mod: 25 | Performed by: INTERNAL MEDICINE

## 2018-01-01 PROCEDURE — 87070 CULTURE OTHR SPECIMN AEROBIC: CPT

## 2018-01-01 PROCEDURE — 84100 ASSAY OF PHOSPHORUS: CPT

## 2018-01-01 PROCEDURE — 93005 ELECTROCARDIOGRAM TRACING: CPT | Performed by: EMERGENCY MEDICINE

## 2018-01-01 PROCEDURE — 99291 CRITICAL CARE FIRST HOUR: CPT | Performed by: HOSPITALIST

## 2018-01-01 PROCEDURE — 36556 INSERT NON-TUNNEL CV CATH: CPT

## 2018-01-01 PROCEDURE — 87205 SMEAR GRAM STAIN: CPT | Mod: 91

## 2018-01-01 PROCEDURE — 96365 THER/PROPH/DIAG IV INF INIT: CPT

## 2018-01-01 PROCEDURE — 304561 HCHG STAT O2

## 2018-01-01 PROCEDURE — 700101 HCHG RX REV CODE 250

## 2018-01-01 PROCEDURE — 5A1955Z RESPIRATORY VENTILATION, GREATER THAN 96 CONSECUTIVE HOURS: ICD-10-PCS | Performed by: INTERNAL MEDICINE

## 2018-01-01 PROCEDURE — 85730 THROMBOPLASTIN TIME PARTIAL: CPT

## 2018-01-01 PROCEDURE — 70450 CT HEAD/BRAIN W/O DYE: CPT

## 2018-01-01 PROCEDURE — 302155 K THERMIA BLANKET 24X30: Performed by: INTERNAL MEDICINE

## 2018-01-01 PROCEDURE — 93306 TTE W/DOPPLER COMPLETE: CPT | Mod: 26 | Performed by: INTERNAL MEDICINE

## 2018-01-01 PROCEDURE — 87205 SMEAR GRAM STAIN: CPT

## 2018-01-01 PROCEDURE — 83935 ASSAY OF URINE OSMOLALITY: CPT

## 2018-01-01 PROCEDURE — 0B9J8ZX DRAINAGE OF LEFT LOWER LUNG LOBE, VIA NATURAL OR ARTIFICIAL OPENING ENDOSCOPIC, DIAGNOSTIC: ICD-10-PCS | Performed by: INTERNAL MEDICINE

## 2018-01-01 PROCEDURE — 700111 HCHG RX REV CODE 636 W/ 250 OVERRIDE (IP): Performed by: EMERGENCY MEDICINE

## 2018-01-01 PROCEDURE — 93005 ELECTROCARDIOGRAM TRACING: CPT | Performed by: HOSPITALIST

## 2018-01-01 PROCEDURE — 700117 HCHG RX CONTRAST REV CODE 255: Performed by: INTERNAL MEDICINE

## 2018-01-01 PROCEDURE — 93306 TTE W/DOPPLER COMPLETE: CPT

## 2018-01-01 PROCEDURE — 31624 DX BRONCHOSCOPE/LAVAGE: CPT | Performed by: INTERNAL MEDICINE

## 2018-01-01 PROCEDURE — 51798 US URINE CAPACITY MEASURE: CPT

## 2018-01-01 PROCEDURE — 700105 HCHG RX REV CODE 258: Performed by: EMERGENCY MEDICINE

## 2018-01-01 PROCEDURE — 82570 ASSAY OF URINE CREATININE: CPT

## 2018-01-01 PROCEDURE — 99291 CRITICAL CARE FIRST HOUR: CPT

## 2018-01-01 RX ORDER — HYDROCODONE BITARTRATE AND ACETAMINOPHEN 5; 300 MG/1; MG/1
1-1.5 TABLET ORAL 3 TIMES DAILY PRN
Qty: 120 TAB | Refills: 0 | Status: SHIPPED | OUTPATIENT
Start: 2018-07-28 | End: 2018-08-27

## 2018-01-01 RX ORDER — MAGNESIUM SULFATE HEPTAHYDRATE 40 MG/ML
2 INJECTION, SOLUTION INTRAVENOUS ONCE
Status: DISCONTINUED | OUTPATIENT
Start: 2018-01-01 | End: 2018-01-01

## 2018-01-01 RX ORDER — INSULIN GLARGINE 100 [IU]/ML
5 INJECTION, SOLUTION SUBCUTANEOUS EVERY EVENING
Status: DISCONTINUED | OUTPATIENT
Start: 2018-01-01 | End: 2018-01-01

## 2018-01-01 RX ORDER — HEPARIN SODIUM 5000 [USP'U]/ML
5000 INJECTION, SOLUTION INTRAVENOUS; SUBCUTANEOUS EVERY 8 HOURS
Status: DISCONTINUED | OUTPATIENT
Start: 2018-01-01 | End: 2018-01-01

## 2018-01-01 RX ORDER — IPRATROPIUM BROMIDE AND ALBUTEROL SULFATE 2.5; .5 MG/3ML; MG/3ML
3 SOLUTION RESPIRATORY (INHALATION)
Status: DISCONTINUED | OUTPATIENT
Start: 2018-01-01 | End: 2018-01-01

## 2018-01-01 RX ORDER — OXYCODONE HYDROCHLORIDE 5 MG/1
5 TABLET ORAL EVERY 6 HOURS
Status: DISCONTINUED | OUTPATIENT
Start: 2018-01-01 | End: 2018-01-01

## 2018-01-01 RX ORDER — HYDROCODONE BITARTRATE AND ACETAMINOPHEN 5; 300 MG/1; MG/1
1-1.5 TABLET ORAL 3 TIMES DAILY PRN
Qty: 105 TAB | Refills: 0 | Status: SHIPPED | OUTPATIENT
Start: 2018-01-01 | End: 2018-01-01 | Stop reason: SDUPTHER

## 2018-01-01 RX ORDER — LABETALOL HYDROCHLORIDE 5 MG/ML
10-20 INJECTION, SOLUTION INTRAVENOUS
Status: DISCONTINUED | OUTPATIENT
Start: 2018-01-01 | End: 2018-01-01

## 2018-01-01 RX ORDER — ATROPINE SULFATE 10 MG/ML
2 SOLUTION/ DROPS OPHTHALMIC EVERY 4 HOURS PRN
Status: DISCONTINUED | OUTPATIENT
Start: 2018-01-01 | End: 2018-01-01

## 2018-01-01 RX ORDER — INSULIN GLARGINE 100 [IU]/ML
0.2 INJECTION, SOLUTION SUBCUTANEOUS EVERY EVENING
Status: DISCONTINUED | OUTPATIENT
Start: 2018-01-01 | End: 2018-01-01

## 2018-01-01 RX ORDER — ALPRAZOLAM 1 MG/1
1 TABLET ORAL 3 TIMES DAILY PRN
Qty: 70 TAB | Refills: 0 | Status: SHIPPED
Start: 2018-01-01 | End: 2018-01-01 | Stop reason: SDUPTHER

## 2018-01-01 RX ORDER — SODIUM CHLORIDE 9 MG/ML
250 INJECTION, SOLUTION INTRAVENOUS ONCE
Status: COMPLETED | OUTPATIENT
Start: 2018-01-01 | End: 2018-01-01

## 2018-01-01 RX ORDER — ALPRAZOLAM 1 MG/1
TABLET ORAL
Qty: 65 TAB | Refills: 2 | Status: SHIPPED
Start: 2018-01-01 | End: 2018-01-01

## 2018-01-01 RX ORDER — MORPHINE SULFATE 10 MG/ML
10 INJECTION, SOLUTION INTRAMUSCULAR; INTRAVENOUS
Status: DISCONTINUED | OUTPATIENT
Start: 2018-01-01 | End: 2018-06-23 | Stop reason: HOSPADM

## 2018-01-01 RX ORDER — AZITHROMYCIN 250 MG/1
500 TABLET, FILM COATED ORAL ONCE
Status: COMPLETED | OUTPATIENT
Start: 2018-01-01 | End: 2018-01-01

## 2018-01-01 RX ORDER — TRAZODONE HYDROCHLORIDE 50 MG/1
50 TABLET ORAL NIGHTLY PRN
Status: DISCONTINUED | OUTPATIENT
Start: 2018-01-01 | End: 2018-01-01

## 2018-01-01 RX ORDER — HEPARIN SODIUM 1000 [USP'U]/ML
2600 INJECTION, SOLUTION INTRAVENOUS; SUBCUTANEOUS PRN
Status: DISCONTINUED | OUTPATIENT
Start: 2018-01-01 | End: 2018-01-01

## 2018-01-01 RX ORDER — INSULIN GLARGINE 100 [IU]/ML
10 INJECTION, SOLUTION SUBCUTANEOUS EVERY EVENING
Status: DISCONTINUED | OUTPATIENT
Start: 2018-01-01 | End: 2018-01-01

## 2018-01-01 RX ORDER — ETOMIDATE 2 MG/ML
20 INJECTION INTRAVENOUS ONCE
Status: COMPLETED | OUTPATIENT
Start: 2018-01-01 | End: 2018-01-01

## 2018-01-01 RX ORDER — SODIUM POLYSTYRENE SULFONATE 15 G/60ML
15 SUSPENSION ORAL; RECTAL ONCE
Status: DISCONTINUED | OUTPATIENT
Start: 2018-01-01 | End: 2018-01-01

## 2018-01-01 RX ORDER — GABAPENTIN 300 MG/1
300 CAPSULE ORAL 3 TIMES DAILY
Status: DISCONTINUED | OUTPATIENT
Start: 2018-01-01 | End: 2018-01-01

## 2018-01-01 RX ORDER — HYDROCODONE BITARTRATE AND ACETAMINOPHEN 5; 300 MG/1; MG/1
1-1.5 TABLET ORAL 3 TIMES DAILY PRN
Qty: 120 TAB | Refills: 0 | Status: SHIPPED | OUTPATIENT
Start: 2018-01-01 | End: 2018-01-01 | Stop reason: SDUPTHER

## 2018-01-01 RX ORDER — QUETIAPINE FUMARATE 25 MG/1
50 TABLET, FILM COATED ORAL EVERY 8 HOURS
Status: DISCONTINUED | OUTPATIENT
Start: 2018-01-01 | End: 2018-01-01

## 2018-01-01 RX ORDER — PHENYLEPHRINE HCL IN 0.9% NACL 0.5 MG/5ML
SYRINGE (ML) INTRAVENOUS
Status: COMPLETED
Start: 2018-01-01 | End: 2018-01-01

## 2018-01-01 RX ORDER — AZITHROMYCIN 250 MG/1
250 TABLET, FILM COATED ORAL EVERY 24 HOURS
Status: DISCONTINUED | OUTPATIENT
Start: 2018-01-01 | End: 2018-01-01

## 2018-01-01 RX ORDER — DEXTROSE MONOHYDRATE 25 G/50ML
50 INJECTION, SOLUTION INTRAVENOUS ONCE
Status: ACTIVE | OUTPATIENT
Start: 2018-01-01 | End: 2018-01-01

## 2018-01-01 RX ORDER — MIDAZOLAM HYDROCHLORIDE 1 MG/ML
1-5 INJECTION INTRAMUSCULAR; INTRAVENOUS
Status: COMPLETED | OUTPATIENT
Start: 2018-01-01 | End: 2018-01-01

## 2018-01-01 RX ORDER — ASPIRIN 81 MG/1
81 TABLET, CHEWABLE ORAL DAILY
Status: DISCONTINUED | OUTPATIENT
Start: 2018-01-01 | End: 2018-01-01

## 2018-01-01 RX ORDER — ACETAMINOPHEN 325 MG/1
650 TABLET ORAL EVERY 6 HOURS PRN
Status: DISCONTINUED | OUTPATIENT
Start: 2018-01-01 | End: 2018-06-23 | Stop reason: HOSPADM

## 2018-01-01 RX ORDER — MINERAL OIL, WHITE PETROLATUM .03; .94 G/G; G/G
1 OINTMENT OPHTHALMIC EVERY 8 HOURS
Status: DISCONTINUED | OUTPATIENT
Start: 2018-01-01 | End: 2018-06-23 | Stop reason: HOSPADM

## 2018-01-01 RX ORDER — PHENYLEPHRINE HCL IN 0.9% NACL 0.5 MG/5ML
500 SYRINGE (ML) INTRAVENOUS ONCE
Status: COMPLETED | OUTPATIENT
Start: 2018-01-01 | End: 2018-01-01

## 2018-01-01 RX ORDER — ALPRAZOLAM 0.5 MG/1
0.5 TABLET ORAL EVERY 8 HOURS
Status: DISCONTINUED | OUTPATIENT
Start: 2018-01-01 | End: 2018-01-01

## 2018-01-01 RX ORDER — FAMOTIDINE 20 MG/1
20 TABLET, FILM COATED ORAL DAILY
Status: DISCONTINUED | OUTPATIENT
Start: 2018-01-01 | End: 2018-01-01

## 2018-01-01 RX ORDER — GLIPIZIDE 2.5 MG/1
TABLET, EXTENDED RELEASE ORAL
Qty: 90 TAB | Refills: 3 | Status: SHIPPED | OUTPATIENT
Start: 2018-01-01

## 2018-01-01 RX ORDER — POLYETHYLENE GLYCOL 3350 17 G/17G
1 POWDER, FOR SOLUTION ORAL
Status: DISCONTINUED | OUTPATIENT
Start: 2018-01-01 | End: 2018-01-01

## 2018-01-01 RX ORDER — BISACODYL 10 MG
10 SUPPOSITORY, RECTAL RECTAL
Status: DISCONTINUED | OUTPATIENT
Start: 2018-01-01 | End: 2018-01-01

## 2018-01-01 RX ORDER — LORAZEPAM 2 MG/ML
1 INJECTION INTRAMUSCULAR
Status: DISCONTINUED | OUTPATIENT
Start: 2018-01-01 | End: 2018-06-23 | Stop reason: HOSPADM

## 2018-01-01 RX ORDER — ALPRAZOLAM 0.5 MG/1
0.5 TABLET ORAL ONCE
Status: COMPLETED | OUTPATIENT
Start: 2018-01-01 | End: 2018-01-01

## 2018-01-01 RX ORDER — FUROSEMIDE 10 MG/ML
20 INJECTION INTRAMUSCULAR; INTRAVENOUS ONCE
Status: DISCONTINUED | OUTPATIENT
Start: 2018-01-01 | End: 2018-01-01

## 2018-01-01 RX ORDER — ROSUVASTATIN CALCIUM 10 MG/1
20 TABLET, COATED ORAL EVERY EVENING
Status: DISCONTINUED | OUTPATIENT
Start: 2018-01-01 | End: 2018-01-01

## 2018-01-01 RX ORDER — SODIUM CHLORIDE 9 MG/ML
INJECTION, SOLUTION INTRAVENOUS
Status: COMPLETED
Start: 2018-01-01 | End: 2018-01-01

## 2018-01-01 RX ORDER — MIDAZOLAM HYDROCHLORIDE 1 MG/ML
5 INJECTION INTRAMUSCULAR; INTRAVENOUS ONCE
Status: COMPLETED | OUTPATIENT
Start: 2018-01-01 | End: 2018-01-01

## 2018-01-01 RX ORDER — HYDROCODONE BITARTRATE AND ACETAMINOPHEN 5; 300 MG/1; MG/1
1-1.5 TABLET ORAL 3 TIMES DAILY PRN
Qty: 120 TAB | Refills: 0 | Status: SHIPPED | OUTPATIENT
Start: 2018-06-28 | End: 2018-01-01 | Stop reason: SDUPTHER

## 2018-01-01 RX ORDER — DEXTROSE MONOHYDRATE 25 G/50ML
25 INJECTION, SOLUTION INTRAVENOUS
Status: DISCONTINUED | OUTPATIENT
Start: 2018-01-01 | End: 2018-01-01

## 2018-01-01 RX ORDER — NOREPINEPHRINE BITARTRATE 1 MG/ML
INJECTION, SOLUTION INTRAVENOUS
Status: COMPLETED
Start: 2018-01-01 | End: 2018-01-01

## 2018-01-01 RX ORDER — SODIUM CHLORIDE 450 MG/100ML
INJECTION, SOLUTION INTRAVENOUS CONTINUOUS
Status: DISCONTINUED | OUTPATIENT
Start: 2018-01-01 | End: 2018-01-01

## 2018-01-01 RX ORDER — MORPHINE SULFATE 10 MG/ML
5 INJECTION, SOLUTION INTRAMUSCULAR; INTRAVENOUS
Status: DISCONTINUED | OUTPATIENT
Start: 2018-01-01 | End: 2018-06-23 | Stop reason: HOSPADM

## 2018-01-01 RX ORDER — ALPRAZOLAM 1 MG/1
1 TABLET ORAL 3 TIMES DAILY PRN
Qty: 65 TAB | Refills: 1 | Status: SHIPPED
Start: 2018-01-01 | End: 2018-01-01 | Stop reason: SDUPTHER

## 2018-01-01 RX ORDER — SODIUM CHLORIDE 9 MG/ML
2000 INJECTION, SOLUTION INTRAVENOUS ONCE
Status: COMPLETED | OUTPATIENT
Start: 2018-01-01 | End: 2018-01-01

## 2018-01-01 RX ORDER — MIDODRINE HYDROCHLORIDE 5 MG/1
5 TABLET ORAL
Status: DISCONTINUED | OUTPATIENT
Start: 2018-01-01 | End: 2018-01-01

## 2018-01-01 RX ORDER — AZITHROMYCIN 250 MG/1
250 TABLET, FILM COATED ORAL EVERY 24 HOURS
Status: COMPLETED | OUTPATIENT
Start: 2018-01-01 | End: 2018-01-01

## 2018-01-01 RX ORDER — GABAPENTIN 300 MG/1
300 CAPSULE ORAL 3 TIMES DAILY
Qty: 270 CAP | Refills: 3 | Status: SHIPPED | OUTPATIENT
Start: 2018-01-01

## 2018-01-01 RX ORDER — SODIUM CHLORIDE 9 MG/ML
500 INJECTION, SOLUTION INTRAVENOUS ONCE
Status: COMPLETED | OUTPATIENT
Start: 2018-01-01 | End: 2018-01-01

## 2018-01-01 RX ORDER — METOCLOPRAMIDE HYDROCHLORIDE 5 MG/ML
5 INJECTION INTRAMUSCULAR; INTRAVENOUS ONCE
Status: COMPLETED | OUTPATIENT
Start: 2018-01-01 | End: 2018-01-01

## 2018-01-01 RX ORDER — GUAIFENESIN/DEXTROMETHORPHAN 100-10MG/5
10 SYRUP ORAL EVERY 6 HOURS PRN
Status: DISCONTINUED | OUTPATIENT
Start: 2018-01-01 | End: 2018-01-01

## 2018-01-01 RX ORDER — SODIUM CHLORIDE 9 MG/ML
INJECTION, SOLUTION INTRAVENOUS
Status: ACTIVE
Start: 2018-01-01 | End: 2018-01-01

## 2018-01-01 RX ORDER — SODIUM CHLORIDE 9 MG/ML
1000 INJECTION, SOLUTION INTRAVENOUS ONCE
Status: COMPLETED | OUTPATIENT
Start: 2018-01-01 | End: 2018-01-01

## 2018-01-01 RX ORDER — ALPRAZOLAM 1 MG/1
1 TABLET ORAL 2 TIMES DAILY PRN
Status: DISCONTINUED | OUTPATIENT
Start: 2018-01-01 | End: 2018-01-01

## 2018-01-01 RX ORDER — HEPARIN SODIUM 1000 [USP'U]/ML
5000 INJECTION, SOLUTION INTRAVENOUS; SUBCUTANEOUS ONCE
Status: COMPLETED | OUTPATIENT
Start: 2018-01-01 | End: 2018-01-01

## 2018-01-01 RX ORDER — MORPHINE SULFATE 100 MG/5ML
10 SOLUTION ORAL
Status: DISCONTINUED | OUTPATIENT
Start: 2018-01-01 | End: 2018-06-23 | Stop reason: HOSPADM

## 2018-01-01 RX ORDER — ALPRAZOLAM 1 MG/1
1 TABLET ORAL 3 TIMES DAILY PRN
Qty: 65 TAB | Refills: 2 | Status: SHIPPED
Start: 2018-01-01 | End: 2018-01-01

## 2018-01-01 RX ORDER — FAMOTIDINE 20 MG/1
20 TABLET, FILM COATED ORAL 2 TIMES DAILY
Status: DISCONTINUED | OUTPATIENT
Start: 2018-01-01 | End: 2018-01-01

## 2018-01-01 RX ORDER — FUROSEMIDE 10 MG/ML
20 INJECTION INTRAMUSCULAR; INTRAVENOUS EVERY 12 HOURS
Status: DISCONTINUED | OUTPATIENT
Start: 2018-01-01 | End: 2018-01-01

## 2018-01-01 RX ORDER — MIDAZOLAM HYDROCHLORIDE 1 MG/ML
1 INJECTION INTRAMUSCULAR; INTRAVENOUS
Status: DISCONTINUED | OUTPATIENT
Start: 2018-01-01 | End: 2018-01-01

## 2018-01-01 RX ORDER — AMOXICILLIN 250 MG
2 CAPSULE ORAL 2 TIMES DAILY
Status: DISCONTINUED | OUTPATIENT
Start: 2018-01-01 | End: 2018-01-01

## 2018-01-01 RX ORDER — LORAZEPAM 2 MG/ML
1-5 CONCENTRATE ORAL
Status: DISCONTINUED | OUTPATIENT
Start: 2018-01-01 | End: 2018-06-23 | Stop reason: HOSPADM

## 2018-01-01 RX ORDER — SODIUM CHLORIDE 9 MG/ML
500 INJECTION, SOLUTION INTRAVENOUS
Status: COMPLETED | OUTPATIENT
Start: 2018-01-01 | End: 2018-01-01

## 2018-01-01 RX ORDER — ROCURONIUM BROMIDE 10 MG/ML
50 INJECTION, SOLUTION INTRAVENOUS ONCE
Status: ACTIVE | OUTPATIENT
Start: 2018-01-01 | End: 2018-01-01

## 2018-01-01 RX ORDER — ROCURONIUM BROMIDE 10 MG/ML
10 INJECTION, SOLUTION INTRAVENOUS ONCE
Status: COMPLETED | OUTPATIENT
Start: 2018-01-01 | End: 2018-01-01

## 2018-01-01 RX ORDER — LINEZOLID 600 MG/1
600 TABLET, FILM COATED ORAL EVERY 12 HOURS
Status: COMPLETED | OUTPATIENT
Start: 2018-01-01 | End: 2018-01-01

## 2018-01-01 RX ORDER — HALOPERIDOL 5 MG/ML
5 INJECTION INTRAMUSCULAR EVERY 4 HOURS PRN
Status: DISCONTINUED | OUTPATIENT
Start: 2018-01-01 | End: 2018-01-01

## 2018-01-01 RX ORDER — MIDODRINE HYDROCHLORIDE 5 MG/1
10 TABLET ORAL
Status: DISCONTINUED | OUTPATIENT
Start: 2018-01-01 | End: 2018-01-01

## 2018-01-01 RX ORDER — FUROSEMIDE 10 MG/ML
20 INJECTION INTRAMUSCULAR; INTRAVENOUS ONCE
Status: COMPLETED | OUTPATIENT
Start: 2018-01-01 | End: 2018-01-01

## 2018-01-01 RX ORDER — MIDAZOLAM HYDROCHLORIDE 1 MG/ML
INJECTION INTRAMUSCULAR; INTRAVENOUS
Status: COMPLETED
Start: 2018-01-01 | End: 2018-01-01

## 2018-01-01 RX ORDER — ACETAMINOPHEN 325 MG/1
650 TABLET ORAL EVERY 6 HOURS PRN
Status: DISCONTINUED | OUTPATIENT
Start: 2018-01-01 | End: 2018-01-01

## 2018-01-01 RX ORDER — HYDROCODONE BITARTRATE AND ACETAMINOPHEN 5; 300 MG/1; MG/1
TABLET ORAL
COMMUNITY
Start: 2018-01-01 | End: 2018-01-01

## 2018-01-01 RX ORDER — HALOPERIDOL 5 MG/ML
1-5 INJECTION INTRAMUSCULAR EVERY 4 HOURS PRN
Status: DISCONTINUED | OUTPATIENT
Start: 2018-01-01 | End: 2018-06-23 | Stop reason: HOSPADM

## 2018-01-01 RX ORDER — ROCURONIUM BROMIDE 10 MG/ML
100 INJECTION, SOLUTION INTRAVENOUS ONCE
Status: COMPLETED | OUTPATIENT
Start: 2018-01-01 | End: 2018-01-01

## 2018-01-01 RX ORDER — ALPRAZOLAM 0.5 MG/1
0.5 TABLET ORAL EVERY 8 HOURS
Status: DISCONTINUED | OUTPATIENT
Start: 2018-01-01 | End: 2018-06-23 | Stop reason: HOSPADM

## 2018-01-01 RX ORDER — SODIUM CHLORIDE, SODIUM LACTATE, POTASSIUM CHLORIDE, CALCIUM CHLORIDE 600; 310; 30; 20 MG/100ML; MG/100ML; MG/100ML; MG/100ML
500 INJECTION, SOLUTION INTRAVENOUS ONCE
Status: COMPLETED | OUTPATIENT
Start: 2018-01-01 | End: 2018-01-01

## 2018-01-01 RX ADMIN — SENNOSIDES AND DOCUSATE SODIUM 2 TABLET: 8.6; 5 TABLET ORAL at 20:07

## 2018-01-01 RX ADMIN — ALPRAZOLAM 0.5 MG: 0.5 TABLET ORAL at 21:13

## 2018-01-01 RX ADMIN — INSULIN HUMAN 3 UNITS: 100 INJECTION, SOLUTION PARENTERAL at 12:43

## 2018-01-01 RX ADMIN — SODIUM CHLORIDE: 4.5 INJECTION, SOLUTION INTRAVENOUS at 20:56

## 2018-01-01 RX ADMIN — AMPICILLIN SODIUM AND SULBACTAM SODIUM 3 G: 2; 1 INJECTION, POWDER, FOR SOLUTION INTRAMUSCULAR; INTRAVENOUS at 11:43

## 2018-01-01 RX ADMIN — POLYETHYLENE GLYCOL 3350 1 PACKET: 17 POWDER, FOR SOLUTION ORAL at 21:14

## 2018-01-01 RX ADMIN — MIDAZOLAM 1 MG: 1 INJECTION INTRAMUSCULAR; INTRAVENOUS at 12:52

## 2018-01-01 RX ADMIN — MIDAZOLAM HYDROCHLORIDE 5 MG: 1 INJECTION, SOLUTION INTRAMUSCULAR; INTRAVENOUS at 18:19

## 2018-01-01 RX ADMIN — SENNOSIDES AND DOCUSATE SODIUM 2 TABLET: 8.6; 5 TABLET ORAL at 20:12

## 2018-01-01 RX ADMIN — IPRATROPIUM BROMIDE AND ALBUTEROL SULFATE 3 ML: .5; 3 SOLUTION RESPIRATORY (INHALATION) at 02:26

## 2018-01-01 RX ADMIN — SENNOSIDES AND DOCUSATE SODIUM 2 TABLET: 8.6; 5 TABLET ORAL at 07:36

## 2018-01-01 RX ADMIN — ACETAMINOPHEN 650 MG: 325 TABLET, FILM COATED ORAL at 21:13

## 2018-01-01 RX ADMIN — IPRATROPIUM BROMIDE AND ALBUTEROL SULFATE 3 ML: .5; 3 SOLUTION RESPIRATORY (INHALATION) at 07:01

## 2018-01-01 RX ADMIN — INSULIN HUMAN 4 UNITS: 100 INJECTION, SOLUTION PARENTERAL at 05:33

## 2018-01-01 RX ADMIN — ALPRAZOLAM 0.5 MG: 0.5 TABLET ORAL at 05:37

## 2018-01-01 RX ADMIN — FENTANYL CITRATE 100 MCG: 50 INJECTION, SOLUTION INTRAMUSCULAR; INTRAVENOUS at 23:59

## 2018-01-01 RX ADMIN — GABAPENTIN 300 MG: 300 CAPSULE ORAL at 05:52

## 2018-01-01 RX ADMIN — PIPERACILLIN AND TAZOBACTAM 4.5 G: 4; .5 INJECTION, POWDER, LYOPHILIZED, FOR SOLUTION INTRAVENOUS; PARENTERAL at 04:21

## 2018-01-01 RX ADMIN — GABAPENTIN 300 MG: 300 CAPSULE ORAL at 14:18

## 2018-01-01 RX ADMIN — HEPARIN SODIUM 5000 UNITS: 5000 INJECTION, SOLUTION INTRAVENOUS; SUBCUTANEOUS at 20:30

## 2018-01-01 RX ADMIN — AZITHROMYCIN 250 MG: 250 TABLET, FILM COATED ORAL at 07:47

## 2018-01-01 RX ADMIN — FENTANYL CITRATE 50 MCG: 50 INJECTION, SOLUTION INTRAMUSCULAR; INTRAVENOUS at 17:24

## 2018-01-01 RX ADMIN — SODIUM CHLORIDE 500 ML: 9 INJECTION, SOLUTION INTRAVENOUS at 14:33

## 2018-01-01 RX ADMIN — ASPIRIN 81 MG: 81 TABLET, CHEWABLE ORAL at 08:19

## 2018-01-01 RX ADMIN — IPRATROPIUM BROMIDE AND ALBUTEROL SULFATE 3 ML: .5; 3 SOLUTION RESPIRATORY (INHALATION) at 18:49

## 2018-01-01 RX ADMIN — FUROSEMIDE 20 MG: 10 INJECTION, SOLUTION INTRAMUSCULAR; INTRAVENOUS at 07:12

## 2018-01-01 RX ADMIN — INSULIN HUMAN 3 UNITS: 100 INJECTION, SOLUTION PARENTERAL at 06:26

## 2018-01-01 RX ADMIN — IPRATROPIUM BROMIDE AND ALBUTEROL SULFATE 3 ML: .5; 3 SOLUTION RESPIRATORY (INHALATION) at 22:40

## 2018-01-01 RX ADMIN — FAMOTIDINE 20 MG: 20 TABLET, FILM COATED ORAL at 07:49

## 2018-01-01 RX ADMIN — AMPICILLIN SODIUM AND SULBACTAM SODIUM 3 G: 2; 1 INJECTION, POWDER, FOR SOLUTION INTRAMUSCULAR; INTRAVENOUS at 00:44

## 2018-01-01 RX ADMIN — IPRATROPIUM BROMIDE AND ALBUTEROL SULFATE 3 ML: .5; 3 SOLUTION RESPIRATORY (INHALATION) at 22:32

## 2018-01-01 RX ADMIN — OXYCODONE HYDROCHLORIDE 5 MG: 5 TABLET ORAL at 11:11

## 2018-01-01 RX ADMIN — NOREPINEPHRINE BITARTRATE 9 MCG/MIN: 1 INJECTION INTRAVENOUS at 01:08

## 2018-01-01 RX ADMIN — AZITHROMYCIN 500 MG: 250 TABLET, FILM COATED ORAL at 20:24

## 2018-01-01 RX ADMIN — PIPERACILLIN AND TAZOBACTAM 4.5 G: 4; .5 INJECTION, POWDER, LYOPHILIZED, FOR SOLUTION INTRAVENOUS; PARENTERAL at 20:07

## 2018-01-01 RX ADMIN — IPRATROPIUM BROMIDE AND ALBUTEROL SULFATE 3 ML: .5; 3 SOLUTION RESPIRATORY (INHALATION) at 14:43

## 2018-01-01 RX ADMIN — ETOMIDATE 20 MG: 2 INJECTION INTRAVENOUS at 08:51

## 2018-01-01 RX ADMIN — GABAPENTIN 300 MG: 300 CAPSULE ORAL at 20:31

## 2018-01-01 RX ADMIN — HALOPERIDOL LACTATE 5 MG: 5 INJECTION, SOLUTION INTRAMUSCULAR at 03:48

## 2018-01-01 RX ADMIN — ACETAMINOPHEN 650 MG: 325 TABLET, FILM COATED ORAL at 08:11

## 2018-01-01 RX ADMIN — ALPRAZOLAM 1 MG: 1 TABLET ORAL at 11:41

## 2018-01-01 RX ADMIN — FENTANYL CITRATE 200 MCG/HR: 50 INJECTION, SOLUTION INTRAMUSCULAR; INTRAVENOUS at 11:00

## 2018-01-01 RX ADMIN — SENNOSIDES AND DOCUSATE SODIUM 2 TABLET: 8.6; 5 TABLET ORAL at 21:41

## 2018-01-01 RX ADMIN — HEPARIN SODIUM 5000 UNITS: 5000 INJECTION, SOLUTION INTRAVENOUS; SUBCUTANEOUS at 20:50

## 2018-01-01 RX ADMIN — SENNOSIDES AND DOCUSATE SODIUM 2 TABLET: 8.6; 5 TABLET ORAL at 07:46

## 2018-01-01 RX ADMIN — HEPARIN SODIUM 5000 UNITS: 5000 INJECTION, SOLUTION INTRAVENOUS; SUBCUTANEOUS at 04:55

## 2018-01-01 RX ADMIN — GABAPENTIN 300 MG: 300 CAPSULE ORAL at 12:52

## 2018-01-01 RX ADMIN — POTASSIUM BICARBONATE 25 MEQ: 25 TABLET, EFFERVESCENT ORAL at 07:36

## 2018-01-01 RX ADMIN — FAMOTIDINE 20 MG: 20 TABLET, FILM COATED ORAL at 08:45

## 2018-01-01 RX ADMIN — ACETAMINOPHEN 650 MG: 325 TABLET, FILM COATED ORAL at 23:48

## 2018-01-01 RX ADMIN — SENNOSIDES AND DOCUSATE SODIUM 2 TABLET: 8.6; 5 TABLET ORAL at 21:20

## 2018-01-01 RX ADMIN — GABAPENTIN 300 MG: 300 CAPSULE ORAL at 21:19

## 2018-01-01 RX ADMIN — OXYCODONE HYDROCHLORIDE 5 MG: 5 TABLET ORAL at 17:13

## 2018-01-01 RX ADMIN — MIDODRINE HYDROCHLORIDE 10 MG: 5 TABLET ORAL at 11:11

## 2018-01-01 RX ADMIN — FAMOTIDINE 20 MG: 20 TABLET ORAL at 08:20

## 2018-01-01 RX ADMIN — GABAPENTIN 300 MG: 300 CAPSULE ORAL at 05:37

## 2018-01-01 RX ADMIN — INSULIN GLARGINE 10 UNITS: 100 INJECTION, SOLUTION SUBCUTANEOUS at 20:08

## 2018-01-01 RX ADMIN — FAMOTIDINE 20 MG: 20 TABLET ORAL at 07:37

## 2018-01-01 RX ADMIN — LINEZOLID 600 MG: 600 TABLET, FILM COATED ORAL at 10:31

## 2018-01-01 RX ADMIN — INSULIN HUMAN 3 UNITS: 100 INJECTION, SOLUTION PARENTERAL at 12:07

## 2018-01-01 RX ADMIN — FENTANYL CITRATE 150 MCG/HR: 50 INJECTION, SOLUTION INTRAMUSCULAR; INTRAVENOUS at 00:04

## 2018-01-01 RX ADMIN — SENNOSIDES AND DOCUSATE SODIUM 2 TABLET: 8.6; 5 TABLET ORAL at 08:48

## 2018-01-01 RX ADMIN — IOHEXOL 100 ML: 350 INJECTION, SOLUTION INTRAVENOUS at 12:27

## 2018-01-01 RX ADMIN — IPRATROPIUM BROMIDE AND ALBUTEROL SULFATE 3 ML: .5; 3 SOLUTION RESPIRATORY (INHALATION) at 11:09

## 2018-01-01 RX ADMIN — PIPERACILLIN AND TAZOBACTAM 4.5 G: 4; .5 INJECTION, POWDER, LYOPHILIZED, FOR SOLUTION INTRAVENOUS; PARENTERAL at 20:03

## 2018-01-01 RX ADMIN — SODIUM CHLORIDE 500 ML: 9 INJECTION, SOLUTION INTRAVENOUS at 12:46

## 2018-01-01 RX ADMIN — IPRATROPIUM BROMIDE AND ALBUTEROL SULFATE 3 ML: .5; 3 SOLUTION RESPIRATORY (INHALATION) at 03:33

## 2018-01-01 RX ADMIN — LABETALOL HYDROCHLORIDE 10 MG: 5 INJECTION, SOLUTION INTRAVENOUS at 21:00

## 2018-01-01 RX ADMIN — FENTANYL CITRATE 100 MCG: 50 INJECTION, SOLUTION INTRAMUSCULAR; INTRAVENOUS at 15:29

## 2018-01-01 RX ADMIN — FENTANYL CITRATE 100 MCG: 50 INJECTION, SOLUTION INTRAMUSCULAR; INTRAVENOUS at 10:27

## 2018-01-01 RX ADMIN — IPRATROPIUM BROMIDE AND ALBUTEROL SULFATE 3 ML: .5; 3 SOLUTION RESPIRATORY (INHALATION) at 10:27

## 2018-01-01 RX ADMIN — ROSUVASTATIN CALCIUM 20 MG: 10 TABLET, FILM COATED ORAL at 21:14

## 2018-01-01 RX ADMIN — MIDAZOLAM HYDROCHLORIDE 1 MG: 1 INJECTION, SOLUTION INTRAMUSCULAR; INTRAVENOUS at 16:25

## 2018-01-01 RX ADMIN — PIPERACILLIN AND TAZOBACTAM 4.5 G: 4; .5 INJECTION, POWDER, LYOPHILIZED, FOR SOLUTION INTRAVENOUS; PARENTERAL at 05:19

## 2018-01-01 RX ADMIN — HEPARIN SODIUM 5000 UNITS: 5000 INJECTION, SOLUTION INTRAVENOUS; SUBCUTANEOUS at 20:11

## 2018-01-01 RX ADMIN — INSULIN HUMAN 4 UNITS: 100 INJECTION, SOLUTION PARENTERAL at 05:21

## 2018-01-01 RX ADMIN — QUETIAPINE FUMARATE 50 MG: 25 TABLET ORAL at 10:08

## 2018-01-01 RX ADMIN — HEPARIN SODIUM 5000 UNITS: 5000 INJECTION, SOLUTION INTRAVENOUS; SUBCUTANEOUS at 21:38

## 2018-01-01 RX ADMIN — PIPERACILLIN AND TAZOBACTAM 4.5 G: 4; .5 INJECTION, POWDER, LYOPHILIZED, FOR SOLUTION INTRAVENOUS; PARENTERAL at 04:55

## 2018-01-01 RX ADMIN — PIPERACILLIN AND TAZOBACTAM 4.5 G: 4; .5 INJECTION, POWDER, LYOPHILIZED, FOR SOLUTION INTRAVENOUS; PARENTERAL at 21:15

## 2018-01-01 RX ADMIN — LABETALOL HYDROCHLORIDE 20 MG: 5 INJECTION, SOLUTION INTRAVENOUS at 11:51

## 2018-01-01 RX ADMIN — IPRATROPIUM BROMIDE AND ALBUTEROL SULFATE 3 ML: .5; 3 SOLUTION RESPIRATORY (INHALATION) at 18:34

## 2018-01-01 RX ADMIN — IPRATROPIUM BROMIDE AND ALBUTEROL SULFATE 3 ML: .5; 3 SOLUTION RESPIRATORY (INHALATION) at 15:06

## 2018-01-01 RX ADMIN — AMPICILLIN SODIUM AND SULBACTAM SODIUM 3 G: 2; 1 INJECTION, POWDER, FOR SOLUTION INTRAMUSCULAR; INTRAVENOUS at 16:30

## 2018-01-01 RX ADMIN — GABAPENTIN 300 MG: 300 CAPSULE ORAL at 14:25

## 2018-01-01 RX ADMIN — HEPARIN SODIUM 5000 UNITS: 5000 INJECTION, SOLUTION INTRAVENOUS; SUBCUTANEOUS at 13:03

## 2018-01-01 RX ADMIN — FENTANYL CITRATE 200 MCG/HR: 50 INJECTION, SOLUTION INTRAMUSCULAR; INTRAVENOUS at 13:35

## 2018-01-01 RX ADMIN — PIPERACILLIN AND TAZOBACTAM 4.5 G: 4; .5 INJECTION, POWDER, LYOPHILIZED, FOR SOLUTION INTRAVENOUS; PARENTERAL at 21:21

## 2018-01-01 RX ADMIN — DEXMEDETOMIDINE HYDROCHLORIDE 1.5 MCG/KG/HR: 100 INJECTION, SOLUTION INTRAVENOUS at 14:06

## 2018-01-01 RX ADMIN — INSULIN LISPRO 5 UNITS: 100 INJECTION, SOLUTION INTRAVENOUS; SUBCUTANEOUS at 06:50

## 2018-01-01 RX ADMIN — AZITHROMYCIN 500 MG: 250 TABLET, FILM COATED ORAL at 01:47

## 2018-01-01 RX ADMIN — DEXMEDETOMIDINE HYDROCHLORIDE 1 MCG/KG/HR: 100 INJECTION, SOLUTION INTRAVENOUS at 12:26

## 2018-01-01 RX ADMIN — WHITE PETROLATUM 1 APPLICATION: .15; .85 OINTMENT OPHTHALMIC at 20:50

## 2018-01-01 RX ADMIN — POTASSIUM BICARBONATE 25 MEQ: 25 TABLET, EFFERVESCENT ORAL at 21:14

## 2018-01-01 RX ADMIN — HALOPERIDOL LACTATE 2.5 MG: 5 INJECTION, SOLUTION INTRAMUSCULAR at 11:13

## 2018-01-01 RX ADMIN — FENTANYL CITRATE 100 MCG: 50 INJECTION, SOLUTION INTRAMUSCULAR; INTRAVENOUS at 10:44

## 2018-01-01 RX ADMIN — HEPARIN SODIUM 5000 UNITS: 1000 INJECTION, SOLUTION INTRAVENOUS; SUBCUTANEOUS at 15:26

## 2018-01-01 RX ADMIN — IPRATROPIUM BROMIDE AND ALBUTEROL SULFATE 3 ML: .5; 3 SOLUTION RESPIRATORY (INHALATION) at 21:49

## 2018-01-01 RX ADMIN — ALPRAZOLAM 0.5 MG: 0.5 TABLET ORAL at 15:00

## 2018-01-01 RX ADMIN — NOREPINEPHRINE BITARTRATE 10 MCG/MIN: 1 INJECTION INTRAVENOUS at 13:39

## 2018-01-01 RX ADMIN — IPRATROPIUM BROMIDE AND ALBUTEROL SULFATE 3 ML: .5; 3 SOLUTION RESPIRATORY (INHALATION) at 07:18

## 2018-01-01 RX ADMIN — FENTANYL CITRATE 75 MCG/HR: 50 INJECTION, SOLUTION INTRAMUSCULAR; INTRAVENOUS at 12:25

## 2018-01-01 RX ADMIN — ROCURONIUM BROMIDE 100 MG: 10 INJECTION, SOLUTION INTRAVENOUS at 08:51

## 2018-01-01 RX ADMIN — IPRATROPIUM BROMIDE AND ALBUTEROL SULFATE 3 ML: .5; 3 SOLUTION RESPIRATORY (INHALATION) at 10:57

## 2018-01-01 RX ADMIN — FENTANYL CITRATE 200 MCG/HR: 50 INJECTION, SOLUTION INTRAMUSCULAR; INTRAVENOUS at 10:33

## 2018-01-01 RX ADMIN — HEPARIN SODIUM 5000 UNITS: 5000 INJECTION, SOLUTION INTRAVENOUS; SUBCUTANEOUS at 14:20

## 2018-01-01 RX ADMIN — HEPARIN SODIUM 5000 UNITS: 5000 INJECTION, SOLUTION INTRAVENOUS; SUBCUTANEOUS at 04:52

## 2018-01-01 RX ADMIN — VANCOMYCIN HYDROCHLORIDE 1800 MG: 100 INJECTION, POWDER, LYOPHILIZED, FOR SOLUTION INTRAVENOUS at 10:59

## 2018-01-01 RX ADMIN — GABAPENTIN 300 MG: 300 CAPSULE ORAL at 13:03

## 2018-01-01 RX ADMIN — MIDAZOLAM 1 MG: 1 INJECTION INTRAMUSCULAR; INTRAVENOUS at 02:35

## 2018-01-01 RX ADMIN — GABAPENTIN 300 MG: 300 CAPSULE ORAL at 05:18

## 2018-01-01 RX ADMIN — PIPERACILLIN AND TAZOBACTAM 4.5 G: 4; .5 INJECTION, POWDER, LYOPHILIZED, FOR SOLUTION INTRAVENOUS; PARENTERAL at 04:38

## 2018-01-01 RX ADMIN — INSULIN HUMAN 7 UNITS: 100 INJECTION, SOLUTION PARENTERAL at 11:17

## 2018-01-01 RX ADMIN — FENTANYL CITRATE 100 MCG: 50 INJECTION, SOLUTION INTRAMUSCULAR; INTRAVENOUS at 22:30

## 2018-01-01 RX ADMIN — WHITE PETROLATUM 1 APPLICATION: .15; .85 OINTMENT OPHTHALMIC at 14:00

## 2018-01-01 RX ADMIN — INSULIN HUMAN 4 UNITS: 100 INJECTION, SOLUTION PARENTERAL at 11:25

## 2018-01-01 RX ADMIN — ROSUVASTATIN CALCIUM 20 MG: 10 TABLET, FILM COATED ORAL at 20:02

## 2018-01-01 RX ADMIN — SODIUM CHLORIDE 500 ML: 9 INJECTION, SOLUTION INTRAVENOUS at 01:31

## 2018-01-01 RX ADMIN — OXYCODONE HYDROCHLORIDE 5 MG: 5 TABLET ORAL at 05:15

## 2018-01-01 RX ADMIN — SODIUM CHLORIDE 500 ML: 9 INJECTION, SOLUTION INTRAVENOUS at 16:27

## 2018-01-01 RX ADMIN — POTASSIUM BICARBONATE 25 MEQ: 25 TABLET, EFFERVESCENT ORAL at 20:27

## 2018-01-01 RX ADMIN — FUROSEMIDE 20 MG: 10 INJECTION, SOLUTION INTRAMUSCULAR; INTRAVENOUS at 07:56

## 2018-01-01 RX ADMIN — AMPICILLIN SODIUM AND SULBACTAM SODIUM 3 G: 2; 1 INJECTION, POWDER, FOR SOLUTION INTRAMUSCULAR; INTRAVENOUS at 12:11

## 2018-01-01 RX ADMIN — GABAPENTIN 300 MG: 300 CAPSULE ORAL at 13:20

## 2018-01-01 RX ADMIN — MIDAZOLAM 1 MG: 1 INJECTION INTRAMUSCULAR; INTRAVENOUS at 20:08

## 2018-01-01 RX ADMIN — MIDODRINE HYDROCHLORIDE 10 MG: 5 TABLET ORAL at 08:45

## 2018-01-01 RX ADMIN — PIPERACILLIN AND TAZOBACTAM 4.5 G: 4; .5 INJECTION, POWDER, LYOPHILIZED, FOR SOLUTION INTRAVENOUS; PARENTERAL at 14:01

## 2018-01-01 RX ADMIN — IPRATROPIUM BROMIDE AND ALBUTEROL SULFATE 3 ML: .5; 3 SOLUTION RESPIRATORY (INHALATION) at 10:52

## 2018-01-01 RX ADMIN — PIPERACILLIN AND TAZOBACTAM 4.5 G: 4; .5 INJECTION, POWDER, LYOPHILIZED, FOR SOLUTION INTRAVENOUS; PARENTERAL at 20:11

## 2018-01-01 RX ADMIN — INSULIN HUMAN 3 UNITS: 100 INJECTION, SOLUTION PARENTERAL at 12:12

## 2018-01-01 RX ADMIN — NOREPINEPHRINE BITARTRATE 5 MCG/MIN: 1 INJECTION INTRAVENOUS at 12:32

## 2018-01-01 RX ADMIN — SODIUM CHLORIDE: 4.5 INJECTION, SOLUTION INTRAVENOUS at 11:26

## 2018-01-01 RX ADMIN — SERTRALINE 50 MG: 50 TABLET, FILM COATED ORAL at 21:20

## 2018-01-01 RX ADMIN — ALPRAZOLAM 0.5 MG: 0.5 TABLET ORAL at 05:18

## 2018-01-01 RX ADMIN — GABAPENTIN 300 MG: 300 CAPSULE ORAL at 14:08

## 2018-01-01 RX ADMIN — MIDAZOLAM 1 MG: 1 INJECTION INTRAMUSCULAR; INTRAVENOUS at 09:05

## 2018-01-01 RX ADMIN — IPRATROPIUM BROMIDE AND ALBUTEROL SULFATE 3 ML: .5; 3 SOLUTION RESPIRATORY (INHALATION) at 04:00

## 2018-01-01 RX ADMIN — AMPICILLIN SODIUM AND SULBACTAM SODIUM 3 G: 2; 1 INJECTION, POWDER, FOR SOLUTION INTRAMUSCULAR; INTRAVENOUS at 05:20

## 2018-01-01 RX ADMIN — INSULIN HUMAN 4 UNITS: 100 INJECTION, SOLUTION PARENTERAL at 23:14

## 2018-01-01 RX ADMIN — IPRATROPIUM BROMIDE AND ALBUTEROL SULFATE 3 ML: .5; 3 SOLUTION RESPIRATORY (INHALATION) at 06:34

## 2018-01-01 RX ADMIN — GABAPENTIN 300 MG: 300 CAPSULE ORAL at 05:16

## 2018-01-01 RX ADMIN — ASPIRIN 81 MG: 81 TABLET, CHEWABLE ORAL at 09:10

## 2018-01-01 RX ADMIN — NOREPINEPHRINE BITARTRATE 8 MG: 1 INJECTION INTRAVENOUS at 09:00

## 2018-01-01 RX ADMIN — ALPRAZOLAM 0.5 MG: 0.5 TABLET ORAL at 20:30

## 2018-01-01 RX ADMIN — DEXMEDETOMIDINE HYDROCHLORIDE 0.3 MCG/KG/HR: 100 INJECTION, SOLUTION INTRAVENOUS at 14:45

## 2018-01-01 RX ADMIN — DEXMEDETOMIDINE HYDROCHLORIDE 1.5 MCG/KG/HR: 100 INJECTION, SOLUTION INTRAVENOUS at 10:35

## 2018-01-01 RX ADMIN — ROSUVASTATIN CALCIUM 20 MG: 10 TABLET, FILM COATED ORAL at 21:34

## 2018-01-01 RX ADMIN — NOREPINEPHRINE BITARTRATE 8 MG: 1 INJECTION INTRAVENOUS at 15:45

## 2018-01-01 RX ADMIN — LINEZOLID 600 MG: 600 TABLET, FILM COATED ORAL at 07:17

## 2018-01-01 RX ADMIN — MIDAZOLAM 1 MG: 1 INJECTION INTRAMUSCULAR; INTRAVENOUS at 21:20

## 2018-01-01 RX ADMIN — LINEZOLID 600 MG: 600 TABLET, FILM COATED ORAL at 07:49

## 2018-01-01 RX ADMIN — INSULIN HUMAN 2 UNITS: 100 INJECTION, SOLUTION PARENTERAL at 11:26

## 2018-01-01 RX ADMIN — GABAPENTIN 300 MG: 300 CAPSULE ORAL at 05:26

## 2018-01-01 RX ADMIN — GABAPENTIN 300 MG: 300 CAPSULE ORAL at 21:26

## 2018-01-01 RX ADMIN — WHITE PETROLATUM 1 APPLICATION: .15; .85 OINTMENT OPHTHALMIC at 22:00

## 2018-01-01 RX ADMIN — QUETIAPINE FUMARATE 50 MG: 25 TABLET ORAL at 02:18

## 2018-01-01 RX ADMIN — GABAPENTIN 300 MG: 300 CAPSULE ORAL at 20:59

## 2018-01-01 RX ADMIN — DEXMEDETOMIDINE HYDROCHLORIDE 1 MCG/KG/HR: 100 INJECTION, SOLUTION INTRAVENOUS at 22:05

## 2018-01-01 RX ADMIN — DEXMEDETOMIDINE HYDROCHLORIDE 1.5 MCG/KG/HR: 100 INJECTION, SOLUTION INTRAVENOUS at 07:24

## 2018-01-01 RX ADMIN — INSULIN HUMAN 3 UNITS: 100 INJECTION, SOLUTION PARENTERAL at 05:26

## 2018-01-01 RX ADMIN — OXYCODONE HYDROCHLORIDE 5 MG: 5 TABLET ORAL at 05:37

## 2018-01-01 RX ADMIN — HEPARIN SODIUM 5000 UNITS: 5000 INJECTION, SOLUTION INTRAVENOUS; SUBCUTANEOUS at 05:04

## 2018-01-01 RX ADMIN — INSULIN HUMAN 3 UNITS: 100 INJECTION, SOLUTION PARENTERAL at 23:25

## 2018-01-01 RX ADMIN — ASPIRIN 81 MG: 81 TABLET, CHEWABLE ORAL at 08:31

## 2018-01-01 RX ADMIN — SERTRALINE 50 MG: 50 TABLET, FILM COATED ORAL at 21:00

## 2018-01-01 RX ADMIN — INSULIN HUMAN 4 UNITS: 100 INJECTION, SOLUTION PARENTERAL at 22:54

## 2018-01-01 RX ADMIN — SERTRALINE 50 MG: 50 TABLET, FILM COATED ORAL at 21:27

## 2018-01-01 RX ADMIN — GABAPENTIN 300 MG: 300 CAPSULE ORAL at 15:24

## 2018-01-01 RX ADMIN — HALOPERIDOL LACTATE 5 MG: 5 INJECTION, SOLUTION INTRAMUSCULAR at 00:17

## 2018-01-01 RX ADMIN — INSULIN HUMAN 3 UNITS: 100 INJECTION, SOLUTION PARENTERAL at 17:12

## 2018-01-01 RX ADMIN — FENTANYL CITRATE 50 MCG: 50 INJECTION, SOLUTION INTRAMUSCULAR; INTRAVENOUS at 18:49

## 2018-01-01 RX ADMIN — SERTRALINE 50 MG: 50 TABLET, FILM COATED ORAL at 21:14

## 2018-01-01 RX ADMIN — IPRATROPIUM BROMIDE AND ALBUTEROL SULFATE 3 ML: .5; 3 SOLUTION RESPIRATORY (INHALATION) at 18:25

## 2018-01-01 RX ADMIN — LINEZOLID 600 MG: 600 TABLET, FILM COATED ORAL at 07:55

## 2018-01-01 RX ADMIN — DEXMEDETOMIDINE HYDROCHLORIDE 1.5 MCG/KG/HR: 100 INJECTION, SOLUTION INTRAVENOUS at 07:06

## 2018-01-01 RX ADMIN — SODIUM CHLORIDE 250 ML: 9 INJECTION, SOLUTION INTRAVENOUS at 09:00

## 2018-01-01 RX ADMIN — ASPIRIN 81 MG: 81 TABLET, CHEWABLE ORAL at 07:56

## 2018-01-01 RX ADMIN — ACETAMINOPHEN 650 MG: 325 TABLET, FILM COATED ORAL at 03:10

## 2018-01-01 RX ADMIN — PROPOFOL 50 MCG/KG/MIN: 10 INJECTION, EMULSION INTRAVENOUS at 00:55

## 2018-01-01 RX ADMIN — GABAPENTIN 300 MG: 300 CAPSULE ORAL at 04:55

## 2018-01-01 RX ADMIN — MIDAZOLAM 1 MG: 1 INJECTION INTRAMUSCULAR; INTRAVENOUS at 11:11

## 2018-01-01 RX ADMIN — OXYCODONE HYDROCHLORIDE 5 MG: 5 TABLET ORAL at 01:15

## 2018-01-01 RX ADMIN — AMPICILLIN SODIUM AND SULBACTAM SODIUM 3 G: 2; 1 INJECTION, POWDER, FOR SOLUTION INTRAMUSCULAR; INTRAVENOUS at 07:46

## 2018-01-01 RX ADMIN — PROPOFOL 50 MCG/KG/MIN: 10 INJECTION, EMULSION INTRAVENOUS at 03:02

## 2018-01-01 RX ADMIN — SENNOSIDES AND DOCUSATE SODIUM 2 TABLET: 8.6; 5 TABLET ORAL at 07:55

## 2018-01-01 RX ADMIN — HEPARIN SODIUM 5000 UNITS: 5000 INJECTION, SOLUTION INTRAVENOUS; SUBCUTANEOUS at 14:35

## 2018-01-01 RX ADMIN — PROPOFOL 50 MCG/KG/MIN: 10 INJECTION, EMULSION INTRAVENOUS at 18:20

## 2018-01-01 RX ADMIN — HEPARIN SODIUM 5000 UNITS: 5000 INJECTION, SOLUTION INTRAVENOUS; SUBCUTANEOUS at 05:16

## 2018-01-01 RX ADMIN — GABAPENTIN 300 MG: 300 CAPSULE ORAL at 05:48

## 2018-01-01 RX ADMIN — GABAPENTIN 300 MG: 300 CAPSULE ORAL at 05:04

## 2018-01-01 RX ADMIN — ALPRAZOLAM 0.5 MG: 0.5 TABLET ORAL at 13:03

## 2018-01-01 RX ADMIN — MIDODRINE HYDROCHLORIDE 5 MG: 5 TABLET ORAL at 11:07

## 2018-01-01 RX ADMIN — MIDODRINE HYDROCHLORIDE 5 MG: 5 TABLET ORAL at 07:13

## 2018-01-01 RX ADMIN — AMIODARONE HYDROCHLORIDE 0.5 MG/MIN: 50 INJECTION, SOLUTION INTRAVENOUS at 23:21

## 2018-01-01 RX ADMIN — PROPOFOL 40 MCG/KG/MIN: 10 INJECTION, EMULSION INTRAVENOUS at 09:30

## 2018-01-01 RX ADMIN — IPRATROPIUM BROMIDE AND ALBUTEROL SULFATE 3 ML: .5; 3 SOLUTION RESPIRATORY (INHALATION) at 15:52

## 2018-01-01 RX ADMIN — SODIUM CHLORIDE 1000 ML: 9 INJECTION, SOLUTION INTRAVENOUS at 21:12

## 2018-01-01 RX ADMIN — DEXMEDETOMIDINE HYDROCHLORIDE 1.5 MCG/KG/HR: 100 INJECTION, SOLUTION INTRAVENOUS at 21:05

## 2018-01-01 RX ADMIN — QUETIAPINE FUMARATE 50 MG: 25 TABLET ORAL at 17:12

## 2018-01-01 RX ADMIN — GABAPENTIN 300 MG: 300 CAPSULE ORAL at 13:59

## 2018-01-01 RX ADMIN — MAGNESIUM SULFATE HEPTAHYDRATE 2 G: 500 INJECTION, SOLUTION INTRAMUSCULAR; INTRAVENOUS at 12:10

## 2018-01-01 RX ADMIN — PROPOFOL 80 MCG/KG/MIN: 10 INJECTION, EMULSION INTRAVENOUS at 13:20

## 2018-01-01 RX ADMIN — INSULIN HUMAN 3 UNITS: 100 INJECTION, SOLUTION PARENTERAL at 12:49

## 2018-01-01 RX ADMIN — INSULIN HUMAN 3 UNITS: 100 INJECTION, SOLUTION PARENTERAL at 18:35

## 2018-01-01 RX ADMIN — IPRATROPIUM BROMIDE AND ALBUTEROL SULFATE 3 ML: .5; 3 SOLUTION RESPIRATORY (INHALATION) at 14:33

## 2018-01-01 RX ADMIN — FENTANYL CITRATE 200 MCG/HR: 50 INJECTION, SOLUTION INTRAMUSCULAR; INTRAVENOUS at 11:10

## 2018-01-01 RX ADMIN — OXYCODONE HYDROCHLORIDE 5 MG: 5 TABLET ORAL at 12:47

## 2018-01-01 RX ADMIN — MIDAZOLAM 1 MG: 1 INJECTION INTRAMUSCULAR; INTRAVENOUS at 15:18

## 2018-01-01 RX ADMIN — GABAPENTIN 300 MG: 300 CAPSULE ORAL at 04:52

## 2018-01-01 RX ADMIN — INSULIN GLARGINE 5 UNITS: 100 INJECTION, SOLUTION SUBCUTANEOUS at 20:15

## 2018-01-01 RX ADMIN — INSULIN HUMAN 3 UNITS: 100 INJECTION, SOLUTION PARENTERAL at 06:42

## 2018-01-01 RX ADMIN — NOREPINEPHRINE BITARTRATE 10 MCG/MIN: 1 INJECTION INTRAVENOUS at 08:49

## 2018-01-01 RX ADMIN — PROPOFOL 65 MCG/KG/MIN: 10 INJECTION, EMULSION INTRAVENOUS at 23:48

## 2018-01-01 RX ADMIN — GABAPENTIN 300 MG: 300 CAPSULE ORAL at 21:38

## 2018-01-01 RX ADMIN — SENNOSIDES AND DOCUSATE SODIUM 2 TABLET: 8.6; 5 TABLET ORAL at 21:34

## 2018-01-01 RX ADMIN — OXYCODONE HYDROCHLORIDE 5 MG: 5 TABLET ORAL at 05:28

## 2018-01-01 RX ADMIN — IPRATROPIUM BROMIDE AND ALBUTEROL SULFATE 3 ML: .5; 3 SOLUTION RESPIRATORY (INHALATION) at 02:08

## 2018-01-01 RX ADMIN — ACETAMINOPHEN 650 MG: 325 TABLET, FILM COATED ORAL at 09:25

## 2018-01-01 RX ADMIN — FENTANYL CITRATE 100 MCG: 50 INJECTION, SOLUTION INTRAMUSCULAR; INTRAVENOUS at 07:21

## 2018-01-01 RX ADMIN — FENTANYL CITRATE 75 MCG: 50 INJECTION, SOLUTION INTRAMUSCULAR; INTRAVENOUS at 10:21

## 2018-01-01 RX ADMIN — ACETAMINOPHEN 650 MG: 325 TABLET, FILM COATED ORAL at 16:44

## 2018-01-01 RX ADMIN — AMPICILLIN SODIUM AND SULBACTAM SODIUM 3 G: 2; 1 INJECTION, POWDER, FOR SOLUTION INTRAMUSCULAR; INTRAVENOUS at 06:47

## 2018-01-01 RX ADMIN — FAMOTIDINE 20 MG: 20 TABLET ORAL at 20:18

## 2018-01-01 RX ADMIN — ROSUVASTATIN CALCIUM 20 MG: 10 TABLET, FILM COATED ORAL at 21:27

## 2018-01-01 RX ADMIN — GABAPENTIN 300 MG: 300 CAPSULE ORAL at 06:47

## 2018-01-01 RX ADMIN — FAMOTIDINE 20 MG: 20 TABLET, FILM COATED ORAL at 07:36

## 2018-01-01 RX ADMIN — OXYCODONE HYDROCHLORIDE 5 MG: 5 TABLET ORAL at 17:22

## 2018-01-01 RX ADMIN — ROSUVASTATIN CALCIUM 20 MG: 10 TABLET, FILM COATED ORAL at 20:07

## 2018-01-01 RX ADMIN — DEXMEDETOMIDINE HYDROCHLORIDE 1.5 MCG/KG/HR: 100 INJECTION, SOLUTION INTRAVENOUS at 01:31

## 2018-01-01 RX ADMIN — AMIODARONE HYDROCHLORIDE 150 MG: 50 INJECTION, SOLUTION INTRAVENOUS at 19:46

## 2018-01-01 RX ADMIN — AMPICILLIN SODIUM AND SULBACTAM SODIUM 3 G: 2; 1 INJECTION, POWDER, FOR SOLUTION INTRAMUSCULAR; INTRAVENOUS at 05:53

## 2018-01-01 RX ADMIN — HEPARIN SODIUM 5000 UNITS: 5000 INJECTION, SOLUTION INTRAVENOUS; SUBCUTANEOUS at 05:18

## 2018-01-01 RX ADMIN — LINEZOLID 600 MG: 600 TABLET, FILM COATED ORAL at 07:36

## 2018-01-01 RX ADMIN — QUETIAPINE FUMARATE 50 MG: 25 TABLET ORAL at 09:55

## 2018-01-01 RX ADMIN — SERTRALINE 50 MG: 50 TABLET, FILM COATED ORAL at 21:42

## 2018-01-01 RX ADMIN — GABAPENTIN 300 MG: 300 CAPSULE ORAL at 21:34

## 2018-01-01 RX ADMIN — MAGNESIUM HYDROXIDE 30 ML: 400 SUSPENSION ORAL at 07:37

## 2018-01-01 RX ADMIN — SERTRALINE 50 MG: 50 TABLET, FILM COATED ORAL at 20:11

## 2018-01-01 RX ADMIN — PROPOFOL 35 MCG/KG/MIN: 10 INJECTION, EMULSION INTRAVENOUS at 13:06

## 2018-01-01 RX ADMIN — ALPRAZOLAM 0.5 MG: 0.5 TABLET ORAL at 14:25

## 2018-01-01 RX ADMIN — DEXMEDETOMIDINE HYDROCHLORIDE 0.6 MCG/KG/HR: 100 INJECTION, SOLUTION INTRAVENOUS at 05:18

## 2018-01-01 RX ADMIN — IPRATROPIUM BROMIDE AND ALBUTEROL SULFATE 3 ML: .5; 3 SOLUTION RESPIRATORY (INHALATION) at 02:36

## 2018-01-01 RX ADMIN — IPRATROPIUM BROMIDE AND ALBUTEROL SULFATE 3 ML: .5; 3 SOLUTION RESPIRATORY (INHALATION) at 14:53

## 2018-01-01 RX ADMIN — HEPARIN SODIUM 5000 UNITS: 5000 INJECTION, SOLUTION INTRAVENOUS; SUBCUTANEOUS at 20:22

## 2018-01-01 RX ADMIN — HEPARIN SODIUM 5000 UNITS: 5000 INJECTION, SOLUTION INTRAVENOUS; SUBCUTANEOUS at 05:19

## 2018-01-01 RX ADMIN — POLYETHYLENE GLYCOL 3350 1 PACKET: 17 POWDER, FOR SOLUTION ORAL at 21:27

## 2018-01-01 RX ADMIN — ASPIRIN 81 MG: 81 TABLET, CHEWABLE ORAL at 07:13

## 2018-01-01 RX ADMIN — MIDAZOLAM 1 MG: 1 INJECTION INTRAMUSCULAR; INTRAVENOUS at 12:15

## 2018-01-01 RX ADMIN — MIDODRINE HYDROCHLORIDE 10 MG: 5 TABLET ORAL at 07:37

## 2018-01-01 RX ADMIN — FUROSEMIDE 20 MG: 10 INJECTION, SOLUTION INTRAMUSCULAR; INTRAVENOUS at 20:27

## 2018-01-01 RX ADMIN — FAMOTIDINE 20 MG: 20 TABLET ORAL at 09:15

## 2018-01-01 RX ADMIN — Medication 500 MCG: at 11:00

## 2018-01-01 RX ADMIN — MIDAZOLAM 1 MG: 1 INJECTION INTRAMUSCULAR; INTRAVENOUS at 07:42

## 2018-01-01 RX ADMIN — ACETAMINOPHEN 650 MG: 325 TABLET, FILM COATED ORAL at 00:58

## 2018-01-01 RX ADMIN — HEPARIN SODIUM 5000 UNITS: 5000 INJECTION, SOLUTION INTRAVENOUS; SUBCUTANEOUS at 22:00

## 2018-01-01 RX ADMIN — MIDAZOLAM 1 MG: 1 INJECTION INTRAMUSCULAR; INTRAVENOUS at 05:28

## 2018-01-01 RX ADMIN — AMPICILLIN SODIUM AND SULBACTAM SODIUM 3 G: 2; 1 INJECTION, POWDER, FOR SOLUTION INTRAMUSCULAR; INTRAVENOUS at 06:46

## 2018-01-01 RX ADMIN — FUROSEMIDE 20 MG: 10 INJECTION, SOLUTION INTRAMUSCULAR; INTRAVENOUS at 21:14

## 2018-01-01 RX ADMIN — SENNOSIDES AND DOCUSATE SODIUM 2 TABLET: 8.6; 5 TABLET ORAL at 08:31

## 2018-01-01 RX ADMIN — IPRATROPIUM BROMIDE AND ALBUTEROL SULFATE 3 ML: .5; 3 SOLUTION RESPIRATORY (INHALATION) at 10:39

## 2018-01-01 RX ADMIN — INSULIN HUMAN 3 UNITS: 100 INJECTION, SOLUTION PARENTERAL at 23:34

## 2018-01-01 RX ADMIN — OXYCODONE HYDROCHLORIDE 5 MG: 5 TABLET ORAL at 23:00

## 2018-01-01 RX ADMIN — SERTRALINE 50 MG: 50 TABLET, FILM COATED ORAL at 20:26

## 2018-01-01 RX ADMIN — PROPOFOL 80 MCG/KG/MIN: 10 INJECTION, EMULSION INTRAVENOUS at 07:46

## 2018-01-01 RX ADMIN — LABETALOL HYDROCHLORIDE 20 MG: 5 INJECTION, SOLUTION INTRAVENOUS at 06:21

## 2018-01-01 RX ADMIN — PIPERACILLIN AND TAZOBACTAM 4.5 G: 4; .5 INJECTION, POWDER, LYOPHILIZED, FOR SOLUTION INTRAVENOUS; PARENTERAL at 12:34

## 2018-01-01 RX ADMIN — DEXMEDETOMIDINE HYDROCHLORIDE 1.5 MCG/KG/HR: 100 INJECTION, SOLUTION INTRAVENOUS at 04:07

## 2018-01-01 RX ADMIN — FENTANYL CITRATE 100 MCG: 50 INJECTION, SOLUTION INTRAMUSCULAR; INTRAVENOUS at 00:50

## 2018-01-01 RX ADMIN — ASPIRIN 81 MG: 81 TABLET, CHEWABLE ORAL at 09:15

## 2018-01-01 RX ADMIN — LINEZOLID 600 MG: 600 TABLET, FILM COATED ORAL at 21:14

## 2018-01-01 RX ADMIN — INSULIN GLARGINE 5 UNITS: 100 INJECTION, SOLUTION SUBCUTANEOUS at 21:08

## 2018-01-01 RX ADMIN — FENTANYL CITRATE 200 MCG: 50 INJECTION, SOLUTION INTRAMUSCULAR; INTRAVENOUS at 21:13

## 2018-01-01 RX ADMIN — ALPRAZOLAM 0.5 MG: 0.5 TABLET ORAL at 20:26

## 2018-01-01 RX ADMIN — HEPARIN SODIUM 5000 UNITS: 5000 INJECTION, SOLUTION INTRAVENOUS; SUBCUTANEOUS at 06:46

## 2018-01-01 RX ADMIN — HEPARIN SODIUM 5000 UNITS: 5000 INJECTION, SOLUTION INTRAVENOUS; SUBCUTANEOUS at 15:12

## 2018-01-01 RX ADMIN — FAMOTIDINE 20 MG: 20 TABLET ORAL at 09:10

## 2018-01-01 RX ADMIN — SODIUM CHLORIDE: 4.5 INJECTION, SOLUTION INTRAVENOUS at 13:28

## 2018-01-01 RX ADMIN — IPRATROPIUM BROMIDE AND ALBUTEROL SULFATE 3 ML: .5; 3 SOLUTION RESPIRATORY (INHALATION) at 10:51

## 2018-01-01 RX ADMIN — AMPICILLIN SODIUM AND SULBACTAM SODIUM 3 G: 2; 1 INJECTION, POWDER, FOR SOLUTION INTRAMUSCULAR; INTRAVENOUS at 10:26

## 2018-01-01 RX ADMIN — DEXMEDETOMIDINE HYDROCHLORIDE 1 MCG/KG/HR: 100 INJECTION, SOLUTION INTRAVENOUS at 15:19

## 2018-01-01 RX ADMIN — INSULIN HUMAN 3 UNITS: 100 INJECTION, SOLUTION PARENTERAL at 17:23

## 2018-01-01 RX ADMIN — FAMOTIDINE 20 MG: 20 TABLET, FILM COATED ORAL at 07:13

## 2018-01-01 RX ADMIN — FENTANYL CITRATE 150 MCG/HR: 50 INJECTION, SOLUTION INTRAMUSCULAR; INTRAVENOUS at 09:10

## 2018-01-01 RX ADMIN — DEXMEDETOMIDINE HYDROCHLORIDE 1 MCG/KG/HR: 100 INJECTION, SOLUTION INTRAVENOUS at 03:32

## 2018-01-01 RX ADMIN — HALOPERIDOL LACTATE 5 MG: 5 INJECTION, SOLUTION INTRAMUSCULAR at 11:21

## 2018-01-01 RX ADMIN — HALOPERIDOL LACTATE 5 MG: 5 INJECTION, SOLUTION INTRAMUSCULAR at 07:34

## 2018-01-01 RX ADMIN — AZITHROMYCIN 250 MG: 250 TABLET, FILM COATED ORAL at 09:15

## 2018-01-01 RX ADMIN — ALPRAZOLAM 0.5 MG: 0.5 TABLET ORAL at 05:19

## 2018-01-01 RX ADMIN — IPRATROPIUM BROMIDE AND ALBUTEROL SULFATE 3 ML: .5; 3 SOLUTION RESPIRATORY (INHALATION) at 14:50

## 2018-01-01 RX ADMIN — FAMOTIDINE 20 MG: 20 TABLET ORAL at 21:27

## 2018-01-01 RX ADMIN — IPRATROPIUM BROMIDE AND ALBUTEROL SULFATE 3 ML: .5; 3 SOLUTION RESPIRATORY (INHALATION) at 07:23

## 2018-01-01 RX ADMIN — AMPICILLIN SODIUM AND SULBACTAM SODIUM 3 G: 2; 1 INJECTION, POWDER, FOR SOLUTION INTRAMUSCULAR; INTRAVENOUS at 01:05

## 2018-01-01 RX ADMIN — INSULIN HUMAN 3 UNITS: 100 INJECTION, SOLUTION PARENTERAL at 05:29

## 2018-01-01 RX ADMIN — DEXMEDETOMIDINE HYDROCHLORIDE 1.2 MCG/KG/HR: 100 INJECTION, SOLUTION INTRAVENOUS at 18:45

## 2018-01-01 RX ADMIN — PROPOFOL 80 MCG/KG/MIN: 10 INJECTION, EMULSION INTRAVENOUS at 16:28

## 2018-01-01 RX ADMIN — AZITHROMYCIN 250 MG: 250 TABLET, FILM COATED ORAL at 08:31

## 2018-01-01 RX ADMIN — DEXMEDETOMIDINE HYDROCHLORIDE 1 MCG/KG/HR: 100 INJECTION, SOLUTION INTRAVENOUS at 21:44

## 2018-01-01 RX ADMIN — FENTANYL CITRATE 150 MCG: 50 INJECTION, SOLUTION INTRAMUSCULAR; INTRAVENOUS at 14:08

## 2018-01-01 RX ADMIN — FUROSEMIDE 20 MG: 10 INJECTION, SOLUTION INTRAMUSCULAR; INTRAVENOUS at 20:15

## 2018-01-01 RX ADMIN — SERTRALINE 50 MG: 50 TABLET, FILM COATED ORAL at 21:38

## 2018-01-01 RX ADMIN — ASPIRIN 81 MG: 81 TABLET, CHEWABLE ORAL at 07:37

## 2018-01-01 RX ADMIN — IPRATROPIUM BROMIDE AND ALBUTEROL SULFATE 3 ML: .5; 3 SOLUTION RESPIRATORY (INHALATION) at 07:11

## 2018-01-01 RX ADMIN — DEXMEDETOMIDINE HYDROCHLORIDE 1.5 MCG/KG/HR: 100 INJECTION, SOLUTION INTRAVENOUS at 18:26

## 2018-01-01 RX ADMIN — FENTANYL CITRATE: 50 INJECTION, SOLUTION INTRAMUSCULAR; INTRAVENOUS at 11:30

## 2018-01-01 RX ADMIN — INSULIN HUMAN 3 UNITS: 100 INJECTION, SOLUTION PARENTERAL at 00:03

## 2018-01-01 RX ADMIN — GABAPENTIN 300 MG: 300 CAPSULE ORAL at 20:03

## 2018-01-01 RX ADMIN — SODIUM CHLORIDE 500 ML: 9 INJECTION, SOLUTION INTRAVENOUS at 03:30

## 2018-01-01 RX ADMIN — HALOPERIDOL LACTATE 5 MG: 5 INJECTION, SOLUTION INTRAMUSCULAR at 23:49

## 2018-01-01 RX ADMIN — IPRATROPIUM BROMIDE AND ALBUTEROL SULFATE 3 ML: .5; 3 SOLUTION RESPIRATORY (INHALATION) at 06:53

## 2018-01-01 RX ADMIN — AMPICILLIN SODIUM AND SULBACTAM SODIUM 3 G: 2; 1 INJECTION, POWDER, FOR SOLUTION INTRAMUSCULAR; INTRAVENOUS at 20:00

## 2018-01-01 RX ADMIN — PIPERACILLIN AND TAZOBACTAM 4.5 G: 4; .5 INJECTION, POWDER, LYOPHILIZED, FOR SOLUTION INTRAVENOUS; PARENTERAL at 12:07

## 2018-01-01 RX ADMIN — METOCLOPRAMIDE 5 MG: 5 INJECTION, SOLUTION INTRAMUSCULAR; INTRAVENOUS at 20:30

## 2018-01-01 RX ADMIN — FENTANYL CITRATE 100 MCG: 50 INJECTION, SOLUTION INTRAMUSCULAR; INTRAVENOUS at 08:57

## 2018-01-01 RX ADMIN — ACETAMINOPHEN 650 MG: 325 TABLET, FILM COATED ORAL at 04:52

## 2018-01-01 RX ADMIN — MIDAZOLAM 1 MG: 1 INJECTION INTRAMUSCULAR; INTRAVENOUS at 04:38

## 2018-01-01 RX ADMIN — GABAPENTIN 300 MG: 300 CAPSULE ORAL at 14:27

## 2018-01-01 RX ADMIN — GABAPENTIN 300 MG: 300 CAPSULE ORAL at 05:19

## 2018-01-01 RX ADMIN — NOREPINEPHRINE BITARTRATE 10 MCG/MIN: 1 INJECTION INTRAVENOUS at 17:26

## 2018-01-01 RX ADMIN — AMPICILLIN SODIUM AND SULBACTAM SODIUM 3 G: 2; 1 INJECTION, POWDER, FOR SOLUTION INTRAMUSCULAR; INTRAVENOUS at 23:19

## 2018-01-01 RX ADMIN — SENNOSIDES AND DOCUSATE SODIUM 2 TABLET: 8.6; 5 TABLET ORAL at 20:59

## 2018-01-01 RX ADMIN — IPRATROPIUM BROMIDE AND ALBUTEROL SULFATE 3 ML: .5; 3 SOLUTION RESPIRATORY (INHALATION) at 11:27

## 2018-01-01 RX ADMIN — MORPHINE SULFATE 10 MG: 10 INJECTION INTRAVENOUS at 19:52

## 2018-01-01 RX ADMIN — IPRATROPIUM BROMIDE AND ALBUTEROL SULFATE 3 ML: .5; 3 SOLUTION RESPIRATORY (INHALATION) at 10:29

## 2018-01-01 RX ADMIN — ASPIRIN 81 MG: 81 TABLET, CHEWABLE ORAL at 08:45

## 2018-01-01 RX ADMIN — GABAPENTIN 300 MG: 300 CAPSULE ORAL at 15:00

## 2018-01-01 RX ADMIN — PROPOFOL 80 MCG/KG/MIN: 10 INJECTION, EMULSION INTRAVENOUS at 17:09

## 2018-01-01 RX ADMIN — ACETAMINOPHEN 650 MG: 325 TABLET, FILM COATED ORAL at 12:36

## 2018-01-01 RX ADMIN — MIDAZOLAM 1 MG: 1 INJECTION INTRAMUSCULAR; INTRAVENOUS at 16:25

## 2018-01-01 RX ADMIN — IPRATROPIUM BROMIDE AND ALBUTEROL SULFATE 3 ML: .5; 3 SOLUTION RESPIRATORY (INHALATION) at 01:29

## 2018-01-01 RX ADMIN — ACETAMINOPHEN 650 MG: 325 TABLET, FILM COATED ORAL at 11:25

## 2018-01-01 RX ADMIN — SENNOSIDES AND DOCUSATE SODIUM 2 TABLET: 8.6; 5 TABLET ORAL at 21:26

## 2018-01-01 RX ADMIN — HEPARIN SODIUM 5000 UNITS: 5000 INJECTION, SOLUTION INTRAVENOUS; SUBCUTANEOUS at 13:20

## 2018-01-01 RX ADMIN — MIDODRINE HYDROCHLORIDE 5 MG: 5 TABLET ORAL at 13:31

## 2018-01-01 RX ADMIN — HEPARIN SODIUM 5000 UNITS: 5000 INJECTION, SOLUTION INTRAVENOUS; SUBCUTANEOUS at 05:48

## 2018-01-01 RX ADMIN — ACETAMINOPHEN 650 MG: 325 TABLET, FILM COATED ORAL at 07:56

## 2018-01-01 RX ADMIN — SERTRALINE 50 MG: 50 TABLET, FILM COATED ORAL at 00:28

## 2018-01-01 RX ADMIN — POTASSIUM BICARBONATE 25 MEQ: 25 TABLET, EFFERVESCENT ORAL at 20:07

## 2018-01-01 RX ADMIN — FUROSEMIDE 20 MG: 10 INJECTION, SOLUTION INTRAMUSCULAR; INTRAVENOUS at 21:00

## 2018-01-01 RX ADMIN — IPRATROPIUM BROMIDE AND ALBUTEROL SULFATE 3 ML: .5; 3 SOLUTION RESPIRATORY (INHALATION) at 07:02

## 2018-01-01 RX ADMIN — MIDAZOLAM 1 MG: 1 INJECTION INTRAMUSCULAR; INTRAVENOUS at 06:39

## 2018-01-01 RX ADMIN — NOREPINEPHRINE BITARTRATE 7 MCG/MIN: 1 INJECTION INTRAVENOUS at 01:08

## 2018-01-01 RX ADMIN — HALOPERIDOL LACTATE 5 MG: 5 INJECTION, SOLUTION INTRAMUSCULAR at 15:13

## 2018-01-01 RX ADMIN — LINEZOLID 600 MG: 600 TABLET, FILM COATED ORAL at 20:07

## 2018-01-01 RX ADMIN — HALOPERIDOL LACTATE 5 MG: 5 INJECTION, SOLUTION INTRAMUSCULAR at 04:22

## 2018-01-01 RX ADMIN — FAMOTIDINE 20 MG: 20 TABLET ORAL at 21:42

## 2018-01-01 RX ADMIN — PIPERACILLIN AND TAZOBACTAM 4.5 G: 4; .5 INJECTION, POWDER, LYOPHILIZED, FOR SOLUTION INTRAVENOUS; PARENTERAL at 04:40

## 2018-01-01 RX ADMIN — SENNOSIDES AND DOCUSATE SODIUM 2 TABLET: 8.6; 5 TABLET ORAL at 21:37

## 2018-01-01 RX ADMIN — HEPARIN SODIUM 5000 UNITS: 5000 INJECTION, SOLUTION INTRAVENOUS; SUBCUTANEOUS at 05:03

## 2018-01-01 RX ADMIN — AMPICILLIN SODIUM AND SULBACTAM SODIUM 3 G: 2; 1 INJECTION, POWDER, FOR SOLUTION INTRAMUSCULAR; INTRAVENOUS at 17:24

## 2018-01-01 RX ADMIN — SENNOSIDES AND DOCUSATE SODIUM 2 TABLET: 8.6; 5 TABLET ORAL at 20:26

## 2018-01-01 RX ADMIN — IPRATROPIUM BROMIDE AND ALBUTEROL SULFATE 3 ML: .5; 3 SOLUTION RESPIRATORY (INHALATION) at 14:37

## 2018-01-01 RX ADMIN — HEPARIN SODIUM 5000 UNITS: 5000 INJECTION, SOLUTION INTRAVENOUS; SUBCUTANEOUS at 12:52

## 2018-01-01 RX ADMIN — POTASSIUM BICARBONATE 25 MEQ: 25 TABLET, EFFERVESCENT ORAL at 07:13

## 2018-01-01 RX ADMIN — AMIODARONE HYDROCHLORIDE 0.5 MG/MIN: 50 INJECTION, SOLUTION INTRAVENOUS at 05:56

## 2018-01-01 RX ADMIN — OXYCODONE HYDROCHLORIDE 5 MG: 5 TABLET ORAL at 05:20

## 2018-01-01 RX ADMIN — IPRATROPIUM BROMIDE AND ALBUTEROL SULFATE 3 ML: .5; 3 SOLUTION RESPIRATORY (INHALATION) at 06:31

## 2018-01-01 RX ADMIN — IPRATROPIUM BROMIDE AND ALBUTEROL SULFATE 3 ML: .5; 3 SOLUTION RESPIRATORY (INHALATION) at 11:55

## 2018-01-01 RX ADMIN — MIDAZOLAM 1 MG: 1 INJECTION INTRAMUSCULAR; INTRAVENOUS at 14:08

## 2018-01-01 RX ADMIN — DEXMEDETOMIDINE HYDROCHLORIDE 1.5 MCG/KG/HR: 100 INJECTION, SOLUTION INTRAVENOUS at 14:14

## 2018-01-01 RX ADMIN — SERTRALINE 50 MG: 50 TABLET, FILM COATED ORAL at 20:03

## 2018-01-01 RX ADMIN — AMPICILLIN SODIUM AND SULBACTAM SODIUM 3 G: 2; 1 INJECTION, POWDER, FOR SOLUTION INTRAMUSCULAR; INTRAVENOUS at 11:15

## 2018-01-01 RX ADMIN — PIPERACILLIN AND TAZOBACTAM 4.5 G: 4; .5 INJECTION, POWDER, LYOPHILIZED, FOR SOLUTION INTRAVENOUS; PARENTERAL at 11:43

## 2018-01-01 RX ADMIN — IPRATROPIUM BROMIDE AND ALBUTEROL SULFATE 3 ML: .5; 3 SOLUTION RESPIRATORY (INHALATION) at 21:58

## 2018-01-01 RX ADMIN — SODIUM CHLORIDE: 4.5 INJECTION, SOLUTION INTRAVENOUS at 04:22

## 2018-01-01 RX ADMIN — IPRATROPIUM BROMIDE AND ALBUTEROL SULFATE 3 ML: .5; 3 SOLUTION RESPIRATORY (INHALATION) at 06:25

## 2018-01-01 RX ADMIN — GABAPENTIN 300 MG: 300 CAPSULE ORAL at 15:12

## 2018-01-01 RX ADMIN — IPRATROPIUM BROMIDE AND ALBUTEROL SULFATE 3 ML: .5; 3 SOLUTION RESPIRATORY (INHALATION) at 18:33

## 2018-01-01 RX ADMIN — ROSUVASTATIN CALCIUM 20 MG: 10 TABLET, FILM COATED ORAL at 21:19

## 2018-01-01 RX ADMIN — MIDAZOLAM 1 MG: 1 INJECTION INTRAMUSCULAR; INTRAVENOUS at 18:20

## 2018-01-01 RX ADMIN — INSULIN HUMAN 2 UNITS: 100 INJECTION, SOLUTION PARENTERAL at 05:12

## 2018-01-01 RX ADMIN — IPRATROPIUM BROMIDE AND ALBUTEROL SULFATE 3 ML: .5; 3 SOLUTION RESPIRATORY (INHALATION) at 21:45

## 2018-01-01 RX ADMIN — OXYCODONE HYDROCHLORIDE 5 MG: 5 TABLET ORAL at 11:07

## 2018-01-01 RX ADMIN — IPRATROPIUM BROMIDE AND ALBUTEROL SULFATE 3 ML: .5; 3 SOLUTION RESPIRATORY (INHALATION) at 00:11

## 2018-01-01 RX ADMIN — IPRATROPIUM BROMIDE AND ALBUTEROL SULFATE 3 ML: .5; 3 SOLUTION RESPIRATORY (INHALATION) at 10:48

## 2018-01-01 RX ADMIN — ROSUVASTATIN CALCIUM 20 MG: 10 TABLET, FILM COATED ORAL at 20:59

## 2018-01-01 RX ADMIN — ACETAMINOPHEN 650 MG: 325 TABLET, FILM COATED ORAL at 12:11

## 2018-01-01 RX ADMIN — HALOPERIDOL LACTATE 5 MG: 5 INJECTION, SOLUTION INTRAMUSCULAR at 10:44

## 2018-01-01 RX ADMIN — INSULIN HUMAN 4 UNITS: 100 INJECTION, SOLUTION PARENTERAL at 13:57

## 2018-01-01 RX ADMIN — SENNOSIDES AND DOCUSATE SODIUM 2 TABLET: 8.6; 5 TABLET ORAL at 07:37

## 2018-01-01 RX ADMIN — POTASSIUM BICARBONATE 25 MEQ: 25 TABLET, EFFERVESCENT ORAL at 20:59

## 2018-01-01 RX ADMIN — HEPARIN SODIUM 5000 UNITS: 5000 INJECTION, SOLUTION INTRAVENOUS; SUBCUTANEOUS at 14:27

## 2018-01-01 RX ADMIN — GABAPENTIN 300 MG: 300 CAPSULE ORAL at 20:18

## 2018-01-01 RX ADMIN — INSULIN HUMAN 4 UNITS: 100 INJECTION, SOLUTION PARENTERAL at 18:12

## 2018-01-01 RX ADMIN — FUROSEMIDE 20 MG: 10 INJECTION, SOLUTION INTRAMUSCULAR; INTRAVENOUS at 10:59

## 2018-01-01 RX ADMIN — FAMOTIDINE 20 MG: 20 TABLET ORAL at 11:25

## 2018-01-01 RX ADMIN — HEPARIN SODIUM 5000 UNITS: 5000 INJECTION, SOLUTION INTRAVENOUS; SUBCUTANEOUS at 05:52

## 2018-01-01 RX ADMIN — ASPIRIN 81 MG: 81 TABLET, CHEWABLE ORAL at 09:00

## 2018-01-01 RX ADMIN — PROPOFOL 50 MCG/KG/MIN: 10 INJECTION, EMULSION INTRAVENOUS at 07:48

## 2018-01-01 RX ADMIN — INSULIN HUMAN 3 UNITS: 100 INJECTION, SOLUTION PARENTERAL at 11:53

## 2018-01-01 RX ADMIN — HEPARIN SODIUM 5000 UNITS: 5000 INJECTION, SOLUTION INTRAVENOUS; SUBCUTANEOUS at 13:59

## 2018-01-01 RX ADMIN — IPRATROPIUM BROMIDE AND ALBUTEROL SULFATE 3 ML: .5; 3 SOLUTION RESPIRATORY (INHALATION) at 01:46

## 2018-01-01 RX ADMIN — ACETAMINOPHEN 650 MG: 325 TABLET, FILM COATED ORAL at 11:41

## 2018-01-01 RX ADMIN — PROPOFOL 60 MCG/KG/MIN: 10 INJECTION, EMULSION INTRAVENOUS at 19:38

## 2018-01-01 RX ADMIN — HALOPERIDOL LACTATE 5 MG: 5 INJECTION, SOLUTION INTRAMUSCULAR at 19:10

## 2018-01-01 RX ADMIN — OXYCODONE HYDROCHLORIDE 5 MG: 5 TABLET ORAL at 04:55

## 2018-01-01 RX ADMIN — INSULIN HUMAN 3 UNITS: 100 INJECTION, SOLUTION PARENTERAL at 01:07

## 2018-01-01 RX ADMIN — FUROSEMIDE 20 MG: 10 INJECTION, SOLUTION INTRAMUSCULAR; INTRAVENOUS at 04:26

## 2018-01-01 RX ADMIN — ROSUVASTATIN CALCIUM 20 MG: 10 TABLET, FILM COATED ORAL at 20:11

## 2018-01-01 RX ADMIN — PIPERACILLIN AND TAZOBACTAM 4.5 G: 4; .5 INJECTION, POWDER, LYOPHILIZED, FOR SOLUTION INTRAVENOUS; PARENTERAL at 05:23

## 2018-01-01 RX ADMIN — IPRATROPIUM BROMIDE AND ALBUTEROL SULFATE 3 ML: .5; 3 SOLUTION RESPIRATORY (INHALATION) at 20:02

## 2018-01-01 RX ADMIN — ALPRAZOLAM 0.5 MG: 0.5 TABLET ORAL at 09:59

## 2018-01-01 RX ADMIN — AMIODARONE HYDROCHLORIDE 1 MG/MIN: 50 INJECTION, SOLUTION INTRAVENOUS at 20:01

## 2018-01-01 RX ADMIN — PROPOFOL 60 MCG/KG/MIN: 10 INJECTION, EMULSION INTRAVENOUS at 14:02

## 2018-01-01 RX ADMIN — SENNOSIDES AND DOCUSATE SODIUM 2 TABLET: 8.6; 5 TABLET ORAL at 07:49

## 2018-01-01 RX ADMIN — FUROSEMIDE 20 MG: 10 INJECTION, SOLUTION INTRAMUSCULAR; INTRAVENOUS at 20:12

## 2018-01-01 RX ADMIN — ACETAMINOPHEN 650 MG: 325 TABLET, FILM COATED ORAL at 18:22

## 2018-01-01 RX ADMIN — QUETIAPINE FUMARATE 50 MG: 25 TABLET ORAL at 01:08

## 2018-01-01 RX ADMIN — Medication 500 MCG: at 09:00

## 2018-01-01 RX ADMIN — POTASSIUM BICARBONATE 25 MEQ: 25 TABLET, EFFERVESCENT ORAL at 20:02

## 2018-01-01 RX ADMIN — LINEZOLID 600 MG: 600 TABLET, FILM COATED ORAL at 20:11

## 2018-01-01 RX ADMIN — DEXMEDETOMIDINE HYDROCHLORIDE 1.5 MCG/KG/HR: 100 INJECTION, SOLUTION INTRAVENOUS at 10:00

## 2018-01-01 RX ADMIN — ROSUVASTATIN CALCIUM 20 MG: 10 TABLET, FILM COATED ORAL at 21:42

## 2018-01-01 RX ADMIN — IPRATROPIUM BROMIDE AND ALBUTEROL SULFATE 3 ML: .5; 3 SOLUTION RESPIRATORY (INHALATION) at 07:03

## 2018-01-01 RX ADMIN — PIPERACILLIN AND TAZOBACTAM 4.5 G: 4; .5 INJECTION, POWDER, LYOPHILIZED, FOR SOLUTION INTRAVENOUS; PARENTERAL at 13:22

## 2018-01-01 RX ADMIN — PIPERACILLIN AND TAZOBACTAM 4.5 G: 4; .5 INJECTION, POWDER, LYOPHILIZED, FOR SOLUTION INTRAVENOUS; PARENTERAL at 21:00

## 2018-01-01 RX ADMIN — NOREPINEPHRINE BITARTRATE 5 MCG/MIN: 1 INJECTION INTRAVENOUS at 15:45

## 2018-01-01 RX ADMIN — HEPARIN SODIUM 5000 UNITS: 5000 INJECTION, SOLUTION INTRAVENOUS; SUBCUTANEOUS at 21:43

## 2018-01-01 RX ADMIN — IPRATROPIUM BROMIDE AND ALBUTEROL SULFATE 3 ML: .5; 3 SOLUTION RESPIRATORY (INHALATION) at 02:04

## 2018-01-01 RX ADMIN — INSULIN HUMAN 3 UNITS: 100 INJECTION, SOLUTION PARENTERAL at 23:23

## 2018-01-01 RX ADMIN — SODIUM CHLORIDE, POTASSIUM CHLORIDE, SODIUM LACTATE AND CALCIUM CHLORIDE 500 ML: 600; 310; 30; 20 INJECTION, SOLUTION INTRAVENOUS at 19:41

## 2018-01-01 RX ADMIN — OXYCODONE HYDROCHLORIDE 5 MG: 5 TABLET ORAL at 11:18

## 2018-01-01 RX ADMIN — IPRATROPIUM BROMIDE AND ALBUTEROL SULFATE 3 ML: .5; 3 SOLUTION RESPIRATORY (INHALATION) at 14:55

## 2018-01-01 RX ADMIN — SENNOSIDES AND DOCUSATE SODIUM 2 TABLET: 8.6; 5 TABLET ORAL at 09:15

## 2018-01-01 RX ADMIN — PIPERACILLIN AND TAZOBACTAM 4.5 G: 4; .5 INJECTION, POWDER, LYOPHILIZED, FOR SOLUTION INTRAVENOUS; PARENTERAL at 12:45

## 2018-01-01 RX ADMIN — WHITE PETROLATUM 1 APPLICATION: .15; .85 OINTMENT OPHTHALMIC at 05:37

## 2018-01-01 RX ADMIN — HEPARIN SODIUM 5000 UNITS: 5000 INJECTION, SOLUTION INTRAVENOUS; SUBCUTANEOUS at 21:26

## 2018-01-01 RX ADMIN — IPRATROPIUM BROMIDE AND ALBUTEROL SULFATE 3 ML: .5; 3 SOLUTION RESPIRATORY (INHALATION) at 10:41

## 2018-01-01 RX ADMIN — IPRATROPIUM BROMIDE AND ALBUTEROL SULFATE 3 ML: .5; 3 SOLUTION RESPIRATORY (INHALATION) at 02:22

## 2018-01-01 RX ADMIN — FAMOTIDINE 20 MG: 20 TABLET ORAL at 21:19

## 2018-01-01 RX ADMIN — ALPRAZOLAM 0.5 MG: 0.5 TABLET ORAL at 14:08

## 2018-01-01 RX ADMIN — AMPICILLIN SODIUM AND SULBACTAM SODIUM 3 G: 2; 1 INJECTION, POWDER, FOR SOLUTION INTRAMUSCULAR; INTRAVENOUS at 23:24

## 2018-01-01 RX ADMIN — ASPIRIN 81 MG: 81 TABLET, CHEWABLE ORAL at 07:49

## 2018-01-01 RX ADMIN — FENTANYL CITRATE 100 MCG/HR: 50 INJECTION, SOLUTION INTRAMUSCULAR; INTRAVENOUS at 14:03

## 2018-01-01 RX ADMIN — INSULIN HUMAN 3 UNITS: 100 INJECTION, SOLUTION PARENTERAL at 05:32

## 2018-01-01 RX ADMIN — LABETALOL HYDROCHLORIDE 20 MG: 5 INJECTION, SOLUTION INTRAVENOUS at 06:49

## 2018-01-01 RX ADMIN — PIPERACILLIN AND TAZOBACTAM 4.5 G: 4; .5 INJECTION, POWDER, LYOPHILIZED, FOR SOLUTION INTRAVENOUS; PARENTERAL at 20:25

## 2018-01-01 RX ADMIN — WHITE PETROLATUM 1 APPLICATION: .15; .85 OINTMENT OPHTHALMIC at 06:00

## 2018-01-01 RX ADMIN — IPRATROPIUM BROMIDE AND ALBUTEROL SULFATE 3 ML: .5; 3 SOLUTION RESPIRATORY (INHALATION) at 14:26

## 2018-01-01 RX ADMIN — AMPICILLIN AND SULBACTAM 3 G: 2; 1 INJECTION, POWDER, FOR SOLUTION INTRAVENOUS at 20:24

## 2018-01-01 RX ADMIN — HEPARIN SODIUM 5000 UNITS: 5000 INJECTION, SOLUTION INTRAVENOUS; SUBCUTANEOUS at 21:34

## 2018-01-01 RX ADMIN — PROPOFOL 55 MCG/KG/MIN: 10 INJECTION, EMULSION INTRAVENOUS at 03:24

## 2018-01-01 RX ADMIN — PROPOFOL 65 MCG/KG/MIN: 10 INJECTION, EMULSION INTRAVENOUS at 20:07

## 2018-01-01 RX ADMIN — IPRATROPIUM BROMIDE AND ALBUTEROL SULFATE 3 ML: .5; 3 SOLUTION RESPIRATORY (INHALATION) at 22:49

## 2018-01-01 RX ADMIN — PROPOFOL 50 MCG/KG/MIN: 10 INJECTION, EMULSION INTRAVENOUS at 22:01

## 2018-01-01 RX ADMIN — POTASSIUM BICARBONATE 25 MEQ: 25 TABLET, EFFERVESCENT ORAL at 10:59

## 2018-01-01 RX ADMIN — INSULIN HUMAN 3 UNITS: 100 INJECTION, SOLUTION PARENTERAL at 05:37

## 2018-01-01 RX ADMIN — FAMOTIDINE 20 MG: 20 TABLET ORAL at 20:59

## 2018-01-01 RX ADMIN — NOREPINEPHRINE BITARTRATE 2 MCG/MIN: 1 INJECTION INTRAVENOUS at 14:07

## 2018-01-01 RX ADMIN — DEXMEDETOMIDINE HYDROCHLORIDE 1.5 MCG/KG/HR: 100 INJECTION, SOLUTION INTRAVENOUS at 00:53

## 2018-01-01 RX ADMIN — INSULIN HUMAN 3 UNITS: 100 INJECTION, SOLUTION PARENTERAL at 00:17

## 2018-01-01 RX ADMIN — DEXMEDETOMIDINE HYDROCHLORIDE 1.5 MCG/KG/HR: 100 INJECTION, SOLUTION INTRAVENOUS at 04:21

## 2018-01-01 RX ADMIN — MORPHINE SULFATE 5 MG: 10 INJECTION INTRAVENOUS at 18:03

## 2018-01-01 RX ADMIN — GABAPENTIN 300 MG: 300 CAPSULE ORAL at 20:11

## 2018-01-01 RX ADMIN — STANDARDIZED SENNA CONCENTRATE AND DOCUSATE SODIUM 2 TABLET: 8.6; 5 TABLET, FILM COATED ORAL at 00:30

## 2018-01-01 RX ADMIN — QUETIAPINE FUMARATE 50 MG: 25 TABLET ORAL at 11:10

## 2018-01-01 RX ADMIN — GADOBENATE DIMEGLUMINE 10 ML: 529 INJECTION, SOLUTION INTRAVENOUS at 20:41

## 2018-01-01 RX ADMIN — INSULIN HUMAN 3 UNITS: 100 INJECTION, SOLUTION PARENTERAL at 01:19

## 2018-01-01 RX ADMIN — IPRATROPIUM BROMIDE AND ALBUTEROL SULFATE 3 ML: .5; 3 SOLUTION RESPIRATORY (INHALATION) at 07:04

## 2018-01-01 RX ADMIN — GABAPENTIN 300 MG: 300 CAPSULE ORAL at 13:48

## 2018-01-01 RX ADMIN — INSULIN HUMAN 3 UNITS: 100 INJECTION, SOLUTION PARENTERAL at 11:14

## 2018-01-01 RX ADMIN — FUROSEMIDE 20 MG: 10 INJECTION, SOLUTION INTRAMUSCULAR; INTRAVENOUS at 09:55

## 2018-01-01 RX ADMIN — IPRATROPIUM BROMIDE AND ALBUTEROL SULFATE 3 ML: .5; 3 SOLUTION RESPIRATORY (INHALATION) at 19:47

## 2018-01-01 RX ADMIN — IPRATROPIUM BROMIDE AND ALBUTEROL SULFATE 3 ML: .5; 3 SOLUTION RESPIRATORY (INHALATION) at 22:21

## 2018-01-01 RX ADMIN — IPRATROPIUM BROMIDE AND ALBUTEROL SULFATE 3 ML: .5; 3 SOLUTION RESPIRATORY (INHALATION) at 15:23

## 2018-01-01 RX ADMIN — ROCURONIUM BROMIDE 10 MG: 10 INJECTION, SOLUTION INTRAVENOUS at 20:08

## 2018-01-01 RX ADMIN — INSULIN GLARGINE 5 UNITS: 100 INJECTION, SOLUTION SUBCUTANEOUS at 20:25

## 2018-01-01 RX ADMIN — INSULIN HUMAN 3 UNITS: 100 INJECTION, SOLUTION PARENTERAL at 11:13

## 2018-01-01 RX ADMIN — INSULIN HUMAN 3 UNITS: 100 INJECTION, SOLUTION PARENTERAL at 18:15

## 2018-01-01 RX ADMIN — DEXMEDETOMIDINE HYDROCHLORIDE 1.5 MCG/KG/HR: 100 INJECTION, SOLUTION INTRAVENOUS at 18:19

## 2018-01-01 RX ADMIN — POTASSIUM BICARBONATE 25 MEQ: 25 TABLET, EFFERVESCENT ORAL at 20:11

## 2018-01-01 RX ADMIN — WHITE PETROLATUM 1 APPLICATION: .15; .85 OINTMENT OPHTHALMIC at 16:00

## 2018-01-01 RX ADMIN — FENTANYL CITRATE 100 MCG: 50 INJECTION, SOLUTION INTRAMUSCULAR; INTRAVENOUS at 14:23

## 2018-01-01 RX ADMIN — ACETAMINOPHEN 650 MG: 325 TABLET, FILM COATED ORAL at 17:12

## 2018-01-01 RX ADMIN — ROSUVASTATIN CALCIUM 20 MG: 10 TABLET, FILM COATED ORAL at 20:18

## 2018-01-01 RX ADMIN — ROSUVASTATIN CALCIUM 20 MG: 10 TABLET, FILM COATED ORAL at 21:37

## 2018-01-01 RX ADMIN — GABAPENTIN 300 MG: 300 CAPSULE ORAL at 21:41

## 2018-01-01 RX ADMIN — INSULIN HUMAN 3 UNITS: 100 INJECTION, SOLUTION PARENTERAL at 00:54

## 2018-01-01 RX ADMIN — MAGNESIUM SULFATE HEPTAHYDRATE 2 G: 500 INJECTION, SOLUTION INTRAMUSCULAR; INTRAVENOUS at 10:47

## 2018-01-01 RX ADMIN — POTASSIUM BICARBONATE 25 MEQ: 25 TABLET, EFFERVESCENT ORAL at 08:20

## 2018-01-01 RX ADMIN — POLYETHYLENE GLYCOL 3350 1 PACKET: 17 POWDER, FOR SOLUTION ORAL at 15:00

## 2018-01-01 RX ADMIN — HEPARIN SODIUM 5000 UNITS: 5000 INJECTION, SOLUTION INTRAVENOUS; SUBCUTANEOUS at 05:26

## 2018-01-01 RX ADMIN — BISACODYL 10 MG: 10 SUPPOSITORY RECTAL at 07:13

## 2018-01-01 RX ADMIN — HALOPERIDOL LACTATE 5 MG: 5 INJECTION, SOLUTION INTRAMUSCULAR at 04:56

## 2018-01-01 RX ADMIN — LORAZEPAM 1 MG: 2 INJECTION INTRAMUSCULAR; INTRAVENOUS at 19:13

## 2018-01-01 RX ADMIN — IPRATROPIUM BROMIDE AND ALBUTEROL SULFATE 3 ML: .5; 3 SOLUTION RESPIRATORY (INHALATION) at 23:04

## 2018-01-01 RX ADMIN — FUROSEMIDE 20 MG: 10 INJECTION, SOLUTION INTRAMUSCULAR; INTRAVENOUS at 20:07

## 2018-01-01 RX ADMIN — FENTANYL CITRATE 50 MCG: 50 INJECTION, SOLUTION INTRAMUSCULAR; INTRAVENOUS at 12:23

## 2018-01-01 RX ADMIN — SODIUM CHLORIDE: 4.5 INJECTION, SOLUTION INTRAVENOUS at 01:01

## 2018-01-01 RX ADMIN — VANCOMYCIN HYDROCHLORIDE 2200 MG: 100 INJECTION, POWDER, LYOPHILIZED, FOR SOLUTION INTRAVENOUS at 11:26

## 2018-01-01 RX ADMIN — SERTRALINE 50 MG: 50 TABLET, FILM COATED ORAL at 21:34

## 2018-01-01 RX ADMIN — FUROSEMIDE 20 MG: 10 INJECTION, SOLUTION INTRAMUSCULAR; INTRAVENOUS at 07:49

## 2018-01-01 RX ADMIN — PROPOFOL 20 MCG/KG/MIN: 10 INJECTION, EMULSION INTRAVENOUS at 09:15

## 2018-01-01 RX ADMIN — INSULIN HUMAN 2 UNITS: 100 INJECTION, SOLUTION PARENTERAL at 23:45

## 2018-01-01 RX ADMIN — PROPOFOL 50 MCG/KG/MIN: 10 INJECTION, EMULSION INTRAVENOUS at 13:28

## 2018-01-01 RX ADMIN — HEPARIN SODIUM 5000 UNITS: 5000 INJECTION, SOLUTION INTRAVENOUS; SUBCUTANEOUS at 20:59

## 2018-01-01 RX ADMIN — FAMOTIDINE 20 MG: 20 TABLET ORAL at 09:42

## 2018-01-01 RX ADMIN — DEXMEDETOMIDINE HYDROCHLORIDE 1.5 MCG/KG/HR: 100 INJECTION, SOLUTION INTRAVENOUS at 21:56

## 2018-01-01 RX ADMIN — FAMOTIDINE 20 MG: 20 TABLET ORAL at 08:31

## 2018-01-01 RX ADMIN — FENTANYL CITRATE 200 MCG/HR: 50 INJECTION, SOLUTION INTRAMUSCULAR; INTRAVENOUS at 01:31

## 2018-01-01 RX ADMIN — HALOPERIDOL LACTATE 5 MG: 5 INJECTION, SOLUTION INTRAMUSCULAR at 07:45

## 2018-01-01 RX ADMIN — DEXMEDETOMIDINE HYDROCHLORIDE 0.7 MCG/KG/HR: 100 INJECTION, SOLUTION INTRAVENOUS at 21:14

## 2018-01-01 RX ADMIN — OXYCODONE HYDROCHLORIDE 5 MG: 5 TABLET ORAL at 23:25

## 2018-01-01 RX ADMIN — HEPARIN SODIUM 5000 UNITS: 5000 INJECTION, SOLUTION INTRAVENOUS; SUBCUTANEOUS at 21:19

## 2018-01-01 RX ADMIN — SENNOSIDES AND DOCUSATE SODIUM 2 TABLET: 8.6; 5 TABLET ORAL at 20:03

## 2018-01-01 RX ADMIN — SERTRALINE 50 MG: 50 TABLET, FILM COATED ORAL at 20:18

## 2018-01-01 RX ADMIN — GABAPENTIN 300 MG: 300 CAPSULE ORAL at 21:00

## 2018-01-01 RX ADMIN — INSULIN HUMAN 3 UNITS: 100 INJECTION, SOLUTION PARENTERAL at 17:39

## 2018-01-01 RX ADMIN — INSULIN HUMAN 3 UNITS: 100 INJECTION, SOLUTION PARENTERAL at 11:27

## 2018-01-01 RX ADMIN — SODIUM CHLORIDE: 4.5 INJECTION, SOLUTION INTRAVENOUS at 02:58

## 2018-01-01 RX ADMIN — HEPARIN SODIUM 5000 UNITS: 5000 INJECTION, SOLUTION INTRAVENOUS; SUBCUTANEOUS at 00:32

## 2018-01-01 RX ADMIN — DEXMEDETOMIDINE HYDROCHLORIDE 0.5 MCG/KG/HR: 100 INJECTION, SOLUTION INTRAVENOUS at 06:03

## 2018-01-01 RX ADMIN — GABAPENTIN 300 MG: 300 CAPSULE ORAL at 14:35

## 2018-01-01 RX ADMIN — DEXMEDETOMIDINE HYDROCHLORIDE 1 MCG/KG/HR: 100 INJECTION, SOLUTION INTRAVENOUS at 13:38

## 2018-01-01 RX ADMIN — MIDAZOLAM 1 MG: 1 INJECTION INTRAMUSCULAR; INTRAVENOUS at 03:32

## 2018-01-01 RX ADMIN — ROSUVASTATIN CALCIUM 20 MG: 10 TABLET, FILM COATED ORAL at 00:29

## 2018-01-01 RX ADMIN — DEXMEDETOMIDINE HYDROCHLORIDE 0.2 MCG/KG/HR: 100 INJECTION, SOLUTION INTRAVENOUS at 19:25

## 2018-01-01 RX ADMIN — FUROSEMIDE 20 MG: 10 INJECTION, SOLUTION INTRAMUSCULAR; INTRAVENOUS at 08:19

## 2018-01-01 RX ADMIN — AMPICILLIN SODIUM AND SULBACTAM SODIUM 3 G: 2; 1 INJECTION, POWDER, FOR SOLUTION INTRAMUSCULAR; INTRAVENOUS at 17:25

## 2018-01-01 RX ADMIN — FENTANYL CITRATE 125 MCG/HR: 50 INJECTION, SOLUTION INTRAMUSCULAR; INTRAVENOUS at 21:53

## 2018-01-01 RX ADMIN — IPRATROPIUM BROMIDE AND ALBUTEROL SULFATE 3 ML: .5; 3 SOLUTION RESPIRATORY (INHALATION) at 10:54

## 2018-01-01 RX ADMIN — DEXMEDETOMIDINE HYDROCHLORIDE 1.2 MCG/KG/HR: 100 INJECTION, SOLUTION INTRAVENOUS at 03:00

## 2018-01-01 RX ADMIN — IPRATROPIUM BROMIDE AND ALBUTEROL SULFATE 3 ML: .5; 3 SOLUTION RESPIRATORY (INHALATION) at 13:30

## 2018-01-01 RX ADMIN — SENNOSIDES AND DOCUSATE SODIUM 2 TABLET: 8.6; 5 TABLET ORAL at 21:14

## 2018-01-01 RX ADMIN — HEPARIN SODIUM 5000 UNITS: 5000 INJECTION, SOLUTION INTRAVENOUS; SUBCUTANEOUS at 13:31

## 2018-01-01 RX ADMIN — MIDODRINE HYDROCHLORIDE 5 MG: 5 TABLET ORAL at 17:13

## 2018-01-01 RX ADMIN — AMIODARONE HYDROCHLORIDE 0.5 MG/MIN: 50 INJECTION, SOLUTION INTRAVENOUS at 08:12

## 2018-01-01 RX ADMIN — IPRATROPIUM BROMIDE AND ALBUTEROL SULFATE 3 ML: .5; 3 SOLUTION RESPIRATORY (INHALATION) at 02:57

## 2018-01-01 RX ADMIN — SERTRALINE 50 MG: 50 TABLET, FILM COATED ORAL at 20:31

## 2018-01-01 RX ADMIN — FENTANYL CITRATE 150 MCG: 50 INJECTION, SOLUTION INTRAMUSCULAR; INTRAVENOUS at 01:59

## 2018-01-01 RX ADMIN — LINEZOLID 600 MG: 600 TABLET, FILM COATED ORAL at 20:26

## 2018-01-01 RX ADMIN — FAMOTIDINE 20 MG: 20 TABLET ORAL at 07:46

## 2018-01-01 RX ADMIN — SODIUM CHLORIDE 2000 ML: 9 INJECTION, SOLUTION INTRAVENOUS at 00:01

## 2018-01-01 RX ADMIN — MIDAZOLAM 1 MG: 1 INJECTION INTRAMUSCULAR; INTRAVENOUS at 00:30

## 2018-01-01 RX ADMIN — HEPARIN SODIUM 1050 UNITS/HR: 5000 INJECTION, SOLUTION INTRAVENOUS at 15:25

## 2018-01-01 RX ADMIN — AMPICILLIN SODIUM AND SULBACTAM SODIUM 3 G: 2; 1 INJECTION, POWDER, FOR SOLUTION INTRAMUSCULAR; INTRAVENOUS at 18:22

## 2018-01-01 RX ADMIN — IPRATROPIUM BROMIDE AND ALBUTEROL SULFATE 3 ML: .5; 3 SOLUTION RESPIRATORY (INHALATION) at 11:16

## 2018-01-01 RX ADMIN — INSULIN HUMAN 3 UNITS: 100 INJECTION, SOLUTION PARENTERAL at 17:17

## 2018-01-01 RX ADMIN — ASPIRIN 81 MG: 81 TABLET, CHEWABLE ORAL at 07:47

## 2018-01-01 RX ADMIN — IPRATROPIUM BROMIDE AND ALBUTEROL SULFATE 3 ML: .5; 3 SOLUTION RESPIRATORY (INHALATION) at 19:09

## 2018-01-01 RX ADMIN — MIDODRINE HYDROCHLORIDE 10 MG: 5 TABLET ORAL at 17:13

## 2018-01-01 RX ADMIN — FENTANYL CITRATE 100 MCG: 50 INJECTION, SOLUTION INTRAMUSCULAR; INTRAVENOUS at 06:16

## 2018-01-01 RX ADMIN — GABAPENTIN 300 MG: 300 CAPSULE ORAL at 21:30

## 2018-01-01 RX ADMIN — GABAPENTIN 300 MG: 300 CAPSULE ORAL at 05:03

## 2018-01-01 RX ADMIN — HEPARIN SODIUM 5000 UNITS: 5000 INJECTION, SOLUTION INTRAVENOUS; SUBCUTANEOUS at 15:24

## 2018-01-01 RX ADMIN — DEXMEDETOMIDINE HYDROCHLORIDE 1.5 MCG/KG/HR: 100 INJECTION, SOLUTION INTRAVENOUS at 10:21

## 2018-01-01 RX ADMIN — OXYCODONE HYDROCHLORIDE 5 MG: 5 TABLET ORAL at 11:51

## 2018-01-01 RX ADMIN — ROSUVASTATIN CALCIUM 20 MG: 10 TABLET, FILM COATED ORAL at 20:25

## 2018-01-01 RX ADMIN — SENNOSIDES AND DOCUSATE SODIUM 2 TABLET: 8.6; 5 TABLET ORAL at 07:13

## 2018-01-01 RX ADMIN — IPRATROPIUM BROMIDE AND ALBUTEROL SULFATE 3 ML: .5; 3 SOLUTION RESPIRATORY (INHALATION) at 18:32

## 2018-01-01 RX ADMIN — INSULIN LISPRO 2 UNITS: 100 INJECTION, SOLUTION INTRAVENOUS; SUBCUTANEOUS at 06:57

## 2018-01-01 RX ADMIN — LINEZOLID 600 MG: 600 TABLET, FILM COATED ORAL at 20:02

## 2018-01-01 RX ADMIN — MIDAZOLAM 1 MG: 1 INJECTION INTRAMUSCULAR; INTRAVENOUS at 03:38

## 2018-01-01 RX ADMIN — HEPARIN SODIUM 5000 UNITS: 5000 INJECTION, SOLUTION INTRAVENOUS; SUBCUTANEOUS at 13:47

## 2018-01-01 RX ADMIN — MIDAZOLAM 1 MG: 1 INJECTION INTRAMUSCULAR; INTRAVENOUS at 22:53

## 2018-01-01 RX ADMIN — FENTANYL CITRATE 200 MCG/HR: 50 INJECTION, SOLUTION INTRAMUSCULAR; INTRAVENOUS at 00:17

## 2018-01-01 RX ADMIN — GABAPENTIN 300 MG: 300 CAPSULE ORAL at 00:29

## 2018-01-01 RX ADMIN — DEXMEDETOMIDINE HYDROCHLORIDE 1.5 MCG/KG/HR: 100 INJECTION, SOLUTION INTRAVENOUS at 17:19

## 2018-01-01 RX ADMIN — DEXMEDETOMIDINE HYDROCHLORIDE 0.5 MCG/KG/HR: 100 INJECTION, SOLUTION INTRAVENOUS at 12:59

## 2018-01-01 RX ADMIN — INSULIN GLARGINE 10 UNITS: 100 INJECTION, SOLUTION SUBCUTANEOUS at 21:32

## 2018-01-01 RX ADMIN — PIPERACILLIN AND TAZOBACTAM 4.5 G: 4; .5 INJECTION, POWDER, LYOPHILIZED, FOR SOLUTION INTRAVENOUS; PARENTERAL at 14:36

## 2018-01-01 RX ADMIN — AMIODARONE HYDROCHLORIDE 0.5 MG/MIN: 50 INJECTION, SOLUTION INTRAVENOUS at 14:54

## 2018-01-01 RX ADMIN — IPRATROPIUM BROMIDE AND ALBUTEROL SULFATE 3 ML: .5; 3 SOLUTION RESPIRATORY (INHALATION) at 15:13

## 2018-01-01 RX ADMIN — QUETIAPINE FUMARATE 50 MG: 25 TABLET ORAL at 17:13

## 2018-01-01 RX ADMIN — FENTANYL CITRATE 25 MCG/HR: 50 INJECTION, SOLUTION INTRAMUSCULAR; INTRAVENOUS at 14:57

## 2018-01-01 RX ADMIN — PIPERACILLIN AND TAZOBACTAM 4.5 G: 4; .5 INJECTION, POWDER, LYOPHILIZED, FOR SOLUTION INTRAVENOUS; PARENTERAL at 13:31

## 2018-01-01 RX ADMIN — INSULIN HUMAN 3 UNITS: 100 INJECTION, SOLUTION PARENTERAL at 00:49

## 2018-01-01 RX ADMIN — FENTANYL CITRATE 100 MCG: 50 INJECTION, SOLUTION INTRAMUSCULAR; INTRAVENOUS at 08:29

## 2018-01-01 RX ADMIN — FENTANYL CITRATE 100 MCG: 50 INJECTION, SOLUTION INTRAMUSCULAR; INTRAVENOUS at 19:05

## 2018-01-01 RX ADMIN — DEXMEDETOMIDINE HYDROCHLORIDE 1.2 MCG/KG/HR: 100 INJECTION, SOLUTION INTRAVENOUS at 08:35

## 2018-01-01 RX ADMIN — PIPERACILLIN AND TAZOBACTAM 4.5 G: 4; .5 INJECTION, POWDER, LYOPHILIZED, FOR SOLUTION INTRAVENOUS; PARENTERAL at 05:16

## 2018-01-01 RX ADMIN — MIDAZOLAM HYDROCHLORIDE 5 MG: 1 INJECTION, SOLUTION INTRAMUSCULAR; INTRAVENOUS at 02:30

## 2018-01-01 RX ADMIN — PROPOFOL 80 MCG/KG/MIN: 10 INJECTION, EMULSION INTRAVENOUS at 10:21

## 2018-01-01 RX ADMIN — FENTANYL CITRATE 100 MCG: 50 INJECTION, SOLUTION INTRAMUSCULAR; INTRAVENOUS at 07:48

## 2018-01-01 RX ADMIN — SENNOSIDES AND DOCUSATE SODIUM 2 TABLET: 8.6; 5 TABLET ORAL at 08:19

## 2018-01-01 RX ADMIN — IPRATROPIUM BROMIDE AND ALBUTEROL SULFATE 3 ML: .5; 3 SOLUTION RESPIRATORY (INHALATION) at 22:29

## 2018-01-01 RX ADMIN — FAMOTIDINE 20 MG: 20 TABLET, FILM COATED ORAL at 07:55

## 2018-01-01 RX ADMIN — OXYCODONE HYDROCHLORIDE 5 MG: 5 TABLET ORAL at 23:15

## 2018-01-01 RX ADMIN — ASPIRIN 81 MG: 81 TABLET, CHEWABLE ORAL at 09:42

## 2018-01-01 RX ADMIN — IPRATROPIUM BROMIDE AND ALBUTEROL SULFATE 3 ML: .5; 3 SOLUTION RESPIRATORY (INHALATION) at 18:48

## 2018-01-01 RX ADMIN — ALPRAZOLAM 1 MG: 1 TABLET ORAL at 16:27

## 2018-01-01 RX ADMIN — IPRATROPIUM BROMIDE AND ALBUTEROL SULFATE 3 ML: .5; 3 SOLUTION RESPIRATORY (INHALATION) at 22:39

## 2018-01-01 RX ADMIN — IPRATROPIUM BROMIDE AND ALBUTEROL SULFATE 3 ML: .5; 3 SOLUTION RESPIRATORY (INHALATION) at 07:10

## 2018-01-01 RX ADMIN — ACETAMINOPHEN 650 MG: 325 TABLET, FILM COATED ORAL at 22:33

## 2018-01-01 RX ADMIN — FENTANYL CITRATE 100 MCG: 50 INJECTION, SOLUTION INTRAMUSCULAR; INTRAVENOUS at 03:41

## 2018-01-01 ASSESSMENT — ENCOUNTER SYMPTOMS
FEVER: 0
COUGH: 1
DEPRESSION: 0
HEADACHES: 0
WHEEZING: 0
SORE THROAT: 0
SHORTNESS OF BREATH: 1
TINGLING: 0
DIZZINESS: 0
PHOTOPHOBIA: 0
VOMITING: 0
FOCAL WEAKNESS: 0
ABDOMINAL PAIN: 0
PALPITATIONS: 0
NAUSEA: 0
CHILLS: 0
MYALGIAS: 0
DIARRHEA: 0

## 2018-01-01 ASSESSMENT — PAIN SCALES - GENERAL
PAINLEVEL_OUTOF10: 0
PAINLEVEL_OUTOF10: 3
PAINLEVEL_OUTOF10: 3
PAINLEVEL_OUTOF10: 2
PAINLEVEL_OUTOF10: 2
PAINLEVEL_OUTOF10: 4

## 2018-01-01 ASSESSMENT — PATIENT HEALTH QUESTIONNAIRE - PHQ9
1. LITTLE INTEREST OR PLEASURE IN DOING THINGS: NOT AT ALL
2. FEELING DOWN, DEPRESSED, IRRITABLE, OR HOPELESS: NOT AT ALL
1. LITTLE INTEREST OR PLEASURE IN DOING THINGS: NOT AT ALL
SUM OF ALL RESPONSES TO PHQ9 QUESTIONS 1 AND 2: 0
2. FEELING DOWN, DEPRESSED, IRRITABLE, OR HOPELESS: NOT AT ALL
SUM OF ALL RESPONSES TO PHQ9 QUESTIONS 1 AND 2: 0

## 2018-01-01 ASSESSMENT — COPD QUESTIONNAIRES
DO YOU EVER COUGH UP ANY MUCUS OR PHLEGM?: NO/ONLY WITH OCCASIONAL COLDS OR INFECTIONS
COPD SCREENING SCORE: 5
DURING THE PAST 4 WEEKS HOW MUCH DID YOU FEEL SHORT OF BREATH: NONE/LITTLE OF THE TIME
HAVE YOU SMOKED AT LEAST 100 CIGARETTES IN YOUR ENTIRE LIFE: YES

## 2018-01-01 ASSESSMENT — LIFESTYLE VARIABLES
REASON UNABLE TO ASSESS: INTUBATED
EVER_SMOKED: YES
EVER_SMOKED: YES
REASON UNABLE TO ASSESS: INTUBATED
REASON UNABLE TO ASSESS: INTUBATED
EVER_SMOKED: YES

## 2018-01-02 NOTE — PROGRESS NOTES
CC: Follow-Up (Question re: DM, snacks for blood sugars. )        HPI:     Rosangela presents today for the followin. Type 2 diabetes mellitus with diabetic autonomic neuropathy, without long-term current use of insulin (CMS-[facility])  Dear long watching her diet. States her sugars are ranging between . No reports of recent hyperglycemia how she keeps snacks and glucose tablets with her if needed.    2. Chronic left shoulder pain/Chronic back pain, unspecified back location, unspecified back pain laterality/Neck muscle spasm/Chronic use of opiate drugs therapeutic purposes ORT=5  Chronic pain recheck:   Last pill 18  Chronic pain treated with norco    She  reports that she does not drink alcohol.  She  reports that she does not use drugs.    Consequences of Chronic Opiate therapy:  (5 A's)  Analgesia: Compared to no treatment or prior treatment, pain is currently improved  Activity: improved  Adverse Events: She denies constipation, dry mouth, itchy skin, nausea and sedation  Aberrant Behaviors: She reports she is taking medication as prescribed and is not veering from agreed treatment regimen. There have been no inappropriate refills or lost/stolen meds reported.   Affect/Mood: Pain is not impacting patient's mood.  Patient denies depression/anxiety.    Nonnarcotic treatments that are being used: Gabapentin and SSRI/SNRI.   Treatment goals discussed.    Opioid Risk Score: 8    Interpretation of Opioid Risk Score   Score 0-3 = Low risk of abuse. Do UDS at least once per year.  Score 4-7 = Moderate risk of abuse. Do UDS 1-4 times per year.  Score 8+ = High risk of abuse. Refer to specialist.    Last order of CONTROLLED SUBSTANCE TREATMENT AGREEMENT was found on 2017 from Office Visit on 2017     UDS Summary                URINE DRUG SCREEN Next Due 1/10/2018      [facility]     Patient has more history with this topic...        Most recent UDS reviewed today  "and is consistent with prescribed medications.    I have reviewed the medical records, the Prescription Monitoring Program and I have determined that controlled substance treatment is medically indicated.    6. Depressive disorder  Patient patient is doing really well on taking Zoloft. She likes it she feels her mood is \"normal\". Very happy with medication. Crying less    Current Outpatient Prescriptions   Medication Sig Dispense Refill   • gabapentin (NEURONTIN) 300 MG Cap Take 1 Cap by mouth 3 times a day. 270 Cap 3   • Hydrocodone-Acetaminophen 5-300 MG Tab Take 1-1.5 Tabs by mouth 3 times a day as needed for up to 30 days. 105 Tab 0   • lisinopril (PRINIVIL) 2.5 MG Tab TAKE ONE TABLET BY MOUTH ONCE DAILY 90 Tab 3   • metformin (GLUCOPHAGE) 1000 MG tablet TAKE ONE TABLET BY MOUTH TWICE DAILY WITH MEALS 180 Tab 3   • SLAVA CONTOUR NEXT TEST strip USE ONE STRIP TO CHECK GLUCOSE THREE TIMES DAILY 100 Strip 11   • alprazolam (XANAX) 1 MG Tab Take 1 Tab by mouth 3 times a day as needed for Anxiety. 70 Tab 2   • sertraline (ZOLOFT) 50 MG Tab Take 1 Tab by mouth every day. 90 Tab 0   • trazodone (DESYREL) 50 MG Tab Take 1 Tab by mouth at bedtime as needed for Sleep. 90 Tab 0   • rosuvastatin (CRESTOR) 20 MG Tab TAKE ONE TABLET BY MOUTH EVERY EVENING 90 Tab 3   • mupirocin calcium (BACTROBAN) 2 % Cream Apply 1 Application to affected area(s) 2 times a day. 1 Tube 5   • baclofen (LIORESAL) 10 MG Tab      • docusate sodium (COLACE) 100 MG Cap Take 1 Cap by mouth 2 times a day as needed for Constipation. 180 Cap 3   • ranitidine (ZANTAC) 150 MG Tab Take 150 mg by mouth 2 times a day.     • glipiZIDE SR (GLUCOTROL) 2.5 MG TABLET SR 24 HR TAKE ONE TABLET BY MOUTH ONCE DAILY 90 Tab 3   • Omega-3 Fatty Acids (FISH OIL PO) Take  by mouth.     • vitamin D, Ergocalciferol, (DRISDOL) 59927 UNITS Cap capsule Take 1 Cap by mouth every 7 days. 12 Cap 3   • aspirin (ASA) 81 MG CHEW Take 81 mg by mouth every day.     • therapeutic " "multivitamin-minerals (THERAGRAN-M) TABS Take 1 Tab by mouth every day.       No current facility-administered medications for this visit.      Social History   Substance Use Topics   • Smoking status: Former Smoker     Packs/day: 1.00     Years: 40.00     Types: Cigarettes     Quit date: 1/1/2006   • Smokeless tobacco: Never Used   • Alcohol use No     I reviewed patients allergies, problem list and medications today in [facility].    ROS: Any/all pertinent positives listed in the HPI, otherwise all others reviewed are negative today.      /66   Pulse 90   Temp 36.5 °C (97.7 °F)   Resp 16   Ht 1.6 m (5' 3\")   Wt 72.6 kg (160 lb)   SpO2 95%   BMI 28.34 kg/m²     Exam:    Gen: Alert and oriented, No apparent distress. WDWN  Psych: A+Ox3, normal affect and mood  Skin: Warm, dry and intact. Good turgor   No rashes in visible areas.  Eye: Conjunctiva clear, lids normal  ENMT: Lips without lesions, good dentition  Lungs: Clear to auscultation bilaterally, no rales or rhonchi   Unlabored respiratory effort.   CV: Regular rate and rhythm, S1, S2. No murmurs.   No Edema plan care A1c: 6.5    Assessment and Plan.   72 y.o. female with the following issues.    1. Type 2 diabetes mellitus with diabetic autonomic neuropathy, without long-term current use of insulin (CMS-[facility])/Chronic left shoulder pain/ Chronic back pain, unspecified back location, unspecified back pain laterality/ Neck muscle spasm/ Chronic use of opiate drugs therapeutic purposes ORT=5  Clinically stable. No reports of new symptoms. Well-controlled on current medication.  obtained/reviewed from state pharmacy database.  Medications found to be medically necessary/appropriate.  3 months of medication supply at this visit. Followup in the office for further refills.    - CONSENT FOR OPIATE PRESCRIPTION  - Hydrocodone-Acetaminophen 5-300 MG Tab; Take 1-1.5 Tabs by mouth 3 times a day as needed for up to 30 days.  Dispense: 105 Tab; Refill: 0  - " [facility] PAIN MANAGEMENT SCREEN; Future    6. Depressive disorder  Stable and doing well on Zoloft 50 mg. She is using less alprazolam and is wanting to and 65 pills per month when she follows up next the office

## 2018-02-14 NOTE — PROGRESS NOTES
CC: Follow-Up (Med management;)        HPI:     Rosangela presents today for the followin. Depressive disorder/Sedative, hypnotic or anxiolytic dependence (CMS-HCC)/JASMIN (generalized anxiety disorder)  Currently doing really well on Zoloft 50 mg. Feels better without the venlafaxine. She states her mood is much more steady. She feels that she is able to slowly decrease on the alprazolam likes good and 65 tabs a month, currently at 70 times a month. She is doing her best to take it only as needed often only half tab.    4. Chronic left shoulder pain/Chronic back pain, unspecified back location, unspecified back pain laterality/ Neck muscle spasm/ Chronic use of opiate drugs therapeutic purposes ORT=5  Chronic pain recheck:   Last dose of controlled substance: Toda  Take 3 times a day    She  reports that she does not drink alcohol.  She  reports that she does not use drugs.    Consequences of Chronic Opiate therapy:  (5 A's)  Analgesia: Compared to no treatment or prior treatment, pain is currently improved  Activity: improved  Adverse Events: She denies constipation, dry mouth, itchy skin, nausea and sedation  Aberrant Behaviors: She reports she is taking medication as prescribed and is not veering from agreed treatment regimen. There have been no inappropriate refills or lost/stolen meds reported.   Affect/Mood: Pain is not impacting patient's mood.  Patient denies depression/anxiety.    Nonnarcotic treatments that are being used: Gabapentin.   Treatment goals discussed.    Opioid Risk Score: No Value exists for the : [id number]    Interpretation of Opioid Risk Score   Score 0-3 = Low risk of abuse. Do UDS at least once per year.  Score 4-7 = Moderate risk of abuse. Do UDS 1-4 times per year.  Score 8+ = High risk of abuse. Refer to specialist.    Last order of CONTROLLED SUBSTANCE TREATMENT AGREEMENT was found on 2018 from Office Visit on 2018     UDS Summary                URINE DRUG SCREEN Next Due  7/2/2018      Done 1/2/2018 [facility] PAIN MANAGEMENT SCREEN     Patient has more history with this topic...        Most recent UDS reviewed today and is consistent with prescribed medications.    I have reviewed the medical records, the Prescription Monitoring Program and I have determined that controlled substance treatment is medically indicated.        Current Outpatient Prescriptions   Medication Sig Dispense Refill   • [START ON 4/29/2018] Hydrocodone-Acetaminophen 5-300 MG Tab Take 1-1.5 Tabs by mouth 3 times a day as needed for up to 30 days. 120 Tab 0   • ALPRAZolam (XANAX) 1 MG Tab Take 1 Tab by mouth 3 times a day as needed for Anxiety for up to 30 days. 65 Tab 2   • sertraline (ZOLOFT) 50 MG Tab TAKE ONE TABLET BY MOUTH EVERY DAY 90 Tab 0   • gabapentin (NEURONTIN) 300 MG Cap Take 1 Cap by mouth 3 times a day. 270 Cap 3   • lisinopril (PRINIVIL) 2.5 MG Tab TAKE ONE TABLET BY MOUTH ONCE DAILY 90 Tab 3   • metformin (GLUCOPHAGE) 1000 MG tablet TAKE ONE TABLET BY MOUTH TWICE DAILY WITH MEALS 180 Tab 3   • SLAVA CONTOUR NEXT TEST strip USE ONE STRIP TO CHECK GLUCOSE THREE TIMES DAILY 100 Strip 11   • trazodone (DESYREL) 50 MG Tab Take 1 Tab by mouth at bedtime as needed for Sleep. 90 Tab 0   • rosuvastatin (CRESTOR) 20 MG Tab TAKE ONE TABLET BY MOUTH EVERY EVENING 90 Tab 3   • mupirocin calcium (BACTROBAN) 2 % Cream Apply 1 Application to affected area(s) 2 times a day. 1 Tube 5   • baclofen (LIORESAL) 10 MG Tab      • docusate sodium (COLACE) 100 MG Cap Take 1 Cap by mouth 2 times a day as needed for Constipation. 180 Cap 3   • ranitidine (ZANTAC) 150 MG Tab Take 150 mg by mouth 2 times a day.     • glipiZIDE SR (GLUCOTROL) 2.5 MG TABLET SR 24 HR TAKE ONE TABLET BY MOUTH ONCE DAILY 90 Tab 3   • Omega-3 Fatty Acids (FISH OIL PO) Take  by mouth.     • vitamin D, Ergocalciferol, (DRISDOL) 56253 UNITS Cap capsule Take 1 Cap by mouth every 7 days. 12 Cap 3   • aspirin (ASA) 81 MG CHEW Take 81 mg by mouth every  "day.     • therapeutic multivitamin-minerals (THERAGRAN-M) TABS Take 1 Tab by mouth every day.       No current facility-administered medications for this visit.      Social History   Substance Use Topics   • Smoking status: Former Smoker     Packs/day: 1.00     Years: 40.00     Types: Cigarettes     Quit date: 1/1/2006   • Smokeless tobacco: Never Used   • Alcohol use No     I reviewed patients allergies, problem list and medications today in EPIC.    ROS: Any/all pertinent positives listed in the HPI, otherwise all others reviewed are negative today.      /70   Pulse 89   Temp 36.5 °C (97.7 °F)   Resp 16   Ht 1.6 m (5' 3\")   Wt 73 kg (161 lb)   SpO2 94%   Breastfeeding? No   BMI 28.52 kg/m²     Exam:    Gen: Alert and oriented, No apparent distress. WDWN  Psych: A+Ox3, normal affect and mood  Skin: Warm, dry and intact. Good turgor   No rashes in visible areas.  Eye: Conjunctiva clear, lids normal  ENMT: Lips without lesions, good dentition  Lungs: Clear to auscultation bilaterally, no rales or rhonchi   Unlabored respiratory effort.   CV: Regular rate and rhythm, S1, S2. No murmurs.   No Edema        Assessment and Plan.   72 y.o. female with the following issues.    1. Depressive disorder  / Sedative, hypnotic or anxiolytic dependence (CMS-HCC)/JASMIN (generalized anxiety disorder)   reviewed from state pharmacy database-Medications found to be medically necessary/appropriate.  Three-month supply was 65  - ALPRAZolam (XANAX) 1 MG Tab; Take 1 Tab by mouth 3 times a day as needed for Anxiety for up to 30 days.  Dispense: 65 Tab; Refill: 2    4. Chronic left shoulder pain/ Chronic back pain, unspecified back location, unspecified back pain laterality/. Neck muscle spasm/ Chronic use of opiate drugs therapeutic purposes ORT=5  Clinically stable. No reports of new symptoms. Well-controlled on current medication.  obtained/reviewed from state pharmacy database.  Medications found to be medically " necessary/appropriate.  3 months of medication supply at this visit. Followup in the office for further refills.  We will switch to 120 month. She understands she can take a maximum of 4 day. Generous prostate is at the same times of the sedating medications including alprazolam. We will continue this will be cautious about dependency over time  Her one existing prescription that was due to fill this month was called and canceled at the pharmacy. She was given 3 new prescription    - CONSENT FOR OPIATE PRESCRIPTION  - CONTROLLED SUBSTANCE TREATMENT AGREEMENT  - Hydrocodone-Acetaminophen 5-300 MG Tab; Take 1-1.5 Tabs by mouth 3 times a day as needed for up to 30 days.  Dispense: 120 Tab; Refill: 0

## 2018-05-22 NOTE — PROGRESS NOTES
Outcome: Left Message    Please transfer to Patient Outreach Team at [phone] when patient returns call.    WebIZ Checked & Epic Updated:  yes    [facility] Verified: yes    Attempt # 1

## 2018-06-10 PROBLEM — E87.5 HYPERKALEMIA: Status: ACTIVE | Noted: 2018-01-01

## 2018-06-10 PROBLEM — J15.8 PNEUMONIA DUE TO OTHER SPECIFIED BACTERIA (HCC): Status: ACTIVE | Noted: 2018-01-01

## 2018-06-10 PROBLEM — J96.01 ACUTE RESPIRATORY FAILURE WITH HYPOXIA (HCC): Status: ACTIVE | Noted: 2018-01-01

## 2018-06-10 PROBLEM — I10 HTN (HYPERTENSION): Status: ACTIVE | Noted: 2018-01-01

## 2018-06-10 PROBLEM — N17.9 AKI (ACUTE KIDNEY INJURY) (HCC): Status: ACTIVE | Noted: 2018-01-01

## 2018-06-10 PROBLEM — A41.9 SEVERE SEPSIS (HCC): Status: ACTIVE | Noted: 2018-01-01

## 2018-06-10 PROBLEM — R41.0 DELIRIUM: Status: ACTIVE | Noted: 2018-01-01

## 2018-06-10 PROBLEM — E78.5 HLD (HYPERLIPIDEMIA): Status: ACTIVE | Noted: 2018-01-01

## 2018-06-10 PROBLEM — E11.9 T2DM (TYPE 2 DIABETES MELLITUS) (HCC): Status: ACTIVE | Noted: 2018-01-01

## 2018-06-10 PROBLEM — R65.20 SEVERE SEPSIS (HCC): Status: ACTIVE | Noted: 2018-01-01

## 2018-06-10 PROBLEM — R57.9 SHOCK (HCC): Status: ACTIVE | Noted: 2018-01-01

## 2018-06-10 NOTE — ED PROVIDER NOTES
ED Provider Note    HPI: Patient is a 73-year-old female who presented to the emergency department by ambulance transfer June 9, 2018 at 7:26 PM with a chief complaint shortness of breath.    Patient is a increasing shortness of breath for the last 3 days. She denied chest pain. No fever or cough. No headache no neck stiffness no photophobia. No change in bladder or bowel habits. No nausea no vomiting. No leg pain or leg swelling. No other somatic complaints. The patient was noted to be markedly hypoxic on arrival was immediately placed on oxygen. She reports feeling better with the oxygen treatment.    Review of Systems: Positive for shortness of breath worse over the last 3 days negative for chest pain fever cough headache neck stiffness photophobia change in bladder or bowel habits nausea vomiting leg pain and leg swelling. Review of systems reviewed with patient, all other systems negative    Past medical/surgical history: Diabetes arthritis possible bipolar disorder cardiac arrhythmia heartburn CVA elevated cholesterol hypertension    Medications:  Aspirin Zantac Colace baclofen Crestor trazodone Glucophage lisinopril Neurontin glipizide Xanax    Allergies:  Keflex    Social History: 40-pack-year history of smoking no alcohol use      Physical exam: Constitutional: Pleasant female awake alert   Vital signs: Temperature 98.4 pulse 108 respirations 33 pulse oximetry 98% on 15 L 81% on room air blood pressure 140/59  EYES: PERRL, EOMI, Conjunctivae and sclera normal, eyelids normal bilaterally.  Neck: Trachea midline. No cervical masses seen or palpated. Normal range of motion, supple. No meningeal signs elicited.  Cardiac: Slightly tachycardic Regular rate and rhythm. S1-S2 present. No S3 or S4 present. No murmurs, rubs, or gallops heard. No edema or varicosities were seen.   Lungs: Tachypnic. Coarse breath sounds diffusely No wheezes, rales, or rhonchi heard. Patient's chest wall moved symmetrically with each  respiratory effort. Patient was not making use of accessory muscles of respiration in breathing.  Abdomen: Soft nontender to palpation. No rebound or guarding elicited. No organomegaly identified. No pulsatile abdominal masses identified.   Musculoskeletal:  no  pain with palpitation or movement of muscle, bone or joint , no obvious musculoskeletal deformities identified.  Neurologic: alert and awake answers questions appropriately. Moves all four extremities independently, no gross focal abnormalities identified. Normal strength and motor.  Skin: no rash or lesion seen, no palpable dermatologic lesions identified.  Psychiatric: not anxious, delusional, or hallucinating.    Medical decision making:  Laboratory studies were obtained (please see lab sheet for all results) significant findings included a markedly elevated white count of 21.7 with a marked shift in the differential of 81% neutrophils 17.6% absolute neutrophils and 1.7 and a granulocytes consistent with infection. Mild acidosis is present with a bicarb of 18. Renal insufficiency is present with a BUN of 68 and a creatinine of 2.71. Potassium elevated at 6.5. BNP slightly elevated at 218.    Chest x-ray obtained; diffuse interstitial prominence with hazy opacities noted in both lungs.    Given the results on laboratory evaluation indicating some degree of acidosis and renal insufficiency possibly secondary to dehydration patient is given a liter of 0.9 normal saline.    Recheck after the liter 0.9 normal saline was given showed the patient subjectively feeling better but her heart rate remained somewhat elevated.    Given the presentation with hypoxia and a markedly elevated white count, the patient met sepsis criteria and was started on antibiotics per pharmacy protocol. Blood cultures obtained.    EKG obtained (interpretation) lead EKG sinus tachycardia rate 104. Morphology P waves QRS complexes T waves unremarkable. No evidence of ST elevation or  depression. R-wave progression normal. Interpreted as unremarkable EKG for acute findings    Patient admitted by hospitalist service. Her renal insufficiency may be secondary to dehydration    Further care and hospital course per attending physician summary    Impression 1) hypoxia  2) pneumonia  3) renal insufficiency  4) dehydration

## 2018-06-10 NOTE — PROCEDURES
Procedure Note    Date: 6/10/2018  Time: 0845    Procedure: Central Venous Line placement  Site: R IJ vein    Indication: Shock needing vasopressor support, CVP monitoring  Consent: Informed consent obtained from patient or designated decision maker after explaining the benefits/risks of the procedure including but not limited to bleeding, infection, nerve or other deep structure injury, pneumothorax/hemothorax, arrythmia, or death. Patient or surrogate expressed understanding and agreement and signed consent which can be found in the patient's chart.    Procedure: After obtaining consent, a time-out was performed. Appropriate site confirmed with ultrasound and patient positioned, prepped, and draped in sterile fashion. All those present wearing cap and mask and those physically participating remained adhering to sterile fashion with cap, mask, gloves, and gown. 5 mL of local anesthetic injected (1% lidocaine without epinephrine) achieving appropriate comfort level for patient. Vein localized and accessed using continuous ultrasound guidance and a 7 Fr Triple lumen catheter placed using Seldinger technique. Able to aspirate dark, non-pulsatile blood through each lumen and sterile saline flushed easily before capping. Line secured and dressed in sterile fashion. Patient tolerated procedure well without any difficulties and remains in care of bedside nurse. CXR will be performed to confirm appropriate placement for internal jugular or subclavian CVLs.    EBL: minimal  Complications: none  CXR:  appropriately positioned tip of catheter without pneumothorax    Jeremy Gonda, MD  Critical Care Medicine

## 2018-06-10 NOTE — H&P
Hospital Medicine History and Physical    Date of Service  6/9/2018    Chief Complaint  Chief Complaint   Patient presents with   • Shortness of Breath       History of Presenting Illness  73 y.o. female who presented on 6/9/2018 with shortness of breath. Patient reports that she quit smoking 10 years ago but does carry a 40-pack-year history prior to this. She states that for the last week, she has had worsening shortness of breath associated with a nonproductive cough. She denies any rhinorrhea, headache, chest pain, abdominal pain, diarrhea or dysuria. She's had no fevers or chills but has had some malaise and fatigue. She denies any recent travel, sick contacts, new paints or carpets or other new environmental changes in the house. Patient does not use any oxygen at baseline. Upon assessment in the emergency room, the patient is noted to be hypoxic requiring up to a 15 L nonrebreather mask.     Primary Care Physician  RODRIGO Humphries.    Consultants  Dr. Barrientos, pulmonary medicine    Code Status  Full    Review of Systems  Review of Systems   Constitutional: Positive for malaise/fatigue. Negative for chills and fever.   HENT: Negative for congestion and sore throat.    Eyes: Negative for photophobia.   Respiratory: Positive for cough and shortness of breath. Negative for wheezing.    Cardiovascular: Negative for chest pain and palpitations.   Gastrointestinal: Negative for abdominal pain, diarrhea, nausea and vomiting.   Genitourinary: Negative for dysuria.   Musculoskeletal: Negative for myalgias.   Skin: Negative.    Neurological: Negative for dizziness, tingling, focal weakness and headaches.   Psychiatric/Behavioral: Negative for depression and suicidal ideas.        Past Medical History  Past Medical History:   Diagnosis Date   • Heart burn 2015    on zantac   • Arrhythmia     notation of A fib in her chart   • Arthritis    • Breath shortness     2015 denies    • CATARACT     bela iol   • Dental  disorder     upper and lower dentures   • Diabetes     oral meds   • Hyperlipidemia    • Hypertension    • Indigestion    • Psychiatric problem     poss bi polar per pt   • Stroke (HCC)     aneursym rupture        Surgical History  Past Surgical History:   Procedure Laterality Date   • RECOVERY  7/8/2015    Procedure: IR2-CEREBRAL ANGIOGRAM-;  Surgeon: Recoveryonly Surgery;  Location: SURGERY PRE-POST PROC UNIT [facility];  Service:    • RECOVERY  2/25/2014    Performed by [facility] at SURGERY SAME DAY [facility]S   • RECOVERY  7/23/2013    Performed by [facility] at SURGERY [facility] ORS   • CATARACT PHACO WITH IOL  12/19/2011    Performed by YANY COLEY at SURGERY SURGICAL [facility] ORS   • CATARACT PHACO WITH IOL  11/21/2011    Performed by YANY COLEY at SURGERY SURGICAL [facility] ORS   • HYSTERECTOMY, TOTAL ABDOMINAL      with bso done 1 week after her c/s   • OTHER      cerebral aneurysm rupture with coil   • PRIMARY C SECTION         Medications  No current facility-administered medications on file prior to encounter.      Current Outpatient Prescriptions on File Prior to Encounter   Medication Sig Dispense Refill   • ALPRAZolam (XANAX) 1 MG Tab TAKE 1 TABLET BY MOUTH THREE TIMES DAILY AS NEEDED FOR ANXIETY FOR UP TO 30 DAYS**F41.1** 65 Tab 2   • [START ON 7/28/2018] Hydrocodone-Acetaminophen 5-300 MG Tab Take 1-1.5 Tabs by mouth 3 times a day as needed for up to 30 days. 120 Tab 0   • glipiZIDE SR (GLUCOTROL) 2.5 MG TABLET SR 24 HR TAKE ONE TABLET BY MOUTH ONCE DAILY 90 Tab 3   • sertraline (ZOLOFT) 50 MG Tab Take 1 Tab by mouth every day. 90 Tab 0   • gabapentin (NEURONTIN) 300 MG Cap Take 1 Cap by mouth 3 times a day. 270 Cap 3   • lisinopril (PRINIVIL) 2.5 MG Tab TAKE ONE TABLET BY MOUTH ONCE DAILY 90 Tab 3   • metformin (GLUCOPHAGE) 1000 MG tablet TAKE ONE TABLET BY MOUTH TWICE DAILY WITH MEALS 180 Tab 3   • SLAVA CONTOUR NEXT TEST strip USE ONE STRIP TO CHECK GLUCOSE THREE  TIMES DAILY 100 Strip 11   • trazodone (DESYREL) 50 MG Tab Take 1 Tab by mouth at bedtime as needed for Sleep. 90 Tab 0   • rosuvastatin (CRESTOR) 20 MG Tab TAKE ONE TABLET BY MOUTH EVERY EVENING 90 Tab 3   • mupirocin calcium (BACTROBAN) 2 % Cream Apply 1 Application to affected area(s) 2 times a day. 1 Tube 5   • baclofen (LIORESAL) 10 MG Tab      • docusate sodium (COLACE) 100 MG Cap Take 1 Cap by mouth 2 times a day as needed for Constipation. 180 Cap 3   • ranitidine (ZANTAC) 150 MG Tab Take 150 mg by mouth 2 times a day.     • Omega-3 Fatty Acids (FISH OIL PO) Take  by mouth.     • aspirin (ASA) 81 MG CHEW Take 81 mg by mouth every day.     • therapeutic multivitamin-minerals (THERAGRAN-M) TABS Take 1 Tab by mouth every day.         Family History  Family History   Problem Relation Age of Onset   • Heart Disease Mother      chf   • Cancer Mother      chronic lymphoma   • Diabetes Mother    • Cancer Sister      lung ca   • Other Brother      brain aneursym       Social History  Social History   Substance Use Topics   • Smoking status: Former Smoker     Packs/day: 1.00     Years: 40.00     Types: Cigarettes     Quit date: 2006   • Smokeless tobacco: Never Used   • Alcohol use No       Allergies  Allergies   Allergen Reactions   • Keflex         Physical Exam  Laboratory   Hemodynamics  Temp (24hrs), Av.9 °C (98.4 °F), Min:36.9 °C (98.4 °F), Max:36.9 °C (98.4 °F)   Temperature: 36.9 °C (98.4 °F)  Pulse  Av.5  Min: 99  Max: 108 Heart Rate (Monitored): (!) 101  Blood Pressure : 140/59, NIBP: 141/72      Respiratory      Respiration: (!) 21, Pulse Oximetry: 93 %     Work Of Breathing / Effort: Tachypnea  RUL Breath Sounds: Crackles, RML Breath Sounds: Crackles, RLL Breath Sounds: Diminished, DEIDRA Breath Sounds: Diminished, LLL Breath Sounds: Diminished    Physical Exam   Constitutional: She is oriented to person, place, and time. No distress.   HENT:   Head: Normocephalic and atraumatic.   Right Ear:  External ear normal.   Left Ear: External ear normal.   Eyes: EOM are normal. Right eye exhibits no discharge. Left eye exhibits no discharge.   Neck: Neck supple. No JVD present.   Cardiovascular: Regular rhythm and normal heart sounds.    Tachycardia   Pulmonary/Chest: She is in respiratory distress. She exhibits no tenderness.   Bilateral rhonchi, coarse breath sounds diffusely. Increased work of breathing but speaking in full sentences.   Abdominal: Soft. Bowel sounds are normal. She exhibits no distension. There is no tenderness.   Musculoskeletal: Normal range of motion. She exhibits no edema.   Neurological: She is alert and oriented to person, place, and time. No cranial nerve deficit.   Skin: Skin is warm and dry. She is not diaphoretic. No erythema.   Psychiatric: She has a normal mood and affect. Her behavior is normal.   Nursing note and vitals reviewed.    Capillary refill less than 3 seconds, distal pulses intact    Recent Labs      06/09/18 1947   WBC  21.7*   RBC  3.67*   HEMOGLOBIN  10.7*   HEMATOCRIT  33.3*   MCV  90.7   MCH  29.2   MCHC  32.1*   RDW  50.2*   PLATELETCT  465*   MPV  10.3     Recent Labs      06/09/18 1947   SODIUM  136   POTASSIUM  6.5*   CHLORIDE  105   CO2  18*   GLUCOSE  175*   BUN  68*   CREATININE  2.71*   CALCIUM  10.2     Recent Labs      06/09/18 1947   ALTSGPT  40   ASTSGOT  51*   ALKPHOSPHAT  91   TBILIRUBIN  0.4   GLUCOSE  175*         Recent Labs      06/09/18 1947   BNPBTYPENAT  218*         Lab Results   Component Value Date    TROPONINI <0.01 07/05/2013       Imaging  Dx-chest-limited (1 View)    Result Date: 6/9/2018 6/9/2018 7:39 PM HISTORY/REASON FOR EXAM:  Shortness of Breath TECHNIQUE/EXAM DESCRIPTION AND NUMBER OF VIEWS: Single portable view of the chest. COMPARISON: 3/30/2017 FINDINGS: Diffuse interstitial prominence and hazy opacity throughout both lungs. Unchanged calcified granuloma in the right midlung. No pleural effusion. No pneumothorax. Stable  cardiopericardial silhouette.     Diffuse interstitial prominence and hazy opacity throughout both lungs could relate to moderate pulmonary edema.     Assessment/Plan     Anticipate that patient will need greater than 2 midnights for management of the discussed medical issues.    * Severe sepsis (HCC)   Assessment & Plan    Patient is tachycardic, she is tachypneic, she has leukocytosis, she is hypoxic, and she has acute kidney injury indicative of severe sepsis. Fortunately she is afebrile and her vital signs are currently stable with a normal blood pressure. I will check a lactic acid level. Suspected etiology would be infection in the lung therefore I will check a sputum culture to monitor for speciation and sensitivities. I will start her on broad-spectrum antibiotic therapy to cover for community acquire and also check a pro-calcitonin leve She does carry a 40 pack year history of tobacco abuse but quit 10 years ago. She currently does not have any wheezing on bedside exam therefore I'm holding off on steroids. Patient does not use any oxygen at baseline and she currently requires a 10 L nonrebreather mask. I'm concerned that she will decompensate and require high flow oxygen, BiPAP, or possible intubation. Therefore I have consulted Dr. Barrientos of pulmonary medicine and I appreciate his assistance.        SY (acute kidney injury) ([facility])   Assessment & Plan    No history of renal disease, I do believe that this is prerenal secondary to infection. I will check electrolytes, continue IV fluids and treat infection as noted above.        Acute respiratory failure with hypoxia (HCC)   Assessment & Plan    Treatment as noted above.        HTN (hypertension)   Assessment & Plan    Holding home Prinivil in light of renal injury, monitor and provide when necessary IV antihypertensives.        HLD (hyperlipidemia)   Assessment & Plan    This is chronic and stable, okay to continue home Crestor and aspirin.        T2DM  (type 2 diabetes mellitus) (HCC)   Assessment & Plan    Holding oral hypoglycemics, monitor with Accu-Cheks and cover with insulin sliding scale.          Prophylaxis: Heparin for DVT prophylaxis, no PPI indicated, bowel protocol as needed    I spent a total of 35 minutes of critical care time during this clinical encounter of which > 50% was devoted to counseling and coordinating care including review of records, pertinent lab data and studies, as well as discussing diagnostic evaluation and work up, planned therapeutic interventions and future disposition of care. Where indicated, the assessment and plan reflect discussion of patient with consultants, other healthcare providers, family members, and additional research needed to obtain further information in formulating the plan of care of this patient. This time includes no procedures or overlap with other providers.

## 2018-06-10 NOTE — PROGRESS NOTES
Report received from ER nurse Tata pt transported to T612, no s/s of distress, vitals stable, personal belongings and chart w/ pt.

## 2018-06-10 NOTE — PROGRESS NOTES
Pulmonary Critical Care Progress Note        DOA: 6/9/2018    Chief Complaint: SOB    History of Present Illness: 73 y.o. female with known past   medical history of type 2 diabetes, questionable bipolar disorder, history of   CVA, dyslipidemia.  The patient indicates that she was in her normal state of   health up until approximately 2 days ago, at which point in time, she had   increasing shortness of breath.  She denied any significant fever, chills.    Denied any headache, visual changes, has not had any cough or sputum   production.  No nausea, vomiting, abdominal pain or discomfort.  Has   intermittent chronic diarrhea, nothing more than usual.  Denies any dysuria or   lower extremity edema.  Further, she denies any sick contacts or recent   travels.  No history of DVTs or PEs in the past.  She indicates that the   shortness of breath has been progressive with increasing dyspnea on exertion.    She does not associate with positioning being any worse or better.  She could   lie flat and just as dyspneic as sitting up forward.  She denies any change   in her medications.  She does have tobacco history, which she quit about 1   year ago, smoking 1 pack a day x40 years.  Denies any alcohol or illicit drug   use.  She further denies any underlying heart disease or history of MI in the   past.       ROS:  Respiratory: unable to perform due to the patient's inability to effectively communicate, Cardiac: unable to perform due to the patient's inability to effectively communicate, GI: unable to perform due to the patient's inability to effectively communicate.      Interval Events:  24 hour interval history reviewed    - Patient went into further respiratory failure this morning on maximum high flow nasal cannula with developing hypercapnia, confusion, hypertension    - intubated and started on full mechanical ventilatory support, central line placed and started on norepinephrine infusion   - worsening CXR,  leukocytosis and fever   - Hgb low at 10.7 without signs of bleeding   - improving SY, persistent combined metabolic and resp acidosis   - lactate worsening prior to intubation   - cultures NGTD    PFSH:  No change.    Respiratory:   HFNC 65 lpm , 100% FiO2 --> CMV with RR 24, TV 6 ml/Kg IBW, PEEP 10, 100 FiO2  Pulse Oximetry: 93 %          Exam: tachypnea, labored breathing, rhonchi throughout all lung fields and diminished breath sounds mild  ImagingCXR  I have personally reviewed the chest x-ray my impression is (Compared to prior film) unchanged diffuse bilateral infiltrates with consolidations in both lower lobes, right upper lobe nodule, endotracheal tube 1.5 cm above the veronica, gastric tube and right IJ central venous catheter in good position without pneumothorax  Recent Labs      06/10/18   0042  06/10/18   0536   ISTATAPH  7.400  7.339*   ISTATAPCO2  28.6  26.9   ISTATAPO2  53*  52*   ISTATATCO2  19*  15*   [id number]  88*  85*   ISTATARTHCO3  17.7  14.5*   ISTATARTBE  -6*  -10*   ISTATTEMP  98.2 F  97.5 F   ISTATFIO2  88  100   ISTATSPEC  Arterial  Arterial   ISTATAPHTC  7.404  7.348*   [id number]  52*  50*   ABG interpretation: Improving combined respiratory and metabolic acidosis with poor oxygenation    HemoDynamics:  Pulse: 88, Heart Rate (Monitored): 88  Blood Pressure : 139/88, NIBP: 114/45   Norepinephrine infusion at 6    Exam: Trace lower extremity edema, peripheral cyanosis, frequent ectopy, regular rhythm (Sinus), tachycardia  Imaging: Available data reviewed  Recent Labs      06/09/18   1947   CPKTOTAL  92   TROPONINI  0.06*   BNPBTYPENAT  218*       Neuro:  GCS  14, now sedated on propofol drip       Exam: no focal deficits noted Agitated Delirious Confused  Imaging: Available data reviewed    Fluids:  Intake/Output       06/08/18 0700 - 06/09/18 0659 (Not Admitted) 06/09/18 0700 - 06/10/18 0659 06/10/18 0700 - 06/11/18 0659      2400-6605 2862-5876 Total 1410-6140 6718-0639 Total  6312-8671 5318-3059 Total       Intake    P.O.  --  -- --  --  50 50  --  -- --    P.O. -- -- -- -- 50 50 -- -- --    Total Intake -- -- -- -- 50 50 -- -- --       Output    Urine  --  -- --  --  1130 1130  --  -- --    Number of Times Voided -- -- -- -- 7 x 7 x -- -- --    Urine Void (mL) (non-catheter) -- -- -- -- 1130 1130 -- -- --    Stool  --  -- --  --  -- --  --  -- --    Number of Times Stooled -- -- -- -- 2 x 2 x -- -- --    Total Output -- -- -- -- 1130 1130 -- -- --       Net I/O     -- -- -- -- -1080 -1080 -- -- --        Weight: 74.2 kg (163 lb 9.3 oz)  Recent Labs      06/09/18   1947  06/10/18   012   SODIUM  136  136   POTASSIUM  6.5*  5.3   CHLORIDE  105  108   CO2  18*  18*   BUN  68*  53*   CREATININE  2.71*  1.82*   CALCIUM  10.2  9.3       GI/Nutrition:  Exam: abdomen is soft and non-tender, normal active bowel sounds  Imaging: Available data reviewed  NPO  Liver Function  Recent Labs      06/09/18   1947  06/10/18   012   ALTSGPT  40  36   ASTSGOT  51*  49*   ALKPHOSPHAT  91  79   TBILIRUBIN  0.4  0.3   GLUCOSE  175*  118*       Heme:  Recent Labs      06/09/18   1947  06/10/18   0450   RBC  3.67*  3.65*   HEMOGLOBIN  10.7*  10.7*   HEMATOCRIT  33.3*  33.7*   PLATELETCT  465*  395       Infectious Disease:  Temp  Av.8 °C (98.2 °F)  Min: 36.4 °C (97.6 °F)  Max: 36.9 °C (98.5 °F)  Micro: reviewed.  No growth to date  Recent Labs      06/09/18   1947  06/10/18   0120  06/10/18   0450   WBC  21.7*   --   23.8*   NEUTSPOLYS  81.40*   --    --    LYMPHOCYTES  10.20*   --    --    MONOCYTES  5.90   --    --    EOSINOPHILS  0.20   --    --    BASOPHILS  0.60   --    --    ASTSGOT  51*  49*   --    ALTSGPT  40  36   --    ALKPHOSPHAT  91  79   --    TBILIRUBIN  0.4  0.3   --      Current Facility-Administered Medications   Medication Dose Frequency Provider Last Rate Last Dose   • ipratropium-albuterol (DUONEB) nebulizer solution  3 mL Q4H PRN (RT) Davide Barrientos M.D.       • 1/2 NS infusion    Continuous Davide Barrientos M.D. 75 mL/hr at 06/10/18 0422     • haloperidol lactate (HALDOL) injection 5 mg  5 mg Q4HRS PRN Davide Barrientos M.D.   5 mg at 06/10/18 0422   • dexmedetomidine (PRECEDEX) 400 mcg in D5W 100 mL infusion  0.1-1.5 mcg/kg/hr Continuous Davide Barrientos M.D. 27.8 mL/hr at 06/10/18 0655 1.5 mcg/kg/hr at 06/10/18 0655   • sertraline (ZOLOFT) tablet 50 mg  50 mg QDAY Sherrell Higgins M.D.   50 mg at 06/10/18 0028   • rosuvastatin (CRESTOR) tablet 20 mg  20 mg Q EVENING Sherrell Higgins M.D.   20 mg at 06/10/18 0029   • gabapentin (NEURONTIN) capsule 300 mg  300 mg TID Sherrell Higgins M.D.   300 mg at 06/10/18 0548   • aspirin (ASA) chewable tab 81 mg  81 mg DAILY Sherrell Higgins M.D.       • ALPRAZolam (XANAX) tablet 1 mg  1 mg BID PRN Sherrell Higgins M.D.       • senna-docusate (PERICOLACE or SENOKOT S) 8.6-50 MG per tablet 2 Tab  2 Tab BID Sherrell Higgins M.D.   2 Tab at 06/10/18 0030    And   • polyethylene glycol/lytes (MIRALAX) PACKET 1 Packet  1 Packet QDAY PRN Sherrell Higgins M.D.        And   • magnesium hydroxide (MILK OF MAGNESIA) suspension 30 mL  30 mL QDAY PRN Sherrell Higgins M.D.        And   • bisacodyl (DULCOLAX) suppository 10 mg  10 mg QDAY PRN Sherrell Higgins M.D.       • Respiratory Care per Protocol   Continuous RT Sherrell Higgins M.D.       • NS (BOLUS) infusion 500 mL  500 mL Once PRN Sherrell Higgins M.D.       • heparin injection 5,000 Units  5,000 Units Q8HRS Sherrell Higgins M.D.   5,000 Units at 06/10/18 0548   • acetaminophen (TYLENOL) tablet 650 mg  650 mg Q6HRS PRN Sherrell Higgins M.D.       • ampicillin/sulbactam (UNASYN) 3 g in  mL IVPB  3 g Q12HRS Sherrell Higgins M.D.       • azithromycin (ZITHROMAX) tablet 250 mg  250 mg Q24HRS Sherrell Higgins M.D.       • insulin glargine (LANTUS) injection 15 Units  0.2 Units/kg/day Q EVENING Sherrell Higgins M.D.   Stopped at 06/09/18 2200    And   • insulin lispro (HUMALOG) injection 5 Units  0.2 Units/kg/day TID AC  Sherrell Higgins M.D.   5 Units at 06/10/18 0650    And   • insulin lispro (HUMALOG) injection 2-9 Units  2-9 Units 4X/DAY ACHS Sherrell Higgins M.D.   2 Units at 06/10/18 0657    And   • glucose 4 g chewable tablet 16 g  16 g Q15 MIN PRN Sherrell Higgins M.D.        And   • dextrose 50% (D50W) injection 25 mL  25 mL Q15 MIN PRN Sherrell Higgins M.D.       • guaiFENesin dextromethorphan (ROBITUSSIN DM) 100-10 MG/5ML syrup 10 mL  10 mL Q6HRS PRN Sherrell Higgins M.D.       • dextrose 50% (D50W) injection 50 mL  50 mL Once Sherrell Higgins M.D.   Stopped at 06/09/18 2330   • insulin regular (HUMULIN R) injection 5 Units  5 Units Once Sherrell Higgins M.D.   Stopped at 06/09/18 2330     Last reviewed on 6/9/2018 10:32 PM by Ana Purcell [facility]    Quality  Measures:  Medications reviewed, Labs reviewed and Radiology images reviewed  Jamison catheter: Critically Ill - Requiring Accurate Measurement of Urinary Output      DVT Prophylaxis: Heparin  DVT prophylaxis - mechanical: SCDs  Ulcer prophylaxis: Yes  Antibiotics: Treating active infection/contamination beyond 24 hours perioperative coverage  Assessed for rehab: Patient unable to tolerate rehabilitation therapeutic regimen      Assessment/Plan:  Acute hypoxic/hypercarbic respiratory failure - intubated 6/10    -continue full vent support, wean FiO2   -RT/O2 protocols   -Propofol and fentanyl drips for sedation/analgesia   -Eventual diuresis although currently resuscitated with fluids  Community-acquired pneumonia    -  continue antibiotics, follow-up on cultures  Septic shock from pulmonary source with associated cardiac and respiratory failure   -Continue broad-spectrum antibiotics, sepsis protocol, norepinephrine infusion to maintain map greater than 65   -Trend CVP with goal 10-12   -Echocardiogram pending  Normocytic anemia   - Monitor with conservative transfusion strategy   Hyperkalemia -improving, monitor  Acute kidney injury, ATN/prerenal   -Avoid nephrotoxins,  monitor urine output  Mildly elevated troponin - trend  Type 2 diabetes - SSI  Dyslipidemia - statin  Thrombocytosis - improving  History of tobacco use, 40 pack years, quit a year ago   - BDs prn, consider steroids if bronchospasm develops  Lactic acidosis - trend  Acute metabolic encephalopathy - limit sedatives when stabilized and monitor  Prophylaxis, start enteral nutrition    Patient is critically ill today requiring my active management of her mechanical ventilatory support, sepsis management, vasopressor drip.  I updated family at bedside and discussed goals of care as this is her third intubation for various conditions.  And they feel she would be okay with full life support for a time-limited trial.    Discussed patient condition and risk of morbidity and/or mortality with Family, RN, RT, Pharmacy, Charge nurse / hot rounds, Patient and hospitalist.    The patient remains critically ill.  Critical care time = 89 minutes in directly providing and coordinating critical care and extensive data review.  No time overlap and excludes procedures.

## 2018-06-10 NOTE — ASSESSMENT & PLAN NOTE
Monitor daily ABG, CXR, vitals, strict I/O's  Pulmonary consulting  RT protocol  Wean sedation as able

## 2018-06-10 NOTE — CONSULTS
DATE OF SERVICE:  06/09/2018    PULMONARY CRITICAL CARE CONSULTATION    REQUESTING PHYSICIAN:  Sherrell Higgins MD    REASON FOR REQUEST:  Acute hypoxic respiratory failure.    HISTORY OF PRESENT ILLNESS:  This is a 73-year-old female with known past   medical history of type 2 diabetes, questionable bipolar disorder, history of   CVA, dyslipidemia.  The patient indicates that she was in her normal state of   health up until approximately 2 days ago, at which point in time, she had   increasing shortness of breath.  She denied any significant fever, chills.    Denied any headache, visual changes, has not had any cough or sputum   production.  No nausea, vomiting, abdominal pain or discomfort.  Has   intermittent chronic diarrhea, nothing more than usual.  Denies any dysuria or   lower extremity edema.  Further, she denies any sick contacts or recent   travels.  No history of DVTs or PEs in the past.  She indicates that the   shortness of breath has been progressive with increasing dyspnea on exertion.    She does not associate with positioning being any worse or better.  She could   lie flat and just as dyspneic as sitting up forward.  She denies any change   in her medications.  She does have tobacco history, which she quit about 1   year ago, smoking 1 pack a day x40 years.  Denies any alcohol or illicit drug   use.  She further denies any underlying heart disease or history of MI in the   past.    ALLERGIES:  KEFLEX.    OUTPATIENT MEDICATIONS:  Xanax, glipizide, Zoloft, Neurontin, Prinivil,   Glucophage, Crestor, docusate, melatonin, Zantac, and aspirin.    FAMILY HISTORY:  Significant for lung cancer and heart disease in father.    PAST MEDICAL HISTORY:  As indicated above in HPI.    SOCIAL HISTORY:  A 40-pack-year history of tobacco use, quit 1 year ago.    Denies any alcohol or illicit drug use.    REVIEW OF SYSTEMS:  As indicated above in the HPI, otherwise fully reviewed   and negative.    PHYSICAL  EXAMINATION:  VITAL SIGNS:  Temperature 98.4, pulse rate 98, blood pressure 139/88, satting   93% on 10 L nonrebreather.  GENERAL:  The patient appears in moderate distress, appears stated age, well   nourished.  HEENT:  Normocephalic, atraumatic.  Anicteric.  CARDIOVASCULAR:  Regular rate and rhythm.  RESPIRATORY:  Bibasilar rales as well as scattered crepitations in the upper   lobes.  No wheezes or rhonchi.  Mildly diminished.  ABDOMEN:  Soft, nontender and nondistended.  Positive bowel sounds.  EXTREMITIES:  Without any edema.  NEUROLOGIC:  Cranial nerves II through XII grossly intact.  PSYCHIATRIC:  Normal affect and behavior.    LABORATORY RESULTS:  White count 21, H and H of 10 and 33, platelet count 465,    positive left shift, no bands.  Sodium 136, potassium 6.5, chloride 105,   bicarbonate 18, anion gap 13, glucose 175, BUN 68, creatinine 2.71.  AST 51,   ALT 40, alkaline phosphatase 91, total bilirubin 0.4, CPK 92.  Troponin 0.06.    BNP of 218.    IMAGING:  Chest x-ray appears with diffuse bilateral patchy hazy opacities,   likely pulmonary edema versus atypical pneumonia.    EKG; sinus tachycardia with a rate of 104, QTc is 405, no significant ST-T   wave changes.    ASSESSMENT AND PLAN:  1.  Acute hypoxic respiratory failure.  2.  Leukocytosis with left shift.  3.  Normocytic anemia.  4.  Hyperkalemia.  5.  Acute kidney injury.  6.  Mildly elevated troponin.  7.  Type 2 diabetes.  8.  Dyslipidemia.  9.  History of tobacco use, 40 pack years, quit a year ago.    PLAN:  Again, this is a 73-year-old female with multiple comorbidities,   presenting with new-onset dyspnea on exertion, shortness of breath, and   hypoxic respiratory failure x2 days, currently on 10 L nonrebreather.  At the   present time, etiology is being worked up as infectious versus cardiac versus   other.  Has marginally elevated BNP and troponin.  I performed bedside   ultrasound of the heart, which showed significant respiratory  variance and   collapse in the IVC suggesting volume depletion equally evaluating the LV,   hyperdynamic, further suggesting dry fluid status.  She has received 1 liter   thus far on sepsis protocol with planned 2 additional liters.  Equally, her   clinical history does not completely correlate with an infectious etiology   either.  Nonetheless, the patient will be placed on empiric antibiotic   coverage.  We will attempt to get sputum culture.  We will further get a   formal echocardiogram to better evaluate cardiac function.  We will continue   antibiotics.  As for her acute kidney injury, the patient will have urinalysis   as well as urine studies, renal ultrasound, CPK within normal limits   suggesting not rhabdomyolysis.  We will renally dose medications as well as   minimize nephrotoxic substances.  As for hyperkalemia, no peaking of T waves   at this time.  She is getting hydration.  We will repeat BMP to evaluate for   improvement.  No need at this present time to do other temporizing measures   given lack of cardiac dysrhythmia or EKG changes.  Furthermore, blood cultures   will be drawn.  We will get urine culture if UA is abnormal.  Check TSH, free   T4, HbA1c.  The patient  will be on RT and O2 protocols.  If she has any   further deterioration of respiratory status, could consider BiPAP therapy   followed by intubation and mechanical ventilatory support at which point in   time she would have bronchoscopy with BAL therapeutic aspiration.  Further   considerations beyond infectious or cardiac in respect to her respiratory   concerns would also be potential for vasculitic or inflammatory processes,   which will be evaluated if initial workup is negative.    Prognosis is guarded.    Total critical care time not including billable procedures is 40 minutes.       ____________________________________     MD MARGARITA Gerber / DENNIS    DD:  06/09/2018 23:55:35  DT:  06/10/2018 04:16:29    D#:  [id number]   Job#:  [id number]

## 2018-06-10 NOTE — DIETARY
"Nutrition Support Assessment     Nutrition services:   Day 1 of admit.  Rosangela Burnette is a 73 y.o. female with admitting DX of severe sepsis     Current problem list:  1. Severe sepsis  2. Acute respiratory failure  3. SY  4. T2 DM  5. HLD  6. HTN     Assessment:  Estimated Nutritional Needs: based on:   Height: 165.1 cm (5' 5\")  Weight: 74.2 kg (163 lb 9.3 oz)  Weight to Use in Calculations: 74.2 kg (163 lb 9.3 oz)  Ideal Body Weight: 56.7 kg (125 lb)  Percent Ideal Body Weight: 130.9  Body mass index is 27.22 kg/m².    Calculation/Equation:    REE per MSJ x 1.2 = 1493 kcal/day   PSU  = 1438 kcal/day  Total Calories / day: 1400 - 1600 (Calories / k - 22)  Total Grams Protein / day: 74 - 104 (Grams Protein / k.0 - 1.4)     Evaluation:   1. Pt is currently intubated and in need of nutrition support  2. Enteral access vis gastric Cortrak  3. Consult for indirect metabolic cart study received - study not indicated at this time, RD to order prn  4. Nutrition pertinent hx includes diabetes; SSI, insulin regular, and D50 (50 ml to be given with insulin regular) currently in MAR  5. Precedex, Levophed (currently held), and propofol @ 40 mcg/kg/min (17.8 ml/hr = 470 kcal/day)  6. Will adjust TF recommendations while on propofol in order to avoid overfeeding, and monitor rate daily  7. Currently pt to benefit from specialized bariatric formula Peptamen Intense VHP in order to meet protein needs while on propofol (also low fiber given Levophed in MAR)  8. Once propofol is d/c'd, transition to CHO controlled formula, Diabetisource is appropriate      Recommendations/Plan:  1. On propofol: Peptamen Intense VHP with goal of 45 ml/hr to provide 1080 kcal, 99 gm protein (1.3 gm/kg), 82 gm CHO, and 907 ml of free water per day  2. Once propofol is d/c'd: transition to Diabetisource with goal rate of 55 ml/hr to provide 1584 kcal, 79 gm protein (1.1 gm/kg), 132 gm CHO, and 1082 ml of free water per day  3. Fluids " per MD  4. RD to monitor propofol and pressor rates daily - adjusting TF recommendations prn    RD following

## 2018-06-10 NOTE — CARE PLAN
Problem: Respiratory:  Goal: Respiratory status will improve  Outcome: NOT MET  Difficulty maintaining O2 sat w/ high flow nasal canula     Problem: Fluid Volume:  Goal: Will maintain balanced intake and output  Outcome: PROGRESSING AS EXPECTED  Fluid bolus and adequate output

## 2018-06-10 NOTE — PROCEDURES
Procedure Note    Date: 6/10/2018  Time: 0830    Procedure: Intubation    Indication: Acute respiratory failure, AMS  Consent: Emergency/implied    Procedure: A time-out was performed. Airway assessed and patient found to have Mallampati class 2.  Equipment prepared and RT/RN at bedside. Patient pre-oxygenated with 100% FiO2.  After medication delivery, a 4.0 Mac blade used to acheive direct visualization and a grade 1 view. A 7.5 ETT was placed with 1 attempt(s) into the trachea directly through the vocal cords. Appropriate placement confirmed by direct visualization, bilateral chest and epigastric auscultation, misting in the ETT, and ETCO2 detector. ETT secured in place at 24 cm at the teeth and patient connected to ventilator. Sedation/analgesia continued as appropriate. Patient tolerated procedure well without any difficulties and remains in care of bedside nurse and respiratory therapy. CXR will be performed to confirm appropriate placement of ETT.    Medications: etomdiate 20 mg, rocuronium 100 mg, neosynephrine 700mcg IV  Complications: none  CXR: tip of ETT 1.2 cm above veronica --> withdrawn by 2 cm, no PTX    Jeremy Gonda, MD  Critical Care Medicine

## 2018-06-10 NOTE — CARE PLAN
Problem: Ventilation Defect:  Goal: Ability to achieve and maintain unassisted ventilation or tolerate decreased levels of ventilator support    Intervention: Support and monitor invasive and noninvasive mechanical ventilation  Vent day 1. Pt on APV/CMV: 24/370/10/60%. Pt has 7.5 ETT @ 22. Small amount of white thin/thick secretions noted. No orders to wean at this time.

## 2018-06-11 NOTE — PROGRESS NOTES
Pulmonary Critical Care Progress Note        Date of Admission:  6/9/2018    Chief Complaint: SOB    History of Present Illness: 73 y.o. female with known past   medical history of type 2 diabetes, questionable bipolar disorder, history of   CVA, dyslipidemia.  The patient indicates that she was in her normal state of   health up until approximately 2 days ago, at which point in time, she had   increasing shortness of breath.  She denied any significant fever, chills.    Denied any headache, visual changes, has not had any cough or sputum   production.  No nausea, vomiting, abdominal pain or discomfort.  Has   intermittent chronic diarrhea, nothing more than usual.  Denies any dysuria or   lower extremity edema.  Further, she denies any sick contacts or recent   travels.  No history of DVTs or PEs in the past.  She indicates that the   shortness of breath has been progressive with increasing dyspnea on exertion.    She does not associate with positioning being any worse or better.  She could   lie flat and just as dyspneic as sitting up forward.  She denies any change   in her medications.  She does have tobacco history, which she quit about 1   year ago, smoking 1 pack a day x40 years.  Denies any alcohol or illicit drug   use.  She further denies any underlying heart disease or history of MI in the   past.       ROS:  Respiratory: unable to perform due to the patient's inability to effectively communicate, Cardiac: unable to perform due to the patient's inability to effectively communicate, GI: unable to perform due to the patient's inability to effectively communicate.      Interval Events:  24 hour interval history reviewed    - intubated yesterday   - agitated, fentanyl, propofol, not following    - Tm 38.5   - good UOP   - vent day 2   - cxr reviewed   - day 3 unayn/simro, cx's NGTD, PCT 1.62   - replacing Mg and    - Increase SSI   - norepi gtt 4 mcg/min  Yesterday:   - Patient went into further respiratory  failure this morning on maximum high flow nasal cannula with developing hypercapnia, confusion, hypertension    - intubated and started on full mechanical ventilatory support, central line placed and started on norepinephrine infusion   - worsening CXR, leukocytosis and fever   - Hgb low at 10.7 without signs of bleeding   - improving SY, persistent combined metabolic and resp acidosis   - lactate worsening prior to intubation   - cultures NGTD    PFSH:  No change.    Respiratory:  Weeks Vent Mode: APVCMV, Rate (breaths/min): 24, Vt Target (mL): 370, PEEP/CPAP: 10, FiO2: 50, Static Compliance (ml / cm H2O): 45.8, Control VTE (exp VT): 500  Pulse Oximetry: 93 %          Exam: tachypnea, labored breathing, rhonchi throughout all lung fields and diminished breath sounds mild  ImagingAvailable data reviewed   Recent Labs      06/10/18   0042  06/10/18   0536  06/10/18   0936  06/11/18   0406   ISTATAPH  7.400  7.339*  7.307*  7.306*   ISTATAPCO2  28.6  26.9  37.2*  35.1   ISTATAPO2  53*  52*  129*  63*   ISTATATCO2  19*  15*  20  19*   [id number]  88*  85*  99  90*   ISTATARTHCO3  17.7  14.5*  18.6  17.5   ISTATARTBE  -6*  -10*  -7*  -8*   ISTATTEMP  98.2 F  97.5 F  see below  37.8 C   ISTATFIO2  88  100  0.8  50   ISTATSPEC  Arterial  Arterial  Arterial  Arterial   ISTATAPHTC  7.404  7.348*   --   7.294*   [id number]  52*  50*   --   67       HemoDynamics:  Pulse: 94, Heart Rate (Monitored): 93  NIBP: 101/55  CVP (mm Hg): (!) 9 MM HGNorepinephrine infusion at 6    Exam: regular rhythm (Sinus), tachycardia  Imaging: Available data reviewed  Recent Labs      06/09/18   1947   CPKTOTAL  92   TROPONINI  0.06*   BNPBTYPENAT  218*       Neuro:  GCS Total Warren Coma Score: 9 sedated on propofol drip       Exam: no focal deficits noted Agitated Delirious Confused, not following  Imaging: Available data reviewed    Fluids:  Intake/Output       06/09/18 0700 - 06/10/18 0659 06/10/18 0700 - 06/11/18 0659 06/11/18 0700 -  06/12/18 0659      0700-1859 1900-0659 Total 0700-1859 1900-0659 Total 0700-1859 1900-0659 Total       Intake    P.O.  --  50 50  100  -- 100  --  -- --    P.O. -- 50 50 100 -- 100 -- -- --    I.V.  --  -- --  1455.9  1574.5 3030.4  --  -- --    Precedex Volume -- -- -- 235.9 -- 235.9 -- -- --    Phenylephrine Volume -- -- -- 10 -- 10 -- -- --    Propofol Volume -- -- -- 223.6 331 554.6 -- -- --    Norepinephrine Volume -- -- -- 86.4 227 313.4 -- -- --    IV Piggyback Volume (IV Piggyback) -- -- -- -- 100 100 -- -- --    IV Volume (.045% NS) -- -- --  -- -- --    IV Volume (Fentanyl) -- -- -- -- 16.5 16.5 -- -- --    Enteral  --  -- --  --  529 529  --  -- --    Free Water / Tube Flush -- -- -- -- 60 60 -- -- --    Intake (mL) (Enteral Tube Right Nare Cortrak Gastric Feeding Tube) -- -- -- -- 469 469 -- -- --    Total Intake -- 50 50 1555.9 2103.5 3659.4 -- -- --       Output    Urine  --  1130 1130  1200  910 2110  --  -- --    Number of Times Voided -- 7 x 7 x -- -- -- -- -- --    Urine Void (mL) (non-catheter) -- 1130 1130 -- -- -- -- -- --    Output (mL) (Urinary Catheter Indwelling Catheter) -- -- -- 9851 769 2928 -- -- --    Stool  --  -- --  --  -- --  --  -- --    Number of Times Stooled -- 2 x 2 x 0 x 1 x 1 x -- -- --    Total Output -- 1130 1130 2866 307 1406 -- -- --       Net I/O     -- -9873 -1591 355.9 1193.5 1549.4 -- -- --        Weight: 75.5 kg (166 lb 7.2 oz)  Recent Labs      06/09/18   1947  06/10/18   0120  06/11/18   0253   SODIUM  136  136  136   POTASSIUM  6.5*  5.3  4.3   CHLORIDE  105  108  109   CO2  18*  18*  18*   BUN  68*  53*  35*   CREATININE  2.71*  1.82*  1.11   MAGNESIUM   --    --   1.7   PHOSPHORUS   --    --   3.6   CALCIUM  10.2  9.3  8.5       GI/Nutrition:  Exam: abdomen is soft and non-tender, normal active bowel sounds  Imaging: Available data reviewed  NPO  Liver Function  Recent Labs      06/09/18   1947  06/10/18   0120  06/11/18   0253   ALTSGPT  40  36  50    ASTSGOT  51*  49*  68*   ALKPHOSPHAT  91  79  88   TBILIRUBIN  0.4  0.3  0.5   PREALBUMIN   --    --   <3.0*   GLUCOSE  175*  118*  170*       Heme:  Recent Labs      06/09/18   1948  06/10/18   0450  06/11/18   0253   RBC  3.67*  3.65*  3.06*   HEMOGLOBIN  10.7*  10.7*  9.0*   HEMATOCRIT  33.3*  33.7*  28.6*   PLATELETCT  465*  395  357       Infectious Disease:  Monitored Temp 2  Av.4 °C (99.4 °F)  Min: 37.1 °C (98.8 °F)  Max: 38.1 °C (100.6 °F)  Temp  Av.2 °C (98.9 °F)  Min: 36.2 °C (97.2 °F)  Max: 37.8 °C (100 °F)  Micro: reviewed.  No growth to date  Recent Labs      06/09/18   1947  06/10/18   0120  06/10/18   0450  06/11/18   0253   WBC  21.7*   --   23.8*  18.0*   NEUTSPOLYS  81.40*   --    --   71.00   LYMPHOCYTES  10.20*   --    --   17.30*   MONOCYTES  5.90   --    --   5.30   EOSINOPHILS  0.20   --    --   1.20   BASOPHILS  0.60   --    --   0.40   ASTSGOT  51*  49*   --   68*   ALTSGPT  40  36   --   50   ALKPHOSPHAT  91  79   --   88   TBILIRUBIN  0.4  0.3   --   0.5     Current Facility-Administered Medications   Medication Dose Frequency Provider Last Rate Last Dose   • 1/2 NS infusion   Continuous Davide Barrientos M.D. 75 mL/hr at 06/10/18 2056     • haloperidol lactate (HALDOL) injection 5 mg  5 mg Q4HRS PRN Davide Barrientos M.D.   5 mg at 06/10/18 0422   • dexmedetomidine (PRECEDEX) 400 mcg in D5W 100 mL infusion  0.1-1.5 mcg/kg/hr Continuous Davide Barrientos M.D.   Stopped at 06/10/18 1500   • norepinephrine (LEVOPHED) 8 mg in  mL Infusion  0-30 mcg/min Continuous Jeremy M Gonda, M.D. 7.5 mL/hr at 18 0644 4 mcg/min at 18 0644   • propofol (DIPRIVAN) injection  0-80 mcg/kg/min Continuous Jeremy M Gonda, M.D. 35.6 mL/hr at 18 0746 80 mcg/kg/min at 18 0746   • MD ALERT...Adult ICU Electrolyte Replacement per Pharmacy Protocol   pharmacy to dose Jeremy M Gonda, M.D.       • Pharmacy Consult: Enteral tube feeding - review meds/change route/product selection  1  Each PRN Jeremy M Gonda, M.D.       • fentaNYL (SUBLIMAZE) injection 25 mcg  25 mcg Q HOUR PRN Jeremy M Gonda, M.D.        Or   • fentaNYL (SUBLIMAZE) injection 50 mcg  50 mcg Q HOUR PRN Jeremy M Gonda, M.D.        Or   • fentaNYL (SUBLIMAZE) injection 100 mcg  100 mcg Q HOUR PRN Jeremy M Gonda, M.D.       • ipratropium-albuterol (DUONEB) nebulizer solution  3 mL Q2HRS PRN (RT) Jeremy M Gonda, M.D.       • ipratropium-albuterol (DUONEB) nebulizer solution  3 mL Q4HRS (RT) Jeremy M Gonda, M.D.   3 mL at 06/11/18 0704   • famotidine (PEPCID) tablet 20 mg  20 mg DAILY Jeremy M Gonda, M.D.   20 mg at 06/11/18 0746    Or   • famotidine (PEPCID) injection 20 mg  20 mg DAILY Jeremy M Gonda, M.D.       • aspirin (ASA) chewable tab 81 mg  81 mg DAILY Jeremy M Gonda, M.D.   81 mg at 06/11/18 0747   • azithromycin (ZITHROMAX) tablet 250 mg  250 mg Q24HRS Jeremy M Gonda, M.D.   250 mg at 06/11/18 0747   • gabapentin (NEURONTIN) capsule 300 mg  300 mg TID Jeremy M Gonda, M.D.   300 mg at 06/11/18 0504   • ALPRAZolam (XANAX) tablet 1 mg  1 mg BID PRN Jeremy M Gonda, M.D.       • acetaminophen (TYLENOL) tablet 650 mg  650 mg Q6HRS PRN Jeremy M Gonda, M.D.   650 mg at 06/10/18 1125   • guaiFENesin dextromethorphan (ROBITUSSIN DM) 100-10 MG/5ML syrup 10 mL  10 mL Q6HRS PRN Jeremy M Gonda, M.D.       • senna-docusate (PERICOLACE or SENOKOT S) 8.6-50 MG per tablet 2 Tab  2 Tab BID Jeremy M Gonda, M.D.   2 Tab at 06/11/18 0746    And   • polyethylene glycol/lytes (MIRALAX) PACKET 1 Packet  1 Packet QDAY PRN Jeremy M Gonda, M.D.        And   • magnesium hydroxide (MILK OF MAGNESIA) suspension 30 mL  30 mL QDAY PRN Jeremy M Gonda, M.D.        And   • bisacodyl (DULCOLAX) suppository 10 mg  10 mg QDAY PRN Jeremy M Gonda, M.D.       • insulin regular (HUMULIN R) injection 2-9 Units  2-9 Units Q6HRS Jeremy M Gonda, M.D.   2 Units at 06/11/18 0512    And   • glucose 4 g chewable tablet 16 g  16 g Q15 MIN PRN Jeremy M Gonda, M.D.        And    • dextrose 50% (D50W) injection 25 mL  25 mL Q15 MIN PRN Jeremy M Gonda, M.D.       • fentaNYL (SUBLIMAZE) 50 mcg/mL in 50mL (Continuous Infusion)   Continuous Jeremy M Gonda, M.D. 1 mL/hr at 06/11/18 0148 50 mcg/hr at 06/11/18 0148   • sertraline (ZOLOFT) tablet 50 mg  50 mg QDAY Sherrell Higgins M.D.   50 mg at 06/10/18 2134   • rosuvastatin (CRESTOR) tablet 20 mg  20 mg Q EVENING Sherrell Higgins M.D.   20 mg at 06/10/18 2134   • Respiratory Care per Protocol   Continuous RT Sherrell Higgins M.D.       • NS (BOLUS) infusion 500 mL  500 mL Once PRN Sherrell Higgins M.D.       • heparin injection 5,000 Units  5,000 Units Q8HRS Sherrell Higgins M.D.   5,000 Units at 06/11/18 0504   • ampicillin/sulbactam (UNASYN) 3 g in  mL IVPB  3 g Q12HRS Sherrell Higgins M.D. 200 mL/hr at 06/11/18 0746 3 g at 06/11/18 0746     Last reviewed on 6/9/2018 10:32 PM by Ana Purcell [facility]    Quality  Measures:   Medications reviewed, Labs reviewed and Radiology images reviewed                      Assessment/Plan:  Acute hypoxic/hypercarbic respiratory failure    - intubated 6/10    -continue full vent support, ventilator settings adjusted today   -RT/O2 protocols   -Propofol and fentanyl drips for sedation/analgesia   -Eventual diuresis although currently resuscitated with fluids  Community-acquired pneumonia    -  continue antibiotics, follow-up on cultures  Septic shock from pulmonary source with associated cardiac and respiratory failure   -Continue broad-spectrum antibiotics, sepsis protocol, norepinephrine infusion to maintain map greater than 65   -Trend CVP with goal 10-12   -Echocardiogram pending  Normocytic anemia   - Monitor with conservative transfusion strategy   Hyperkalemia -improving, monitor  Acute kidney injury, ATN/prerenal   -Avoid nephrotoxins, monitor urine output  Mildly elevated troponin - trend  Type 2 diabetes - SSI  Dyslipidemia - statin  Thrombocytosis - improving  History of tobacco use, 40 pack  years, quit a year ago   - BDs prn, consider steroids if bronchospasm develops  Lactic acidosis - trend  Acute metabolic encephalopathy - limit sedatives when stabilized and monitor  Prophylaxis, start enteral nutrition    Patient is critically ill.  I have titrated vasopressors, adjusted ventilator settings and ongoing critical care management as above.  Discussed patient condition and risk of morbidity and/or mortality with Family, RN, RT, Pharmacy, Charge nurse / hot rounds, Patient and hospitalist.    The patient remains critically ill.  Critical care time = 36minutes in directly providing and coordinating critical care and extensive data review.  No time overlap and excludes procedures.

## 2018-06-11 NOTE — CARE PLAN
Problem: Nutritional:  Goal: Nutrition support tolerated and meeting greater than 85% of estimated needs  Outcome: MET Date Met: 06/11/18

## 2018-06-11 NOTE — PROGRESS NOTES
[facility] Hospitalist Progress Note    Date of Service: 2018    Chief Complaint  73 y.o. female admitted 2018 with severe sepsis due to pneumonia with acute renal failure and acute respiratory failure requiring intubation.    Interval Problem Update  Daughter at bedside given updates.  Sedated on propofol and fentanyl.  On levophed 4mcg/hr.  On ventilator with peep:10 and FiO2:55%.  CXR bilateral patchy infilatrates.  Procalcitonin:1.62.  Episodes of tachycardia.  No secretions. [id number]    Consultants/Specialty  Intensivist    Disposition  Leave in ICU while on ventilator        Review of Systems   Unable to perform ROS: Intubated      Physical Exam  Laboratory/Imaging   Hemodynamics  Temp (24hrs), Av.9 °C (100.2 °F), Min:37.3 °C (99.1 °F), Max:38.5 °C (101.3 °F)   Temperature: (!) 38.1 °C (100.6 °F), Monitored Temp: 38.1 °C (100.6 °F)  Pulse  Av.5  Min: 64  Max: 139 Heart Rate (Monitored): 99  NIBP: (!) 99/58 CVP (mm Hg): (!) 294 MM HG    Respiratory  Weeks Vent Mode: APVCMV, Rate (breaths/min): 24, PEEP/CPAP: 10, PEEP/CPAP: 10, FiO2: 55, P Peak (PIP): 30, P MEAN: 15   Respiration: (!) 24, Pulse Oximetry: 96 %     Work Of Breathing / Effort: Vented  RUL Breath Sounds: Clear, RML Breath Sounds: Clear;Diminished, RLL Breath Sounds: Diminished, DEIDRA Breath Sounds: Clear, LLL Breath Sounds: Diminished    Fluids    Intake/Output Summary (Last 24 hours) at 18 1651  Last data filed at 18 1400   Gross per 24 hour   Intake          4788.65 ml   Output             1630 ml   Net          3158.65 ml       Nutrition  Orders Placed This Encounter   Procedures   • Diet NPO     Standing Status:   Standing     Number of Occurrences:   1     Order Specific Question:   Type:     Answer:   Now [1]     Order Specific Question:   Restrict to:     Answer:   Strict [1]     Physical Exam   Constitutional: She appears well-developed and well-nourished. She appears ill. No distress. She is sedated, intubated and  restrained.   HENT:   Head: Normocephalic and atraumatic.   Nose: Nose normal.   Eyes: Conjunctivae and EOM are normal. Right eye exhibits no discharge. Left eye exhibits no discharge. No scleral icterus.   Neck: No tracheal deviation present.   Right IJ TLC w/o erythema, bleeding.   Cardiovascular: Normal rate, regular rhythm, normal heart sounds and intact distal pulses.    No murmur heard.  Pulmonary/Chest: Effort normal. She is intubated. No respiratory distress. She has no wheezes. She has rales.   Abdominal: Soft. Bowel sounds are normal. She exhibits no distension. There is no tenderness.   Musculoskeletal: She exhibits no edema.   Lymphadenopathy:     She has no cervical adenopathy.   Skin: Skin is warm. She is not diaphoretic.   Vitals reviewed.      Recent Labs      06/09/18   1947  06/10/18   0450  06/11/18   0253   WBC  21.7*  23.8*  18.0*   RBC  3.67*  3.65*  3.06*   HEMOGLOBIN  10.7*  10.7*  9.0*   HEMATOCRIT  33.3*  33.7*  28.6*   MCV  90.7  92.3  93.5   MCH  29.2  29.3  29.4   MCHC  32.1*  31.8*  31.5*   RDW  50.2*  50.7*  51.8*   PLATELETCT  465*  395  357   MPV  10.3  10.3  10.1     Recent Labs      06/09/18   1947  06/10/18   0120  06/11/18   0253   SODIUM  136  136  136   POTASSIUM  6.5*  5.3  4.3   CHLORIDE  105  108  109   CO2  18*  18*  18*   GLUCOSE  175*  118*  170*   BUN  68*  53*  35*   CREATININE  2.71*  1.82*  1.11   CALCIUM  10.2  9.3  8.5         Recent Labs      06/09/18 1947   BNPBTYPENAT  218*     Recent Labs      06/10/18   0915   TRIGLYCERIDE  108          Assessment/Plan     * Severe sepsis (HCC)   Assessment & Plan    Severe sepsis with acute renal failure as end organ damage  Pressor support to keep mean arterial pressure greater than 65 and systolic blood pressure greater than 90  IV fluids monitor strict I's and O's, vitals  Monitor microbiology and cultures, labs  Respiratory protocol with concern of pneumonia  Supplemental oxygen weaning as tolerates  Downtrending lactic  acid        SY (acute kidney injury) ([facility])   Assessment & Plan    No prior renal disease on labs  IV fluids and monitor daily labs  Improving renal function 6/11/18        Acute respiratory failure with hypoxia ([facility])   Assessment & Plan    Ongoing bilateral opacification on CXR R>L  Pulmonology consulting  Supplemental oxygen with respiratory protocol and as needed bronchodilators  Follow up on sputum cultures and blood cultures        Delirium   Assessment & Plan    Intubated and sedated  Neurochecks        Hyperkalemia   Assessment & Plan    Improved since admit.   Monitor BMP        HTN (hypertension)   Assessment & Plan    Monitoring vitals  Holding home medication: Lisinopril        HLD (hyperlipidemia)   Assessment & Plan    Continuing Crestor for ongoing active treatment        T2DM (type 2 diabetes mellitus) ([facility])   Assessment & Plan    Monitor Accu-Cheks and cover with sliding scale insulin  Hemoglobin A1c 7.1 within the last 8 months  Holding home glipizide and metformin        History of atrial fibrillation without current medication- (present on admission)   Assessment & Plan    Monitor on telemetry          Quality-Core Measures   Reviewed items::  Labs reviewed, Medications reviewed, Radiology images reviewed and EKG reviewed  Jamison catheter::  Critically Ill - Requiring Accurate Measurement of Urinary Output and Unconscious / Sedated Patient on a Ventilator  DVT prophylaxis pharmacological::  Heparin  Antibiotics:  Treating active infection/contamination beyond 24 hours perioperative coverage

## 2018-06-11 NOTE — CARE PLAN
Problem: Ventilation Defect:  Goal: Ability to achieve and maintain unassisted ventilation or tolerate decreased levels of ventilator support  Outcome: PROGRESSING SLOWER THAN EXPECTED  Adult Ventilation Update    Total Vent Days: 2    Patient Lines/Drains/Airways Status    Active Airway     Name: Placement date: Placement time: Site: Days:    Airway Group ET Tube 7.5 06/10/18   0840      2              CMV 24/370/10/60%    Plateau Pressure (Q Shift): 19 (06/10/18 1835)  Static Compliance (ml / cm H2O): 79 (06/11/18 0239)    Patient failed trials because of Barriers to Wean: FiO2 >60% or PEEP >10 CM H2O (06/10/18 1437)  Barriers to SBT    Length of Weaning Trial        Sputum/Suction   Cough: Productive (06/11/18 0400)  Sputum Amount: Scant (06/11/18 0400)  Sputum Color: Pink Tinged (06/11/18 0400)  Sputum Consistency: Thin (06/11/18 0400)    Mobility  Activity Performed: Unable to mobilize (06/10/18 1800)  Reason Not Mobilized: Other (comment) (Newly Intubated) (06/10/18 1800)    Events/Summary/Plan: ABg draw. No ventilator changes made.

## 2018-06-11 NOTE — CARE PLAN
Problem: Ventilation Defect:  Goal: Ability to achieve and maintain unassisted ventilation or tolerate decreased levels of ventilator support    Intervention: Support and monitor invasive and noninvasive mechanical ventilation  Adult Ventilation Update    Total Vent Days: 2  Patient Lines/Drains/Airways Status    Active Airway     Name: Placement date:       Airway Group ET Tube 7.5 06/10/18              Plateau Pressure (Q Shift): 25   Static Compliance (ml / cm H2O): 47    Patient failed trials because of Barriers to Wean: FiO2 >60% or PEEP >10 CM H2O (06/11/18 0705)    Sputum/Suction   Cough: Productive   Sputum Amount: Small   Sputum Color: Brown;Clear   Sputum Consistency: Thin     Mobility  Activity Performed: Unable to mobilize (06/11/18 0800)  Reason Not Mobilized: Bed rest (06/11/18 0800)  Mobilization Comments: MD ordered bed rest for first 24 hours  (06/11/18 0800)

## 2018-06-11 NOTE — CARE PLAN
Problem: Knowledge Deficit  Goal: Knowledge of disease process/condition, treatment plan, diagnostic tests, and medications will improve  Outcome: PROGRESSING AS EXPECTED  Patient and family education on patient disease process and treatment plan. All questions answered at this time.    Problem: Pain Management  Goal: Pain level will decrease to patient's comfort goal  Outcome: PROGRESSING AS EXPECTED  Fentanyl gtt started to help relieve patient chronic sciatic pain.

## 2018-06-11 NOTE — CARE PLAN
Problem: Respiratory:  Goal: Respiratory status will improve  Outcome: NOT MET  Patient remains on ventilator     Problem: Pain Management  Goal: Pain level will decrease to patient's comfort goal  Outcome: PROGRESSING AS EXPECTED  Patient is receiving fentanyl at 75 mcg/hr

## 2018-06-11 NOTE — PROGRESS NOTES
[facility]    Date of Service: 6/10/2018    Chief Complaint  73 y.o. female admitted 2018 with severe sepsis due to pneumonia with acute renal failure and acute respiratory failure requiring intubation.    Interval Problem Update  On ventilatory and sedated.  Family at bedside given updates.  Opens eyes and nods slowly.  Care discussed with ICU RN.    Consultants/Specialty  Intensivist    Disposition  Leave in ICU while on ventilator        Review of Systems   Unable to perform ROS: Intubated      Physical Exam  Laboratory/Imaging   Hemodynamics  Temp (24hrs), Av.7 °C (98.1 °F), Min:36.2 °C (97.2 °F), Max:37.2 °C (99 °F)   Temperature: 37.2 °C (99 °F), Monitored Temp: 37.1 °C (98.8 °F)  Pulse  Av.8  Min: 64  Max: 139 Heart Rate (Monitored): 83  Blood Pressure : 139/88, NIBP: 105/43 CVP (mm Hg): (!) 13 MM HG    Respiratory  Weeks Vent Mode: APVCMV, Rate (breaths/min): 24, PEEP/CPAP: 10, PEEP/CPAP: 10, FiO2: 60, P Peak (PIP): 21, P MEAN: 14   Respiration: 20, Pulse Oximetry: 96 %, O2 Daily Delivery Respiratory : Highflow Nasal Cannula     Work Of Breathing / Effort: Vented  RUL Breath Sounds: Diminished, RML Breath Sounds: Diminished, RLL Breath Sounds: Diminished, DEIDRA Breath Sounds: Diminished, LLL Breath Sounds: Diminished    Fluids    Intake/Output Summary (Last 24 hours) at 06/10/18 1915  Last data filed at 06/10/18 1800   Gross per 24 hour   Intake           705.87 ml   Output             2330 ml   Net         -1624.13 ml       Nutrition  Orders Placed This Encounter   Procedures   • Diet NPO     Standing Status:   Standing     Number of Occurrences:   1     Order Specific Question:   Type:     Answer:   Now [1]     Order Specific Question:   Restrict to:     Answer:   Strict [1]     Physical Exam   Constitutional: She appears well-developed and well-nourished. She appears ill. No distress. She is sedated, intubated and restrained.   HENT:   Head: Normocephalic and  atraumatic.   Nose: Nose normal.   Eyes: Conjunctivae and EOM are normal. Right eye exhibits no discharge. Left eye exhibits no discharge. No scleral icterus.   Neck: No tracheal deviation present.   Right IJ TLC w/o erythema, bleeding.   Cardiovascular: Normal rate, regular rhythm, normal heart sounds and intact distal pulses.    No murmur heard.  Pulmonary/Chest: Effort normal. She is intubated. No respiratory distress. She has no wheezes. She has rales.   Abdominal: Soft. Bowel sounds are normal. She exhibits no distension. There is no tenderness.   Musculoskeletal: She exhibits no edema.   Lymphadenopathy:     She has no cervical adenopathy.   Skin: Skin is warm. She is not diaphoretic.   Vitals reviewed.      Recent Labs      06/09/18   1947  06/10/18   0450   WBC  21.7*  23.8*   RBC  3.67*  3.65*   HEMOGLOBIN  10.7*  10.7*   HEMATOCRIT  33.3*  33.7*   MCV  90.7  92.3   MCH  29.2  29.3   MCHC  32.1*  31.8*   RDW  50.2*  50.7*   PLATELETCT  465*  395   MPV  10.3  10.3     Recent Labs      06/09/18   1947  06/10/18   0120   SODIUM  136  136   POTASSIUM  6.5*  5.3   CHLORIDE  105  108   CO2  18*  18*   GLUCOSE  175*  118*   BUN  68*  53*   CREATININE  2.71*  1.82*   CALCIUM  10.2  9.3         Recent Labs      06/09/18 1947   BNPBTYPENAT  218*     Recent Labs      06/10/18   0915   TRIGLYCERIDE  108          Assessment/Plan     * Severe sepsis ([facility])   Assessment & Plan    Severe sepsis with acute renal failure as end organ damage  Pressor support to keep mean arterial pressure greater than 65 and systolic blood pressure greater than 90  IV fluids monitor strict I's and O's, vitals  Monitor microbiology and cultures, labs  Respiratory protocol with concern of pneumonia  Supplemental oxygen weaning as tolerates  Downtrending lactic acid        SY (acute kidney injury) ([facility])   Assessment & Plan    No prior renal disease on labs  IV fluids and monitor daily labs  Check ultrasound renal if not improving        Acute  respiratory failure with hypoxia (HCC)   Assessment & Plan    Concern of pneumonia on chest x-ray  Pulmonology consulting  Supplemental oxygen with respiratory protocol and as needed bronchodilators  Follow up on sputum cultures and blood cultures        Hyperkalemia   Assessment & Plan    Improved since admit.   Monitor BMP        HTN (hypertension)   Assessment & Plan    Monitoring vitals  Holding home medication: Lisinopril        HLD (hyperlipidemia)   Assessment & Plan    Continuing Crestor for ongoing active treatment        T2DM (type 2 diabetes mellitus) (HCC)   Assessment & Plan    Monitor Accu-Cheks and cover with sliding scale insulin  Hemoglobin A1c 7.1 within the last 8 months  Holding home glipizide and metformin        History of atrial fibrillation without current medication- (present on admission)   Assessment & Plan    Monitor on telemetry          Quality-Core Measures   Reviewed items::  Labs reviewed, Medications reviewed, Radiology images reviewed and EKG reviewed  Jamison catheter::  Critically Ill - Requiring Accurate Measurement of Urinary Output and Unconscious / Sedated Patient on a Ventilator  DVT prophylaxis pharmacological::  Heparin  Antibiotics:  Treating active infection/contamination beyond 24 hours perioperative coverage

## 2018-06-11 NOTE — DISCHARGE PLANNING
Medical SW    Sw attended AM IDT Rounds.    Per face sheet, pt resident of [facility], , 74yo female, admitted 6/9 for severe sepsis, and carries SCP INS     RN reports, pt dx pneumonia, moves all extremities, family at bedside, agitated and not able to follow commands,     Plan: Sw to assist w/ d/c planning as needed.

## 2018-06-12 NOTE — PROGRESS NOTES
Pulmonary Critical Care Progress Note        Date of Admission:  6/9/2018    Chief Complaint: SOB    History of Present Illness: 73 y.o. female with known past   medical history of type 2 diabetes, questionable bipolar disorder, history of   CVA, dyslipidemia.  The patient indicates that she was in her normal state of   health up until approximately 2 days ago, at which point in time, she had   increasing shortness of breath.  She denied any significant fever, chills.    Denied any headache, visual changes, has not had any cough or sputum   production.  No nausea, vomiting, abdominal pain or discomfort.  Has   intermittent chronic diarrhea, nothing more than usual.  Denies any dysuria or   lower extremity edema.  Further, she denies any sick contacts or recent   travels.  No history of DVTs or PEs in the past.  She indicates that the   shortness of breath has been progressive with increasing dyspnea on exertion.    She does not associate with positioning being any worse or better.  She could   lie flat and just as dyspneic as sitting up forward.  She denies any change   in her medications.  She does have tobacco history, which she quit about 1   year ago, smoking 1 pack a day x40 years.  Denies any alcohol or illicit drug   use.  She further denies any underlying heart disease or history of MI in the   past.       ROS:  Respiratory: unable to perform due to the patient's inability to effectively communicate, Cardiac: unable to perform due to the patient's inability to effectively communicate, GI: unable to perform due to the patient's inability to effectively communicate.      Interval Events:  24 hour interval history reviewed    - ishan TF   - arouses, CAM+   - vent day 3   - FiO2 60   - CXR unchanged bilat patchy inf   - WBC down   - PCT f/u P   - replace MG   - Unasyn/azithro 4   - norepi titrated this am  Yesterday   - intubated yesterday   - agitated, fentanyl, propofol, not following    - Tm 38.5   - good  UOP   - vent day 2   - cxr reviewed   - day 3 unayn/azithro, cx's NGTD, PCT 1.62   - replacing Mg and    - Increase SSI   - norepi gtt 4 mcg/min  :  PFSH:  No change.    Respiratory:  Weeks Vent Mode: APVCMV, Rate (breaths/min): 24, Vt Target (mL): 340, PEEP/CPAP: 10, FiO2: 50, P Support: 0, Static Compliance (ml / cm H2O): 40, Control VTE (exp VT): 540  Pulse Oximetry: 98 %          Exam: tachypnea, labored breathing, rhonchi throughout all lung fields and diminished breath sounds mild  ImagingAvailable data reviewed   Recent Labs      06/10/18   0536  06/10/18   0936  06/11/18   0406  06/12/18   0421   ISTATAPH  7.339*  7.307*  7.306*  7.344*   ISTATAPCO2  26.9  37.2*  35.1  35.4   ISTATAPO2  52*  129*  63*  61*   ISTATATCO2  15*  20  19*  20   [id number]  85*  99  90*  90*   ISTATARTHCO3  14.5*  18.6  17.5  19.3   ISTATARTBE  -10*  -7*  -8*  -6*   ISTATTEMP  97.5 F  see below  37.8 C  37.5 C   ISTATFIO2  100  0.8  50  55   ISTATSPEC  Arterial  Arterial  Arterial  Arterial   ISTATAPHTC  7.348*   --   7.294*  7.337*   [id number]  50*   --   67  63*       HemoDynamics:  Pulse: (!) 114, Heart Rate (Monitored): (!) 110  NIBP: (!) 95/57  CVP (mm Hg): (!) 11 MM HGNorepinephrine infusion at 6    Exam: regular rhythm (Sinus), tachycardia  Imaging: Available data reviewed  Recent Labs      06/09/18   1947  06/11/18   0253   CPKTOTAL  92   --    TROPONINI  0.06*  0.03   BNPBTYPENAT  218*   --        Neuro:  GCS Total Luis Coma Score: 9 sedated on propofol drip       Exam: no focal deficits noted Agitated Delirious Confused, not following  Imaging: Available data reviewed    Fluids:  Intake/Output       06/10/18 0700 - 06/11/18 0659 06/11/18 0700 - 06/12/18 0659 06/12/18 0700 - 06/13/18 0659      9346-1950 6199-5110 Total [id number] 1900-0659 Total 0700-1859 1900-0659 Total       Intake    P.O.  100  -- 100  --  -- --  --  -- --    P.O. 100 -- 100 -- -- -- -- -- --    I.V.  1455.9  1574.5 3030.4  1324.3  1349 2673.3   --  -- --    Precedex Volume 235.9 -- 235.9 -- -- -- -- -- --    Phenylephrine Volume 10 -- 10 -- -- -- -- -- --    Propofol Volume 223.6 331 554.6 397.6 265.6 663.2 -- -- --    Norepinephrine Volume 86.4 227 313.4 101.7 65.5 167.1 -- -- --    IV Piggyback Volume (IV Piggyback) -- 100 100 200 100 300 -- -- --    IV Volume (.045% NS)   -- -- --    IV Volume (Fentanyl) -- 16.5 16.5 -- 18 18 -- -- --    Other  --  -- --  150  -- 150  --  -- --    Medications (P.O./ Enteral Liquids) -- -- -- 150 -- 150 -- -- --    Enteral  --  529 529  735  540 1275  --  -- --    Free Water / Tube Flush -- 60 60 195 -- 195 -- -- --    Intake (mL) (Enteral Tube Right Nare Cortrak Gastric Feeding Tube) -- 469 469  -- -- --    Total Intake 1555.9 2103.5 3659.4 2209.3 1889 4098.3 -- -- --       Output    Urine  1200  910 2110  725  1140 1865  --  -- --    Output (mL) (Urinary Catheter Indwelling Catheter) 0700 275 7695 725 1140 1865 -- -- --    Stool  --  -- --  --  -- --  --  -- --    Number of Times Stooled 0 x 1 x 1 x -- -- -- -- -- --    Total Output 8320 122 7179 725 1140 1865 -- -- --       Net I/O     355.9 1193.5 1549.4 1484.3 749 2233.3 -- -- --        Weight: 83.5 kg (184 lb 1.4 oz)  Recent Labs      06/10/18   0120  06/11/18   0253  06/12/18   0500   SODIUM  136  136  135   POTASSIUM  5.3  4.3  4.6   CHLORIDE  108  109  108   CO2  18*  18*  17*   BUN  53*  35*  24*   CREATININE  1.82*  1.11  0.91   MAGNESIUM   --   1.7  1.8   PHOSPHORUS   --   3.6  3.7   CALCIUM  9.3  8.5  8.3*       GI/Nutrition:  Exam: abdomen is soft and non-tender, normal active bowel sounds  Imaging: Available data reviewed  NPO  Liver Function  Recent Labs      06/09/18   1947  06/10/18   0120  06/11/18   0253  06/12/18   0500   ALTSGPT  40  36  50   --    ASTSGOT  51*  49*  68*   --    ALKPHOSPHAT  91  79  88   --    TBILIRUBIN  0.4  0.3  0.5   --    PREALBUMIN   --    --   <3.0*   --    GLUCOSE  175*  118*  170*  179*        Heme:  Recent Labs      06/10/18   0450  18   0253  18   0500   RBC  3.65*  3.06*  2.94*   HEMOGLOBIN  10.7*  9.0*  8.7*   HEMATOCRIT  33.7*  28.6*  27.2*   PLATELETCT  395  357  267       Infectious Disease:  Monitored Temp 2  Av.2 °C (100.7 °F)  Min: 37.6 °C (99.7 °F)  Max: 38.6 °C (101.5 °F)  Temp  Av.2 °C (100.7 °F)  Min: 37.9 °C (100.2 °F)  Max: 38.5 °C (101.3 °F)  Micro: reviewed.  No growth to date  Recent Labs      18   1947  06/10/18   0120  06/10/18   0450  06/11/18   0253  18   0500   WBC  21.7*   --   23.8*  18.0*  13.3*   NEUTSPOLYS  81.40*   --    --   71.00  69.90   LYMPHOCYTES  10.20*   --    --   17.30*  15.90*   MONOCYTES  5.90   --    --   5.30  2.70   EOSINOPHILS  0.20   --    --   1.20  4.40   BASOPHILS  0.60   --    --   0.40  0.90   ASTSGOT  51*  49*   --   68*   --    ALTSGPT  40  36   --   50   --    ALKPHOSPHAT  91  79   --   88   --    TBILIRUBIN  0.4  0.3   --   0.5   --      Current Facility-Administered Medications   Medication Dose Frequency Provider Last Rate Last Dose   • famotidine (PEPCID) tablet 20 mg  20 mg BID Oliver Carcamo M.D.   20 mg at 18 0915   • magnesium sulfate IVPB premix 2 g  2 g Once Oliver Carcamo M.D.       • insulin regular (HUMULIN R) injection 3-14 Units  3-14 Units Q6HRS Oliver Carcamo M.D.   3 Units at 18 0532    And   • glucose 4 g chewable tablet 16 g  16 g Q15 MIN PRN Oliver Carcamo M.D.        And   • dextrose 50% (D50W) injection 25 mL  25 mL Q15 MIN PRN Oliver Carcamo M.D.       • ampicillin/sulbactam (UNASYN) 3 g in  mL IVPB  3 g Q6HRS Mathew Diez D.O.   Stopped at 18 0550   • rosuvastatin (CRESTOR) tablet 20 mg  20 mg Q EVENING Oliver Carcamo M.D.   20 mg at 18   • sertraline (ZOLOFT) tablet 50 mg  50 mg QDAY Oliver Carcamo M.D.   50 mg at 18   • 1/2 NS infusion   Continuous Davide Barrientos M.D. 75 mL/hr at 18 0101     • haloperidol lactate (HALDOL)  injection 5 mg  5 mg Q4HRS PRN Davide Barrientos M.D.   5 mg at 06/10/18 0422   • dexmedetomidine (PRECEDEX) 400 mcg in D5W 100 mL infusion  0.1-1.5 mcg/kg/hr Continuous Davide Barrientos M.D.   Stopped at 06/10/18 1500   • norepinephrine (LEVOPHED) 8 mg in  mL Infusion  0-30 mcg/min Continuous Jeremy M Gonda, M.D.   Stopped at 06/12/18 0512   • propofol (DIPRIVAN) injection  0-80 mcg/kg/min Continuous Jeremy M Gonda, M.D. 22.3 mL/hr at 06/12/18 0545 50 mcg/kg/min at 06/12/18 0545   • MD ALERT...Adult ICU Electrolyte Replacement per Pharmacy Protocol   pharmacy to dose Jeremy M Gonda, M.D.       • Pharmacy Consult: Enteral tube feeding - review meds/change route/product selection  1 Each PRN Jeremy M Gonda, M.D.       • fentaNYL (SUBLIMAZE) injection 25 mcg  25 mcg Q HOUR PRN Jeremy M Gonda, M.D.        Or   • fentaNYL (SUBLIMAZE) injection 50 mcg  50 mcg Q HOUR PRN Jeremy M Gonda, M.D.        Or   • fentaNYL (SUBLIMAZE) injection 100 mcg  100 mcg Q HOUR PRN Jeremy M Gonda, M.D.       • ipratropium-albuterol (DUONEB) nebulizer solution  3 mL Q2HRS PRN (RT) Jeremy M Gonda, M.D.       • ipratropium-albuterol (DUONEB) nebulizer solution  3 mL Q4HRS (RT) Jeremy M Gonda, M.D.   3 mL at 06/12/18 0702   • aspirin (ASA) chewable tab 81 mg  81 mg DAILY Jeremy M Gonda, M.D.   81 mg at 06/12/18 0915   • azithromycin (ZITHROMAX) tablet 250 mg  250 mg Q24HRS Jeremy M Gonda, M.D.   250 mg at 06/12/18 0915   • gabapentin (NEURONTIN) capsule 300 mg  300 mg TID Jeremy M Gonda, M.D.   300 mg at 06/12/18 0519   • ALPRAZolam (XANAX) tablet 1 mg  1 mg BID PRN Jeremy M Gonda, M.D.   1 mg at 06/11/18 1627   • acetaminophen (TYLENOL) tablet 650 mg  650 mg Q6HRS PRN Jeremy M Gonda, M.D.   650 mg at 06/12/18 0925   • guaiFENesin dextromethorphan (ROBITUSSIN DM) 100-10 MG/5ML syrup 10 mL  10 mL Q6HRS PRN Jeremy M Gonda, M.D.       • senna-docusate (PERICOLACE or SENOKOT S) 8.6-50 MG per tablet 2 Tab  2 Tab BID Jeremy M Gonda, M.D.   2 Tab  at 06/12/18 0915    And   • polyethylene glycol/lytes (MIRALAX) PACKET 1 Packet  1 Packet QDAY PRN Jeremy M Gonda, M.D.        And   • magnesium hydroxide (MILK OF MAGNESIA) suspension 30 mL  30 mL QDAY PRN Jeremy M Gonda, M.D.        And   • bisacodyl (DULCOLAX) suppository 10 mg  10 mg QDAY PRN Jeremy M Gonda, M.D.       • fentaNYL (SUBLIMAZE) 50 mcg/mL in 50mL (Continuous Infusion)   Continuous Jeremy M Gonda, M.D. 1.5 mL/hr at 06/11/18 1902 75 mcg/hr at 06/11/18 1902   • Respiratory Care per Protocol   Continuous RT Sherrell Higgins M.D.       • NS (BOLUS) infusion 500 mL  500 mL Once PRN Sherrell Higgins M.D.       • heparin injection 5,000 Units  5,000 Units Q8HRS Sherrell Higgins M.D.   5,000 Units at 06/12/18 0519     Last reviewed on 6/9/2018 10:32 PM by [facility]    Quality  Measures:  Medications reviewed, Labs reviewed and Radiology images reviewed                      Assessment/Plan:  Acute hypoxic/hypercarbic respiratory failure    - intubated 6/10    -continue full vent support, ventilator settings adjusted today   -RT/O2 protocols   -Propofol and fentanyl drips for sedation/analgesia   -Eventual diuresis although currently resuscitated with fluids  Community-acquired pneumonia    - continue antibiotics, follow-up on cultures  Septic shock from pulmonary source with associated cardiac and respiratory failure   -Continue broad-spectrum antibiotics, sepsis protocol, norepinephrine infusion to maintain map greater than 65   -Trend CVP with goal 10-12   -Echocardiogram pending  Normocytic anemia   - Monitor with conservative transfusion strategy   Hyperkalemia -improving, monitor  Acute kidney injury, ATN/prerenal   -Avoid nephrotoxins, monitor urine output  Mildly elevated troponin - trend  Type 2 diabetes - SSI  Dyslipidemia - statin  Thrombocytosis - improving  History of tobacco use, 40 pack years, quit a year ago   - BDs prn, consider steroids if bronchospasm develops  Lactic acidosis -  trend  Acute metabolic encephalopathy - limit sedatives when stabilized and monitor  Prophylaxis, start enteral nutrition    Patient is critically ill.  I have titrated vasopressors, adjusted ventilator settings and ongoing critical care management as above.  Discussed patient condition and risk of morbidity and/or mortality with Family, RN, RT, Pharmacy, Charge nurse / hot rounds, Patient and hospitalist.    The patient remains critically ill.  Critical care time = 34 minutes in directly providing and coordinating critical care and extensive data review.  No time overlap and excludes procedures.

## 2018-06-12 NOTE — PROGRESS NOTES
[facility] Hospitalist Progress Note    Date of Service: 2018    Chief Complaint  73 y.o. female admitted 2018 with respiratory failure.    Interval Problem Update  Patient noted to have respiratory failure, required intubation.  Deemed due to pneumonia.  Also with acute renal failure.  Patient seen and examined in the ICU, ICU care given.  Discussed patient condition and plan with Intensivist, RN, RT and charge nurse / hot rounds.    Sinus tach  Prop 40  fent 100  Levo   Tolerating TF at goal  Adequate uop via [place]  Mobilize today  Vent day 3  Give mg    Consultants/Specialty  Intensivist    Disposition  Patient's requires additional treatment in the hospital, unsure if she will need placement versus home at the time of discharge    Patient is critically ill.   The patient continues to have : septic shock  The vital organ system that is effected is the: all are at risk   If untreated there is a high chance of deterioration into: death   The critical care that I am providing today is: levophed gtt  The critical care that has been undertaken is medically complex.   There has been no overlap in critical care time.  Critical care time not including procedures, no overlap: 36 minutes       Review of Systems   Unable to perform ROS: Intubated      Physical Exam  Laboratory/Imaging   Hemodynamics  Temp (24hrs), Av.2 °C (100.8 °F), Min:37.9 °C (100.2 °F), Max:38.5 °C (101.3 °F)   Temperature: (!) 38.2 °C (100.8 °F), Monitored Temp: 38.1 °C (100.6 °F)  Pulse  Av.7  Min: 40  Max: 139 Heart Rate (Monitored): (!) 117  NIBP: 138/64 CVP (mm Hg): (!) 11 MM HG    Respiratory  Weeks Vent Mode: APVCMV, Rate (breaths/min): 24, PEEP/CPAP: 10, PEEP/CPAP: 10, FiO2: 60, P Peak (PIP): 26, P MEAN: 15   Respiration: (!) 23, Pulse Oximetry: 100 %     Work Of Breathing / Effort: Vented  RUL Breath Sounds: Coarse Crackles, RML Breath Sounds: Diminished, RLL Breath Sounds: Diminished, DEIDRA Breath Sounds: Coarse Crackles, LLL  Breath Sounds: Diminished    Fluids    Intake/Output Summary (Last 24 hours) at 06/12/18 0725  Last data filed at 06/12/18 0600   Gross per 24 hour   Intake          4098.31 ml   Output             1865 ml   Net          2233.31 ml       Nutrition  Orders Placed This Encounter   Procedures   • Diet NPO     Standing Status:   Standing     Number of Occurrences:   1     Order Specific Question:   Type:     Answer:   Now [1]     Order Specific Question:   Restrict to:     Answer:   Strict [1]     Physical Exam   Constitutional: She appears ill. No distress. She is sedated, intubated and restrained.   HENT:   Head: Normocephalic and atraumatic.   Eyes: Right eye exhibits no discharge. Left eye exhibits no discharge.   Neck: No tracheal deviation present.   Cardiovascular: Normal rate and regular rhythm.    No murmur heard.  Pulmonary/Chest: No stridor. She is intubated. She has no wheezes. She has rales.   Intubated and sedated    Abdominal: Soft. Bowel sounds are normal. She exhibits no distension.   Musculoskeletal: She exhibits edema.   Neurological:   Intubated and sedated    Skin: Skin is warm and dry. She is not diaphoretic. No erythema.   Psychiatric: She is noncommunicative.   Vitals reviewed.      Recent Labs      06/10/18   0450  06/11/18   0253  06/12/18   0500   WBC  23.8*  18.0*  13.3*   RBC  3.65*  3.06*  2.94*   HEMOGLOBIN  10.7*  9.0*  8.7*   HEMATOCRIT  33.7*  28.6*  27.2*   MCV  92.3  93.5  92.5   MCH  29.3  29.4  29.6   MCHC  31.8*  31.5*  32.0*   RDW  50.7*  51.8*  51.9*   PLATELETCT  395  357  267   MPV  10.3  10.1  9.9     Recent Labs      06/10/18   0120  06/11/18   0253  06/12/18   0500   SODIUM  136  136  135   POTASSIUM  5.3  4.3  4.6   CHLORIDE  108  109  108   CO2  18*  18*  17*   GLUCOSE  118*  170*  179*   BUN  53*  35*  24*   CREATININE  1.82*  1.11  0.91   CALCIUM  9.3  8.5  8.3*         Recent Labs      06/09/18   1947   BNPBTYPENAT  218*     Recent Labs      06/10/18   0915  06/12/18    0500   TRIGLYCERIDE  108  182*          Assessment/Plan     * Severe sepsis ([facility])   Assessment & Plan    - Due to pneumonia  - Continue Unasyn and azithromycin  - await culture results  - Continue IV fluids          SY (acute kidney injury) ([facility])   Assessment & Plan    - Due to dehydration and sepsis  - Resolved with IV fluids        Acute respiratory failure with hypoxia ([facility])   Assessment & Plan    - Full vent support  - Vent management per intensivist        Delirium   Assessment & Plan    - Now intubated and sedated        Hyperkalemia   Assessment & Plan    - Resolved        Shock ([facility])   Assessment & Plan    - Due to sepsis and dehydration  - Continue Levophed drip, titrating        Pneumonia due to other specified bacteria ([facility])   Assessment & Plan    - Continue Unasyn and azithromycin  - Await culture results        HLD (hyperlipidemia)   Assessment & Plan    - Continue statin        T2DM (type 2 diabetes mellitus) ([facility])   Assessment & Plan    - Continue insulin sliding scale, adjust as needed        History of atrial fibrillation without current medication- (present on admission)   Assessment & Plan    - Monitor          Quality-Core Measures   Reviewed items::  Labs reviewed, Medications reviewed and Radiology images reviewed  Jamison catheter::  Critically Ill - Requiring Accurate Measurement of Urinary Output and Unconscious / Sedated Patient on a Ventilator  DVT prophylaxis pharmacological::  Heparin  Ulcer Prophylaxis::  Yes  Antibiotics:  Treating active infection/contamination beyond 24 hours perioperative coverage

## 2018-06-12 NOTE — DISCHARGE PLANNING
Medical SW    Sw attended AM IDT Rounds.    RN reports, pt has celso in place, will mobilize today,      Plan: Sw to assist w/ d/c planning as needed.

## 2018-06-12 NOTE — ASSESSMENT & PLAN NOTE
Improving with IV fluids  Due to sepsis and dehydration  Monitor I/O's vitals.  Pressors as needed to keep MAP >65 and SBP >90

## 2018-06-12 NOTE — CARE PLAN
Problem: Infection  Goal: Will remain free from infection  Outcome: PROGRESSING AS EXPECTED  High fall risk precautions in place. Pt gets extremely agitated when sedation is lifted. Non compliant with instruction and does not follow commands. Bed alarm on.     Problem: Respiratory:  Goal: Respiratory status will improve  Outcome: PROGRESSING AS EXPECTED  Pt respiratory status continuously assessed. RT involved in care to help manage vent and respiratory needs.

## 2018-06-12 NOTE — PROGRESS NOTES
Outcome: Left Message    Please transfer to Patient Outreach Team at [phone] when patient returns call.    Attempt # 2

## 2018-06-12 NOTE — PROGRESS NOTES
Pt temp not coming down with tylenol and ice packs. Dr Mahmood paged and order received for cooling blanket.

## 2018-06-12 NOTE — RESPIRATORY CARE
Vent Day: 3  Vent: APVCMV 24/340/+10/65%  ETT: 7.5@23    Vt dropped from 370 to 340 for 6mL/kg of IBW and Fi02 increased to 65% from 55% per AM ABG Pa02 63

## 2018-06-12 NOTE — PROGRESS NOTES
Tele summary  Rhythm: SR  Rate: 90's-110's. PSVT up to 150's  AZ: 0.12  QRS: 0.06  QT: 0.32      Telemetry strips placed in pt chart.     12 hour chart check

## 2018-06-12 NOTE — CARE PLAN
Problem: Nutritional:  Goal: Nutrition support tolerated and meeting greater than 85% of estimated needs  Outcome: PROGRESSING AS EXPECTED  TF @ goal on propofol - Peptamen Intense VHP @ 45 mL/hr.

## 2018-06-13 NOTE — PROGRESS NOTES
[facility] Hospitalist Progress Note    Date of Service: 2018    Chief Complaint  73 y.o. female admitted 2018 with respiratory failure.    Interval Problem Update  Patient noted to have respiratory failure, required intubation.  Deemed due to pneumonia.  Also with acute renal failure.  Patient seen and examined in the ICU, ICU care given.  Discussed patient condition and plan with Intensivist, RN, RT and charge nurse / hot rounds.    withdrawaling, not following  Sinus tach 110-120  SBP   Edema  CVP 8-12  tmax 39.2  Tolerating TF at goal  Jamison with 1700 uop overnight  fent 125  Prop 40  1/2 ns at 75, stop   Vent day 4  azithro day , unasyn day   procalcitonin improved    Consultants/Specialty  Intensivist    Disposition  Patient's requires additional treatment in the hospital, unsure if she will need placement versus home at the time of discharge    Patient is critically ill.   The patient continues to have : shock  The vital organ system that is effected is the: all are at risk  If untreated there is a high chance of deterioration into: death   The critical care that I am providing today is: levophed gtt  The critical care that has been undertaken is medically complex.   There has been no overlap in critical care time.  Critical care time not including procedures, no overlap: 38 minutes      Review of Systems   Unable to perform ROS: Intubated      Physical Exam  Laboratory/Imaging   Hemodynamics  No data recorded.   Monitored Temp: 38.7 °C (101.7 °F)  Pulse  Av.4  Min: 40  Max: 139 Heart Rate (Monitored): (!) 115  NIBP: (!) 99/43 CVP (mm Hg): (!) 12 MM HG    Respiratory  Weeks Vent Mode: APVCMV, Rate (breaths/min): 24, PEEP/CPAP: 10, PEEP/CPAP: 10, FiO2: 65, P Peak (PIP): 15, P MEAN: 13   Respiration: (!) 27, Pulse Oximetry: 95 %     Work Of Breathing / Effort: Vented  RUL Breath Sounds: Clear After Suction, RML Breath Sounds: Clear After Suction, RLL Breath Sounds: Clear After Suction,  DEIDRA Breath Sounds: Clear After Suction, LLL Breath Sounds: Clear After Suction    Fluids    Intake/Output Summary (Last 24 hours) at 06/13/18 0710  Last data filed at 06/13/18 0400   Gross per 24 hour   Intake          3057.56 ml   Output             1675 ml   Net          1382.56 ml       Nutrition  Orders Placed This Encounter   Procedures   • Diet NPO     Standing Status:   Standing     Number of Occurrences:   1     Order Specific Question:   Type:     Answer:   Now [1]     Order Specific Question:   Restrict to:     Answer:   Strict [1]     Physical Exam   Constitutional: She appears ill. She is sedated, intubated and restrained.   HENT:   Head: Normocephalic and atraumatic.   Mouth/Throat: No oropharyngeal exudate.   Eyes: Right eye exhibits no discharge. Left eye exhibits no discharge. No scleral icterus.   Neck: Neck supple. No tracheal deviation present.   Cardiovascular: Normal rate and regular rhythm.  Exam reveals no gallop and no friction rub.    No murmur heard.  Pulmonary/Chest: No stridor. She is intubated. She has no wheezes. She has no rhonchi. She has rales.   Intubated and sedated    Abdominal: Bowel sounds are normal. She exhibits no distension.   Musculoskeletal: She exhibits edema.   Neurological:   Intubated and sedated    Skin: Skin is warm and dry. She is not diaphoretic.   Psychiatric: She is noncommunicative.   Nursing note and vitals reviewed.      Recent Labs      06/11/18   0253  06/12/18   0500  06/13/18   0500   WBC  18.0*  13.3*  18.1*   RBC  3.06*  2.94*  2.97*   HEMOGLOBIN  9.0*  8.7*  8.7*   HEMATOCRIT  28.6*  27.2*  27.9*   MCV  93.5  92.5  93.9   MCH  29.4  29.6  29.3   MCHC  31.5*  32.0*  31.2*   RDW  51.8*  51.9*  54.3*   PLATELETCT  357  267  256   MPV  10.1  9.9  10.5     Recent Labs      06/11/18   0253  06/12/18   0500  06/13/18   0500   SODIUM  136  135  132*   POTASSIUM  4.3  4.6  5.0   CHLORIDE  109  108  106   CO2  18*  17*  16*   GLUCOSE  170*  179*  144*   BUN  35*   24*  25*   CREATININE  1.11  0.91  0.95   CALCIUM  8.5  8.3*  8.6             Recent Labs      06/10/18   0915  06/12/18   0500   TRIGLYCERIDE  108  182*          Assessment/Plan     * Severe sepsis ([facility])   Assessment & Plan    - Due to pneumonia  - Continue Unasyn and azithromycin  - await culture results  - Continue IV fluids  - Persistent fever, now need to go back on pressor support, obtain urinalysis, decreased output this morning          SY (acute kidney injury) ([facility])   Assessment & Plan    - Due to dehydration and sepsis  - Resolved with IV fluids        Acute respiratory failure with hypoxia ([facility])   Assessment & Plan    - Full vent support  - Vent management per intensivist        Delirium   Assessment & Plan    - Now intubated and sedated        Hyperkalemia   Assessment & Plan    - Resolved        Shock ([facility])   Assessment & Plan    - Due to sepsis and dehydration  - Continue Levophed drip, titrating  - Was able to come off for a short time however now she is worsening        Pneumonia due to other specified bacteria ([facility])   Assessment & Plan    - Continue Unasyn and azithromycin  - Await culture results        HLD (hyperlipidemia)   Assessment & Plan    - Continue statin        T2DM (type 2 diabetes mellitus) ([facility])   Assessment & Plan    - Continue insulin sliding scale, adjust as needed        History of atrial fibrillation without current medication- (present on admission)   Assessment & Plan    - Monitor          Quality-Core Measures   Reviewed items::  Labs reviewed, Medications reviewed and Radiology images reviewed  Jamison catheter::  Critically Ill - Requiring Accurate Measurement of Urinary Output and Unconscious / Sedated Patient on a Ventilator  DVT prophylaxis pharmacological::  Heparin  Ulcer Prophylaxis::  Yes  Antibiotics:  Treating active infection/contamination beyond 24 hours perioperative coverage

## 2018-06-13 NOTE — PROGRESS NOTES
Pulmonary Critical Care Progress Note        Date of Admission:  6/9/2018    Chief Complaint: SOB    History of Present Illness: 73 y.o. female with known past   medical history of type 2 diabetes, questionable bipolar disorder, history of   CVA, dyslipidemia.  The patient indicates that she was in her normal state of   health up until approximately 2 days ago, at which point in time, she had   increasing shortness of breath.  She denied any significant fever, chills.    Denied any headache, visual changes, has not had any cough or sputum   production.  No nausea, vomiting, abdominal pain or discomfort.  Has   intermittent chronic diarrhea, nothing more than usual.  Denies any dysuria or   lower extremity edema.  Further, she denies any sick contacts or recent   travels.  No history of DVTs or PEs in the past.  She indicates that the   shortness of breath has been progressive with increasing dyspnea on exertion.    She does not associate with positioning being any worse or better.  She could   lie flat and just as dyspneic as sitting up forward.  She denies any change   in her medications.  She does have tobacco history, which she quit about 1   year ago, smoking 1 pack a day x40 years.  Denies any alcohol or illicit drug   use.  She further denies any underlying heart disease or history of MI in the   past.       ROS:  Respiratory: unable to perform due to the patient's inability to effectively communicate, Cardiac: unable to perform due to the patient's inability to effectively communicate, GI: unable to perform due to the patient's inability to effectively communicate.      Interval Events:  24 hour interval history reviewed    - Tm 39.2   - ishan TF    - fentanyl/prop, w/d's, not following   - vent day 4,    - azithro/unasyn   - cx's neg   - PCT down 50%   - CXR improving bilat infs   - stop IVF (1/2 NS)   -   Yesterday   - ishan TF   - arouses, CAM+   - vent day 3   - FiO2 60   - CXR unchanged bilat patchy inf   -  WBC down   - PCT f/u P   - replace MG   - Unasyn/azithro 4   - norepi off at 5 am    PFSH:  No change.    Respiratory:  Rate (breaths/min): 24, Vt Target (mL): 340, PEEP/CPAP: 10, FiO2: 65, Static Compliance (ml / cm H2O): 98, Control VTE (exp VT): 722  Pulse Oximetry: 93 %          Exam: tachypnea, labored breathing, rhonchi throughout all lung fields and diminished breath sounds mild  ImagingAvailable data reviewed   Recent Labs      06/11/18   0406  06/12/18   0421  06/13/18   0330   ISTATAPH  7.306*  7.344*  7.273*   ISTATAPCO2  35.1  35.4  38.3*   ISTATAPO2  63*  61*  59*   ISTATATCO2  19*  20  19*   [id number]  90*  90*  86*   ISTATARTHCO3  17.5  19.3  17.7   ISTATARTBE  -8*  -6*  -8*   ISTATTEMP  37.8 C  37.5 C  38.8 C   ISTATFIO2  50  55  60   ISTATSPEC  Arterial  Arterial  Arterial   ISTATAPHTC  7.294*  7.337*  7.248*   [id number]  67  63*  66       HemoDynamics:  Pulse: (!) 114, Heart Rate (Monitored): (!) 114  NIBP: 100/53  CVP (mm Hg): (!) 12 MM HGNorepinephrine infusion at 6    Exam: regular rhythm (Sinus), tachycardia  Imaging: Available data reviewed  Recent Labs      06/11/18   0253   TROPONINI  0.03       Neuro:  GCS Total [place] Coma Score: 7 sedated on propofol drip       Exam: no focal deficits noted Agitated Delirious Confused, not following  Imaging: Available data reviewed    Fluids:  Intake/Output       06/11/18 0700 - 06/12/18 0659 06/12/18 0700 - 06/13/18 0659 06/13/18 0700 - 06/14/18 0659      1480-2525 6783-5674 Total 0782-7110 1222-0525 Total 0681-0016 9687-8123 Total       Intake    I.V.  1324.3  1349 2673.3  750  1977.2 2727.2  --  -- --    Propofol Volume 397.6 265.6 663.2 -- 497.2 497.2 -- -- --    Norepinephrine Volume 101.7 65.5 167.1 -- -- -- -- -- --    IV Piggyback Volume (IV Piggyback) 200 100 300 -- 100 100 -- -- --    IV Volume (.045% NS)  750 1350 2100 -- -- --    IV Volume (Fentanyl) -- 18 18 -- 30 30 -- -- --    Other  150  -- 150  --  150 150  --  -- --     Medications (P.O./ Enteral Liquids) 150 -- 150 -- 150 150 -- -- --    Enteral  735  540 1275  --  660 660  --  -- --    Free Water / Tube Flush 195 -- 195 -- 120 120 -- -- --    Intake (mL) (Enteral Tube Right Nare Cortrak Gastric Feeding Tube)  -- 540 540 -- -- --    Total Intake 2209.3 1889 4098.3 750 2787.2 3537.2 -- -- --       Output    Urine  725  1140 1865  1100  575 1675  --  -- --    Output (mL) (Urinary Catheter Indwelling Catheter) 725 1140 1865 5548 705 4225 -- -- --    Stool  --  -- --  --  -- --  --  -- --    Number of Times Stooled -- -- -- 1 x 0 x 1 x -- -- --    Total Output 725 1140 1865 1759 286 7419 -- -- --       Net I/O     1484.3 749 2233.3 -350 2212.2 1862.2 -- -- --           Recent Labs      18   0500  18   0500   SODIUM  136  135  132*   POTASSIUM  4.3  4.6  5.0   CHLORIDE  109  108  106   CO2  18*  17*  16*   BUN  35*  24*  25*   CREATININE  1.11  0.91  0.95   MAGNESIUM  1.7  1.8  2.2   PHOSPHORUS  3.6  3.7  4.8*   CALCIUM  8.5  8.3*  8.6       GI/Nutrition:  Exam: abdomen is soft and non-tender, normal active bowel sounds  Imaging: Available data reviewed  NPO  Liver Function  Recent Labs      18   0253  06/12/18   0500  18   0500   ALTSGPT  50   --    --    ASTSGOT  68*   --    --    ALKPHOSPHAT  88   --    --    TBILIRUBIN  0.5   --    --    PREALBUMIN  <3.0*   --    --    GLUCOSE  170*  179*  144*       Heme:  Recent Labs      18   0253  18   0500  18   0500   RBC  3.06*  2.94*  2.97*   HEMOGLOBIN  9.0*  8.7*  8.7*   HEMATOCRIT  28.6*  27.2*  27.9*   PLATELETCT  357  106  256       Infectious Disease:  Monitored Temp 2  Av.6 °C (101.5 °F)  Min: 37.9 °C (100.2 °F)  Max: 39.3 °C (102.7 °F)  Micro: reviewed.  No growth to date  Recent Labs      18   0253  18   0500  18   0500   WBC  18.0*  13.3*  18.1*   NEUTSPOLYS  71.00  69.90  75.50*   LYMPHOCYTES  17.30*  15.90*  7.30*   MONOCYTES  5.30   2.70  0.00   EOSINOPHILS  1.20  4.40  3.60   BASOPHILS  0.40  0.90  0.00   ASTSGOT  68*   --    --    ALTSGPT  50   --    --    ALKPHOSPHAT  88   --    --    TBILIRUBIN  0.5   --    --      Current Facility-Administered Medications   Medication Dose Frequency Provider Last Rate Last Dose   • famotidine (PEPCID) tablet 20 mg  20 mg BID Oliver Carcamo M.D.   20 mg at 06/13/18 0831   • insulin regular (HUMULIN R) injection 3-14 Units  3-14 Units Q6HRS Oliver Carcamo M.D.   Stopped at 06/13/18 0600    And   • glucose 4 g chewable tablet 16 g  16 g Q15 MIN PRN Oliver Carcamo M.D.        And   • dextrose 50% (D50W) injection 25 mL  25 mL Q15 MIN PRN Oliver Carcamo M.D.       • ampicillin/sulbactam (UNASYN) 3 g in  mL IVPB  3 g Q6HRS Oliver Carcamo M.D.   Stopped at 06/13/18 0717   • rosuvastatin (CRESTOR) tablet 20 mg  20 mg Q EVENING Oliver Carcamo M.D.   20 mg at 06/12/18 2127   • sertraline (ZOLOFT) tablet 50 mg  50 mg QDAY Oliver Carcamo M.D.   50 mg at 06/12/18 2127   • 1/2 NS infusion   Continuous Davide Barrientos M.D. 75 mL/hr at 06/13/18 0258     • haloperidol lactate (HALDOL) injection 5 mg  5 mg Q4HRS PRN Davide Barrientos M.D.   5 mg at 06/10/18 0422   • norepinephrine (LEVOPHED) 8 mg in  mL Infusion  0-30 mcg/min Continuous Jeremy M Gonda, M.D.   Stopped at 06/12/18 0512   • propofol (DIPRIVAN) injection  0-80 mcg/kg/min Continuous Jeremy M Gonda, M.D. 17.8 mL/hr at 06/13/18 0839 40 mcg/kg/min at 06/13/18 0839   • MD ALERT...Adult ICU Electrolyte Replacement per Pharmacy Protocol   pharmacy to dose Jeremy M Gonda, M.D.       • Pharmacy Consult: Enteral tube feeding - review meds/change route/product selection  1 Each PRN Jeremy M Gonda, M.D.       • fentaNYL (SUBLIMAZE) injection 25 mcg  25 mcg Q HOUR PRN Jeremy M Gonda, M.D.        Or   • fentaNYL (SUBLIMAZE) injection 50 mcg  50 mcg Q HOUR PRN Jeremy M Gonda, M.D.        Or   • fentaNYL (SUBLIMAZE) injection 100 mcg  100 mcg Q HOUR PRN  Jeremy M Gonda, M.D.       • ipratropium-albuterol (DUONEB) nebulizer solution  3 mL Q2HRS PRN (RT) Jeremy M Gonda, M.D.       • ipratropium-albuterol (DUONEB) nebulizer solution  3 mL Q4HRS (RT) Jeremy M Gonda, M.D.   3 mL at 06/13/18 0703   • aspirin (ASA) chewable tab 81 mg  81 mg DAILY Jeremy M Gonda, M.D.   81 mg at 06/13/18 0831   • gabapentin (NEURONTIN) capsule 300 mg  300 mg TID Jeremy M Gonda, M.D.   300 mg at 06/13/18 0452   • ALPRAZolam (XANAX) tablet 1 mg  1 mg BID PRN Jeremy M Gonda, M.D.   1 mg at 06/11/18 1627   • acetaminophen (TYLENOL) tablet 650 mg  650 mg Q6HRS PRN Jeremy M Gonda, M.D.   650 mg at 06/13/18 0452   • guaiFENesin dextromethorphan (ROBITUSSIN DM) 100-10 MG/5ML syrup 10 mL  10 mL Q6HRS PRN Jeremy M Gonda, M.D.       • senna-docusate (PERICOLACE or SENOKOT S) 8.6-50 MG per tablet 2 Tab  2 Tab BID Jeremy M Gonda, M.D.   2 Tab at 06/13/18 0831    And   • polyethylene glycol/lytes (MIRALAX) PACKET 1 Packet  1 Packet QDAY PRN Jeremy M Gonda, M.D.   1 Packet at 06/12/18 2127    And   • magnesium hydroxide (MILK OF MAGNESIA) suspension 30 mL  30 mL QDAY PRN Jeremy M Gonda, M.D.        And   • bisacodyl (DULCOLAX) suppository 10 mg  10 mg QDAY PRN Jeremy M Gonda, M.D.       • fentaNYL (SUBLIMAZE) 50 mcg/mL in 50mL (Continuous Infusion)   Continuous Jeremy M Gonda, M.D. 2.5 mL/hr at 06/12/18 2153 125 mcg/hr at 06/12/18 2153   • Respiratory Care per Protocol   Continuous RT Sherrell Higgins M.D.       • NS (BOLUS) infusion 500 mL  500 mL Once PRN Sherrell Higgins M.D.       • heparin injection 5,000 Units  5,000 Units Q8HRS Sherrell Higgins M.D.   5,000 Units at 06/13/18 0452     Last reviewed on 6/9/2018 10:32 PM by Ana Purcell [facility]    Quality  Measures:   Medications reviewed, Labs reviewed and Radiology images reviewed                      Assessment/Plan:  Acute hypoxic/hypercarbic respiratory failure    - intubated 6/10    -continue full vent support, ventilator settings adjusted today,  not appropriate for liberation   -RT/O2 protocols   -Propofol and fentanyl drips for sedation/analgesia   -Eventual diuresis, currently resuscitated with fluids  Community-acquired pneumonia    -  continue antibiotics, follow-up on cultures  Septic shock from pulmonary source with associated cardiac and respiratory failure   -Continue broad-spectrum antibiotics, sepsis protocol   -Echocardiogram reviewed, preserved EF  Normocytic anemia   - Monitor with conservative transfusion strategy   Hyperkalemia -improving, monitor  Acute kidney injury, ATN/prerenal   -Avoid nephrotoxins, monitor urine output  Mildly elevated troponin - trend  Type 2 diabetes - SSI  Dyslipidemia - statin  Thrombocytosis - improving  History of tobacco use, 40 pack years, quit a year ago   - BDs prn, corticosteroids if needed  Lactic acidosis - trend  Acute metabolic encephalopathy - limit sedatives when stabilized and monitor  Prophylaxis, enteral nutrition  Monitor, correct electrolytes as needed  Patient is critically ill.  I have made ventilator adjustments and ongoing critical care management as above.  Discussed patient condition and risk of morbidity and/or mortality with Family, RN, RT, Pharmacy, Charge nurse / hot rounds, Patient and hospitalist.    The patient remains critically ill.  Critical care time = 38 minutes in directly providing and coordinating critical care and extensive data review.  No time overlap and excludes procedures.

## 2018-06-13 NOTE — FLOWSHEET NOTE
06/13/18 0710   Weaning Trial   Weaning Trial Initiated No   Barriers to Wean FiO2 >60% or PEEP >10 CM H2O

## 2018-06-13 NOTE — PROGRESS NOTES
Pt's urine output slowed to about 20 mL/h by 1200 today as well as became cloudy in appearance. Dr. Chen and Dr. Carcamo made aware. Urinalysis ordered and placed by Dr. Chen.

## 2018-06-13 NOTE — CARE PLAN
Problem: Ventilation Defect:  Goal: Ability to achieve and maintain unassisted ventilation or tolerate decreased levels of ventilator support  Outcome: PROGRESSING SLOWER THAN EXPECTED    Intervention: Support and monitor invasive and noninvasive mechanical ventilation  Adult Ventilation Update    Total Vent Days: 4    Patient Lines/Drains/Airways Status    Active Airway     Name: Placement date: Placement time: Site: Days:    Airway Group ET Tube 7.5 06/10/18   0840      2                Plateau Pressure (Q Shift): 24 (06/12/18 0704)  Static Compliance (ml / cm H2O): 12.1 (06/13/18 0147)    Patient failed trials because of Barriers to Wean: FiO2 >60% or PEEP >10 CM H2O (06/12/18 1453)  Barriers to SBT    Length of Weaning Trial        Sputum/Suction   Cough: Non Productive (06/13/18 0000)  Sputum Amount: Small (06/13/18 0000)  Sputum Color: White (06/13/18 0000)  Sputum Consistency: Thin (06/13/18 0000)    Mobility  Level of Mobility: Level I (06/12/18 2000)  Activity Performed: Unable to mobilize (06/12/18 2000)  Reason Not Mobilized:  (Rass +3 when sedation lifted. Not following commands) (06/12/18 2000)  Mobilization Comments: RASS-4 (06/12/18 1100)    Events/Summary/Plan: ABG drawn (06/13/18 6679)

## 2018-06-13 NOTE — CARE PLAN
Problem: Respiratory:  Goal: Respiratory status will improve  Outcome: PROGRESSING AS EXPECTED  Respiratory status continually assessed. RT involved to assist with assessment and vent settings.     Problem: Skin Integrity  Goal: Risk for impaired skin integrity will decrease  Outcome: PROGRESSING AS EXPECTED  Q2h turns, mepelex, and frequent assessment for moisture in place to prevent skin breaksown.

## 2018-06-13 NOTE — CARE PLAN
Problem: Discharge Barriers/Planning  Goal: Patient's continuum of care needs will be met  Pt on ventilator and being sedated on propofol per MD orders    Problem: Pain Management  Goal: Pain level will decrease to patient's comfort goal  Fentanyl drip in place and being monitored for CPOT scoring.

## 2018-06-13 NOTE — PROGRESS NOTES
Tele summary  Rhythm: SR/ ST  Rate: 90's-120's  MT: 0.12  QRS: 0.08  QT: 0.32      Telemetry strips placed in pt chart.     12 HOur chart check.

## 2018-06-14 NOTE — CARE PLAN
Problem: Ventilation Defect:  Goal: Ability to achieve and maintain unassisted ventilation or tolerate decreased levels of ventilator support    Intervention: Support and monitor invasive and noninvasive mechanical ventilation  Adult Ventilation Update    Total Vent Days: 5    Patient Lines/Drains/Airways Status    Active Airway     Name: Placement date: Placement time: Site: Days:    Airway Group ET Tube 7.5 06/10/18   0840      5              APVCMV  RR 24  Vt 340  PEEP 10  FiO2 50%

## 2018-06-14 NOTE — CARE PLAN
Problem: Safety  Goal: Will remain free from injury  Outcome: PROGRESSING AS EXPECTED    Goal: Will remain free from falls  Outcome: PROGRESSING AS EXPECTED      Problem: Knowledge Deficit  Goal: Knowledge of disease process/condition, treatment plan, diagnostic tests, and medications will improve  Outcome: NOT MET

## 2018-06-14 NOTE — PROGRESS NOTES
Pulmonary Critical Care Progress Note        Date of Admission:  6/9/2018    Chief Complaint: SOB    History of Present Illness: 73 y.o. female with known past   medical history of type 2 diabetes, questionable bipolar disorder, history of   CVA, dyslipidemia.  The patient indicates that she was in her normal state of   health up until approximately 2 days ago, at which point in time, she had   increasing shortness of breath.  She denied any significant fever, chills.    Denied any headache, visual changes, has not had any cough or sputum   production.  No nausea, vomiting, abdominal pain or discomfort.  Has   intermittent chronic diarrhea, nothing more than usual.  Denies any dysuria or   lower extremity edema.  Further, she denies any sick contacts or recent   travels.  No history of DVTs or PEs in the past.  She indicates that the   shortness of breath has been progressive with increasing dyspnea on exertion.    She does not associate with positioning being any worse or better.  She could   lie flat and just as dyspneic as sitting up forward.  She denies any change   in her medications.  She does have tobacco history, which she quit about 1   year ago, smoking 1 pack a day x40 years.  Denies any alcohol or illicit drug   use.  She further denies any underlying heart disease or history of MI in the   past.       ROS:  Respiratory: unable to perform due to the patient's inability to effectively communicate, Cardiac: unable to perform due to the patient's inability to effectively communicate, GI: unable to perform due to the patient's inability to effectively communicate.      Interval Events:  24 hour interval history reviewed    - fentanyl 50, no sedation, RASS -3, w/d's   - Tm 39.3   - ishan TF   - good uop   - vent day 5, 10, 50%   - unasyn day 5 of 7   - Cr up    - IVF for worsening renal fx   Yesterday   - Tm 39.2   - ishan TF    - fentanyl/prop, w/d's, not following   - vent day 4,    - azithro/unasyn   - cx's  neg   - PCT down 50%   - CXR improving bilat infs   - stop IVF (1/2 NS)    PFSH:  No change.    Respiratory:  Weeks Vent Mode: APVCMV, Rate (breaths/min): 24, Vt Target (mL): 340, PEEP/CPAP: 10, FiO2: 50, Static Compliance (ml / cm H2O): 106, Control VTE (exp VT): 534  Pulse Oximetry: 96 %          Exam: tachypnea, labored breathing, rhonchi throughout all lung fields and diminished breath sounds mild  ImagingAvailable data reviewed   Recent Labs      06/12/18   0421  06/13/18   0330  06/14/18   0400   ISTATAPH  7.344*  7.273*  7.262*   ISTATAPCO2  35.4  38.3*  40.9*   ISTATAPO2  61*  59*  64   ISTATATCO2  20  19*  20   FCAXALV3XRA  90*  86*  89*   ISTATARTHCO3  19.3  17.7  18.4   ISTATARTBE  -6*  -8*  -8*   ISTATTEMP  37.5 C  38.8 C  97.8 F   ISTATFIO2  55  60  55   ISTATSPEC  Arterial  Arterial  Arterial   ISTATAPHTC  7.337*  7.248*  7.269*   IPCPOYRO1IT  63*  66  62*       HemoDynamics:  Pulse: (!) 120, Heart Rate (Monitored): (!) 120  NIBP: 128/50  CVP (mm Hg): (!) 9 MM HGNorepinephrine infusion at 6    Exam: regular rhythm (Sinus), tachycardia  Imaging: Available data reviewed        Neuro:  GCS Total [place] Coma Score: 7 sedated on propofol drip       Exam: no focal deficits noted Agitated Delirious Confused, not following  Imaging: Available data reviewed    Fluids:  Intake/Output       06/12/18 0700 - 06/13/18 0659 06/13/18 0700 - 06/14/18 0659 06/14/18 0700 - 06/15/18 0659      [id number] 0012-2531 Total 0473-6654 6997-6214 Total 0178-8387 6069-8831 Total       Intake    I.V.  750  1977.2 2727.2  --  616 616  --  -- --    Propofol Volume -- 497.2 497.2 -- -- -- -- -- --    IV Piggyback Volume (IV Piggyback) -- 100 100 -- 100 100 -- -- --    IV Volume -- -- -- -- 500 500 -- -- --    IV Volume (.045% NS) 750 1350 2100 -- -- -- -- -- --    IV Volume (Fentanyl) -- 30 30 -- 16 16 -- -- --    Other  --  150 150  --  180 180  --  -- --    Medications (P.O./ Enteral Liquids) -- 150 150 -- 180 180 -- -- --     Enteral  --  660 660  --  660 660  200  -- 200    Free Water / Tube Flush -- 120 120 -- 120 120 -- -- --    Intake (mL) (Enteral Tube Right Nare Cortrak Gastric Feeding Tube) -- 540 540 -- 540 540 200 -- 200    Total Intake 750 2787.2 3537.2 -- 1456 1456 200 -- 200       Output    Urine  1100  575 1675  260  725 985  --  -- --    Output (mL) (Urinary Catheter Indwelling Catheter) 1429 085 1158 260 725 985 -- -- --    Stool  --  -- --  --  -- --  --  -- --    Number of Times Stooled 1 x 0 x 1 x -- -- -- -- -- --    Total Output 0584 276 9532 260 725 985 -- -- --       Net I/O     -350 2212.2 1862.2 -260 731 471 200 -- 200        Weight: 88 kg (194 lb 0.1 oz)  Recent Labs      18   0500  18   0500  18   0500   SODIUM  135  132*  135   POTASSIUM  4.6  5.0  5.6*   CHLORIDE  108  106  107   CO2  17*  16*  18*   BUN  24*  25*  43*   CREATININE  0.91  0.95  1.51*   MAGNESIUM  1.8  2.2   --    PHOSPHORUS  3.7  4.8*   --    CALCIUM  8.3*  8.6  8.2*       GI/Nutrition:  Exam: abdomen is soft and non-tender, normal active bowel sounds  Imaging: Available data reviewed  NPO  Liver Function  Recent Labs      18   0500  18   0500  18   0500   GLUCOSE  179*  144*  139*       Heme:  Recent Labs      18   0500  18   0500  18   0500   RBC  2.94*  2.97*  2.88*   HEMOGLOBIN  8.7*  8.7*  8.4*   HEMATOCRIT  27.2*  27.9*  27.3*   PLATELETCT  267  256  256       Infectious Disease:  Monitored Temp 2  Av.1 °C (100.6 °F)  Min: 37.6 °C (99.7 °F)  Max: 38.8 °C (101.8 °F)  Temp  Av.8 °C (98.2 °F)  Min: 36.8 °C (98.2 °F)  Max: 36.8 °C (98.2 °F)  Micro: reviewed.  No growth to date  Recent Labs      18   0500  18   0500  18   0500   WBC  13.3*  18.1*  19.6*   NEUTSPOLYS  69.90  75.50*  84.90*   LYMPHOCYTES  15.90*  7.30*  5.30*   MONOCYTES  2.70  0.00  1.80   EOSINOPHILS  4.40  3.60  0.00   BASOPHILS  0.90  0.00  0.00     Current Facility-Administered Medications    Medication Dose Frequency Provider Last Rate Last Dose   • famotidine (PEPCID) tablet 20 mg  20 mg BID Oliver Carcamo M.D.   20 mg at 06/14/18 0737   • insulin regular (HUMULIN R) injection 3-14 Units  3-14 Units Q6HRS Oliver Carcamo M.D.   Stopped at 06/14/18 0600    And   • glucose 4 g chewable tablet 16 g  16 g Q15 MIN PRN Oliver Carcamo M.D.        And   • dextrose 50% (D50W) injection 25 mL  25 mL Q15 MIN PRN Oliver Carcamo M.D.       • ampicillin/sulbactam (UNASYN) 3 g in  mL IVPB  3 g Q6HRS Oliver Carcamo M.D.   Stopped at 06/14/18 0623   • rosuvastatin (CRESTOR) tablet 20 mg  20 mg Q EVENING Oliver Carcamo M.D.   20 mg at 06/13/18 2142   • sertraline (ZOLOFT) tablet 50 mg  50 mg QDAY Oliver Carcamo M.D.   50 mg at 06/13/18 2142   • haloperidol lactate (HALDOL) injection 5 mg  5 mg Q4HRS PRN Davide Barrientos M.D.   5 mg at 06/10/18 0422   • norepinephrine (LEVOPHED) 8 mg in  mL Infusion  0-30 mcg/min Continuous Jeremy M Gonda, M.D. 5.6 mL/hr at 06/13/18 1728 3 mcg/min at 06/13/18 1728   • propofol (DIPRIVAN) injection  0-80 mcg/kg/min Continuous Jeremy M Gonda, M.D.   Stopped at 06/13/18 1700   • MD ALERT...Adult ICU Electrolyte Replacement per Pharmacy Protocol   pharmacy to dose Jeremy M Gonda, M.D.       • Pharmacy Consult: Enteral tube feeding - review meds/change route/product selection  1 Each PRN Jeremy M Gonda, M.D.       • fentaNYL (SUBLIMAZE) injection 25 mcg  25 mcg Q HOUR PRN Jeremy M Gonda, M.D.        Or   • fentaNYL (SUBLIMAZE) injection 50 mcg  50 mcg Q HOUR PRN Jeremy M Gonda, M.D.        Or   • fentaNYL (SUBLIMAZE) injection 100 mcg  100 mcg Q HOUR PRN Jeremy M Gonda, M.D.       • ipratropium-albuterol (DUONEB) nebulizer solution  3 mL Q2HRS PRN (RT) Jeremy M Gonda, M.D.       • ipratropium-albuterol (DUONEB) nebulizer solution  3 mL Q4HRS (RT) Jeremy M Gonda, M.D.   3 mL at 06/14/18 0723   • aspirin (ASA) chewable tab 81 mg  81 mg DAILY Jeremy M Gonda, M.D.   81 mg at  06/13/18 0831   • gabapentin (NEURONTIN) capsule 300 mg  300 mg TID Jeremy M Gonda, M.D.   300 mg at 06/14/18 0552   • ALPRAZolam (XANAX) tablet 1 mg  1 mg BID PRN Jeremy M Gonda, M.D.   1 mg at 06/13/18 1141   • acetaminophen (TYLENOL) tablet 650 mg  650 mg Q6HRS PRN Jeremy M Gonda, M.D.   650 mg at 06/14/18 0058   • guaiFENesin dextromethorphan (ROBITUSSIN DM) 100-10 MG/5ML syrup 10 mL  10 mL Q6HRS PRN Jeremy M Gonda, M.D.       • senna-docusate (PERICOLACE or SENOKOT S) 8.6-50 MG per tablet 2 Tab  2 Tab BID Jeremy M Gonda, M.D.   2 Tab at 06/14/18 0737    And   • polyethylene glycol/lytes (MIRALAX) PACKET 1 Packet  1 Packet QDAY PRN Jeremy M Gonda, M.D.   1 Packet at 06/12/18 2127    And   • magnesium hydroxide (MILK OF MAGNESIA) suspension 30 mL  30 mL QDAY PRN Jeremy M Gonda, M.D.   30 mL at 06/14/18 0737    And   • bisacodyl (DULCOLAX) suppository 10 mg  10 mg QDAY PRN Jeremy M Gonda, M.D.       • fentaNYL (SUBLIMAZE) 50 mcg/mL in 50mL (Continuous Infusion)   Continuous Jeremy M Gonda, M.D. 1 mL/hr at 06/14/18 0740 50 mcg/hr at 06/14/18 0740   • Respiratory Care per Protocol   Continuous RT Sherrell Higgins M.D.       • NS (BOLUS) infusion 500 mL  500 mL Once PRN Sherrell Higgins M.D.       • heparin injection 5,000 Units  5,000 Units Q8HRS Sherrell Higgins M.D.   5,000 Units at 06/14/18 0552     Last reviewed on 6/9/2018 10:32 PM by Franca Willett    Quality  Measures:  Medications reviewed, Labs reviewed and Radiology images reviewed                      Assessment/Plan:  Acute hypoxic/hypercarbic respiratory failure    - intubated 6/10    -continue full vent support, ventilator settings adjusted today   -RT/O2 protocols   -Propofol and fentanyl drips for sedation/analgesia changed to dexmedetomidine today   -Initiate diuresis as tolerated  Community-acquired pneumonia    -  continue antibiotics, follow-up on cultures  Septic shock from pulmonary source with associated cardiac and respiratory  failure   -Continue broad-spectrum antibiotics, sepsis protocol, norepinephrine infusion to maintain map greater than 65   -Trend CVP with goal 10-12   -Echocardiogram pending  Normocytic anemia   - Monitor with conservative transfusion strategy   Hyperkalemia -improving, monitor  Acute kidney injury, ATN/prerenal   -Avoid nephrotoxins, monitor urine output  Mildly elevated troponin - trend  Type 2 diabetes - SSI  Dyslipidemia - statin  Thrombocytosis - improving  History of tobacco use, 40 pack years, quit a year ago   - BDs prn, consider steroids if bronchospasm develops  Lactic acidosis - trend  Acute metabolic encephalopathy - limit sedatives when stabilized and monitor  Prophylaxis, start enteral nutrition    Patient is critically ill.  I have titrated vasopressors, adjusted ventilator settings and ongoing critical care management as above.  Discussed patient condition and risk of morbidity and/or mortality with Family, RN, RT, Pharmacy, Charge nurse / hot rounds, Patient and hospitalist.    The patient remains critically ill.  Critical care time = 34 minutes in directly providing and coordinating critical care and extensive data review.  No time overlap and excludes procedures.

## 2018-06-14 NOTE — DISCHARGE PLANNING
Medical SW    Sw attended AM IDT Rounds.    RN reports, pt dtr at bedside, not able to participate in mobility, houston in place    Plan: Sw to assist w/ d/c planning as needed.

## 2018-06-14 NOTE — CARE PLAN
Problem: Ventilation Defect:  Goal: Ability to achieve and maintain unassisted ventilation or tolerate decreased levels of ventilator support  Outcome: PROGRESSING SLOWER THAN EXPECTED    Intervention: Support and monitor invasive and noninvasive mechanical ventilation  Adult Ventilation Update    Total Vent Days: 5    Patient Lines/Drains/Airways Status    Active Airway     Name: Placement date: Placement time: Site: Days:    Airway Group ET Tube 7.5 06/10/18   0840      3             Plateau Pressure (Q Shift): 14 (06/13/18 0707)      Sputum/Suction   Cough: Weak;Productive (06/13/18 1836)  Sputum Amount: Unable to Evaluate (06/13/18 1836)  Sputum Color: Unable to Evaluate (06/13/18 1836)  Sputum Consistency: Unable to Evaluate (06/13/18 1836)    Mobility  Level of Mobility: Level I (06/13/18 2000)  Activity Performed: Unable to mobilize (06/13/18 2000)  Reason Not Mobilized: Other (comment) (RASS -3/-4 with no sedation. Does not follow commands. ) (06/13/18 2000)  Mobilization Comments: FIO2 65 (06/13/18 2000)    Events/Summary/Plan: ABG drawn (06/14/18 0400)

## 2018-06-14 NOTE — PROGRESS NOTES
RN paged Dr. Chen with update about urine output which continues to be about 20 mL/h and updated about urinalysis results. Dr. Chen verbalized orders for a 500 mL bolus of LR to be given. RN read back and placed orders as per MD request.

## 2018-06-14 NOTE — CARE PLAN
Problem: Safety  Goal: Will remain free from falls  Outcome: PROGRESSING AS EXPECTED  High fall risk precautions in place. Pt not following commands and can be agitated     Problem: Infection  Goal: Will remain free from infection  Outcome: PROGRESSING AS EXPECTED  Standard precautions in place with every patient interaction. Frequent handwashing and foaming utilized to prevent spread of infection to patient or staff members.

## 2018-06-14 NOTE — PROGRESS NOTES
Tele summary  Rhythm: SR/ ST  Rate: 90's-120's. Up to 160's (not sustained)   NE: 0.14  QRS: 0.08  QT: 0.32        Telemetry strips placed in pt chart.

## 2018-06-14 NOTE — PROGRESS NOTES
[facility]    Date of Service: 2018    Chief Complaint  73 y.o. female admitted 2018 with respiratory failure.    Interval Problem Update  Patient noted to have respiratory failure, required intubation.  Deemed due to pneumonia.  Also with acute renal failure.  Discussed with family at bedside.  Patient seen and examined in the ICU, ICU care given.  Discussed patient condition and plan with Intensivist, RN, RT and charge nurse / hot rounds.    Low BP overnight, resolved with bolus  fent 50  Off prop  NSR to tach  SBP 120s  tmax 39.3  High residual noted, was tolerating TF at goal  Small bm on   Jamison with adequate uop  RIJ  unasyn day     Consultants/Specialty  Intensivist    Disposition  Patient's requires additional treatment in the hospital, unsure if she will need placement versus home at the time of discharge      Review of Systems   Unable to perform ROS: Intubated      Physical Exam  Laboratory/Imaging   Hemodynamics  Temp (24hrs), Av.8 °C (98.2 °F), Min:36.8 °C (98.2 °F), Max:36.8 °C (98.2 °F)   Temperature: 36.8 °C (98.2 °F), Monitored Temp: 37.9 °C (100.2 °F)  Pulse  Av.2  Min: 40  Max: 139 Heart Rate (Monitored): (!) 119  NIBP: 132/48 CVP (mm Hg): (!) 8 MM HG    Respiratory  Weeks Vent Mode: APVCMV, Rate (breaths/min): 24, PEEP/CPAP: 10, PEEP/CPAP: 10, FiO2: 50, P Peak (PIP): 24, P MEAN: 14   Respiration: (!) 21, Pulse Oximetry: 96 %     Work Of Breathing / Effort: Vented  RUL Breath Sounds: Clear, RML Breath Sounds: Clear, RLL Breath Sounds: Diminished, DEIDRA Breath Sounds: Clear, LLL Breath Sounds: Diminished    Fluids    Intake/Output Summary (Last 24 hours) at 18 0734  Last data filed at 18 0600   Gross per 24 hour   Intake             1456 ml   Output              985 ml   Net              471 ml       Nutrition  Orders Placed This Encounter   Procedures   • Diet NPO     Standing Status:   Standing     Number of Occurrences:   1     Order  Specific Question:   Type:     Answer:   Now [1]     Order Specific Question:   Restrict to:     Answer:   Strict [1]     Physical Exam   Constitutional: She appears ill. No distress. She is sedated, intubated and restrained.   HENT:   Head: Normocephalic and atraumatic.   Eyes: Conjunctivae are normal. Right eye exhibits no discharge. Left eye exhibits no discharge.   Neck: No tracheal deviation present.   Cardiovascular: Regular rhythm.  Tachycardia present.    Pulmonary/Chest: No stridor. She is intubated. She has no rhonchi. She has rales.   Intubated and sedated    Abdominal: Soft. Bowel sounds are normal. She exhibits no distension.   Musculoskeletal: She exhibits edema.   Neurological:   Intubated and sedated    Skin: Skin is warm and dry. She is not diaphoretic. No erythema.   Psychiatric: She is noncommunicative.   Nursing note and vitals reviewed.      Recent Labs      06/12/18   0500  06/13/18   0500  06/14/18   0500   WBC  13.3*  18.1*  19.6*   RBC  2.94*  2.97*  2.88*   HEMOGLOBIN  8.7*  8.7*  8.4*   HEMATOCRIT  27.2*  27.9*  27.3*   MCV  92.5  93.9  94.8   MCH  29.6  29.3  29.2   MCHC  32.0*  31.2*  30.8*   RDW  51.9*  54.3*  54.6*   PLATELETCT  267  256  256   MPV  9.9  10.5  10.9     Recent Labs      06/12/18   0500  06/13/18   0500  06/14/18   0500   SODIUM  135  132*  135   POTASSIUM  4.6  5.0  5.6*   CHLORIDE  108  106  107   CO2  17*  16*  18*   GLUCOSE  179*  144*  139*   BUN  24*  25*  43*   CREATININE  0.91  0.95  1.51*   CALCIUM  8.3*  8.6  8.2*             Recent Labs      06/12/18   0500   TRIGLYCERIDE  182*          Assessment/Plan     * Severe sepsis (HCC)   Assessment & Plan    - Due to pneumonia  - Continue Unasyn and azithromycin  - await culture results  -IV fluids stopped, bolus for hypotension/decreased urinary output          SY (acute kidney injury) (HCC)   Assessment & Plan    - Due to dehydration and sepsis  -Worse today  -Patient did have decreased urine output yesterday,  bolus was given with improvement in her urinary output  -Patient may need additional fluids, repeat BMP in the morning        Acute respiratory failure with hypoxia (HCC)   Assessment & Plan    - Full vent support  - Vent management per intensivist  -Continues to have infiltrate on chest x-ray, could be a component of pulmonary edema, at this point in time Lasix is not an option        Delirium   Assessment & Plan    - Now intubated and sedated        Hyperkalemia   Assessment & Plan    - Resolved        Shock ([facility])   Assessment & Plan    - Due to sepsis and dehydration  - Continue Levophed drip, titrating  - Was able to come off for a short time however now she is worsening        Pneumonia due to other specified bacteria ([facility])   Assessment & Plan    - Continue Unasyn and azithromycin  - Await culture results        HLD (hyperlipidemia)   Assessment & Plan    - Continue statin        T2DM (type 2 diabetes mellitus) ([facility])   Assessment & Plan    - Continue insulin sliding scale, adjust as needed        History of atrial fibrillation without current medication- (present on admission)   Assessment & Plan    - Monitor          Quality-Core Measures   Reviewed items::  Labs reviewed, Medications reviewed and Radiology images reviewed  Jamison catheter::  Critically Ill - Requiring Accurate Measurement of Urinary Output and Unconscious / Sedated Patient on a Ventilator  DVT prophylaxis pharmacological::  Heparin  Ulcer Prophylaxis::  Yes  Antibiotics:  Treating active infection/contamination beyond 24 hours perioperative coverage

## 2018-06-14 NOTE — CARE PLAN
Problem: Ventilation Defect:  Goal: Ability to achieve and maintain unassisted ventilation or tolerate decreased levels of ventilator support  Outcome: PROGRESSING SLOWER THAN EXPECTED  Vent day 4, No SBT FIO2 .6 and Peep 10

## 2018-06-15 PROBLEM — G93.41 ACUTE METABOLIC ENCEPHALOPATHY: Status: ACTIVE | Noted: 2018-01-01

## 2018-06-15 NOTE — PROGRESS NOTES
Patient converted back to sinus tach with rates 140-160, patient is also hypertensive in the high 160s, Dr Gama paged and updated. I let him know that the MAR had incorrectly reflected that the patient was on pressors and they were not running. PRN labetalol was ordered

## 2018-06-15 NOTE — CARE PLAN
Problem: Ventilation Defect:  Goal: Ability to achieve and maintain unassisted ventilation or tolerate decreased levels of ventilator support  Outcome: PROGRESSING AS EXPECTED  Adult Ventilation Update    Total Vent Days: 6  RR: 24 VT: 340 PEEP: 15 FiO2: 60%    Patient Lines/Drains/Airways Status    Active Airway     Name: Placement date: Placement time: Site: Days:    Airway Group ET Tube 7.5 06/10/18   0840      6               Plateau Pressure (Q Shift): 23 (06/15/18 0633)  Static Compliance (ml / cm H2O): 33.9 (06/15/18 1430)    Sputum/Suction   Cough: Non Productive (06/15/18 1430)  Sputum Amount: Unable to Evaluate (06/15/18 1430)  Sputum Color: Unable to Evaluate (06/15/18 1430)  Sputum Consistency: Unable to Evaluate (06/15/18 1430)    Events/Summary/Plan: No other vent changes made at this time. (06/15/18 1430)

## 2018-06-15 NOTE — CARE PLAN
Problem: Ventilation Defect:  Goal: Ability to achieve and maintain unassisted ventilation or tolerate decreased levels of ventilator support  Outcome: PROGRESSING AS EXPECTED    Intervention: Support and monitor invasive and noninvasive mechanical ventilation  Adult Ventilation Update    Total Vent Days: 6    Patient Lines/Drains/Airways Status    Active Airway     Name: Placement date: Placement time: Site: Days:    Airway Group ET Tube 7.5 06/10/18   0840      6              Vent settings: 24 340 +10 40%  Duoneb Q4    Sputum/Suction   Cough: Non Productive (06/14/18 2159)  Sputum Amount: Scant (06/14/18 2159)  Sputum Color: White (06/14/18 2159)  Sputum Consistency: Thick (06/14/18 2159)        Events/Summary/Plan: Abg drawn, no changes made. Pt stable on vent, will continue to monitor.

## 2018-06-15 NOTE — PROGRESS NOTES
Monitor summary    Pt went into afibRVR last night, did not affect BPs    Currently sinus tach  14/10/36

## 2018-06-15 NOTE — PROGRESS NOTES
Dr Gama updated that the patient's temp is 38.7, last cultures were negative on 6/10, respiration rate is 30, no new orders received

## 2018-06-15 NOTE — CARE PLAN
Problem: Infection  Goal: Will remain free from infection  Outcome: PROGRESSING SLOWER THAN EXPECTED  Antibiotics adjusted for elevated wbc

## 2018-06-15 NOTE — PROGRESS NOTES
Precedex was not restarted after returning from CT because it didn't seem to be doing anything for the patient. She has been compliant with the vent off all sedation this morning. Basic neuro intact, no responses or purposeful movement

## 2018-06-15 NOTE — DISCHARGE PLANNING
Medical SW    Sw attended AM IDT Rounds.    RN reports, pt has gag/cough/corneal reflex, not following commands, houston in place, vent day 6,      Plan: Sw to assist w/ d/c planning as needed.

## 2018-06-15 NOTE — PROGRESS NOTES
[facility]    Date of Service: 6/15/2018    Chief Complaint  73 y.o. female admitted 2018 with respiratory failure.    Interval Problem Update  Patient noted to have respiratory failure, required intubation.  Deemed due to pneumonia.  Also with acute renal failure.  Discussed with family at bedside.  Patient seen and examined in the ICU, ICU care given.  Discussed patient condition and plan with Intensivist, RN, RT and charge nurse / hot rounds.    Not following  Withdrawals  Off sedation  Pupils uneven, dilated  CT head overnight, negative  afib overnight, converted with amio bolus  Now sinus tach  SBP stable  CVP 7  tmax 38.9  Tolerating TF at goal  bm yesterday  Jamison with 3L overnight  amio 0.5  tachypneic this am  Vent day 6  Bronch post rounds  Adjusted abx    Consultants/Specialty  Intensivist    Disposition  Patient's requires additional treatment in the hospital, unsure if she will need placement versus home at the time of discharge      Review of Systems   Unable to perform ROS: Intubated      Physical Exam  Laboratory/Imaging   Hemodynamics  No data recorded.   Monitored Temp: 38.5 °C (101.3 °F)  Pulse  Av.2  Min: 40  Max: 146 Heart Rate (Monitored): (!) 113  Blood Pressure : (!) 161/68, NIBP: 147/72 CVP (mm Hg): (!) 12 MM HG    Respiratory  Weeks Vent Mode: APVCMV, Rate (breaths/min): 24, PEEP/CPAP: 10, PEEP/CPAP: 10, FiO2: 40, P Peak (PIP): 26, P MEAN: 16   Respiration: (!) 35, Pulse Oximetry: 93 %     Work Of Breathing / Effort: Vented  RUL Breath Sounds: Clear, RML Breath Sounds: Clear, RLL Breath Sounds: Diminished, DEIDRA Breath Sounds: Clear, LLL Breath Sounds: Diminished    Fluids    Intake/Output Summary (Last 24 hours) at 06/15/18 0723  Last data filed at 06/15/18 0700   Gross per 24 hour   Intake          1820.71 ml   Output             5075 ml   Net         -3254.29 ml       Nutrition  Orders Placed This Encounter   Procedures   • Diet NPO     Standing Status:    Standing     Number of Occurrences:   1     Order Specific Question:   Type:     Answer:   Now [1]     Order Specific Question:   Restrict to:     Answer:   Strict [1]     Physical Exam   Constitutional: She appears ill. She appears distressed. She is sedated, intubated and restrained.   HENT:   Head: Normocephalic and atraumatic.   Mouth/Throat: No oropharyngeal exudate.   Eyes: Right eye exhibits no discharge. Left eye exhibits no discharge.   Neck: Neck supple. No tracheal deviation present.   Cardiovascular: Regular rhythm.  Tachycardia present.  Exam reveals no gallop.    Pulmonary/Chest: No stridor. Tachypnea noted. She is intubated. She is in respiratory distress. She has no wheezes. She has no rhonchi. She has rales.   Intubated and sedated    Abdominal: Bowel sounds are normal. She exhibits no distension.   Musculoskeletal: She exhibits edema.   Neurological:   Intubated, unresponsive    Skin: Skin is warm. She is diaphoretic. No erythema.   Psychiatric: She is noncommunicative.   Nursing note and vitals reviewed.      Recent Labs      06/13/18   0500  06/14/18   0500  06/15/18   0539   WBC  18.1*  19.6*  22.8*   RBC  2.97*  2.88*  3.24*   HEMOGLOBIN  8.7*  8.4*  9.3*   HEMATOCRIT  27.9*  27.3*  29.2*   MCV  93.9  94.8  90.1   MCH  29.3  29.2  28.7   MCHC  31.2*  30.8*  31.8*   RDW  54.3*  54.6*  51.4*   PLATELETCT  256  256  308   MPV  10.5  10.9  10.8     Recent Labs      06/13/18   0500  06/14/18   0500  06/15/18   0539   SODIUM  132*  135  138   POTASSIUM  5.0  5.6*  4.3   CHLORIDE  106  107  107   CO2  16*  18*  21   GLUCOSE  144*  139*  153*   BUN  25*  43*  42*   CREATININE  0.95  1.51*  1.12   CALCIUM  8.6  8.2*  8.5                      Assessment/Plan     * Severe sepsis (HCC)   Assessment & Plan    -Likely due to pneumonia  -Now a significant worsening  -Adjust antibiotics to vancomycin and Zosyn  -Panculture and await the results  -Patient remains normotensive          SY (acute kidney injury)  ([facility])   Assessment & Plan    - Due to dehydration and sepsis  -Improved, was having decreased urine output, now is having significant urine output        Acute respiratory failure with hypoxia ([facility])   Assessment & Plan    - Full vent support  - Vent management per intensivist  -Continues to have infiltrate on chest x-ray, could be a component of pulmonary edema        Acute metabolic encephalopathy   Assessment & Plan    -Patient has been off sedation, bonding  -Overnight noted decreased response as well as dilated pupils, CT head obtained which is negative  -Patient also having significant urinary output, obtain urinalysis as well as urine electrolytes        Delirium   Assessment & Plan    - Now intubated and sedated        Hyperkalemia   Assessment & Plan    - Resolved        Shock ([facility])   Assessment & Plan    - Due to sepsis and dehydration  -Resolved with IV fluids  -Pressors now off        Pneumonia due to other specified bacteria ([facility])   Assessment & Plan    -Called to bedside due to worsening in her condition, patient was very febrile, tachypneic  -Panculture and adjust antibiotics, start vancomycin and Zosyn  -Await repeat culture results        HLD (hyperlipidemia)   Assessment & Plan    - Continue statin        T2DM (type 2 diabetes mellitus) ([facility])   Assessment & Plan    - Continue insulin sliding scale, adjust as needed        History of atrial fibrillation without current medication- (present on admission)   Assessment & Plan    - Monitor          Quality-Core Measures   Reviewed items::  Labs reviewed, Medications reviewed and Radiology images reviewed  Jamison catheter::  Critically Ill - Requiring Accurate Measurement of Urinary Output and Unconscious / Sedated Patient on a Ventilator  DVT prophylaxis pharmacological::  Heparin  Ulcer Prophylaxis::  Yes  Antibiotics:  Treating active infection/contamination beyond 24 hours perioperative coverage

## 2018-06-15 NOTE — PROGRESS NOTES
Patient's pupils were 4mm and brisk at the 2000 assessment, at midnight they were 3mm and sluggish, at this time the patient's pupils are 5mm on the right and 3mm on the left, both are responsive. The patient continues to breathe at 33 respirations per minute, sinus tach 115 with BPs in the 130s. Dr Gama paged, orders for head CT. I asked for a one time versed push to help keep the patient still as she is moving her head back and forth with each breath. HR in the 130-140s is okay per Dr HARPER as long as she is not symptomatic

## 2018-06-15 NOTE — PROGRESS NOTES
Patient taken to CT with RT and RN on monitor, versed given for sedation, patient still had a lot of breathing movement despite 5mg of versed being given. Patient returned back to [facility] in stable condition

## 2018-06-15 NOTE — PROGRESS NOTES
Pulmonary Critical Care Progress Note        Date of Admission:  6/9/2018    Chief Complaint: SOB    History of Present Illness: 73 y.o. female with known past   medical history of type 2 diabetes, questionable bipolar disorder, history of   CVA, dyslipidemia.  The patient indicates that she was in her normal state of   health up until approximately 2 days ago, at which point in time, she had   increasing shortness of breath.  She denied any significant fever, chills.    Denied any headache, visual changes, has not had any cough or sputum   production.  No nausea, vomiting, abdominal pain or discomfort.  Has   intermittent chronic diarrhea, nothing more than usual.  Denies any dysuria or   lower extremity edema.  Further, she denies any sick contacts or recent   travels.  No history of DVTs or PEs in the past.  She indicates that the   shortness of breath has been progressive with increasing dyspnea on exertion.    She does not associate with positioning being any worse or better.  She could   lie flat and just as dyspneic as sitting up forward.  She denies any change   in her medications.  She does have tobacco history, which she quit about 1   year ago, smoking 1 pack a day x40 years.  Denies any alcohol or illicit drug   use.  She further denies any underlying heart disease or history of MI in the   past.       ROS:  Respiratory: unable to perform due to the patient's inability to effectively communicate, Cardiac: unable to perform due to the patient's inability to effectively communicate, GI: unable to perform due to the patient's inability to effectively communicate.      Interval Events:  24 hour interval history reviewed    - w/d's, not following, off sedation 2 days, unqual pupils   - AF/RVR - SR after amio bolus   - SR/ST   - hypertensive   - febrile, Tm 38.9   - ishan TF,    - good UOP   - 3+ anasarca   - CXR bilat inf's, sl worse   - pan-cx today; e   - vent day 6; PEEP 10, 60%, increase PEEP   - Unasyn ->  escalate to vanco/zosyn    Yesterday   - fentanyl 50, no sedation, RASS -3, w/d's   - Tm 39.3   - ishan TF   - good uop   - vent day 5, 10, 50%   - unasyn day 5 of 7   - Cr up    - IVF for worsening renal fx         PFSH:  No change.    Respiratory:  Weeks Vent Mode: APVCMV, Rate (breaths/min): 24, Vt Target (mL): 340, PEEP/CPAP: 10, FiO2: 40, Static Compliance (ml / cm H2O): 31, Weaning Trial Stopped due to:: Abnormal breathing pattern (paradoxical respiratory pattern, use of accessory muscles, etc);RSBI >105 (Rapid Shallow Breathing Index), Control VTE (exp VT): 425  Pulse Oximetry: 93 %          Exam: tachypnea, labored breathing, rhonchi throughout all lung fields and diminished breath sounds mild  ImagingAvailable data reviewed   Recent Labs      06/14/18   0400  06/15/18   0210  06/15/18   0358   ISTATAPH  7.262*  7.427  7.403   ISTATAPCO2  40.9*  28.6  33.0   ISTATAPO2  64  62*  64   ISTATATCO2  20  20  22   [id number]  89*  93  93   ISTATARTHCO3  18.4  18.9  20.6   ISTATARTBE  -8*  -5*  -4   ISTATTEMP  97.8 F  37.6 C  38.1 C   ISTATFIO2  55  40  40   ISTATSPEC  Arterial  Arterial  Arterial   ISTATAPHTC  7.269*  7.418  7.387*   [id number]  62*  65  70       HemoDynamics:  Pulse: (!) 124, Heart Rate (Monitored): (!) 113  Blood Pressure : (!) 161/68, NIBP: (!) 178/79  CVP (mm Hg): (!) 10 MM HGNorepinephrine infusion at 6    Exam: regular rhythm (Sinus), tachycardia  Imaging: Available data reviewed        Neuro:  GCS Total Luis Coma Score: 4 sedated on propofol drip       Exam: no focal deficits noted Agitated Delirious Confused, not following  Imaging: Available data reviewed    Fluids:  Intake/Output       06/13/18 0700 - 06/14/18 0659 06/14/18 0700 - 06/15/18 0659 06/15/18 0700 - 06/16/18 0659 0700-1859 1900-0659 Total 0700-1859 1900-0659 Total 0700-1859 1900-0659 Total       Intake    I.V.  --  616 616  200  540.7 740.7  --  -- --    Precedex Volume -- -- -- -- 76.7 76.7 -- -- --    Amiodarone  Volume -- -- -- -- 264 264 -- -- --    IV Piggyback Volume (IV Piggyback) -- 100 100 -- 200 200 -- -- --    IV Volume -- 500 500 200 -- 200 -- -- --    IV Volume (Fentanyl) -- 16 16 -- -- -- -- -- --    Other  --  180 180  --  -- --  --  -- --    Medications (P.O./ Enteral Liquids) -- 180 180 -- -- -- -- -- --    Enteral  --  660 660  1020  60 1080  --  -- --    Free Water / Tube Flush -- 120 120 360 60 420 -- -- --    Intake (mL) (Enteral Tube Right Nare Cortrak Gastric Feeding Tube) -- 540 540 660 -- 660 -- -- --    Total Intake -- 1456 1456 1220 600.7 1820.7 -- -- --       Output    Urine  260  725 985  1625  2750 4375  350  -- 350    Output (mL) (Urinary Catheter Indwelling Catheter) 260  2750 4375 350 -- 350    Stool  --  -- --  350  -- 350  --  -- --    Number of Times Stooled -- -- -- 5 x 2 x 7 x -- -- --    Measurable Stool (mL) -- -- -- 350 -- 350 -- -- --    Total Output 260  2750 4725 350 -- 350       Net I/O     -260 731 471 -755 -2149.3 -2904.3 -350 -- -350           Recent Labs      18   05018   0500  06/15/18   0539   SODIUM  132*  135  138   POTASSIUM  5.0  5.6*  4.3   CHLORIDE  106  107  107   CO2  16*  18*  21   BUN  25*  43*  42*   CREATININE  0.95  1.51*  1.12   MAGNESIUM  2.2   --    --    PHOSPHORUS  4.8*   --    --    CALCIUM  8.6  8.2*  8.5       GI/Nutrition:  Exam: abdomen is soft and non-tender, normal active bowel sounds  Imaging: Available data reviewed  NPO  Liver Function  Recent Labs      18   0500  18   0500  06/15/18   0539   GLUCOSE  144*  139*  153*       Heme:  Recent Labs      18   0500  18   0500  06/15/18   05   RBC  2.97*  2.88*  3.24*   HEMOGLOBIN  8.7*  8.4*  9.3*   HEMATOCRIT  27.9*  27.3*  29.2*   PLATELETCT  256  256  308       Infectious Disease:  Monitored Temp 2  Av.1 °C (100.5 °F)  Min: 37.6 °C (99.7 °F)  Max: 38.7 °C (101.7 °F)  Micro: reviewed.  No growth to date  Recent Labs      18   06/14/18   0500  06/15/18   0539   WBC  18.1*  19.6*  22.8*   NEUTSPOLYS  75.50*  84.90*  93.80*   LYMPHOCYTES  7.30*  5.30*  2.60*   MONOCYTES  0.00  1.80  1.80   EOSINOPHILS  3.60  0.00  0.90   BASOPHILS  0.00  0.00  0.00     Current Facility-Administered Medications   Medication Dose Frequency Provider Last Rate Last Dose   • dexmedetomidine (PRECEDEX) 400 mcg in D5W 100 mL infusion  0.1-1.5 mcg/kg/hr Continuous Oliver Carcamo M.D.   Stopped at 06/15/18 0230   • amiodarone (CORDARONE) 450 mg in D5W 250 mL Infusion  0.5-1 mg/min Continuous Mathew Gama M.D. 17 mL/hr at 06/15/18 0200 0.5 mg/min at 06/15/18 0200   • labetalol (NORMODYNE,TRANDATE) injection 10-20 mg  10-20 mg Q HOUR PRN Mathew Gama M.D.   20 mg at 06/15/18 0649   • famotidine (PEPCID) tablet 20 mg  20 mg BID Oliver Carcamo M.D.   20 mg at 06/14/18 2018   • insulin regular (HUMULIN R) injection 3-14 Units  3-14 Units Q6HRS Oliver Carcamo M.D.   3 Units at 06/15/18 0642    And   • glucose 4 g chewable tablet 16 g  16 g Q15 MIN PRN Oliver Carcamo M.D.        And   • dextrose 50% (D50W) injection 25 mL  25 mL Q15 MIN PRN Oliver Carcamo M.D.       • ampicillin/sulbactam (UNASYN) 3 g in  mL IVPB  3 g Q6HRS Oliver Carcamo M.D. 200 mL/hr at 06/15/18 0646 3 g at 06/15/18 0646   • rosuvastatin (CRESTOR) tablet 20 mg  20 mg Q EVENING Oliver Carcamo M.D.   20 mg at 06/14/18 2018   • sertraline (ZOLOFT) tablet 50 mg  50 mg QDAY Oliver Carcamo M.D.   50 mg at 06/14/18 2018   • haloperidol lactate (HALDOL) injection 5 mg  5 mg Q4HRS PRN Davide Barrientos M.D.   5 mg at 06/10/18 0422   • norepinephrine (LEVOPHED) 8 mg in  mL Infusion  0-30 mcg/min Continuous Jeremy M Gonda, M.D.   Stopped at 06/14/18 0700   • MD ALERT...Adult ICU Electrolyte Replacement per Pharmacy Protocol   pharmacy to dose Jeremy M Gonda, M.D.       • Pharmacy Consult: Enteral tube feeding - review meds/change route/product selection  1 Each PRN Caesar NEAL  Gonda, M.D.       • fentaNYL (SUBLIMAZE) injection 25 mcg  25 mcg Q HOUR PRN Jeremy M Gonda, M.D.   Stopped at 06/14/18 1719    Or   • fentaNYL (SUBLIMAZE) injection 50 mcg  50 mcg Q HOUR PRN Jeremy M Gonda, M.D.   50 mcg at 06/14/18 1849    Or   • fentaNYL (SUBLIMAZE) injection 100 mcg  100 mcg Q HOUR PRN Jeremy M Gonda, M.D.       • ipratropium-albuterol (DUONEB) nebulizer solution  3 mL Q2HRS PRN (RT) Jeremy M Gonda, M.D.       • ipratropium-albuterol (DUONEB) nebulizer solution  3 mL Q4HRS (RT) Jeremy M Gonda, M.D.   3 mL at 06/15/18 0625   • aspirin (ASA) chewable tab 81 mg  81 mg DAILY Jeremy M Gonda, M.D.   81 mg at 06/14/18 0900   • gabapentin (NEURONTIN) capsule 300 mg  300 mg TID Jeremy M Gonda, M.D.   300 mg at 06/15/18 0647   • ALPRAZolam (XANAX) tablet 1 mg  1 mg BID PRN Jeremy M Gonda, M.D.   1 mg at 06/13/18 1141   • acetaminophen (TYLENOL) tablet 650 mg  650 mg Q6HRS PRN Jeremy M Gonda, M.D.   650 mg at 06/14/18 2348   • guaiFENesin dextromethorphan (ROBITUSSIN DM) 100-10 MG/5ML syrup 10 mL  10 mL Q6HRS PRN Jeremy M Gonda, M.D.       • senna-docusate (PERICOLACE or SENOKOT S) 8.6-50 MG per tablet 2 Tab  2 Tab BID Jeremy M Gonda, M.D.   Stopped at 06/14/18 2100    And   • polyethylene glycol/lytes (MIRALAX) PACKET 1 Packet  1 Packet QDAY PRN Jeremy M Gonda, M.D.   1 Packet at 06/12/18 2127    And   • magnesium hydroxide (MILK OF MAGNESIA) suspension 30 mL  30 mL QDAY PRN Jeremy M Gonda, M.D.   30 mL at 06/14/18 0737    And   • bisacodyl (DULCOLAX) suppository 10 mg  10 mg QDAY PRN Jeremy M Gonda, M.D.       • fentaNYL (SUBLIMAZE) 50 mcg/mL in 50mL (Continuous Infusion)   Continuous Jeremy M Gonda, M.D.   Stopped at 06/14/18 0950   • Respiratory Care per Protocol   Continuous RT Sherrell Higgins M.D.       • heparin injection 5,000 Units  5,000 Units Q8HRS Sherrell Higgins M.D.   5,000 Units at 06/15/18 0646     Last reviewed on 6/9/2018 10:32 PM by Franca Willett    Quality  Measures:    Medications reviewed, Labs reviewed and Radiology images reviewed                      Assessment/Plan:  Acute hypoxic/hypercarbic respiratory failure    - intubated 6/10    -continue full vent support, ventilator settings adjusted today   -RT/O2 protocols   -Minimize sedation, dexmedetomidine and fentanyl as needed   -diuresis as tolerated  Community-acquired pneumonia    -Febrile, increasing leukocytosis and worsening chest x-ray   -Suspicion for new HCAP versus other source of new infection   -Antibiotics escalated to Zosyn and vancomycin today   -Repeat blood cultures, urinalysis and bronchoscopy with BAL  Septic shock from pulmonary source with associated cardiac and respiratory failure   -Worse today as above  Encephalopathy   -Suspect toxic metabolic secondary to sepsis, pneumonia   -CT head negative   -Check ammonia level   -MRI brain and EEG if she does not clear, titrate down sedation  Normocytic anemia   - Monitor with conservative transfusion strategy   Hyperkalemia -improving, monitor  Acute kidney injury, ATN/prerenal   -Avoid nephrotoxins, monitor urine output  Mildly elevated troponin - trend  Type 2 diabetes - SSI  Dyslipidemia - statin  Thrombocytosis - improving  History of tobacco use, 40 pack years, quit a year ago   - BDs prn, consider steroids if bronchospasm develops  Lactic acidosis - trend  Acute metabolic encephalopathy - limit sedatives when stabilized and monitor  Prophylaxis, start enteral nutrition    Patient is critically ill.  Clinically worse today.  I have adjusted ventilator settings.  Antibiotics escalated.  Ongoing critical care management as above.  She is at high risk for further deterioration with guarded overall prognosis.  Family updated at bedside.  She remains full CODE STATUS.  Discussed patient condition and risk of morbidity and/or mortality with Family, RN, RT, Pharmacy, Charge nurse / hot rounds, Patient and hospitalist.    The patient remains critically ill.   Critical care time = 44 minutes in directly providing and coordinating critical care and extensive data review.  No time overlap and excludes procedures.

## 2018-06-15 NOTE — CARE PLAN
Problem: Communication  Goal: The ability to communicate needs accurately and effectively will improve    Intervention: Educate patient and significant other/support system about the plan of care, procedures, treatments, medications and allow for questions  Family updated on care plan .

## 2018-06-15 NOTE — PROGRESS NOTES
"Pharmacy Kinetics 73 y.o. female on vancomycin day # 1  6/15/2018    Starting with Vancomycin 2200 mg iv x 1 then Vanco 1800 mg iv q 24 hrs    Indication for Treatment: PNA    Pertinent history per medical record: Admitted on 6/9/2018 for sepsis.  Pt had increasing SOB and came to hospital where she required intubation. CAP abx started, but on pt started having increased white count and temps after 6 days of Unasyn and 5 days of Azithro. Pt was pan cx'd and abx broadened.    Other antibiotics: Zosyn 4.5 iv q 8 hs    Allergies: Keflex     List concerns for renal function: age, BUN/SCr ratio > 20:1, low albumin    Pertinent cultures to date:   6/9/18: PBC x 2 = NGTD  6/10/18: TA = NGTD  6/15 pan cx'd    Recent Labs      06/13/18   0500  06/14/18   0500  06/15/18   0539   WBC  18.1*  19.6*  22.8*   NEUTSPOLYS  75.50*  84.90*  93.80*   BANDSSTABS  7.30  6.20   --      Recent Labs      06/13/18   0500  06/14/18   0500  06/15/18   0539   BUN  25*  43*  42*   CREATININE  0.95  1.51*  1.12     No results for input(s): VANCOTROUGH, VANCOPEAK, VANCORANDOM in the last 72 hours.  Intake/Output Summary (Last 24 hours) at 06/15/18 1327  Last data filed at 06/15/18 1200   Gross per 24 hour   Intake          1789.31 ml   Output             5575 ml   Net         -3785.69 ml      Blood pressure (!) 161/68, pulse 82, temperature 36.8 °C (98.2 °F), resp. rate (!) 26, height 1.651 m (5' 5\"), weight 88 kg (194 lb 0.1 oz), SpO2 100 %, not currently breastfeeding. No data recorded.      A/P   1. Vancomycin dose change: new start  2. Next vancomycin level: prior to the 3rd total dose (not ordered)  3. Goal trough: 16-20 mcg/ml  4. Comments: Pt will get vanco loading dose today and then start vanco 20 mg/kg (1800 mg) iv q 24 hr tomorrow. SCr bumped up some yesterday, UOP has been good. Pt to be bronched today. Await miroc results.    Sunil Velásquez, PharmD    "

## 2018-06-15 NOTE — PROGRESS NOTES
Patient  In afib RVR with rates from 120-180 which started during shift change, stat 12 lead ordered to confirm. Patient's BPs are stable in the 130s. Dr Gama paged at 1905, Amiodarone with bolus ordered

## 2018-06-15 NOTE — ASSESSMENT & PLAN NOTE
MRI brain with contrast concerning for brain mass.  Consulted neurosurgery and no planned surgery. Future MRI brain was considered  Sedation as needed for agitation

## 2018-06-16 PROBLEM — I48.91 ATRIAL FIBRILLATION WITH RVR (HCC): Status: ACTIVE | Noted: 2018-01-01

## 2018-06-16 NOTE — CARE PLAN
Problem: Respiratory:  Goal: Respiratory status will improve  Outcome: PROGRESSING SLOWER THAN EXPECTED    Intervention: Assess and monitor pulmonary status   06/14/18 2329 06/15/18 2304 06/16/18 0400   OTHER   O2 (LPM) 50 --  --    Respiratory Pattern --  --  Vented   Work Of Breathing / Effort --  --  Vented   Pre/Post Intervention --  Pre Intervention Assessment --    RUL Breath Sounds --  --  Clear   RML Breath Sounds --  --  Diminished   RLL Breath Sounds --  --  Diminished   DEIDRA Breath Sounds --  --  Clear   LLL Breath Sounds --  --  Diminished   Vitals   Respiration --  --  --    Pulse Oximetry --  --  --    Respiratory   Cough --  Non Productive --     06/16/18 0500   OTHER   O2 (LPM) --    Respiratory Pattern --    Work Of Breathing / Effort --    Pre/Post Intervention --    RUL Breath Sounds --    RML Breath Sounds --    RLL Breath Sounds --    DEIDRA Breath Sounds --    LLL Breath Sounds --    Vitals   Respiration (!) 27   Pulse Oximetry 97 %   Respiratory   Cough --          Problem: Skin Integrity  Goal: Risk for impaired skin integrity will decrease  Outcome: PROGRESSING SLOWER THAN EXPECTED    Intervention: Assess risk factors for impaired skin integrity and/or pressure ulcers  Q2 turns, mepilex in place. Waffle on bed in use, head to toe assessment. Tube feeds.

## 2018-06-16 NOTE — CARE PLAN
Problem: Ventilation Defect:  Goal: Ability to achieve and maintain unassisted ventilation or tolerate decreased levels of ventilator support    Intervention: Support and monitor invasive and noninvasive mechanical ventilation  Adult Ventilation Update    Total Vent Days: 7    Patient Lines/Drains/Airways Status    Active Airway     Name: Placement date: Placement time: Site: Days:    Airway Group ET Tube 7.5 06/10/18   0840      7              APVCMV  RR 24  Vt 340  PEEP 15  FiO2 50%

## 2018-06-16 NOTE — CARE PLAN
Problem: Ventilation Defect:  Goal: Ability to achieve and maintain unassisted ventilation or tolerate decreased levels of ventilator support  Adult Ventilation Update    Total Vent Days: 7    Patient Lines/Drains/Airways Status    Active Airway     Name: Placement date: Placement time: Site: Days:    Airway Group ET Tube 7.5 06/10/18   0840      5                   Plateau Pressure (Q Shift): 23 (06/15/18 0633)  Static Compliance (ml / cm H2O): 59 (06/16/18 0147)    Patient failed trials because of Barriers to Wean: FiO2 >60% or PEEP >10 CM H2O (06/15/18 1430)  Barriers to SBT Weaning Trial Stopped due to:: Abnormal breathing pattern (paradoxical respiratory pattern, use of accessory muscles, etc) (06/15/18 0800)  Length of Weaning Trial        Cough: Non Productive (06/15/18 2304)  Sputum Amount: Unable to Evaluate (06/15/18 1600)  Sputum Color: Unable to Evaluate (06/15/18 1600)  Sputum Consistency: Unable to Evaluate (06/15/18 1600)    Mobility  Level of Mobility: Level I (06/15/18 0800)  Activity Performed: Unable to mobilize (06/15/18 0800)  Assistance / Tolerance: Assistance of Two or More (06/15/18 0800)  Pt Calls for Assistance: No (06/15/18 0800)  Staff Present for Mobilization: RN (06/15/18 0800)  Gait: Unable to Ambulate (06/16/18 0000)  Assistive Devices: None (06/15/18 1600)  Reason Not Mobilized: Bed rest (06/16/18 0000)  Mobilization Comments: PEEP 15 (06/16/18 0000)    Events/Summary/Plan: No other vent changes made at this time. (06/15/18 1430)

## 2018-06-16 NOTE — PROGRESS NOTES
Pulmonary Critical Care Progress Note        Date of Admission:  6/9/2018    Chief Complaint: SOB    History of Present Illness: 73 y.o. female with known past   medical history of type 2 diabetes, questionable bipolar disorder, history of   CVA, dyslipidemia.  The patient indicates that she was in her normal state of   health up until approximately 2 days ago, at which point in time, she had   increasing shortness of breath.  She denied any significant fever, chills.    Denied any headache, visual changes, has not had any cough or sputum   production.  No nausea, vomiting, abdominal pain or discomfort.  Has   intermittent chronic diarrhea, nothing more than usual.  Denies any dysuria or   lower extremity edema.  Further, she denies any sick contacts or recent   travels.  No history of DVTs or PEs in the past.  She indicates that the   shortness of breath has been progressive with increasing dyspnea on exertion.    She does not associate with positioning being any worse or better.  She could   lie flat and just as dyspneic as sitting up forward.  She denies any change   in her medications.  She does have tobacco history, which she quit about 1   year ago, smoking 1 pack a day x40 years.  Denies any alcohol or illicit drug   use.  She further denies any underlying heart disease or history of MI in the   past.       ROS:  Respiratory: unable to perform due to the patient's inability to effectively communicate, Cardiac: unable to perform due to the patient's inability to effectively communicate, GI: unable to perform due to the patient's inability to effectively communicate.      Interval Events:  24 hour interval history reviewed    - w/d's, moves all ext, not following, dex 1.2   - unequal pupils   - fentanyl pushes   - SR/ST   - Tm 38.4   - cooling blanket   - ishan TF, + BM   - decreasing UOP   - start lasix today   - no IVF   - vent day 7, PEEP 15, 50%, cxr sl improved Bialt inf's   - pepcid   - vanco/zosyn   - BAL  pending, rare GPC   - CPOT high, add oxy   - SSI  Yesterday   - w/d's, not following, off sedation 2 days, unqual pupils   - AF/RVR - SR after amio bolus   - SR/ST   - hypertensive   - febrile, Tm 38.9   - ishan TF,    - good UOP   - 3+ anasarca   - CXR bilat inf's, sl worse   - pan-cx today; e   - vent day 6; PEEP 10, 60%, increase PEEP   - Unasyn -> escalate to vanco/zosyn        PFSH:  No change.    Respiratory:  Weeks Vent Mode: APVCMV, Rate (breaths/min): 24, Vt Target (mL): 340, PEEP/CPAP: 15, FiO2: 50, Static Compliance (ml / cm H2O): 67.6, Weaning Trial Stopped due to:: Abnormal breathing pattern (paradoxical respiratory pattern, use of accessory muscles, etc), Control VTE (exp VT): 413  Pulse Oximetry: 98 %          Exam: tachypnea, labored breathing, rhonchi throughout all lung fields and diminished breath sounds mild  ImagingAvailable data reviewed   Recent Labs      06/15/18   0210  06/15/18   0358  06/16/18   0412   ISTATAPH  7.427  7.403  7.406   ISTATAPCO2  28.6  33.0  36.4   ISTATAPO2  62*  64  85   ISTATATCO2  20  22  24   SDBGVTX1AGU  93  93  97   ISTATARTHCO3  18.9  20.6  22.9   ISTATARTBE  -5*  -4  -2   ISTATTEMP  37.6 C  38.1 C  97.8 F   ISTATFIO2  40  40  50   ISTATSPEC  Arterial  Arterial  Arterial   ISTATAPHTC  7.418  7.387*  7.412   MCLZFBFT3MK  65  70  83       HemoDynamics:  Pulse: 79, Heart Rate (Monitored): 79  NIBP: 119/55  CVP (mm Hg): (!) 244 MM HGNorepinephrine infusion at 6    Exam: regular rhythm (Sinus), tachycardia  Imaging: Available data reviewed        Neuro:  GCS Total [place] Coma Score: 4 sedated on propofol drip       Exam: no focal deficits noted Agitated Delirious Confused, not following  Imaging: Available data reviewed    Fluids:  Intake/Output       06/14/18 0700 - 06/15/18 0659 06/15/18 0700 - 06/16/18 0659 06/16/18 0700 - 06/17/18 0659      8150-0495 3731-0556 Total 3239-7306 0898-3596 Total 5493-8041 2490-3873 Total       Intake    P.O.  --  -- --  100  -- 100  --   -- --    P.O. -- -- -- 100 -- 100 -- -- --    I.V.  200  540.7 740.7  876.6  617.6 1494.2  78  -- 78    Precedex Volume -- 76.7 76.7 72.6 213.6 286.2 44 -- 44    Amiodarone Volume -- 264 264 204 204 408 34 -- 34    IV Piggyback Volume (IV Piggyback) -- 200 200 600 200 800 -- -- --    IV Volume 200 -- 200 -- -- -- -- -- --    IV Volume (Fentanyl) -- -- -- 0 -- 0 -- -- --    Other  --  -- --  --  120 120  --  -- --    Medications (P.O./ Enteral Liquids) -- -- -- -- 120 120 -- -- --    Enteral  1020  60 1080  390  790 1180  --  -- --    Free Water / Tube Flush 360 60 420 120 240 360 -- -- --    Intake (mL) (Enteral Tube Right Nare Cortrak Gastric Feeding Tube) 660 -- 660 270 550 820 -- -- --    Total Intake 1220 600.7 1820.7 1366.6 1527.6 2894.2 78 -- 78       Output    Urine  1625  2750 4375  2850  950 3800  150  -- 150    Urine Void (mL) (non-catheter) -- -- -- 1050 -- 1050 -- -- --    Output (mL) (Urinary Catheter Indwelling Catheter) 1625 2750 4375 1536 639 3424 150 -- 150    Stool  350  -- 350  --  -- --  --  -- --    Number of Times Stooled 5 x 2 x 7 x -- -- -- -- -- --    Measurable Stool (mL) 350 -- 350 -- -- -- -- -- --    Total Output 1975 2750 4725 2850 950 3800 150 -- 150       Net I/O     -755 -2149.3 -2904.3 -1483.4 577.6 -905.8 -72 -- -72        Weight: 82.7 kg (182 lb 5.1 oz)  Recent Labs      06/14/18   0500  06/15/18   0539  06/16/18   0520   SODIUM  135  138  141   POTASSIUM  5.6*  4.3  4.2   CHLORIDE  107  107  108   CO2  18*  21  24   BUN  43*  42*  44*   CREATININE  1.51*  1.12  0.98   CALCIUM  8.2*  8.5  8.3*       GI/Nutrition:  Exam: abdomen is soft and non-tender, normal active bowel sounds  Imaging: Available data reviewed  NPO  Liver Function  Recent Labs      06/14/18   0500  06/15/18   0539  06/16/18   0520   GLUCOSE  139*  153*  173*       Heme:  Recent Labs      06/14/18   0500  06/15/18   0539  06/16/18   0520   RBC  2.88*  3.24*  2.73*   HEMOGLOBIN  8.4*  9.3*  7.9*   HEMATOCRIT   27.3*  29.2*  25.0*   PLATELETCT  256  308  290       Infectious Disease:  Monitored Temp 2  Av.2 °C (100.8 °F)  Min: 37.8 °C (100 °F)  Max: 38.4 °C (101.1 °F)  Temp  Av °C (100.4 °F)  Min: 37.9 °C (100.2 °F)  Max: 38.3 °C (100.9 °F)  Micro: reviewed.  No growth to date  Recent Labs      18   0500  06/15/18   0539  18   0520   WBC  19.6*  22.8*  16.2*   NEUTSPOLYS  84.90*  93.80*  84.10*   LYMPHOCYTES  5.30*  2.60*  8.40*   MONOCYTES  1.80  1.80  4.70   EOSINOPHILS  0.00  0.90  0.90   BASOPHILS  0.00  0.00  0.00     Current Facility-Administered Medications   Medication Dose Frequency Provider Last Rate Last Dose   • furosemide (LASIX) injection 20 mg  20 mg Q12HRS Oliver Carcamo M.D.       • potassium bicarbonate (KLYTE) 25 MEQ effervescent tablet TBEF 25 mEq  25 mEq Q12HRS Oliver Carcamo M.D.       • MD ALERT... vancomycin per pharmacy protocol   pharmacy to dose Evan Chen D.O.       • piperacillin-tazobactam (ZOSYN) 4.5 g in  mL IVPB  4.5 g Q8HRS ROMELIA CovingtonO. 25 mL/hr at 18 0516 4.5 g at 18 0516   • vancomycin 1,800 mg in  mL IVPB  20 mg/kg Q24HR MEREDITH Covington.O.       • midazolam (VERSED) injection 1-5 mg  1-5 mg Once PRN Oliver Carcamo M.D.       • rocuronium (ZEMURON) injection 50 mg  50 mg Once Oliver Carcamo M.D.   Stopped at 06/15/18 1845   • dexmedetomidine (PRECEDEX) 400 mcg in D5W 100 mL infusion  0.1-1.5 mcg/kg/hr Continuous Oliver Carcamo M.D. 26.4 mL/hr at 18 0835 1.2 mcg/kg/hr at 18 0835   • amiodarone (CORDARONE) 450 mg in D5W 250 mL Infusion  0.5-1 mg/min Continuous Mathew Gama M.D. 17 mL/hr at 06/15/18 2321 0.5 mg/min at 06/15/18 2321   • labetalol (NORMODYNE,TRANDATE) injection 10-20 mg  10-20 mg Q HOUR PRN Mathew Gama M.D.   20 mg at 06/15/18 1151   • famotidine (PEPCID) tablet 20 mg  20 mg BID Oliver Carcamo M.D.   20 mg at 18 0910   • insulin regular (HUMULIN R) injection 3-14 Units   3-14 Units Q6HRS Oliver Carcamo M.D.   3 Units at 06/16/18 0529    And   • glucose 4 g chewable tablet 16 g  16 g Q15 MIN PRN Oliver Carcamo M.D.        And   • dextrose 50% (D50W) injection 25 mL  25 mL Q15 MIN PRN Oliver Carcamo M.D.       • rosuvastatin (CRESTOR) tablet 20 mg  20 mg Q EVENING Oliver Carcamo M.D.   20 mg at 06/15/18 2119   • sertraline (ZOLOFT) tablet 50 mg  50 mg QDAY Oliver Carcamo M.D.   50 mg at 06/15/18 2120   • haloperidol lactate (HALDOL) injection 5 mg  5 mg Q4HRS PRN Davide Barrientos M.D.   5 mg at 06/10/18 0422   • norepinephrine (LEVOPHED) 8 mg in  mL Infusion  0-30 mcg/min Continuous Jeremy M Gonda, M.D.   Stopped at 06/14/18 0700   • MD ALERT...Adult ICU Electrolyte Replacement per Pharmacy Protocol   pharmacy to dose Jeremy M Gonda, M.D.       • Pharmacy Consult: Enteral tube feeding - review meds/change route/product selection  1 Each PRN Jeremy M Gonda, M.D.       • fentaNYL (SUBLIMAZE) injection 25 mcg  25 mcg Q HOUR PRN Jeremy M Gonda, M.D.   Stopped at 06/14/18 1719    Or   • fentaNYL (SUBLIMAZE) injection 50 mcg  50 mcg Q HOUR PRN Jeremy M Gonda, M.D.   50 mcg at 06/15/18 1724    Or   • fentaNYL (SUBLIMAZE) injection 100 mcg  100 mcg Q HOUR PRN Jeremy M Gonda, M.D.   100 mcg at 06/16/18 0829   • ipratropium-albuterol (DUONEB) nebulizer solution  3 mL Q2HRS PRN (RT) Jeremy M Gonda, M.D.       • ipratropium-albuterol (DUONEB) nebulizer solution  3 mL Q4HRS (RT) Jeremy M Gonda, M.D.   3 mL at 06/16/18 0710   • aspirin (ASA) chewable tab 81 mg  81 mg DAILY Jeremy M Gonda, M.D.   81 mg at 06/16/18 0910   • gabapentin (NEURONTIN) capsule 300 mg  300 mg TID Jeremy M Gonda, M.D.   300 mg at 06/16/18 0516   • ALPRAZolam (XANAX) tablet 1 mg  1 mg BID PRN Jeremy M Gonda, M.D.   1 mg at 06/13/18 1141   • acetaminophen (TYLENOL) tablet 650 mg  650 mg Q6HRS PRN Jeremy M Gonda, M.D.   650 mg at 06/14/18 2348   • guaiFENesin dextromethorphan (ROBITUSSIN DM) 100-10 MG/5ML syrup 10  mL  10 mL Q6HRS PRN Jeremy M Gonda, M.D.       • senna-docusate (PERICOLACE or SENOKOT S) 8.6-50 MG per tablet 2 Tab  2 Tab BID Jeremy M Gonda, M.D.   Stopped at 06/16/18 0900    And   • polyethylene glycol/lytes (MIRALAX) PACKET 1 Packet  1 Packet QDAY PRN Jeremy M Gonda, M.D.   1 Packet at 06/12/18 2127    And   • magnesium hydroxide (MILK OF MAGNESIA) suspension 30 mL  30 mL QDAY PRN Jeremy M Gonda, M.D.   30 mL at 06/14/18 0737    And   • bisacodyl (DULCOLAX) suppository 10 mg  10 mg QDAY PRN Jeremy M Gonda, M.D.       • fentaNYL (SUBLIMAZE) 50 mcg/mL in 50mL (Continuous Infusion)   Continuous Jeremy M Gonda, M.D.   Stopped at 06/14/18 0950   • Respiratory Care per Protocol   Continuous RT Sherrell Higgins M.D.       • heparin injection 5,000 Units  5,000 Units Q8HRS Sherrell Higgins M.D.   5,000 Units at 06/16/18 0516     Last reviewed on 6/9/2018 10:32 PM by Ana Purcell BRIGITTE    Quality  Measures:  Medications reviewed, Labs reviewed and Radiology images reviewed                      Assessment/Plan:  Acute hypoxic/hypercarbic respiratory failure    - intubated 6/10    -continue full vent support, ventilator settings adjusted today   -RT/O2 protocols   -Minimize sedation, dexmedetomidine and fentanyl as needed   -diuresis as tolerated  Community-acquired pneumonia    -Febrile, increasing leukocytosis and worsening chest x-ray   -Suspicion for new HCAP versus other source of new infection   -Antibiotics escalated to Zosyn and vancomycin today   -Repeat blood cultures, urinalysis and bronchoscopy with BAL  Septic shock from pulmonary source with associated cardiac and respiratory failure   -Worse today as above  Encephalopathy   -Suspect toxic metabolic secondary to sepsis, pneumonia   -CT head negative   -Check ammonia level   -MRI brain and EEG if she does not clear, titrate down sedation  Normocytic anemia   - Monitor with conservative transfusion strategy   Hyperkalemia -improving, monitor  Acute kidney  injury, ATN/prerenal   -Avoid nephrotoxins, monitor urine output  Mildly elevated troponin - trend  Type 2 diabetes - SSI  Dyslipidemia - statin  Thrombocytosis - improving  History of tobacco use, 40 pack years, quit a year ago   - BDs prn, consider steroids if bronchospasm develops  Lactic acidosis - trend  Acute metabolic encephalopathy - limit sedatives when stabilized and monitor  Prophylaxis, start enteral nutrition  Much more agitated today, dexmedetomidine restarted.  Not having much clinical effect.  She does respond to intermittent boluses of fentanyl.  I will resume fentanyl infusion.  MRI ordered however may be a prior aneurysm clip in place and screening this further.  Overall prognosis guarded.  Patient is critically ill.  Clinically worse today.  I have adjusted ventilator settings.  Antibiotics escalated.  Ongoing critical care management as above.  She remains full CODE STATUS.  Discussed patient condition and risk of morbidity and/or mortality with Family, RN, RT, Pharmacy, Charge nurse / hot rounds, Patient and hospitalist.    The patient remains critically ill.  Critical care time = 61 minutes in directly providing and coordinating critical care and extensive data review.  No time overlap and excludes procedures.

## 2018-06-16 NOTE — PROGRESS NOTES
[facility]    Date of Service: 2018    Chief Complaint  73 y.o. female admitted 2018 with respiratory failure.    Interval Problem Update  Patient noted to have respiratory failure, required intubation.  Deemed due to pneumonia.  Also with acute renal failure.  Discussed with family at bedside.  Patient seen and examined in the ICU, ICU care given.  Discussed patient condition and plan with Intensivist, RN, RT and charge nurse / hot rounds.    Cough, gag, corneal   Withdrawals  Pupils uneven  precedex 1.2  fent prn  NSR to tach  tmax 38.4, cooling blanket  Tolerating TF at goal  bm yesterday  Jamison with 75/hr  RIJ  Not mobilized  Vent day 7  No SBT  vanco/zosyn  Start lasix  Add scheduled oxy    Consultants/Specialty  Intensivist    Disposition  Patient's requires additional treatment in the hospital, unsure if she will need placement versus home at the time of discharge    Patient is critically ill.   The patient continues to have : A. fib with RVR  The vital organ system that is effected is the: Cardiac initially  If untreated there is a high chance of deterioration into: Cardiogenic shock  The critical care that I am providing today is: Amiodarone drip  The critical care that has been undertaken is medically complex.   There has been no overlap in critical care time.  Critical care time not including procedures, no overlap: 37 minutes      Review of Systems   Unable to perform ROS: Intubated      Physical Exam  Laboratory/Imaging   Hemodynamics  Temp (24hrs), Av °C (100.4 °F), Min:37.9 °C (100.2 °F), Max:38.3 °C (100.9 °F)   Temperature: (!) 38.3 °C (100.9 °F), Monitored Temp: 38.4 °C (101.1 °F)  Pulse  Av.6  Min: 40  Max: 146 Heart Rate (Monitored): 88  NIBP: 106/51 CVP (mm Hg): (!) 245 MM HG    Respiratory  Weeks Vent Mode: APVCMV, Rate (breaths/min): 24, PEEP/CPAP: 15, PEEP/CPAP: 15, FiO2: 50, P Peak (PIP): 21, P MEAN: 17   Respiration: (!) 23, Pulse Oximetry: 97 %      Work Of Breathing / Effort: Vented  RUL Breath Sounds: Clear, RML Breath Sounds: Clear, RLL Breath Sounds: Diminished, DEIDRA Breath Sounds: Clear, LLL Breath Sounds: Diminished    Fluids    Intake/Output Summary (Last 24 hours) at 06/16/18 0739  Last data filed at 06/16/18 0600   Gross per 24 hour   Intake          2684.23 ml   Output             3450 ml   Net          -765.77 ml       Nutrition  Orders Placed This Encounter   Procedures   • Diet NPO     Standing Status:   Standing     Number of Occurrences:   1     Order Specific Question:   Type:     Answer:   Now [1]     Order Specific Question:   Restrict to:     Answer:   Strict [1]     Physical Exam   Constitutional: She appears ill. No distress. She is sedated, intubated and restrained.   HENT:   Head: Normocephalic and atraumatic.   Eyes: Right eye exhibits no discharge. Left eye exhibits no discharge. No scleral icterus.   Neck: No tracheal deviation present.   Cardiovascular: Normal rate and regular rhythm.    No murmur heard.  Pulmonary/Chest: No stridor. No tachypnea. She is intubated. She has no wheezes. She has no rhonchi. She has rales.   Intubated and sedated    Abdominal: Soft. Bowel sounds are normal. She exhibits no distension.   Musculoskeletal: She exhibits edema.   Neurological:   Intubated, unresponsive    Skin: Skin is warm and dry. She is not diaphoretic.   Psychiatric: She is noncommunicative.   Nursing note and vitals reviewed.      Recent Labs      06/14/18   0500  06/15/18   0539  06/16/18   0520   WBC  19.6*  22.8*  16.2*   RBC  2.88*  3.24*  2.73*   HEMOGLOBIN  8.4*  9.3*  7.9*   HEMATOCRIT  27.3*  29.2*  25.0*   MCV  94.8  90.1  91.6   MCH  29.2  28.7  28.9   MCHC  30.8*  31.8*  31.6*   RDW  54.6*  51.4*  52.6*   PLATELETCT  256  308  290   MPV  10.9  10.8  10.8     Recent Labs      06/14/18   0500  06/15/18   0539  06/16/18   0520   SODIUM  135  138  141   POTASSIUM  5.6*  4.3  4.2   CHLORIDE  107  107  108   CO2  18*  21  24    GLUCOSE  139*  153*  173*   BUN  43*  42*  44*   CREATININE  1.51*  1.12  0.98   CALCIUM  8.2*  8.5  8.3*                      Assessment/Plan     * Severe sepsis ([facility])   Assessment & Plan    -Likely due to pneumonia  -Now a significant worsening  -Adjust antibiotics to vancomycin and Zosyn  -Panculture and await the results  -Patient remains normotensive          Atrial fibrillation with RVR ([facility])   Assessment & Plan    -Continue amiodarone drip, titrating  -Blood pressure has been marginal, once improved start metoprolol  -No full dose anticoagulation at this time        SY (acute kidney injury) ([facility])   Assessment & Plan    - Due to dehydration and sepsis  -Markedly improved, patient was having decreased urine output then excessive urine output, has normalized now        Acute respiratory failure with hypoxia ([facility])   Assessment & Plan    - Full vent support  - Vent management per intensivist  -Continues to have infiltrate on chest x-ray, could be a component of pulmonary edema, starting Lasix        Acute metabolic encephalopathy   Assessment & Plan    -Patient has been off sedation, more agitated today, Precedex and fentanyl drips restarted  -Recent CT head negative        Delirium   Assessment & Plan    - Now intubated and sedated        Hyperkalemia   Assessment & Plan    - Resolved        Shock ([facility])   Assessment & Plan    - Due to sepsis and dehydration  -Resolved with IV fluids  -Pressors now off        Pneumonia due to other specified bacteria ([facility])   Assessment & Plan    -Antibiotics have been broadened, continue vancomycin and Zosyn  -Patient continues to have a fever, otherwise does appear improved on new antibiotics  -Await repeat culture results        HLD (hyperlipidemia)   Assessment & Plan    - Continue statin        T2DM (type 2 diabetes mellitus) ([facility])   Assessment & Plan    - Continue insulin sliding scale, adjust as needed        History of atrial fibrillation without current medication-  (present on admission)   Assessment & Plan    - Monitor          Quality-Core Measures   Reviewed items::  Labs reviewed, Medications reviewed and Radiology images reviewed  Jamison catheter::  Critically Ill - Requiring Accurate Measurement of Urinary Output and Unconscious / Sedated Patient on a Ventilator  DVT prophylaxis pharmacological::  Heparin  Ulcer Prophylaxis::  Yes  Antibiotics:  Treating active infection/contamination beyond 24 hours perioperative coverage

## 2018-06-16 NOTE — PROGRESS NOTES
"Pharmacy Kinetics 73 y.o. female on vancomycin day # 2 2018    Currently on Vancomycin 1800 mg iv q24hr (1100)  Other antibiotics: piperacillin/tazobactam 4.5 grams iv q8h    Indication for Treatment: PNA    Pertinent history per medical record: Admitted on 2018 for worsening SOB and nonproductive cough.  She was ultimately intubated on 6/10.  A CAP regimen was initially started, but pt started having increased white count and temps after 6 days of Unasyn and 5 days of Azithro. Empiric broad spectrum antibiotics were initiated and cultures were ordered.    Allergies: Keflex     List concerns for renal function: age, obesity (BMI 30.34), SIRS, low albumin, BUN/SCr ratio > 20:1    Pertinent cultures to date:   6/15/18 BAL:  Rare GPC, < 5% intracellular organisms  6/15/18 blood-peripheral x 2:  NGTD  6/10/18 TA:  No growth at 48 hours  18 blood-peripheral x 2:  NGTD    Recent Labs      18   0500  06/15/18   0539  18   0520   WBC  19.6*  22.8*  16.2*   NEUTSPOLYS  84.90*  93.80*  84.10*   BANDSSTABS  6.20   --   1.90     Recent Labs      18   0500  06/15/18   0539  18   0520   BUN  43*  42*  44*   CREATININE  1.51*  1.12  0.98     Intake/Output Summary (Last 24 hours) at 18 1317  Last data filed at 18 1000   Gross per 24 hour   Intake          2357.86 ml   Output             2030 ml   Net           327.86 ml      Blood pressure (!) 161/68, pulse 79, temperature (!) 38.3 °C (100.9 °F), resp. rate (!) 24, height 1.651 m (5' 5\"), weight 82.7 kg (182 lb 5.1 oz), SpO2 99 %, not currently breastfeeding. Temp (24hrs), Av °C (100.4 °F), Min:37.9 °C (100.2 °F), Max:38.3 °C (100.9 °F)      A/P   1. Vancomycin dose change: continue current regimen  2. Next vancomycin level:  at 1030  3. Goal trough: 16-20 mcg/mL  4. Assessment: The patient continues to have fevers and a poor neuro exam.  Preliminary culture data as noted above.  She is at risk for accumulation.  An MRI-brain " is to be done today.    5. Plan:  Continue current regimen.  A follow up level for tomorrow morning has been ordered, prior to the 3rd total dose.    Saritha Prieto, Pharm.D., BCPS

## 2018-06-16 NOTE — CARE PLAN
Problem: Ventilation Defect:  Goal: Ability to achieve and maintain unassisted ventilation or tolerate decreased levels of ventilator support  Outcome: PROGRESSING AS EXPECTED    Intervention: Support and monitor invasive and noninvasive mechanical ventilation  Adult Ventilation Update    Total Vent Days: 7    Patient Lines/Drains/Airways Status    Active Airway     Name: Placement date: Placement time: Site: Days:    Airway Group ET Tube 7.5 06/10/18   0840      7              Vent settings: 24 340 +15 50%  Duoneb Q4    Sputum/Suction   Cough: Non Productive (06/15/18 2304)  Sputum Amount: Unable to Evaluate (06/15/18 1600)  Sputum Color: Unable to Evaluate (06/15/18 1600)  Sputum Consistency: Unable to Evaluate (06/15/18 1600)        Events/Summary/Plan: Abg drawn, no changes made. Pt stable on vent, will continue to monitor.

## 2018-06-16 NOTE — CARE PLAN
Problem: Infection  Goal: Will remain free from infection  Outcome: PROGRESSING SLOWER THAN EXPECTED  cx taken yesterday . Wbc down from 20 >16

## 2018-06-17 NOTE — PROGRESS NOTES
[facility] Hospitalist Progress Note    Date of Service: 2018    Chief Complaint  73 y.o. female admitted 2018 with respiratory failure.    Interval Problem Update  Patient noted to have respiratory failure, required intubation.  Deemed due to pneumonia.  Also with acute renal failure.  Went into atrial fibrillation with rapid ventricular rate.  Patient seen and examined in the ICU, ICU care given.  Discussed patient condition and plan with Intensivist, RN, RT and charge nurse / hot rounds.    Cough, gag, corneal   Withdrawals  fent 200  precedex 1.5  NSR  SBP 90s  tmax 37.9  Tolerating TF at goal  Adequate uop via [place]  amio 0.5  Vent day 8, 50%/14  vanco/zosyn day 3, change to zyvox    Consultants/Specialty  Intensivist    Disposition  Patient's requires additional treatment in the hospital, unsure if she will need placement versus home at the time of discharge    Patient is critically ill.   The patient continues to have : A. fib with RVR  The vital organ system that is effected is the: Cardiac initially  If untreated there is a high chance of deterioration into: Cardiogenic shock  The critical care that I am providing today is: Amiodarone drip  The critical care that has been undertaken is medically complex.   There has been no overlap in critical care time.  Critical care time not including procedures, no overlap: 35 minutes      Review of Systems   Unable to perform ROS: Intubated      Physical Exam  Laboratory/Imaging   Hemodynamics  Temp (24hrs), Av.1 °C (98.8 °F), Min:36.9 °C (98.5 °F), Max:37.2 °C (99 °F)   Temperature: 36.9 °C (98.5 °F), Monitored Temp: 37.6 °C (99.7 °F)  Pulse  Av.8  Min: 40  Max: 146 Heart Rate (Monitored): 80  NIBP: 100/50 CVP (mm Hg): (!) 238 MM HG    Respiratory  Weeks Vent Mode: APVCMV, Rate (breaths/min): 20, PEEP/CPAP: 15, PEEP/CPAP: 15, FiO2: 50, P Peak (PIP): 23, P MEAN: 18   Respiration: 15, Pulse Oximetry: 97 %     Work Of Breathing / Effort: Vented  RUL  Breath Sounds: Clear, RML Breath Sounds: Clear, RLL Breath Sounds: Clear, DEIDRA Breath Sounds: Clear, LLL Breath Sounds: Diminished    Fluids    Intake/Output Summary (Last 24 hours) at 06/17/18 0723  Last data filed at 06/17/18 0706   Gross per 24 hour   Intake          1797.03 ml   Output             1455 ml   Net           342.03 ml       Nutrition  Orders Placed This Encounter   Procedures   • Diet NPO     Standing Status:   Standing     Number of Occurrences:   1     Order Specific Question:   Type:     Answer:   Now [1]     Order Specific Question:   Restrict to:     Answer:   Strict [1]     Physical Exam   Constitutional: She appears ill. She is sedated, intubated and restrained.   HENT:   Head: Normocephalic and atraumatic.   Mouth/Throat: No oropharyngeal exudate.   Eyes: Conjunctivae are normal. Right eye exhibits no discharge. Left eye exhibits no discharge. No scleral icterus.   Neck: Neck supple. No tracheal deviation present.   Cardiovascular: Normal rate and regular rhythm.  Exam reveals no gallop.    No murmur heard.  Pulmonary/Chest: No stridor. No tachypnea. She is intubated. She has no rhonchi. She has rales.   Intubated and sedated    Abdominal: Bowel sounds are normal. She exhibits no distension.   Musculoskeletal: She exhibits edema.   Neurological:   Intubated, unresponsive    Skin: Skin is warm and dry. She is not diaphoretic. No erythema.   Psychiatric: She is noncommunicative.   Nursing note and vitals reviewed.      Recent Labs      06/15/18   0539  06/16/18   0520  06/17/18   0400   WBC  22.8*  16.2*  16.8*   RBC  3.24*  2.73*  2.46*   HEMOGLOBIN  9.3*  7.9*  7.2*   HEMATOCRIT  29.2*  25.0*  22.9*   MCV  90.1  91.6  93.1   MCH  28.7  28.9  29.3   MCHC  31.8*  31.6*  31.4*   RDW  51.4*  52.6*  53.1*   PLATELETCT  308  290  325   MPV  10.8  10.8  11.2     Recent Labs      06/15/18   0539  06/16/18   0520  06/17/18   0400   SODIUM  138  141  137   POTASSIUM  4.3  4.2  4.4   CHLORIDE  107  108   105   CO2  21  24  25   GLUCOSE  153*  173*  218*   BUN  42*  44*  44*   CREATININE  1.12  0.98  1.21   CALCIUM  8.5  8.3*  7.6*                      Assessment/Plan     * Severe sepsis ([facility])   Assessment & Plan    -Likely due to pneumonia  -Now a significant worsening  -Adjust antibiotics to Zyvox and Zosyn  -Only yeast thus far in respiratory culture, otherwise no growth to date  -Patient remains normotensive          Atrial fibrillation with RVR ([facility])   Assessment & Plan    -Continue amiodarone drip, titrating  -Blood pressure remains marginal, once improved start metoprolol  -No full dose anticoagulation at this time        SY (acute kidney injury) ([facility])   Assessment & Plan    - Due to dehydration and sepsis  -Markedly improved, patient was having decreased urine output then excessive urine output, has normalized now        Acute respiratory failure with hypoxia ([facility])   Assessment & Plan    - Full vent support  - Vent management per intensivist  -Continues to have infiltrate on chest x-ray, could be a component of pulmonary edema, continue Lasix        Acute metabolic encephalopathy   Assessment & Plan    -Has been more agitated lately, for a while she was not doing much  -Recent CT head negative  -MRI brain pending, patient with history of coiling        Delirium   Assessment & Plan    - Now intubated and sedated        Hyperkalemia   Assessment & Plan    - Resolved        Shock ([facility])   Assessment & Plan    - Due to sepsis and dehydration  -Resolved with IV fluids  -Pressors now off        Pneumonia due to other specified bacteria ([facility])   Assessment & Plan    -Antibiotics have been broadened, now on Zyvox and Zosyn  -Fever now resolved  -Only yeast growing on respiratory culture so far, patient did improve with broadening of antibiotics, continue        HLD (hyperlipidemia)   Assessment & Plan    - Continue statin        T2DM (type 2 diabetes mellitus) ([facility])   Assessment & Plan    - Continue insulin sliding  scale, adjust as needed        History of atrial fibrillation without current medication- (present on admission)   Assessment & Plan    - Monitor          Quality-Core Measures   Reviewed items::  Labs reviewed, Medications reviewed and Radiology images reviewed  Jamison catheter::  Critically Ill - Requiring Accurate Measurement of Urinary Output and Unconscious / Sedated Patient on a Ventilator  DVT prophylaxis pharmacological::  Heparin  Ulcer Prophylaxis::  Yes  Antibiotics:  Treating active infection/contamination beyond 24 hours perioperative coverage

## 2018-06-17 NOTE — CARE PLAN
Problem: Infection  Goal: Will remain free from infection  Outcome: PROGRESSING SLOWER THAN EXPECTED  Blood cultures pending, pneumonia on chest ray, IV antibiotics as ordered    Problem: Pain Management  Goal: Pain level will decrease to patient's comfort goal  Outcome: PROGRESSING SLOWER THAN EXPECTED  Fentanyl drip, scheduled oxycodone

## 2018-06-17 NOTE — PROCEDURES
"Date of Service: 6/15/2018    Procedure:  Diagnostic and therapeutic bronchoscopy with BAL    Indication: Respiratory failure, ? pneumonia    Physician:  Dr. Oliver Carcamo MD    Post Procedure Diagnosis:  1.  Inflamed airways  2.  Moderate amount of very thick, purulent secretions  3.  Therapeutic lavage RLL and LLLs  4.  BAL LLL sent for cx    Narrative:  Appropriate consent was obtained and \"time out\" was performed.  A flexible fiberoptic bronchoscope was then inserted through the ETT without difficulty.  All airways were evaluated to the sub-segmental level.  The airway mucosa was inflamed.  There was a moderate amount of very thick, purulent secretions, plugging the LLL airways.  The LLL and RLL segments were therapeutically lavaged.  No endobronchial lesions were seen.  The bronchoscope was then wedged in a segment of the LLL bronchus.  30cc of saline was instilled with moderate return of thick, purulent BAL fluid.  The BAL specimen will be sent for appropriate culture.  No immediate complications.  EBL = 0.      Oliver Carcamo M.D.        "

## 2018-06-17 NOTE — WOUND TEAM
[facility]  Inpatient Services  Initial Wound and Skin Care Evaluation    Admission Date:     HPI, PMH, SH: Reviewed  Unit where seen by Wound Team:    WOUND CONSULT RELATED TO:     SUBJECTIVE:      Self Report / Pain Level:     OBJECTIVE:    WOUND TYPE, LOCATION, CHARACTERISTICS (Pressure ulcers: location, stage, POA or date identified)    Location and type of wound: Deep tissue injury, coccyx, R, 6/14/18, purple  Measurements:   3.0 x 2.0 cm      Periwound:   intact    Drainage:    none  Tissue Type and %:   purple 100%  Wound Edges:   irregular  Odor:     none  Exposed structure(s): none  S&S of Infection:  none      INTERVENTIONS BY WOUND TEAM: Assessed lips, heels, back, buttock. Turned with RN. Changed Mepilex sacral. Kept turned to R with pillow. Heels floated.      Interdisciplinary consultation: RN    EVALUATION: patient has a ARUNA bed with waffle cushion mattress cover, and Mepilex sacral being used. No notation in chart prior to 6/14/18. Small dried scab to R lower lip, but not located where ET tube rests. No inflammation noted, should resolve with problems.    Factors affecting wound healing: smoker, DM, sepsis,   Goals: Steady decrease in wound area and depth weekly.    NURSING PLAN OF CARE ORDERS (X):    Dressing changes: See Dressing Care orders: x  Skin care: See Skin Care orders:   Rectal tube care: See Rectal Tube Care orders:   Other orders:    RSKIN: CURRENT (X) ORDERED (O)  Q shift Jair:  X  Q shift pressure point assessments:  X  Pressure redistribution mattress        ARUNA   X with waffle mattress overlay   Bariatric ARUNA      Bariatric foam        Heel float boots       Heels floated on pillows X     Barrier wipes      Barrier Cream      Barrier paste   x   Sacral silicone dressing      Silicone O2 tubing      Anchorfast      Trach with Optifoam split foam       Waffle cushion     Waffle Overlay    Rectal tube or BMS      Antifungal tx    Turn q 2 hours   X  Up to chair      Ambulate   PT/OT     Dietician     Diabetes Education   PO     TF X  TPN     PVN    NPO   # days   Other       WOUND TEAM PLAN OF CARE (X):   NPWT change 3 x week:        Dressing changes by wound team:       Follow up as needed:   X    Other (explain):    Anticipated discharge plans (X):  SNF:           Home Care:  x         Outpatient Wound Center:            Self Care:            Other:

## 2018-06-17 NOTE — PROGRESS NOTES
Pulmonary Critical Care Progress Note        Date of Admission:  6/9/2018    Chief Complaint: SOB    History of Present Illness: 73 y.o. female with known past   medical history of type 2 diabetes, questionable bipolar disorder, history of   CVA, dyslipidemia.  The patient indicates that she was in her normal state of   health up until approximately 2 days ago, at which point in time, she had   increasing shortness of breath.  She denied any significant fever, chills.    Denied any headache, visual changes, has not had any cough or sputum   production.  No nausea, vomiting, abdominal pain or discomfort.  Has   intermittent chronic diarrhea, nothing more than usual.  Denies any dysuria or   lower extremity edema.  Further, she denies any sick contacts or recent   travels.  No history of DVTs or PEs in the past.  She indicates that the   shortness of breath has been progressive with increasing dyspnea on exertion.    She does not associate with positioning being any worse or better.  She could   lie flat and just as dyspneic as sitting up forward.  She denies any change   in her medications.  She does have tobacco history, which she quit about 1   year ago, smoking 1 pack a day x40 years.  Denies any alcohol or illicit drug   use.  She further denies any underlying heart disease or history of MI in the   past.       ROS:  Respiratory: unable to perform due to the patient's inability to effectively communicate, Cardiac: unable to perform due to the patient's inability to effectively communicate, GI: unable to perform due to the patient's inability to effectively communicate.      Interval Events:  24 hour interval history reviewed    - +c/g/c, int agitated, ? Grasp to command   - dex 1.5, fentanyl 200   - very agitated with lower fentanyl, suspeceted to use chronic narcotics   - afebrile   - ishan TF at goal   - good UOP   - I/O 1.7/1.4   - vent day 8, 15, 50%, CXR bilat infs   - vanco/zosyn - change vanco to linazolid     - on lasix    - remains in SR; will d/c amio gtt    Yesterday   - w/d's, moves all ext, not following, dex 1.2   - unequal pupils   - fentanyl pushes   - SR/ST   - Tm 38.4   - cooling blanket   - ishan TF, + BM   - decreasing UOP   - start lasix today   - no IVF   - vent day 7, PEEP 15, 50%, cxr sl improved Bialt inf's   - pepcid   - vanco/zosyn   - BAL pending, rare GPC   - CPOT high, add oxy   - SSI        PFSH:  No change.    Respiratory:  Weeks Vent Mode: APVCMV, Rate (breaths/min): 24, Vt Target (mL): 340, PEEP/CPAP: 15, FiO2: 40, Static Compliance (ml / cm H2O): 55.6  Pulse Oximetry: 95 %          Exam: tachypnea, labored breathing, rhonchi throughout all lung fields and diminished breath sounds mild  ImagingAvailable data reviewed   Recent Labs      06/15/18   0210  06/15/18   0358  06/16/18   0412  06/17/18   0424   ISTATAPH  7.427  7.403  7.406  7.414   ISTATAPCO2  28.6  33.0  36.4  40.0*   ISTATAPO2  62*  64  85  86   ISTATATCO2  20  22  24  27   BPMUOTN3OFT  93  93  97  97   ISTATARTHCO3  18.9  20.6  22.9  25.6*   ISTATARTBE  -5*  -4  -2  1   ISTATTEMP  37.6 C  38.1 C  97.8 F  see below   ISTATFIO2  40  40  50  50   ISTATSPEC  Arterial  Arterial  Arterial  Arterial   ISTATAPHTC  7.418  7.387*  7.412   --    QYCZARCG2IE  65  70  83   --        HemoDynamics:  Pulse: 84, Heart Rate (Monitored): 84  NIBP: (!) 89/36  CVP (mm Hg): (!) 237 MM HG    Exam: regular rhythm (Sinus), tachycardia, remains on amio gtt; no further AF  Imaging: Available data reviewed        Neuro:  GCS Total [place] Coma Score: 7 sedated on propofol drip       Exam: no focal deficits noted Agitated Delirious Confused, not following  Imaging: Available data reviewed    Fluids:  Intake/Output       06/15/18 0700 - 06/16/18 0659 06/16/18 0700 - 06/17/18 0659 06/17/18 0700 - 06/18/18 0659      2068-4723 8765-6489 Total 0700-1859 1900-0659 Total 0700-1859 1900-0659 Total       Intake    P.O.  100  -- 100  --  -- --  --  -- --    P.O. 100  -- 100 -- -- -- -- -- --    I.V.  876.6  617.6 1494.2  744  528 1272  108  -- 108    Precedex Volume 72.6 213.6 286.2 279 228 507 66 -- 66    Amiodarone Volume 204 204 408 204 170 374 34 -- 34    IV Piggyback Volume (IV Piggyback) 600 200 800 250 100 350 -- -- --    IV Volume (Fentanyl) 0 -- 0 11 30 41 8 -- 8    Other  --  120 120  --  75 75  120  -- 120    Medications (P.O./ Enteral Liquids) -- 120 120 -- 75 75 120 -- 120    Enteral  390  790 1180  --  395 395  110  -- 110    Free Water / Tube Flush 120 240 360 -- 120 120 -- -- --    Intake (mL) (Enteral Tube Right Nare Cortrak Gastric Feeding Tube) 270 550 820 -- 275 275 110 -- 110    Total Intake 1366.6 1527.6 2894.2  338 -- 338       Output    Urine  2850  950 3800  1180  275 1455  250  -- 250    Urine Void (mL) (non-catheter) 1050 -- 1050 -- -- -- -- -- --    Output (mL) (Urinary Catheter Indwelling Catheter) 4051 230 0310 6841 987 0577 250 -- 250    Total Output 2850 950 3800 8710 519 1965 250 -- 250       Net I/O     -1483.4 577.6 -905.8 -436 723 287 88 -- 88           Recent Labs      06/15/18   0539  18   0520  18   0400   SODIUM  138  141  137   POTASSIUM  4.3  4.2  4.4   CHLORIDE  107  108  105   CO2  21  24  25   BUN  42*  44*  44*   CREATININE  1.12  0.98  1.21   CALCIUM  8.5  8.3*  7.6*       GI/Nutrition:  Exam: abdomen is soft and non-tender, normal active bowel sounds  Imaging: Available data reviewed  NPO and tube feed Tolerated  Liver Function  Recent Labs      06/15/18   0539  18   0520  18   0400   GLUCOSE  153*  173*  218*       Heme:  Recent Labs      06/15/18   0539  18   0520  18   0400   RBC  3.24*  2.73*  2.46*   HEMOGLOBIN  9.3*  7.9*  7.2*   HEMATOCRIT  29.2*  25.0*  22.9*   PLATELETCT  308  290  325       Infectious Disease:  Monitored Temp 2  Av.9 °C (100.3 °F)  Min: 37.6 °C (99.7 °F)  Max: 38.2 °C (100.8 °F)  Temp  Av.1 °C (98.8 °F)  Min: 36.9 °C (98.5 °F)  Max: 37.2 °C (99  °F)  Micro: reviewed.  No growth to date  Recent Labs      06/15/18   0539  06/16/18   0520  06/17/18   0400   WBC  22.8*  16.2*  16.8*   NEUTSPOLYS  93.80*  84.10*  74.70*   LYMPHOCYTES  2.60*  8.40*  6.50*   MONOCYTES  1.80  4.70  4.70   EOSINOPHILS  0.90  0.90  8.40*   BASOPHILS  0.00  0.00  0.00     Current Facility-Administered Medications   Medication Dose Frequency Provider Last Rate Last Dose   • furosemide (LASIX) injection 20 mg  20 mg Q12HRS Oliver Carcamo M.D.   20 mg at 06/17/18 0819   • potassium bicarbonate (KLYTE) 25 MEQ effervescent tablet TBEF 25 mEq  25 mEq Q12HRS Oliver Carcamo M.D.   25 mEq at 06/17/18 0820   • oxyCODONE immediate-release (ROXICODONE) tablet 5 mg  5 mg Q6HRS Oliver Carcamo M.D.   Stopped at 06/16/18 1800   • fentaNYL (SUBLIMAZE) 50 mcg/mL in 50mL (Continuous Infusion)   Continuous Oliver Carcamo M.D. 3 mL/hr at 06/17/18 0159 150 mcg at 06/17/18 0159   • MD ALERT... vancomycin per pharmacy protocol   pharmacy to dose Evan Chen D.O.       • piperacillin-tazobactam (ZOSYN) 4.5 g in  mL IVPB  4.5 g Q8HRS MEREDITH Covington.O.   Stopped at 06/17/18 0838   • vancomycin 1,800 mg in  mL IVPB  20 mg/kg Q24HR MEREDITH Covington.O.   Stopped at 06/16/18 1259   • dexmedetomidine (PRECEDEX) 400 mcg in D5W 100 mL infusion  0.1-1.5 mcg/kg/hr Continuous Oliver Carcamo M.D. 33 mL/hr at 06/17/18 0706 1.5 mcg/kg/hr at 06/17/18 0706   • amiodarone (CORDARONE) 450 mg in D5W 250 mL Infusion  0.5-1 mg/min Continuous Mathew Gama M.D. 17 mL/hr at 06/17/18 0556 0.5 mg/min at 06/17/18 0556   • labetalol (NORMODYNE,TRANDATE) injection 10-20 mg  10-20 mg Q HOUR PRN Mathew Gama M.D.   20 mg at 06/15/18 1151   • famotidine (PEPCID) tablet 20 mg  20 mg BID Oliver Carcamo M.D.   20 mg at 06/17/18 0820   • insulin regular (HUMULIN R) injection 3-14 Units  3-14 Units Q6HRS Oliver Carcamo M.D.   4 Units at 06/17/18 0521    And   • glucose 4 g chewable tablet 16 g  16  g Q15 MIN PRN Oliver Carcamo M.D.        And   • dextrose 50% (D50W) injection 25 mL  25 mL Q15 MIN PRN Oliver Carcamo M.D.       • rosuvastatin (CRESTOR) tablet 20 mg  20 mg Q EVENING Oliver Carcamo M.D.   20 mg at 06/16/18 2059   • sertraline (ZOLOFT) tablet 50 mg  50 mg QDAY Oliver Carcamo M.D.   50 mg at 06/16/18 2100   • haloperidol lactate (HALDOL) injection 5 mg  5 mg Q4HRS PRN Davide Barrientos M.D.   5 mg at 06/17/18 0734   • MD ALERT...Adult ICU Electrolyte Replacement per Pharmacy Protocol   pharmacy to dose Jeremy M Gonda, M.D.       • Pharmacy Consult: Enteral tube feeding - review meds/change route/product selection  1 Each PRN Jeremy M Gonda, M.D.       • fentaNYL (SUBLIMAZE) injection 25 mcg  25 mcg Q HOUR PRN Jeremy M Gonda, M.D.   Stopped at 06/14/18 1719    Or   • fentaNYL (SUBLIMAZE) injection 50 mcg  50 mcg Q HOUR PRN Jeremy M Gonda, M.D.   50 mcg at 06/15/18 1724    Or   • fentaNYL (SUBLIMAZE) injection 100 mcg  100 mcg Q HOUR PRN Jeremy M Gonda, M.D.   100 mcg at 06/16/18 1423   • ipratropium-albuterol (DUONEB) nebulizer solution  3 mL Q2HRS PRN (RT) Jeremy M Gonda, M.D.       • ipratropium-albuterol (DUONEB) nebulizer solution  3 mL Q4HRS (RT) Jeremy M Gonda, M.D.   3 mL at 06/17/18 0718   • aspirin (ASA) chewable tab 81 mg  81 mg DAILY Jeremy M Gonda, M.D.   81 mg at 06/17/18 0819   • gabapentin (NEURONTIN) capsule 300 mg  300 mg TID Jeremy M Gonda, M.D.   300 mg at 06/17/18 0503   • acetaminophen (TYLENOL) tablet 650 mg  650 mg Q6HRS PRN Jeremy M Gonda, M.D.   650 mg at 06/17/18 0310   • guaiFENesin dextromethorphan (ROBITUSSIN DM) 100-10 MG/5ML syrup 10 mL  10 mL Q6HRS PRN Jeremy M Gonda, M.D.       • senna-docusate (PERICOLACE or SENOKOT S) 8.6-50 MG per tablet 2 Tab  2 Tab BID Jeremy M Gonda, M.D.   2 Tab at 06/17/18 0819    And   • polyethylene glycol/lytes (MIRALAX) PACKET 1 Packet  1 Packet QDAY PRN Jeremy M Gonda, M.D.   1 Packet at 06/12/18 2127    And   • magnesium hydroxide  (MILK OF MAGNESIA) suspension 30 mL  30 mL QDAY PRN Jeremy M Gonda, M.D.   30 mL at 06/14/18 0737    And   • bisacodyl (DULCOLAX) suppository 10 mg  10 mg QDAY PRN Jeremy M Gonda, M.D.       • Respiratory Care per Protocol   Continuous RT Sherrell Higgins M.D.       • heparin injection 5,000 Units  5,000 Units Q8HRS Sherrell Higgins M.D.   5,000 Units at 06/17/18 0503     Last reviewed on 6/9/2018 10:32 PM by Ana Purcell, [facility]    Quality  Measures:   Medications reviewed, Labs reviewed and Radiology images reviewed                      Assessment/Plan:  Acute hypoxic/hypercarbic respiratory failure    - intubated 6/10    -continue full vent support, ventilator settings adjusted today; not appropriate for liberation    -RT/O2 protocols   -Minimize sedation, dexmedetomidine and fentanyl as needed   -diuresis as tolerated  Community-acquired pneumonia    -Febrile, increasing leukocytosis and worsening chest x-ray   -Suspicion for new HCAP versus other source of new infection   -Antibiotics escalated to Zosyn and vancomycin - BAL 6/15 light growth yeast   - change vanco to linazolid today; complete 7 day course   Septic shock from pulmonary source with associated cardiac and respiratory failure   -s/p pressors     - now tolerating diuresis   Encephalopathy   -Suspect toxic metabolic secondary to sepsis, pneumonia   -CT head negative   -Check ammonia level   -MRI brain today  Normocytic anemia   - Monitor with conservative transfusion strategy   Hyperkalemia -improving, monitor  Acute kidney injury, ATN/prerenal   -Avoid nephrotoxins, monitor urine output  Mildly elevated troponin - trend  Type 2 diabetes - SSI  Dyslipidemia - statin  Thrombocytosis - improving  History of tobacco use, 40 pack years, quit a year ago   - BDs  Lactic acidosis - trend  Acute metabolic encephalopathy - limit sedatives when stabilized and monitor    Patient is critically ill. I have adjusted ventilator settings.  Ongoing critical care  management as above.  She remains full CODE STATUS.  Discussed patient condition and risk of morbidity and/or mortality with Family, RN, RT, Pharmacy, Charge nurse / hot rounds, Patient and hospitalist.    The patient remains critically ill.  Critical care time = 36 minutes in directly providing and coordinating critical care and extensive data review.  No time overlap and excludes procedures.

## 2018-06-17 NOTE — CARE PLAN
Problem: Ventilation Defect:  Goal: Ability to achieve and maintain unassisted ventilation or tolerate decreased levels of ventilator support    Intervention: Support and monitor invasive and noninvasive mechanical ventilation  Adult Ventilation Update    Total Vent Days: 8    Patient Lines/Drains/Airways Status    Active Airway     Name: Placement date: Placement time: Site: Days:    Airway Group ET Tube 7.5 @ 21 06/10/18   0840      8              24 340 +15 50%      Barriers to Wean: FiO2 >60% or PEEP >10 CM H2O         Sputum/Suction   Cough: Productive (06/17/18 1510)  Sputum Amount: Small (06/17/18 1510)  Sputum Color: White;Clear (06/17/18 1510)  Sputum Consistency: Thick;Thin (06/17/18 1510)      Events/Summary/Plan: No changes made this shift, pt remains stable on vent, will continue to monitor.

## 2018-06-18 NOTE — DIETARY
Nutrition support weekly update:  Day 9 of admit.  Rosangela Burnette is a 73 y.o. female with admitting DX of Severe sepsis (HCC).  Current TF via gastric Cortrak: Diabetisource AC, goal rate 55 ml/hr, providing 1584 kcals, 79 grams protein, 1082 mL free water, 132 (gm CHO).    Assessment:  Weight 82.7 kg - patient is fluid +, per I/O's  Weight used in calculations: 74.2 kg    Evaluation:   1. TF change tolerated well at goal: Diabetisource AC, running at 55 ml/hr.  2. MRI with contrast of brain pending, per RN.  3. Labs: glucose 176-157, BUN 52, adjusted calcium 9.2 (WNL), AST//357, alk phos 176, CRP trending downward 7.9  4. Meds: Lasix, Pepcid, Lantus, regular insulin, Klyte, Zemuron, senna-docusate, precedex, fentanyl.  5. DTI to R coccyx, per wound team evaluation 6/17 - TF meeting >100% DRI for vitamins, minerals and protein to promote wound healing.   6. +BM on 6/15.     Recommendations/Plan:  1. Continue current TF/goal rate, no changes at this time.  2. Fluids per MD.

## 2018-06-18 NOTE — PROGRESS NOTES
Pulmonary Critical Care Progress Note        Date of Admission:  6/9/2018    Chief Complaint: SOB    History of Present Illness: 73 y.o. female with known past   medical history of type 2 diabetes, questionable bipolar disorder, history of   CVA, dyslipidemia.  The patient indicates that she was in her normal state of   health up until approximately 2 days ago, at which point in time, she had   increasing shortness of breath.  She denied any significant fever, chills.    Denied any headache, visual changes, has not had any cough or sputum   production.  No nausea, vomiting, abdominal pain or discomfort.  Has   intermittent chronic diarrhea, nothing more than usual.  Denies any dysuria or   lower extremity edema.  Further, she denies any sick contacts or recent   travels.  No history of DVTs or PEs in the past.  She indicates that the   shortness of breath has been progressive with increasing dyspnea on exertion.    She does not associate with positioning being any worse or better.  She could   lie flat and just as dyspneic as sitting up forward.  She denies any change   in her medications.  She does have tobacco history, which she quit about 1   year ago, smoking 1 pack a day x40 years.  Denies any alcohol or illicit drug   use.  She further denies any underlying heart disease or history of MI in the   past.       ROS:  Respiratory: unable to perform due to the patient's inability to effectively communicate, Cardiac: unable to perform due to the patient's inability to effectively communicate, GI: unable to perform due to the patient's inability to effectively communicate.      Interval Events:  24 hour interval history reviewed     Sedate - moves all 4, follows occasionally per RN  Fent 200  Dexmedetomidine 1.5  Hemodynamics reviewed SBp 80-110s  Pulled houston with foot  MRI brain last night results reviewed  Tm 102 -> cooling blanket  TF Cortrak  I/Os reviewed  Vent day #9     PEEP 15, Itime 0.8, fiO2 0.55  CXR  unchanged bilateral opacities  Secretions minimal  Vanco/Zyvox  Lasix IV  Lovenox  SSI 12 units 24 hours    Serial follow-up, tried lower PEEP but patient desaturated and PEEP had to be increased  Hemodynamics and LOC unchanged    Neurology curb sided, MRI brain with contrast requested    YESTERDAY   - +c/g/c, int agitated, ? Grasp to command   - dex 1.5, fentanyl 200   - very agitated with lower fentanyl, suspeceted to use chronic narcotics   - afebrile   - ishan TF at goal   - good UOP   - I/O 1.7/1.4   - vent day 8, 15, 50%, CXR bilat infs   - vanco/zosyn - change vanco to linazolid    - on lasix    - remains in SR; will d/c amio gtt    PFSH:  No change.    Respiratory:  Weeks Vent Mode: APVCMV, Rate (breaths/min): 24, FiO2: 40, Static Compliance (ml / cm H2O): 59.7, Control VTE (exp VT): 538  Pulse Oximetry: 97 %   Ventilator mechanics and waveforms are reviewed at the bedside with RT          Exam: tachypnea, labored breathing, rhonchi throughout all lung fields and diminished breath sounds mild  ImagingAvailable data reviewed   Recent Labs      06/16/18   0412  06/17/18   0424   ISTATAPH  7.406  7.414   ISTATAPCO2  36.4  40.0*   ISTATAPO2  85  86   ISTATATCO2  24  27   [id number]  97  97   ISTATARTHCO3  22.9  25.6*   ISTATARTBE  -2  1   ISTATTEMP  97.8 F  see below   ISTATFIO2  50  50   ISTATSPEC  Arterial  Arterial   ISTATAPHTC  7.412   --    [id number]  83   --        HemoDynamics:  Pulse: 84, Heart Rate (Monitored): 84  NIBP: 100/54  CVP (mm Hg): (!) 235 MM HG  Exam: regular rhythm (Sinus), tachycardia, no further AF  Imaging: Available data reviewed      Amiodarone infusion stopped  Neuro:  GCS Total Luis Coma Score: 7 sedated on propofol drip       Exam: no focal deficits noted Agitated Delirious Confused, not following  Imaging: Available data reviewed     MRI brain p.m. 6/17, no contrast  1. Age-related cerebral atrophy.  2. Mild periventricular white matter changes consistent with chronic  microvascular ischemic gliosis.  3. Interval development of moderate-sized region of increased T2 signal intensity in the juxtacortical white matter in the left posterior frontal lobe. This could represent an area of venous infarction, gliosis, posterior reversible encephalopathy   syndrome, or possibly primary or metastatic brain neoplasm. Recommend limited contrast enhanced images through the brain.  4. Previous endovascular coiling of bilateral posterior communicating artery aneurysms.    Fluids:  Intake/Output       06/16/18 0700 - 06/17/18 0659 06/17/18 0700 - 06/18/18 0659 06/18/18 0700 - 06/19/18 0659      0700-1859 4545-9831 Total 2646-8998 1871-3838 Total [id number] 1900-0659 Total       Intake    I.V.  744  528 1272  784  365 1149  --  -- --    Precedex Volume 279 228 507 396 330 726 -- -- --    Amiodarone Volume 204 170 374 102 -- 102 -- -- --    IV Piggyback Volume (IV Piggyback) 250 100 350 250 -- 250 -- -- --    IV Volume (Fentanyl) 11 30 41 36 35 71 -- -- --    Other  --  75 75  120  100 220  --  -- --    Medications (P.O./ Enteral Liquids) -- 75 75 120 100 220 -- -- --    Enteral  --  395 395  110  625 735  --  -- --    Free Water / Tube Flush -- 120 120 -- 75 75 -- -- --    Intake (mL) (Enteral Tube Right Nare Cortrak Gastric Feeding Tube) -- 275 275 110 550 660 -- -- --    Total Intake  1014 1090 2104 -- -- --       Output    Urine  1180  275 1455  1225  575 1800  --  -- --    Output (mL) ([REMOVED] Urinary Catheter Indwelling Catheter) 9639 837 2590 7675 644 0221 -- -- --    Total Output 3331 539 2040 9124 879 7584 -- -- --       Net I/O     -436 723 287 -211 515 304 -- -- --           Recent Labs      06/15/18   0539  06/16/18   0520  06/17/18   0400   SODIUM  138  141  137   POTASSIUM  4.3  4.2  4.4   CHLORIDE  107  108  105   CO2  21  24  25   BUN  42*  44*  44*   CREATININE  1.12  0.98  1.21   CALCIUM  8.5  8.3*  7.6*       GI/Nutrition:  Exam: abdomen is soft and non-tender, normal  active bowel sounds, no CVAT  Imaging: Available data reviewed  NPO and tube feed Tolerated  Liver Function  Recent Labs      06/15/18   0539  18   0520  18   0400   GLUCOSE  153*  173*  218*       Heme:  Recent Labs      06/15/18   0539  18   0520  18   0400   RBC  3.24*  2.73*  2.46*   HEMOGLOBIN  9.3*  7.9*  7.2*   HEMATOCRIT  29.2*  25.0*  22.9*   PLATELETCT  308  290  325       Infectious Disease:  Monitored Temp 2  Av.4 °C (101.2 °F)  Min: 37.6 °C (99.7 °F)  Max: 39 °C (102.2 °F)  Micro: reviewed.    No growth to date  Recent Labs      06/15/18   0539  18   0520  18   0400   WBC  22.8*  16.2*  16.8*   NEUTSPOLYS  93.80*  84.10*  74.70*   LYMPHOCYTES  2.60*  8.40*  6.50*   MONOCYTES  1.80  4.70  4.70   EOSINOPHILS  0.90  0.90  8.40*   BASOPHILS  0.00  0.00  0.00     Current Facility-Administered Medications   Medication Dose Frequency Provider Last Rate Last Dose   • insulin glargine (LANTUS) injection 5 Units  5 Units Q EVENING ROMELIA CovingtonOKimberley   5 Units at 18   • linezolid (ZYVOX) tablet 600 mg  600 mg Q12HRS Oliver Carcamo M.D.   600 mg at 18   • GENTEAL TEARS NIGHT-TIME OINT 1 Application  1 Application Q8HRS Oliver Carcamo M.D.   1 Application at 18 0600   • famotidine (PEPCID) tablet 20 mg  20 mg DAILY Evan Chen D.O.       • rocuronium (ZEMURON) injection 50 mg  50 mg Once Oliver Carcamo M.D.   Stopped at 18 1645   • furosemide (LASIX) injection 20 mg  20 mg Q12HRS Oliver Carcamo M.D.   20 mg at 18   • potassium bicarbonate (KLYTE) 25 MEQ effervescent tablet TBEF 25 mEq  25 mEq Q12HRS Oliver Carcamo M.D.   25 mEq at 18   • oxyCODONE immediate-release (ROXICODONE) tablet 5 mg  5 mg Q6HRS Oliver Carcamo M.D.   5 mg at 18 0455   • fentaNYL (SUBLIMAZE) 50 mcg/mL in 50mL (Continuous Infusion)   Continuous Oliver Carcamo M.D. 4 mL/hr at 18 200 mcg/hr at 18   •  piperacillin-tazobactam (ZOSYN) 4.5 g in  mL IVPB  4.5 g Q8HRS Evan Chen D.O. 25 mL/hr at 06/18/18 0455 4.5 g at 06/18/18 0455   • dexmedetomidine (PRECEDEX) 400 mcg in D5W 100 mL infusion  0.1-1.5 mcg/kg/hr Continuous Oliver Carcamo M.D. 33 mL/hr at 06/18/18 0200 1.5 mcg/kg/hr at 06/18/18 0200   • labetalol (NORMODYNE,TRANDATE) injection 10-20 mg  10-20 mg Q HOUR PRN Mathew Gama M.D.   20 mg at 06/15/18 1151   • insulin regular (HUMULIN R) injection 3-14 Units  3-14 Units Q6HRS Oliver Carcamo M.D.   3 Units at 06/18/18 0119    And   • glucose 4 g chewable tablet 16 g  16 g Q15 MIN PRN Oliver Carcamo M.D.        And   • dextrose 50% (D50W) injection 25 mL  25 mL Q15 MIN PRN Oliver Carcamo M.D.       • rosuvastatin (CRESTOR) tablet 20 mg  20 mg Q EVENING Oliver Carcamo M.D.   20 mg at 06/17/18 2011   • sertraline (ZOLOFT) tablet 50 mg  50 mg QDAY Oliver Carcamo M.D.   50 mg at 06/17/18 2011   • haloperidol lactate (HALDOL) injection 5 mg  5 mg Q4HRS PRN Davide Barrientos M.D.   5 mg at 06/18/18 0456   • MD ALERT...Adult ICU Electrolyte Replacement per Pharmacy Protocol   pharmacy to dose Jeremy M Gonda, M.D.       • Pharmacy Consult: Enteral tube feeding - review meds/change route/product selection  1 Each PRN Jeremy M Gonda, M.D.       • fentaNYL (SUBLIMAZE) injection 25 mcg  25 mcg Q HOUR PRN Jeremy M Gonda, M.D.   Stopped at 06/14/18 1719    Or   • fentaNYL (SUBLIMAZE) injection 50 mcg  50 mcg Q HOUR PRN Jeremy M Gonda, M.D.   50 mcg at 06/15/18 1724    Or   • fentaNYL (SUBLIMAZE) injection 100 mcg  100 mcg Q HOUR PRN Jeremy M Gonda, M.D.   100 mcg at 06/18/18 0050   • ipratropium-albuterol (DUONEB) nebulizer solution  3 mL Q2HRS PRN (RT) Jeremy M Gonda, M.D.       • ipratropium-albuterol (DUONEB) nebulizer solution  3 mL Q4HRS (RT) Jeremy M Gonda, M.D.   3 mL at 06/18/18 0222   • aspirin (ASA) chewable tab 81 mg  81 mg DAILY Jeremy M Gonda, M.D.   81 mg at 06/17/18 0819   • gabapentin  (NEURONTIN) capsule 300 mg  300 mg TID Jeremy M Gonda, M.D.   300 mg at 06/18/18 0455   • acetaminophen (TYLENOL) tablet 650 mg  650 mg Q6HRS PRN Jeremy M Gonda, M.D.   650 mg at 06/17/18 0310   • guaiFENesin dextromethorphan (ROBITUSSIN DM) 100-10 MG/5ML syrup 10 mL  10 mL Q6HRS PRN Jeremy M Gonda, M.D.       • senna-docusate (PERICOLACE or SENOKOT S) 8.6-50 MG per tablet 2 Tab  2 Tab BID Jeremy M Gonda, M.D.   2 Tab at 06/17/18 2012    And   • polyethylene glycol/lytes (MIRALAX) PACKET 1 Packet  1 Packet QDAY PRN Jeremy M Gonda, M.D.   1 Packet at 06/12/18 2127    And   • magnesium hydroxide (MILK OF MAGNESIA) suspension 30 mL  30 mL QDAY PRN Jeremy M Gonda, M.D.   30 mL at 06/14/18 0737    And   • bisacodyl (DULCOLAX) suppository 10 mg  10 mg QDAY PRN Jeremy M Gonda, M.D.       • Respiratory Care per Protocol   Continuous RT Sehrrell Higgins M.D.       • heparin injection 5,000 Units  5,000 Units Q8HRS Sherrell Higgins M.D.   5,000 Units at 06/18/18 0455     Last reviewed on 6/9/2018 10:32 PM by Ana Purcell [facility]    Quality  Measures:  Medications reviewed, Labs reviewed and Radiology images reviewed                      Assessment/Plan:  Acute hypoxic/hypercarbic respiratory failure    - intubated 6/10    -serial ABG/chest x-ray/ventilator mechanics reviews   -continue full vent support, ventilator settings adjusted today; not appropriate for liberation     wean FiO2 and PEEP    Did not tolerate PEEP of 12, desaturated on FiO2 0.4, I had increased on time   -RT/O2 protocols   -Minimize sedation, dexmedetomidine and fentanyl as needed   -diuresis as tolerated fluid balance mildly negative    - ET tube a bit high 6/18, tube advanced 2 cm  Community-acquired pneumonia    -Febrile, increasing leukocytosis and worsening chest x-ray   -Suspicion for new HCAP versus other source of new infection   -Antibiotics escalated to Zosyn and vancomycin - BAL 6/15 light growth yeast   -change vanco to linazolid 6/17; complete  7 day course   Septic shock from pulmonary source with associated cardiac and respiratory failure   -s/p pressors     - now tolerating diuresis -fluid balance mildly negative   Encephalopathy   -Suspect toxic metabolic secondary to sepsis, pneumonia   -CT head negative   -Check ammonia levels ×3 normal   -MRI brain 6/17 with broad differential, neuro consulted by hospitalist, MRI with contrast pending  Normocytic anemia   -Monitor with conservative transfusion strategy    -serial CBC   -Transfuse per conservative policy  Hyperkalemia -improving, monitor  Acute kidney injury, ATN/prerenal   -Avoid nephrotoxins, monitor urine output and renal function  Mildly elevated troponin - trend  Type 2 diabetes - SSI, ADA tube feed formula  Dyslipidemia - statin  Thrombocytosis - improving  History of tobacco use, 40 pack years, quit a year ago   - BDs  Lactic acidosis - trend  Acute metabolic encephalopathy - limit sedatives when stabilized and monitor    Patient is critically ill. I have adjusted ventilator settings.  Ongoing critical care management as above.  She remains full CODE STATUS.  Discussed patient condition and risk of morbidity and/or mortality with RN, RT, Pharmacy, Charge nurse / hot rounds and hospitalist.    The patient remains critically ill.  Critical care time = 45 prophylaxis minutes in directly providing and coordinating critical care and extensive data review.  No time overlap and excludes procedures.

## 2018-06-18 NOTE — PROGRESS NOTES
Patient pulled out houston catheter with foot, balloon intact. No bleeding at this time. [place] reinserted

## 2018-06-18 NOTE — RESPIRATORY CARE
Vent Day: 9  Vent: APVCMV 24/340/+5/55%  ETT: 7.5@21    Overnight, Fi02 was dropped to 40% and then set at 55% post ABG Pa02 50

## 2018-06-18 NOTE — CARE PLAN
Problem: Infection  Goal: Will remain free from infection  Outcome: NOT MET      Problem: Skin Integrity  Goal: Risk for impaired skin integrity will decrease  Outcome: PROGRESSING AS EXPECTED

## 2018-06-18 NOTE — PROGRESS NOTES
[facility] Progress Note    Date of Service: 2018    Chief Complaint  73 y.o. female admitted 2018 with respiratory failure.    Interval Problem Update  Patient noted to have respiratory failure, required intubation.  Deemed due to pneumonia.  Also with acute renal failure.  Went into atrial fibrillation with rapid ventricular rate.  Patient seen and examined in the ICU, ICU care given.  Discussed patient condition and plan with Intensivist, RN, RT and charge nurse / hot rounds.    Cough, gag, corneal   Withdrawals  Pulled out houston overnight  MRI brain yesterday  precedex 1.5  fent 200  NSR to tach  SBP   tmax 102, improved with cooling blanket  Last bm 6/15  Tolerating TF at goal  RIJ  Adequate uop via houston  Vent day 9  Day  abx    Consultants/Specialty  Intensivist    Disposition  Patient's requires additional treatment in the hospital, unsure if she will need placement versus home at the time of discharge        Review of Systems   Unable to perform ROS: Intubated      Physical Exam  Laboratory/Imaging   Hemodynamics  Temp (24hrs), Av.5 °C (97.7 °F), Min:36.4 °C (97.5 °F), Max:36.6 °C (97.9 °F)   Temperature: 36.4 °C (97.5 °F), Monitored Temp: 38 °C (100.4 °F)  Pulse  Av.5  Min: 40  Max: 146 Heart Rate (Monitored): 84  NIBP: (!) 93/54 CVP (mm Hg): (!) 235 MM HG    Respiratory  Weeks Vent Mode: APVCMV, Rate (breaths/min): 24, PEEP/CPAP: 15, PEEP/CPAP: 15, FiO2: 55, P Peak (PIP): 27, P MEAN: 18   Respiration: (!) 21, Pulse Oximetry: 100 %     Work Of Breathing / Effort: Vented  RUL Breath Sounds: Coarse Crackles, RML Breath Sounds: Coarse Crackles, RLL Breath Sounds: Diminished, DEIDRA Breath Sounds: Coarse Crackles, LLL Breath Sounds: Diminished    Fluids    Intake/Output Summary (Last 24 hours) at 18 0719  Last data filed at 18 0600   Gross per 24 hour   Intake             2438 ml   Output             2750 ml   Net             -312 ml       Nutrition  Orders  Placed This Encounter   Procedures   • Diet NPO     Standing Status:   Standing     Number of Occurrences:   1     Order Specific Question:   Type:     Answer:   Now [1]     Order Specific Question:   Restrict to:     Answer:   Strict [1]     Physical Exam   Constitutional: She appears ill. No distress. She is sedated, intubated and restrained.   HENT:   Head: Normocephalic and atraumatic.   Eyes: Right eye exhibits no discharge. Left eye exhibits no discharge. No scleral icterus.   Neck: No tracheal deviation present.   Cardiovascular: Normal rate and regular rhythm.    No murmur heard.  Pulmonary/Chest: No stridor. No tachypnea. She is intubated. She has no wheezes. She has no rhonchi. She has rales.   Intubated and sedated    Abdominal: Soft. Bowel sounds are normal. She exhibits no distension.   Musculoskeletal: She exhibits edema.   Neurological:   Intubated, unresponsive    Skin: Skin is warm and dry. She is not diaphoretic.   Psychiatric: She is noncommunicative.   Nursing note and vitals reviewed.      Recent Labs      06/16/18   0520  06/17/18   0400  06/18/18   0508   WBC  16.2*  16.8*  16.8*   RBC  2.73*  2.46*  2.76*   HEMOGLOBIN  7.9*  7.2*  8.1*   HEMATOCRIT  25.0*  22.9*  26.0*   MCV  91.6  93.1  94.2   MCH  28.9  29.3  29.3   MCHC  31.6*  31.4*  31.2*   RDW  52.6*  53.1*  54.0*   PLATELETCT  290  325  337   MPV  10.8  11.2  11.6     Recent Labs      06/16/18   0520  06/17/18   0400  06/18/18   0508   SODIUM  141  137  137   POTASSIUM  4.2  4.4  4.5   CHLORIDE  108  105  103   CO2  24  25  27   GLUCOSE  173*  218*  193*   BUN  44*  44*  52*   CREATININE  0.98  1.21  1.21   CALCIUM  8.3*  7.6*  7.7*                      Assessment/Plan     * Severe sepsis (HCC)   Assessment & Plan    -Likely due to pneumonia  -Now a significant worsening  -Adjust antibiotics to Zyvox and Zosyn  -Only yeast thus far in respiratory culture, otherwise no growth to date  -Patient remains normotensive          Atrial  fibrillation with RVR ([facility])   Assessment & Plan    -Resolved, off amiodarone drip        SY (acute kidney injury) ([facility])   Assessment & Plan    - Due to dehydration and sepsis  -Markedly improved, patient was having decreased urine output then excessive urine output, has normalized now        Acute respiratory failure with hypoxia ([facility])   Assessment & Plan    - Full vent support  - Vent management per intensivist  -Continues to have infiltrate on chest x-ray, could be a component of pulmonary edema, continue Lasix        Acute metabolic encephalopathy   Assessment & Plan    -Has been more agitated lately, for a while she was not doing much  -Recent CT head negative  -MRI brain showed interval development of moderate-sized region of increased T2 signal intensity in the juxtacortical white matter in the left posterior frontal lobe. This could represent an area of venous infarction, gliosis, posterior reversible encephalopathy   syndrome, or possibly primary or metastatic brain neoplasm. Recommend limited contrast enhanced images through the brain  - discussed with neurology who viewed the images, concerning for vasogenic edema, agreed with mri brain with contrast which is pending, official neuro consult if needed        Delirium   Assessment & Plan    - Now intubated and sedated        Hyperkalemia   Assessment & Plan    - Resolved        Shock ([facility])   Assessment & Plan    - Due to sepsis and dehydration  -Resolved with IV fluids  -Pressors now off        Pneumonia due to other specified bacteria ([facility])   Assessment & Plan    -Antibiotics have been broadened, now on Zyvox and Zosyn  -Only yeast growing on respiratory culture so far, patient did improve with broadening of antibiotics, continue        HLD (hyperlipidemia)   Assessment & Plan    - Continue statin        T2DM (type 2 diabetes mellitus) ([facility])   Assessment & Plan    - Continue insulin sliding scale, adjust as needed        History of atrial fibrillation  without current medication- (present on admission)   Assessment & Plan    - Monitor          Quality-Core Measures   Reviewed items::  Labs reviewed, Medications reviewed and Radiology images reviewed  Jamison catheter::  Critically Ill - Requiring Accurate Measurement of Urinary Output and Unconscious / Sedated Patient on a Ventilator  DVT prophylaxis pharmacological::  Heparin  Ulcer Prophylaxis::  Yes  Antibiotics:  Treating active infection/contamination beyond 24 hours perioperative coverage

## 2018-06-18 NOTE — CARE PLAN
Problem: Safety  Goal: Will remain free from injury  Outcome: PROGRESSING AS EXPECTED  Bed brake set, lower side rails in place, soft wrist restraints    Problem: Infection  Goal: Will remain free from infection  Outcome: PROGRESSING SLOWER THAN EXPECTED  Blood cultures pending negative, elevated temperature, IV antibiotics, trend labwork

## 2018-06-18 NOTE — DISCHARGE PLANNING
Medical SW    Sw attended AM IDT Rounds.    RN reports, pt family at bedside, MRI completed last night, restlessness, tube feed via cortrak, houston replaced, vent day 9, range of motion for movement, MRSA ABX,        Plan: Sw to assist w/ d/c planning as needed.

## 2018-06-19 PROBLEM — E87.5 HYPERKALEMIA: Status: RESOLVED | Noted: 2018-01-01 | Resolved: 2018-01-01

## 2018-06-19 PROBLEM — R74.8 ELEVATED LIVER ENZYMES: Status: ACTIVE | Noted: 2018-01-01

## 2018-06-19 NOTE — PROGRESS NOTES
[facility]    Date of Service: 2018    Chief Complaint  73 y.o. female admitted 2018 with shortness of breath with a concern of sepsis.    Interval Problem Update  On ventilator.  Family bedside.  On propofol, fentanyl.  Awakens per RN.  Vent day #10.  Day #5 of 7days.  On pressors.    Consultants/Specialty  Pulmonary    Disposition  To remain in ICU.          Review of Systems   Unable to perform ROS: Intubated      Physical Exam  Laboratory/Imaging   Hemodynamics  Temp (24hrs), Av.7 °C (99.8 °F), Min:37.5 °C (99.5 °F), Max:37.9 °C (100.2 °F)   Temperature: 37.6 °C (99.7 °F), Monitored Temp: 37.8 °C (100 °F)  Pulse  Av.8  Min: 40  Max: 146 Heart Rate (Monitored): 75  NIBP: 116/55 CVP (mm Hg): (!) 202 MM HG    Respiratory  Weeks Vent Mode: APVCMV, Rate (breaths/min): 24, PEEP/CPAP: 14, PEEP/CPAP: 14, FiO2: 40, P Peak (PIP): 35, P MEAN: 19   Respiration: (!) 24, Pulse Oximetry: 98 %     Work Of Breathing / Effort: Vented  RUL Breath Sounds: Clear, RML Breath Sounds: Rhonchi, RLL Breath Sounds: Rhonchi, DEIDRA Breath Sounds: Clear, LLL Breath Sounds: Rhonchi    Fluids    Intake/Output Summary (Last 24 hours) at 182  Last data filed at 18 1800   Gross per 24 hour   Intake          2803.14 ml   Output             2350 ml   Net           453.14 ml       Nutrition  Orders Placed This Encounter   Procedures   • Diet NPO     Standing Status:   Standing     Number of Occurrences:   1     Order Specific Question:   Type:     Answer:   Now [1]     Order Specific Question:   Restrict to:     Answer:   Strict [1]     Physical Exam   Constitutional: She appears ill. No distress. She is sedated, intubated and restrained.   HENT:   Head: Normocephalic and atraumatic.   Nose: Nose normal.   Mouth/Throat: Oropharynx is clear and moist.   Eyes: Conjunctivae and EOM are normal. Right eye exhibits no discharge. Left eye exhibits no discharge. No scleral icterus.   Neck: No tracheal  deviation present.   Cardiovascular: Normal rate, regular rhythm, normal heart sounds and intact distal pulses.    No murmur heard.  Pulmonary/Chest: Effort normal and breath sounds normal. She is intubated. No respiratory distress. She has no wheezes.   Abdominal: Soft. Bowel sounds are normal. She exhibits no distension. There is no tenderness.   Musculoskeletal: She exhibits no edema.   Skin: Skin is warm. She is not diaphoretic.   Vitals reviewed.      Recent Labs      06/17/18   0400  06/18/18   0508  06/19/18   0443   WBC  16.8*  16.8*  14.4*   RBC  2.46*  2.76*  2.64*   HEMOGLOBIN  7.2*  8.1*  7.6*   HEMATOCRIT  22.9*  26.0*  24.9*   MCV  93.1  94.2  94.3   MCH  29.3  29.3  28.8   MCHC  31.4*  31.2*  30.5*   RDW  53.1*  54.0*  53.3*   PLATELETCT  325  337  335   MPV  11.2  11.6  11.4     Recent Labs      06/17/18   0400  06/18/18   0508  06/19/18   0443   SODIUM  137  137  138   POTASSIUM  4.4  4.5  4.8   CHLORIDE  105  103  103   CO2  25  27  29   GLUCOSE  218*  193*  185*   BUN  44*  52*  41*   CREATININE  1.21  1.21  1.11   CALCIUM  7.6*  7.7*  7.9*                      Assessment/Plan     * Severe sepsis (HCC)   Assessment & Plan    zyvox & zosyn  yeast on respiratory culture  Mild decrease in WBC  Monitor vitals, strict I/O's        Atrial fibrillation with RVR ([facility])   Assessment & Plan    Monitoring on telemetry  s/p amiodarone         Elevated liver enzymes   Assessment & Plan    Monitor CMP tomorrow.  If worsens will need U/S RUQ.  Probable medication adverse effect.        SY (acute kidney injury) ([facility])   Assessment & Plan    Likely prerenal from hypovolumia  Improving with IV fluids.  Monitor BMP        Acute respiratory failure with hypoxia ([facility])   Assessment & Plan    CXR with rotation but some mild improvement in edema.  Monitor daily ABG, CXR, vitals, strict I/O's  Pulmonary consulting  RT protocol  Wean sedation as able        Acute metabolic encephalopathy   Assessment & Plan    MRI brain  with contrast concerning for brain mass.  Consult neurosurgery  Ongoing agitation while on ventilator. PRN meds        Delirium   Assessment & Plan    neurochecks  Intubated and sedated.  Will assess with weaning off sedation        Shock ([facility])   Assessment & Plan    Improving with IV fluids  Due to sepsis and dehydration  Monitor I/O's vitals.  Pressors as needed to keep MAP >65 and SBP >90        Pneumonia due to other specified bacteria ([facility])   Assessment & Plan    zyvox and zosyn  On ventilator.  RT protocol  Pulmonary consulting.        HTN (hypertension)   Assessment & Plan    Monitor vitals.  Holding outpatient lisinopril initially due to renal function.  Restart once BP improves        HLD (hyperlipidemia)   Assessment & Plan    Active therapy with rosuvastatin        T2DM (type 2 diabetes mellitus) ([facility])   Assessment & Plan    Monitor accuchecks and cover with SSI        History of atrial fibrillation without current medication- (present on admission)   Assessment & Plan    Monitoring telemetry          Quality-Core Measures   Reviewed items::  Labs reviewed, Medications reviewed and Radiology images reviewed  Jamison catheter::  No Jamison  DVT prophylaxis pharmacological::  Heparin  Antibiotics:  Treating active infection/contamination beyond 24 hours perioperative coverage

## 2018-06-19 NOTE — CARE PLAN
Problem: Pain Management  Goal: Pain level will decrease to patient's comfort goal  Outcome: PROGRESSING AS EXPECTED  Fentanyl drip      Problem: Psychosocial Needs:  Goal: Level of anxiety will decrease  Outcome: NOT MET    Intervention: Identify and develop with patient strategies to cope with anxiety triggers  Patient restless and agitated frequently requiring PRN medications

## 2018-06-19 NOTE — DISCHARGE PLANNING
Medical SW    Sw attended AM IDT Rounds.    RN reports, pt family at bedside, pt is able to wake, is agitated, MRI last night, nods and follows, moves all extremities, pupils reactive today, right nare cortrak w/ tube feed at goal, vent day 10,     Plan: Sw to assist w/ d/c planning as needed.

## 2018-06-19 NOTE — PROGRESS NOTES
Pulmonary Critical Care Progress Note        Date of Admission:  6/9/2018    Chief Complaint: SOB    History of Present Illness: 73 y.o. female with known past   medical history of type 2 diabetes, questionable bipolar disorder, history of   CVA, dyslipidemia.  The patient indicates that she was in her normal state of   health up until approximately 2 days ago, at which point in time, she had   increasing shortness of breath.  She denied any significant fever, chills.    Denied any headache, visual changes, has not had any cough or sputum   production.  No nausea, vomiting, abdominal pain or discomfort.  Has   intermittent chronic diarrhea, nothing more than usual.  Denies any dysuria or   lower extremity edema.  Further, she denies any sick contacts or recent   travels.  No history of DVTs or PEs in the past.  She indicates that the   shortness of breath has been progressive with increasing dyspnea on exertion.    She does not associate with positioning being any worse or better.  She could   lie flat and just as dyspneic as sitting up forward.  She denies any change   in her medications.  She does have tobacco history, which she quit about 1   year ago, smoking 1 pack a day x40 years.  Denies any alcohol or illicit drug   use.  She further denies any underlying heart disease or history of MI in the   past.       ROS:  Respiratory: unable to perform due to the patient's inability to effectively communicate, Cardiac: unable to perform due to the patient's inability to effectively communicate, GI: unable to perform due to the patient's inability to effectively communicate.      Interval Events:  24 hour interval history reviewed     NE yesterday afternoon  Vent day #10  CXR no change bilat opacities  Sedate - moves all four - not following  DEX 1.5  FENT 200  SR 80s  SBp 120s  Tm 100.2  MRI brain with contrast - c/o MASS > infarct  TF goal  UO adequate  Vanco/Zosyn  Lasix IV  Lovenox  I/Os  QTc 374 agitation waxing  and waning throughout the day  Reviewed sedation    Protocols at length with nursing staff  Transient hypotension treated with norepinephrine  Midodrine therapy to be tried    Case reviewed times several with daughter at great length.  Reviewed current neurological findings both on exam and MRI with contrast done last evening  Malena reviewed ventilatord neurology, neurosurgery consultation pending  Course with patient's daughter and suggested it would be appropriate to do a tracheostomy and refer her to a long-term acute care facility if she was willing to participate in 1-2 months of rehab and vent weaning.  Also suggested that we wait for neurosurgical opinion regards to possible CNS mass, this consult is pending       YESTERDAY  Sedate - moves all 4, follows occasionally per RN  Fent 200  Dexmedetomidine 1.5  Hemodynamics reviewed SBp 80-110s  Pulled [place] with foot  MRI brain last night results reviewed  Tm 102 -> cooling blanket  TF Cortrak  I/Os reviewed  Vent day #9     PEEP 15, Itime 0.8, fiO2 0.55  CXR unchanged bilateral opacities  Secretions minimal  Vanco/Zyvox  Lasix IV  Lovenox  SSI 12 units 24 hours    Serial follow-up, tried lower PEEP but patient desaturated and PEEP had to be increased  Hemodynamics and LOC unchanged    Neurology curb sided, MRI brain with contrast requested    PFSH:  No change.    Respiratory:  Weeks Vent Mode: APVCMV, Rate (breaths/min): 24, FiO2: 50, Static Compliance (ml / cm H2O): 70.9, Control VTE (exp VT): 365  Pulse Oximetry: 99 %  Ventilator mechanics and waveforms are reviewed at the bedside with RT again            Exam: unlabored respirations, no intercostal retractions or accessory muscle use on full vent support   Diminished breath sounds at the bases, bilateral coarse rales   Tachypneic but not labored    ImagingAvailable data reviewed   Recent Labs      06/17/18   0424  06/18/18   0525  06/19/18   0425   ISTATAPH  7.414  7.388*  7.409   ISTATAPCO2  40.0*   47.2*  49.6*   ISTATAPO2  86  51*  85   ISTATATCO2  27  30  33   [id number]  97  85*  96   ISTATARTHCO3  25.6*  28.5*  31.3*   ISTATARTBE  1  3  6*   ISTATTEMP  see below  36.6 C  98.5 F   ISTATFIO2  50  40  50   ISTATSPEC  Arterial  Arterial  Arterial   ISTATAPHTC   --   7.394*  7.410   [id number]   --   50*  85       HemoDynamics:  Pulse: 82, Heart Rate (Monitored): 83  NIBP: (!) 96/50  CVP (mm Hg): (!) 13 MM HG  Exam: regular rhythm (Sinus), tachycardia, no further AF, no murmur, pulses intact   Imaging: Available data reviewed      Amiodarone infusion stopped  Neuro:  GCS Total Lincoln Coma Score: 10 sedated on propofol drip       Exam: no focal deficits noted Agitated Delirious Confused, moving all 4 extremities, not purposeful, not following    Imaging: Available data reviewed     MRI brain p.m. 6/17, no contrast  1. Age-related cerebral atrophy.  2. Mild periventricular white matter changes consistent with chronic microvascular ischemic gliosis.  3. Interval development of moderate-sized region of increased T2 signal intensity in the juxtacortical white matter in the left posterior frontal lobe. This could represent an area of venous infarction, gliosis, posterior reversible encephalopathy   syndrome, or possibly primary or metastatic brain neoplasm. Recommend limited contrast enhanced images through the brain.  4. Previous endovascular coiling of bilateral posterior communicating artery aneurysms.    MRI with contrast PM 6/18  1.  14 x 8 x 10 mm faintly enhancing structure centered in a region of T1 signal hypointensity in the left juxta cortical posterior frontal lobe white matter which could represent primary or metastatic lesion, less likely subacute infarct.  2.  Changes consistent with endovascular repair of bilateral posterior communicating artery aneurysms are again noted.  3.  Mild diffuse cerebral substance loss.  4.  Bilateral mastoid effusions. Findings could be consistent with mastoiditis in  the appropriate clinical setting.    Fluids:  Intake/Output       06/17/18 0700 - 06/18/18 0659 06/18/18 0700 - 06/19/18 0659 06/19/18 0700 - 06/20/18 0659      3691-4845 5305-4083 Total 5522-0722 7970-5899 Total 0700-1859 1900-0659 Total       Intake    I.V.  784  539 1323  1144  373 1517  --  -- --    Precedex Volume 396 396 792 396 330 726 -- -- --    Amiodarone Volume 102 -- 102 -- -- -- -- -- --    IV Piggyback Volume (IV Piggyback) 250 100 350 200 -- 200 -- -- --    IV Volume -- -- -- 500 7 507 -- -- --    IV Volume (Fentanyl) 36 43 79 48 36 84 -- -- --    Other  120  175 295  100  100 200  --  -- --    Medications (P.O./ Enteral Liquids) 120 175 295 100 100 200 -- -- --    Enteral  110  765 875  660  655 1315  --  -- --    Free Water / Tube Flush -- 105 105 -- 105 105 -- -- --    Intake (mL) (Enteral Tube Right Nare Cortrak Gastric Feeding Tube) 110 660 770  -- -- --    Total Intake 1014 1479 2493 1904 1128 3032 -- -- --       Output    Urine  1225  1525 2750  1975 375 2350  --  -- --    Output (mL) ([REMOVED] Urinary Catheter Indwelling Catheter) --  375 2350 -- -- --    Output (mL) ([REMOVED] Urinary Catheter Indwelling Catheter) 3123 185 2540 -- -- -- -- -- --    Total Output 1225 1525 2750 7154 130 1205 -- -- --       Net I/O     -211 -46 -257 -71 164 974 -- -- --        Weight: 83.1 kg (183 lb 3.2 oz)  Recent Labs      06/17/18   0400  06/18/18   0508  06/19/18   0443   SODIUM  137  137  138   POTASSIUM  4.4  4.5  4.8   CHLORIDE  105  103  103   CO2  25  27  29   BUN  44*  52*  41*   CREATININE  1.21  1.21  1.11   CALCIUM  7.6*  7.7*  7.9*       GI/Nutrition:  Exam: abdomen is soft and non-tender, normal active bowel sounds, no CVAT, no change abnormal MRI brain with contrast  Imaging: Available data reviewed  NPO and tube feed Tolerated  Liver Function  Recent Labs      06/17/18   0400  06/18/18   0508  06/19/18   0443   ALTSGPT   --   357*   --    ASTSGOT   --   293*   --     ALKPHOSPHAT   --   176*   --    TBILIRUBIN   --   0.6   --    PREALBUMIN   --   6.0*   --    GLUCOSE  218*  193*  185*       Heme:  Recent Labs      180  18   05018   RBC  2.46*  2.76*  2.64*   HEMOGLOBIN  7.2*  8.1*  7.6*   HEMATOCRIT  22.9*  26.0*  24.9*   PLATELETCT  325  337  335       Infectious Disease:  Temp  Av.3 °C (99.1 °F)  Min: 36.4 °C (97.5 °F)  Max: 37.9 °C (100.2 °F)  Micro: reviewed.    No growth to date  Recent Labs      18   WBC  16.8*  16.8*  14.4*   NEUTSPOLYS  74.70*  68.90   --    LYMPHOCYTES  6.50*  10.70*   --    MONOCYTES  4.70  1.00   --    EOSINOPHILS  8.40*  6.80   --    BASOPHILS  0.00  1.90*   --    ASTSGOT   --   293*   --    ALTSGPT   --   357*   --    ALKPHOSPHAT   --   176*   --    TBILIRUBIN   --   0.6   --      Current Facility-Administered Medications   Medication Dose Frequency Provider Last Rate Last Dose   • piperacillin-tazobactam (ZOSYN) 4.5 g in  mL IVPB  4.5 g Q8HRS Evan Segura M.D. 25 mL/hr at 18 4.5 g at 18   • oxyCODONE immediate-release (ROXICODONE) tablet 5 mg  5 mg Q6HRS ROMELIA CovingtonOKimberley   5 mg at 18   • potassium bicarbonate (KLYTE) 25 MEQ effervescent tablet TBEF 25 mEq  25 mEq Q12HRS ROMELIA CovingtonO.   25 mEq at 18   • midazolam (VERSED) 2 MG/2ML injection 1 mg  1 mg Q HOUR PRN Evan Segura M.D.   1 mg at 18   • norepinephrine (LEVOPHED) 8 mg in  mL Infusion  0-30 mcg/min Continuous Evan Segura M.D.   Stopped at 18 1630   • insulin glargine (LANTUS) injection 5 Units  5 Units Q EVENING Evan Chen D.O.   5 Units at 18   • linezolid (ZYVOX) tablet 600 mg  600 mg Q12HRS Oliver Carcamo M.D.   600 mg at 18   • GENTEAL TEARS NIGHT-TIME OINT 1 Application  1 Application Q8HRS Oliver Carcamo M.D.   1 Application at 18 0600   • famotidine (PEPCID)  tablet 20 mg  20 mg DAILY Evan Chen D.O.   20 mg at 06/18/18 0755   • furosemide (LASIX) injection 20 mg  20 mg Q12HRS Oliver Carcamo M.D.   20 mg at 06/18/18 2015   • fentaNYL (SUBLIMAZE) 50 mcg/mL in 50mL (Continuous Infusion)   Continuous Oliver Carcamo M.D. 3 mL/hr at 06/19/18 0004 150 mcg/hr at 06/19/18 0004   • dexmedetomidine (PRECEDEX) 400 mcg in D5W 100 mL infusion  0.1-1.5 mcg/kg/hr Continuous Oliver Carcamo M.D. 33 mL/hr at 06/19/18 0407 1.5 mcg/kg/hr at 06/19/18 0407   • labetalol (NORMODYNE,TRANDATE) injection 10-20 mg  10-20 mg Q HOUR PRN Mathew Gama M.D.   20 mg at 06/15/18 1151   • insulin regular (HUMULIN R) injection 3-14 Units  3-14 Units Q6HRS Oliver Carcamo M.D.   3 Units at 06/19/18 0526    And   • glucose 4 g chewable tablet 16 g  16 g Q15 MIN PRN Oliver Carcamo M.D.        And   • dextrose 50% (D50W) injection 25 mL  25 mL Q15 MIN PRN Oliver Carcamo M.D.       • rosuvastatin (CRESTOR) tablet 20 mg  20 mg Q EVENING Oliver Carcamo M.D.   20 mg at 06/18/18 2002   • sertraline (ZOLOFT) tablet 50 mg  50 mg QDAY Oliver Carcamo M.D.   50 mg at 06/18/18 2003   • haloperidol lactate (HALDOL) injection 5 mg  5 mg Q4HRS PRN Davide Barrientos M.D.   5 mg at 06/18/18 1044   • MD ALERT...Adult ICU Electrolyte Replacement per Pharmacy Protocol   pharmacy to dose Jeremy M Gonda, M.D.       • Pharmacy Consult: Enteral tube feeding - review meds/change route/product selection  1 Each PRN Jeremy M Gonda, M.D.       • fentaNYL (SUBLIMAZE) injection 25 mcg  25 mcg Q HOUR PRN Jeremy M Gonda, M.D.   Stopped at 06/14/18 1719    Or   • fentaNYL (SUBLIMAZE) injection 50 mcg  50 mcg Q HOUR PRN Jeremy M Gonda, M.D.   50 mcg at 06/15/18 1724    Or   • fentaNYL (SUBLIMAZE) injection 100 mcg  100 mcg Q HOUR PRN Jeremy M Gonda, M.D.   100 mcg at 06/18/18 1529   • ipratropium-albuterol (DUONEB) nebulizer solution  3 mL Q2HRS PRN (RT) Jeremy M Gonda, M.D.       • ipratropium-albuterol (DUONEB)  nebulizer solution  3 mL Q4HRS (RT) Jeremy M Gonda, M.D.   3 mL at 06/19/18 0257   • aspirin (ASA) chewable tab 81 mg  81 mg DAILY Jeremy M Gonda, M.D.   81 mg at 06/18/18 0756   • gabapentin (NEURONTIN) capsule 300 mg  300 mg TID Jeremy M Gonda, M.D.   300 mg at 06/19/18 0526   • acetaminophen (TYLENOL) tablet 650 mg  650 mg Q6HRS PRN Jeremy M Gonda, M.D.   650 mg at 06/18/18 0756   • guaiFENesin dextromethorphan (ROBITUSSIN DM) 100-10 MG/5ML syrup 10 mL  10 mL Q6HRS PRN Jeremy M Gonda, M.D.       • senna-docusate (PERICOLACE or SENOKOT S) 8.6-50 MG per tablet 2 Tab  2 Tab BID Jeremy M Gonda, M.D.   2 Tab at 06/18/18 2003    And   • polyethylene glycol/lytes (MIRALAX) PACKET 1 Packet  1 Packet QDAY PRN Jeremy M Gonda, M.D.   1 Packet at 06/12/18 2127    And   • magnesium hydroxide (MILK OF MAGNESIA) suspension 30 mL  30 mL QDAY PRN Jeremy M Gonda, M.D.   30 mL at 06/14/18 0737    And   • bisacodyl (DULCOLAX) suppository 10 mg  10 mg QDAY PRN Jeremy M Gonda, M.D.       • Respiratory Care per Protocol   Continuous RT Sherrell Higgins M.D.       • heparin injection 5,000 Units  5,000 Units Q8HRS Sherrell Higgins M.D.   5,000 Units at 06/19/18 0526     Last reviewed on 6/9/2018 10:32 PM by Franca Willett    Quality  Measures:   Medications reviewed, Labs reviewed and Radiology images reviewed                      Assessment/Plan:  Acute hypoxic/hypercarbic respiratory failure    -intubated 6/10    -serial ABG/chest x-ray/ventilator mechanics reviews   -continue full vent support, ventilator settings adjusted today; not appropriate for liberation     wean FiO2 and PEEP    Did not tolerate PEEP of 12, desaturated on FiO2 0.4, I had increased on time   -RT/O2 protocols   -Minimize sedation, dexmedetomidine and fentanyl as needed   -diuresis as tolerated fluid balance mildly negative    -Discussed the possibility of tracheostomy and LTAC transfer with daughter 6/19, NS eval 1st  Hypotension suspect related to  medications   -Doubt sepsis/other causes of hypotension at this juncture   -Monitor fluid balance closely, appears euvolemic to hypervolemic   -Add Midodrine PO   -Titrate norepinephrine as needed   -Limit hypotensive inducing sedation as safe/tolerated  Community-acquired pneumonia    -Febrile, increasing leukocytosis and worsening chest x-ray   -Suspicion for new HCAP versus other source of new infection   -Antibiotics escalated to Zosyn and vancomycin - BAL 6/15 light growth yeast   -change vanco to linazolid 6/17; complete 7 day course   Septic shock from pulmonary source with associated cardiac and respiratory failure   -s/p pressors     -Lactic acidosis - trend resolved   -now tolerating diuresis -fluid balance mildly negative   Abnormal MRI brain with contrast   -Primary brain tumor versus metastatic disease suspected, PRES and CVA much less likely   -Location may be amenable to surgical resection per neurology    -neurosurgical consultation requested 6/19 with Dr. loaiza   -Seizure prophylaxis?   -Minimal vasogenic edema, hold on Decadron for now?  Encephalopathy/agitation   -Suspect toxic metabolic secondary to sepsis, pneumonia   -CT head negative   -Check ammonia levels ×3 normal   -MRI brain 6/17 with broad differential, neuro consulted by hospitalist, MRI with contrast noted   -Resume home Xanax, and as needed haloperidol   -Continue to try to avoid propofol but resume if necessary  Normocytic anemia   -Monitor with conservative transfusion strategy    -serial CBC   -Transfuse per conservative policy  Hyperkalemia -improving, monitor  Acute kidney injury, ATN/prerenal   -Avoid nephrotoxins, monitor urine output and renal function  Mildly elevated troponin - trend  Type 2 diabetes - SSI, ADA tube feed formula  Dyslipidemia - statin  Thrombocytosis - improving  History of tobacco use, 40 pack years, quit a year ago   - BDs    Prophylaxis    Patient is critically ill. I have adjusted ventilator settings.   Ongoing critical care management as above.  She remains full CODE STATUS.  Discussed patient condition and risk of morbidity and/or mortality with Family, RN, RT, Pharmacy, , Charge nurse / hot rounds and hospitalist and neurology.    The patient remains critically ill.  Critical care time = 50 prophylaxis minutes in directly providing and coordinating critical care and extensive data review.  No time overlap and excludes procedures.

## 2018-06-19 NOTE — CARE PLAN
Problem: Communication  Goal: The ability to communicate needs accurately and effectively will improve  Outcome: PROGRESSING SLOWER THAN EXPECTED  Pt follows commands intermittently and nods appropriately. Pt is intubated and not able to make needs known.    Problem: Bowel/Gastric:  Goal: Normal bowel function is maintained or improved  Outcome: PROGRESSING SLOWER THAN EXPECTED  Pt has not had BM since 6/15, apparently at that time she was having diarrhea. Will escalate bowel regimen.

## 2018-06-19 NOTE — PROGRESS NOTES
Spoke with Dr. Canales about pt fluctuating agitation and somnolence. Pt's VS fluctuate concurrently. He stated to attempt midodrine and continue with IV push haldol and po xanax and see if we can regulate the patient with those meds before we start her back on propofol and possiblly have to restart levophed.

## 2018-06-20 PROBLEM — E43 SEVERE PROTEIN-CALORIE MALNUTRITION (HCC): Status: ACTIVE | Noted: 2018-01-01

## 2018-06-20 NOTE — DISCHARGE PLANNING
Medical SW    Referral: Sw met w/ pt and family to acquire hospice choice. Sw answered pt's dtr's questions. There are some concerns as pt's dtr [place] lives [place] of [place] is coming tomorrow and she is stating she wants to keep pt at her home w/ a chronic trach. Rossy advised family and medical staff will discuss pt's medical situation and care plan which will include if pt is able to safely travel anywhere.    Rossy provided requested work excuse letter to pt's gdtr.     Rossy faxed choice to [facility].     Plan: Sw to assist w/ d/c planning as needed.

## 2018-06-20 NOTE — DISCHARGE PLANNING
Medical SW    Sw attended AM IDT Rounds.    RN reports, pt alert this AM, appropriately following commands, dtr at bedside, right agnes hutchisoned,     Plan: Sw to assist w/ d/c planning as needed.

## 2018-06-20 NOTE — ASSESSMENT & PLAN NOTE
Monitor vitals.  Holding outpatient lisinopril initially due to renal function.  Restart once BP improves

## 2018-06-20 NOTE — CARE PLAN
Problem: Pain Management  Goal: Pain level will decrease to patient's comfort goal  Outcome: PROGRESSING AS EXPECTED  Fentanyl gtt in place, scheduled oxy given per MAR. Pt shakes head no when asked if in pain.    Problem: Psychosocial Needs:  Goal: Level of anxiety will decrease  Outcome: PROGRESSING SLOWER THAN EXPECTED  Xanax given per MAR, haldol 5mg given x1 this shift.

## 2018-06-20 NOTE — CARE PLAN
Problem: Communication  Goal: The ability to communicate needs accurately and effectively will improve  Outcome: PROGRESSING SLOWER THAN EXPECTED  Pt nodding appropriately to questions, but pt is intubated and unable to make needs known.    Problem: Bowel/Gastric:  Goal: Normal bowel function is maintained or improved  Outcome: PROGRESSING SLOWER THAN EXPECTED  Pt last BM 6/15. Bowel regimen escalated.

## 2018-06-20 NOTE — CARE PLAN
Problem: Ventilation Defect:  Goal: Ability to achieve and maintain unassisted ventilation or tolerate decreased levels of ventilator support  Outcome: PROGRESSING AS EXPECTED  Adult Ventilation Update    Total Vent Days: 11    Patient Lines/Drains/Airways Status    Active Airway     Name: Placement date: Placement time: Site: Days:    Airway Group ET Tube 7.5 06/10/18   0840      10              Plateau Pressure (Q Shift): 29 (06/20/18 0702)  Static Compliance (ml / cm H2O): 88 (06/20/18 1443)    Patient failed trials because of Barriers to Wean: FiO2 >60% or PEEP >10 CM H2O (06/20/18 1443)  Barriers to SBT Weaning Trial Stopped due to:: Abnormal breathing pattern (paradoxical respiratory pattern, use of accessory muscles, etc) (06/16/18 0713)  Length of Weaning Trial      Cough: Productive;Moist (06/20/18 1600)  Sputum Amount: Small (06/20/18 1600)  Sputum Color: White;Clear (06/20/18 1600)  Sputum Consistency: Thick;Thin (06/20/18 1600)    Mobility  Level of Mobility: Level I (06/19/18 2000)  Activity Performed: Unable to mobilize (06/20/18 1600)  Assistance / Tolerance: Assistance of Two or More (06/17/18 0800)  Pt Calls for Assistance: No (06/19/18 2000)  Staff Present for Mobilization: RN (06/18/18 0800)  Gait: Unable to Ambulate (06/18/18 1600)  Assistive Devices: None (06/18/18 1600)  Reason Not Mobilized: Unstable condition (06/20/18 1600)  Mobilization Comments: PEEP/CPOT (06/20/18 1600)    Events/Summary/Plan: vent check and in line med (06/20/18 1443)

## 2018-06-20 NOTE — PROGRESS NOTES
12 hour chart check completed. Assumed care, bedside report received from Radha HUERTA. Pt attached to telemetry monitoring, alarm parameters set properly, drips verified.

## 2018-06-20 NOTE — CARE PLAN
Problem: Ventilation Defect:  Goal: Ability to achieve and maintain unassisted ventilation or tolerate decreased levels of ventilator support    Intervention: Support and monitor invasive and noninvasive mechanical ventilation  Adult Ventilation Update    Total Vent Days: 10    Patient Lines/Drains/Airways Status    Active Airway     Name: Placement date: Placement time: Site: Days:    Airway Group ET Tube 7.5 @23 06/10/18   0840      10                24 340 +14 40%    No changes will continue to monitor.

## 2018-06-20 NOTE — PROGRESS NOTES
[facility]    Date of Service: 2018    Chief Complaint  73 y.o. female admitted 2018 with shortness of breath with a concern of sepsis.    Interval Problem Update  On ventilator.  Alert.  No BM since 6/15.  CXR showing mild improvement in edema. PEEP:12 FiO2:40%.  Family discussion last night w/ Dr Segura and myself and decision made for NO CPR and No defibrillation.  Family request neurosurgery input all the same.      Consultants/Specialty  Pulmonary    Disposition  To remain in ICU.          Review of Systems   Unable to perform ROS: Intubated      Physical Exam  Laboratory/Imaging   Hemodynamics  Temp (24hrs), Av.6 °C (99.7 °F), Min:37.3 °C (99.1 °F), Max:37.8 °C (100 °F)   Temperature: 37.3 °C (99.1 °F), Monitored Temp: 37.4 °C (99.3 °F)  Pulse  Av.7  Min: 40  Max: 146 Heart Rate (Monitored): 76  NIBP: (!) 96/41 CVP (mm Hg): (!) 16 MM HG    Respiratory  Weeks Vent Mode: APVCMV, Rate (breaths/min): 24, PEEP/CPAP: 14, PEEP/CPAP: 14, FiO2: 40, P Peak (PIP): 26, P MEAN: 25   Respiration: (!) 24, Pulse Oximetry: 99 %     Work Of Breathing / Effort: Vented  RUL Breath Sounds: Clear, RML Breath Sounds: Diminished, RLL Breath Sounds: Diminished, DEIDRA Breath Sounds: Clear, LLL Breath Sounds: Diminished    Fluids    Intake/Output Summary (Last 24 hours) at 18 0628  Last data filed at 18 0600   Gross per 24 hour   Intake          3251.32 ml   Output             3400 ml   Net          -148.68 ml       Nutrition  Orders Placed This Encounter   Procedures   • Diet NPO     Standing Status:   Standing     Number of Occurrences:   1     Order Specific Question:   Type:     Answer:   Now [1]     Order Specific Question:   Restrict to:     Answer:   Strict [1]     Physical Exam   Constitutional: She appears ill. No distress. She is sedated, intubated and restrained.   HENT:   Head: Normocephalic and atraumatic.   Nose: Nose normal.   Mouth/Throat: Oropharynx is clear and  moist.   Eyes: Conjunctivae and EOM are normal. Right eye exhibits no discharge. Left eye exhibits no discharge. No scleral icterus.   Neck: No tracheal deviation present.   Cardiovascular: Normal rate, regular rhythm, normal heart sounds and intact distal pulses.    No murmur heard.  Pulmonary/Chest: Effort normal and breath sounds normal. She is intubated. No respiratory distress.   Abdominal: Soft. Bowel sounds are normal. She exhibits no distension.   Musculoskeletal: She exhibits no edema.   Lymphadenopathy:     She has no cervical adenopathy.   Skin: Skin is warm. She is not diaphoretic.   Vitals reviewed.      Recent Labs      06/18/18   0508  06/19/18   0443  06/20/18   0530   WBC  16.8*  14.4*  13.2*   RBC  2.76*  2.64*  2.88*   HEMOGLOBIN  8.1*  7.6*  8.3*   HEMATOCRIT  26.0*  24.9*  26.7*   MCV  94.2  94.3  92.7   MCH  29.3  28.8  28.8   MCHC  31.2*  30.5*  31.1*   RDW  54.0*  53.3*  51.7*   PLATELETCT  337  335  433   MPV  11.6  11.4  11.3     Recent Labs      06/18/18   0508  06/19/18   0443  06/20/18   0530   SODIUM  137  138  136   POTASSIUM  4.5  4.8  4.2   CHLORIDE  103  103  98   CO2  27  29  32   GLUCOSE  193*  185*  242*   BUN  52*  41*  32*   CREATININE  1.21  1.11  1.06   CALCIUM  7.7*  7.9*  7.9*                      Assessment/Plan     * Severe sepsis ([facility])   Assessment & Plan    zyvox & zosyn  yeast on respiratory culture  Mild decrease in WBC  Monitor vitals, strict I/O's        Atrial fibrillation with RVR ([facility])   Assessment & Plan    Monitoring on telemetry  s/p amiodarone         Elevated liver enzymes   Assessment & Plan    Monitor CMP tomorrow.  If worsens will need U/S RUQ.  Probable medication adverse effect.        SY (acute kidney injury) ([facility])   Assessment & Plan    Likely prerenal from hypovolumia  Improving with IV fluids.  Monitor BMP        Acute respiratory failure with hypoxia ([facility])   Assessment & Plan    CXR with rotation but some mild improvement in edema.  Monitor daily  ABG, CXR, vitals, strict I/O's  Pulmonary consulting  RT protocol  Wean sedation as able        Acute metabolic encephalopathy   Assessment & Plan    MRI brain with contrast concerning for brain mass.  Consult neurosurgery  Ongoing agitation while on ventilator. PRN meds        Delirium   Assessment & Plan    neurochecks  Intubated and sedated.  Will assess with weaning off sedation        Shock (HCC)   Assessment & Plan    Improving with IV fluids  Due to sepsis and dehydration  Monitor I/O's vitals.  Pressors as needed to keep MAP >65 and SBP >90        Pneumonia due to other specified bacteria ([facility])   Assessment & Plan    zyvox and zosyn  On ventilator.  RT protocol  Pulmonary consulting.        HTN (hypertension)   Assessment & Plan    Monitor vitals.  Holding outpatient lisinopril initially due to renal function.  Restart once BP improves        HLD (hyperlipidemia)   Assessment & Plan    Active therapy with rosuvastatin        T2DM (type 2 diabetes mellitus) ([facility])   Assessment & Plan    Monitor accuchecks and cover with SSI        History of atrial fibrillation without current medication- (present on admission)   Assessment & Plan    Monitoring telemetry          Quality-Core Measures   Reviewed items::  Labs reviewed, Medications reviewed and Radiology images reviewed  Jamison catheter::  No Jamison  DVT prophylaxis pharmacological::  Heparin  Antibiotics:  Treating active infection/contamination beyond 24 hours perioperative coverage

## 2018-06-20 NOTE — DISCHARGE PLANNING
Received Choice form at [phone]   Agency/Facility Name: [facility]   Referral sent per Choice form @ [phone]

## 2018-06-20 NOTE — DISCHARGE PLANNING
Medical SW    Referral: Sw spoke to Mds and palliative Rn Mike.    Pt and family waiting for sister to arrive tomorrow then possibly comfort care decision.    Sw requested hospice order from Md.    Sw met w/ pt and family at bedside to discuss hospice and complete hospice choice.      Plan: Sw to assist w/ d/c planning as needed.

## 2018-06-21 PROBLEM — G93.89 BRAIN MASS: Status: ACTIVE | Noted: 2018-01-01

## 2018-06-21 PROBLEM — I26.99 ACUTE PULMONARY EMBOLISM (HCC): Status: ACTIVE | Noted: 2018-01-01

## 2018-06-21 NOTE — CONSULTS
"Reason for PC Consult: Advance Care Planning    Assessment:  General:  73yr old female admitted 6/9/18 with SOB x 1 week; patient required 15L/non-rebreather.  PMH of DM type 2, HTN, Bipolar Disorder, Stroke  Social- Patient resides in [place] alone.  She has daughterSocorro who lives in [place] and Iram Dunn, daughter/DPOA in [facility].   A 40-pack-year history of tobacco use, quit 1 year ago. Denies any alcohol or illicit drug use.  Dyspnea: Yes- Intubated, ARDs PEEP14  Last BM: 06/15/18- Per RN assessment  Pain: Unable to determine-    Depression: Unable to determine-    Dementia: Unable to Determine;       Spiritual:  Is Voodoo or spirituality important for coping with this illness? No-    Has a  or spiritual provider visit been requested? No    Palliative Performance Scale: 10%    Advance Directive: Advance Directive, DPOA-  Statement of Desires #5 only  DPOA: Yes- Iram Dunn  daughter, [phone]  POLST: None on File    Code Status: Limited-      Outcome:  I met with the patient, who is vented and sedated, and daughter, Socorro at bedside.  I introduced myself, role of Palliative Care in POC and reason for visit.  Socorro explained the days prior to the patient's admission.  She stated that she wasn't feeling well and Socorro offered a few times to bring her to the hospital but she declined.  Socorro went camping over the weekend and the patient brought herself to the ED.    We discussed Socorro's understanding of the current status, patient's wishes and length of time intubated, family dynamics and families want for the patient to be \"comfortable\".  Iram, patient's daughter, will arrive in [place] tomorrow 6/21/18 around 10am, then will come to the hospital and plan to withdraw vent support and pressors; transitioning to comfort care.      All questions were answered in full, stated understanding and agreed. Active listening, reflection, and empathic support utilized throughout this " encounter. Contact for Palliative Care was provided and patient/family is encourage to call for questions, concerns and/or support.      Dr. Segura had the same conversation and is in agreement with the plan moving forward.    Updated: Dr. Diez, Dr. Segura, BRADLEY Garcia and SAW Gallo    Plan: Withdraw of care tomorrow 6/21/18-  Maintain current level of care.  If patient declines no escalation of care, contact Socorro.      Recommendations: Hospice- Comfort Care.      Thank you for allowing Palliative Care to participate in this patient's care. Please feel free to call [phone] with any questions or concerns.

## 2018-06-21 NOTE — CARE PLAN
Problem: Ventilation Defect:  Goal: Ability to achieve and maintain unassisted ventilation or tolerate decreased levels of ventilator support  Outcome: PROGRESSING AS EXPECTED    Intervention: Support and monitor invasive and noninvasive mechanical ventilation  Adult Ventilation Update    Total Vent Days: 12    Patient Lines/Drains/Airways Status    Active Airway     Name: Placement date: Placement time: Site: Days:    Airway Group ET Tube 7.5 06/10/18   0840      12              Vent settings: 24 340 +12 40%  Duoneb Q4    Sputum/Suction   Cough: Productive;Moist (06/21/18 0400)  Sputum Amount: Small (06/21/18 0400)  Sputum Color: White;Clear (06/21/18 0400)  Sputum Consistency: Thick;Thin (06/21/18 0400)      Events/Summary/Plan: Abg drawn, no changes made. Pt stable on vent, will continue to monitor.

## 2018-06-21 NOTE — DISCHARGE PLANNING
Medical SW    Referral: Sw contacted [facility] in [place] via Skype.     Sw requested f/u w/ referral to [facility].    Plan: Sw to assist w/ d/c planning as needed.

## 2018-06-21 NOTE — CARE PLAN
Problem: Safety  Goal: Will remain free from falls  Outcome: PROGRESSING AS EXPECTED    Intervention: Assess risk factors for falls  Jadyn Garnica Fall Assessment done.  Intervention: Implement fall precautions  Fall precautions in place.      Problem: Fluid Volume:  Goal: Will maintain balanced intake and output  Outcome: PROGRESSING AS EXPECTED    Intervention: Monitor, educate, and encourage compliance with therapeutic intake of liquids  Strict I and O's being taken.

## 2018-06-21 NOTE — HOSPICE
Went to patient's room at approx. 1445 today,no family present.  Now had Approx. 25 minute phone conversation with patient's daughter, Socorro who voices multiple concerns about differing opinions between her mother's (patient)  health care desires and Socorro's  sister, Iram's (DPOA).  Iram arrives from [place] tomorrow.  Offered support and active listening, Socorro appreciative. Briefly explained hospice services, Socorro stated understanding.  Socorro believes a family conference has been set up for tomorrow, Hospice will attempt to be present as well.

## 2018-06-21 NOTE — CARE PLAN
Problem: Bowel/Gastric:  Goal: Normal bowel function is maintained or improved  Outcome: PROGRESSING SLOWER THAN EXPECTED  No bowel movement despite advancement of bowel protocol. Hypoactive bowel sounds, TF at goal.     Problem: Psychosocial Needs:  Goal: Level of anxiety will decrease  Outcome: PROGRESSING AS EXPECTED  Medications given per MAR, precedex titrated down d/t fever PRN versed given see MAR.

## 2018-06-21 NOTE — PROGRESS NOTES
12 hour chart check completed. Assumed care, bedside report received from Radha HUERTA. Pt attached to telemetry monitoring, alarm parameters set properly, drips verified. Cooling blanket on pt.

## 2018-06-21 NOTE — PROGRESS NOTES
[facility] Progress Note    Date of Service: 2018    Chief Complaint  73 y.o. female admitted 2018 with shortness of breath with a concern of sepsis.    Interval Problem Update  Follows commands.  Agitated on vent at times.  Temp:101.2 with precidex.  Improved temp with wean of precedex.  Wean levophed.  Discussed with Dr Pastrana, neurosurgery.  Ordered CT chest/abd/pelvis. High residuals and holding enteral feeding. Await CT.       I met with the daughters and reviewed CT, CXRs and labs.  Remains partial code but decision to comfort care being decided upon.      Consultants/Specialty  Pulmonary    Disposition  To remain in ICU.          Review of Systems   Unable to perform ROS: Intubated      Physical Exam  Laboratory/Imaging   Hemodynamics  Temp (24hrs), Av.2 °C (100.7 °F), Min:37.5 °C (99.5 °F), Max:38.7 °C (101.7 °F)   Temperature: (!) 38.4 °C (101.1 °F), Monitored Temp: 38.3 °C (100.9 °F)  Pulse  Av.1  Min: 40  Max: 146 Heart Rate (Monitored): 93  NIBP: 109/52     Respiratory  Weeks Vent Mode: APVCMV, Rate (breaths/min): 24, PEEP/CPAP: 10, PEEP/CPAP: 10, FiO2: 40, P Peak (PIP): 27, P MEAN: 16   Respiration: (!) 24, Pulse Oximetry: 97 %     Work Of Breathing / Effort: Vented  RUL Breath Sounds: Fine Crackles, RML Breath Sounds: Fine Crackles, RLL Breath Sounds: Diminished, DEIDRA Breath Sounds: Fine Crackles, LLL Breath Sounds: Diminished    Fluids    Intake/Output Summary (Last 24 hours) at 18 2328  Last data filed at 18 2200   Gross per 24 hour   Intake          2930.29 ml   Output             2340 ml   Net           590.29 ml       Nutrition  Orders Placed This Encounter   Procedures   • Diet NPO     Standing Status:   Standing     Number of Occurrences:   1     Order Specific Question:   Type:     Answer:   Now [1]     Order Specific Question:   Restrict to:     Answer:   Strict [1]     Physical Exam   Constitutional: She appears ill. No distress. She is sedated,  intubated and restrained.   HENT:   Head: Normocephalic and atraumatic.   Nose: Nose normal.   Mouth/Throat: Oropharynx is clear and moist.   Eyes: Conjunctivae and EOM are normal. Right eye exhibits no discharge. Left eye exhibits no discharge. No scleral icterus.   Neck: No tracheal deviation present.   Cardiovascular: Normal rate, regular rhythm, normal heart sounds and intact distal pulses.    No murmur heard.  Pulses:       Radial pulses are 2+ on the right side, and 2+ on the left side.        Dorsalis pedis pulses are 2+ on the right side, and 2+ on the left side.   Pulmonary/Chest: Effort normal and breath sounds normal. She is intubated. No respiratory distress.   Abdominal: Soft. Bowel sounds are normal. She exhibits no distension.   Musculoskeletal: She exhibits edema.   Lymphadenopathy:     She has no cervical adenopathy.   Skin: Skin is warm. She is not diaphoretic.   Vitals reviewed.      Recent Labs      06/19/18   0443  06/20/18   0530  06/21/18   0530   WBC  14.4*  13.2*  14.9*   RBC  2.64*  2.88*  3.10*   HEMOGLOBIN  7.6*  8.3*  8.7*   HEMATOCRIT  24.9*  26.7*  28.7*   MCV  94.3  92.7  92.6   MCH  28.8  28.8  28.1   MCHC  30.5*  31.1*  30.3*   RDW  53.3*  51.7*  52.9*   PLATELETCT  335  433  490*   MPV  11.4  11.3  10.9     Recent Labs      06/19/18   0443  06/20/18   0530  06/21/18   0530   SODIUM  138  136  135   POTASSIUM  4.8  4.2  4.4   CHLORIDE  103  98  97   CO2  29  32  32   GLUCOSE  185*  242*  252*   BUN  41*  32*  31*   CREATININE  1.11  1.06  1.17   CALCIUM  7.9*  7.9*  8.1*     Recent Labs      06/21/18   1451  06/21/18   2125   APTT  30.0  82.6*   INR  1.26*   --                   Assessment/Plan     * Severe sepsis (HCC)   Assessment & Plan    zyvox & zosyn  yeast on respiratory culture  Mild decrease in WBC  Monitor vitals, strict I/O's        Brain mass   Assessment & Plan    Noted on MRI brain 6/2018.  Neurosurgery wants repeat MRI brain in a few months.  No current recommendation  of biopsy as patient now on heparin due to PE  CT Chest, Abd,Pelvis ordered and not showing mass or c/o primary malignancy        Acute pulmonary embolism (HCC)   Assessment & Plan    Seen on CT chest/abd/pelvis from 6/21/18  Heparin drip initiated  LE DVT U/S ordered        Atrial fibrillation with RVR ([facility])   Assessment & Plan    Monitoring on telemetry  s/p amiodarone         Acute metabolic encephalopathy   Assessment & Plan    MRI brain with contrast concerning for brain mass.  Consulted neurosurgery and no planned surgery w/ future MRI brain  Sedation as needed for agitation        Pneumonia due to other specified bacteria ([facility])   Assessment & Plan    zyvox and zosyn  On ventilator.  RT protocol  Pulmonary consulting.        Acute respiratory failure with hypoxia ([facility])   Assessment & Plan    Monitor daily ABG, CXR, vitals, strict I/O's  Pulmonary consulting  RT protocol  Wean sedation as able        Elevated liver enzymes   Assessment & Plan    Monitor cmp  CT abd w/o acute finding  Probable medication adverse effect.        Delirium   Assessment & Plan    neurochecks  Intubated and sedated.  Will assess with weaning off sedation        HTN (hypertension)   Assessment & Plan    Monitor vitals.  Holding outpatient lisinopril initially due to renal function.  Restart once BP improves        SY (acute kidney injury) ([facility])   Assessment & Plan    Likely prerenal from hypovolumia  Improving with IV fluids.  Monitor BMP        Severe protein-calorie malnutrition (HCC)   Assessment & Plan    Enteral feeding  Likely of chronic disease.  Monitoring CMP        Shock (HCC)   Assessment & Plan    Improving with IV fluids  Due to sepsis and dehydration  Monitor I/O's vitals.  Pressors as needed to keep MAP >65 and SBP >90        T2DM (type 2 diabetes mellitus) ([facility])   Assessment & Plan    Monitor accuchecks and cover with SSI        HLD (hyperlipidemia)   Assessment & Plan    Active therapy with rosuvastatin        History  of atrial fibrillation without current medication- (present on admission)   Assessment & Plan    Monitoring telemetry          Quality-Core Measures   Reviewed items::  Labs reviewed, Medications reviewed and Radiology images reviewed  Jamison catheter::  Critically Ill - Requiring Accurate Measurement of Urinary Output and Unconscious / Sedated Patient on a Ventilator  DVT prophylaxis pharmacological::  Heparin  Antibiotics:  Treating active infection/contamination beyond 24 hours perioperative coverage

## 2018-06-21 NOTE — CONSULTS
Date of service: 6/21/2018    Requesting physician: Dr. Diez    Reason for consultation:  Left posterior frontal enhancement on MRI, brain lesion    HPI  This is a 73-year-old female with a complicated admission history. She was admitted to the hospital on 6/10/2018 with symptoms of chest pain subsequently experienced respiratory failure and has a number of systemic illnesses being treated. She did have a CT of her head on 6/15/2018 that showed some white matter edema. She subsequently underwent an MRI of the brain on 6/15/2018 and a contrast enhanced MRI on 6/18/2018. I was asked for my opinion regarding her imaging findings and the potential of diagnostic or therapeutic treatment. Given her history of smoking and this small lesion on MRI at the gray-white junction, I requested a chest abdomen and pelvis CT with contrast which does have several findings, although none suspicious for carcinoma    Past medical history, past surgical history, past social history, allergies all reviewed in admission history and physical    Review of systems is unable to be obtained due to her current status of being intubated and sedated    Physical exam  Vital signs: Afebrile, vital signs stable  Gen.: Intubated and sedated  Neurologic: Moving all extremities well, localizing well, following simple commands off of sedation    Imaging  I have reviewed the MRIs of her brain as well as the CT scan. In summary, there is a very small area of T1 contrast enhancement in the left posterior frontal region, it does appear to be  and the cortical region. There is some surrounding white matter edema.    CT of the chest abdomen and pelvis with contrast: Multiple numerous systemic findings, there is a lucency in the inferior endplate and vertebral body at L3    Assessment  73-year-old female with multiple ongoing medical problems with a small left posterior frontal cortical and gray-white junction area of T1 contrast enhancement with surrounding  vasogenic edema. There is also a finding of a lucency through the inferior endplate and inferior vertebral body at L3    Plan  1. L3 vertebral body lucency-Consistent with Schmorl's node. Not recommending any treatment or further diagnostic follow-up    2. Had a chance to discuss the MRI findings with her family at bedside today, including both sisters. The MRI is not diagnostic of specific pathology. I think the most likely potential diagnoses include residual enhancement from a prior, subacute infarct, the possibility of a very early tumor or infection. Given the small size of the lesion on MRI, I have discussed with her sisters that follow-up imaging (MRI of the brain with and without contrast) in 2 months is likely the best course. A biopsy at this point would be very high risk given her systemic and active comorbidities. I do think there is also a chance that the biopsy would be nondiagnostic at this point given the very small size of the lesion.    I would favor not treating her with emperic steroids as I think she is likely asymptomatic.    3. We did discuss the potential of hospice/palliative of care. I did recommend that they try to remove this MRI finding of the brain from their decision making process given the fact that we are unable to give her a clear diagnosis. They do seem to understand this. They understand that the medical team is the best source for overall prognosis given her numerous systemic and medical conditions     4. I gave her family my card. I have asked for them to schedule a follow-up MRI of the brain with and without contrast in approximately 2 months if they continue to proceed with care and she improves systemically. I am happy to see her in the interval if there are any issues or concerns    I spent a total of 57 minutes reviewing her EMR, imaging on history and physical examination as well as discussing her care with her family at bedside

## 2018-06-21 NOTE — DISCHARGE PLANNING
Medical SW    Referral: Sw spoke to palliative RN, Dipika. She will attempt to be present for family meeting and care plan discussion regarding end of life w/ family and medical team.     Sw advised dtr, Socorro, not in pt's room so possibly [place] her sister at airport.         Plan: Sw to assist w/ d/c planning as needed.

## 2018-06-21 NOTE — DISCHARGE PLANNING
Medical SW    Sw attended AM IDT Rounds.    RN reports, pt agitated at times, comfort care conference today w/ family and medical team around 10 am     Plan: Sw to assist w/ d/c planning as needed.

## 2018-06-21 NOTE — PROGRESS NOTES
Pulmonary Critical Care Progress Note        Date of Admission:  6/9/2018    Chief Complaint: SOB    History of Present Illness: 73 y.o. female with known past   medical history of type 2 diabetes, questionable bipolar disorder, history of   CVA, dyslipidemia.  The patient indicates that she was in her normal state of   health up until approximately 2 days ago, at which point in time, she had   increasing shortness of breath.  She denied any significant fever, chills.    Denied any headache, visual changes, has not had any cough or sputum   production.  No nausea, vomiting, abdominal pain or discomfort.  Has   intermittent chronic diarrhea, nothing more than usual.  Denies any dysuria or   lower extremity edema.  Further, she denies any sick contacts or recent   travels.  No history of DVTs or PEs in the past.  She indicates that the   shortness of breath has been progressive with increasing dyspnea on exertion.    She does not associate with positioning being any worse or better.  She could   lie flat and just as dyspneic as sitting up forward.  She denies any change   in her medications.  She does have tobacco history, which she quit about 1   year ago, smoking 1 pack a day x40 years.  Denies any alcohol or illicit drug   use.  She further denies any underlying heart disease or history of MI in the   past.       ROS:  Respiratory: unable to perform due to the patient's inability to effectively communicate, Cardiac: unable to perform due to the patient's inability to effectively communicate, GI: unable to perform due to the patient's inability to effectively communicate.      Interval Events:  24 hour interval history reviewed     Agitated following some  Labile Bp  Norepinephrine 15  Tm 101.2 - better with weaning DEX  Fent 150  DEX 0.6  Xanax/Seroquel  Sister coming in today  CT ABD/chest/pelvis pending  NS to see today  Zyvox/Zosyn 7/7 - WBC 14,9  Lasix  I/Os  SSI 18units/24 hrs  Midodrine    CT scan  chest/abdomen/pelvis noted, no obvious tumor, definite bilateral pulmonary emboli    Neuro surgery consultation noted, follow-up MRI in 2 months     Long conversation with local sister times several today longer conversation with both sisters, and children and spouse at bedside at great length outlining current status and prognosis and possible treatment options, family considering options, patient has no capacity to participate in these conversations at this time    Patient tolerating dropping of PEEP to 10, O2 saturations holding  Patient remains on intravenous norepinephrine for hypotension     YESTERDAY  Recurrent hypotension requiring reinitiation of intravenous pressors  Another daughter coming today  D/w daughter who lives in [place] - her sister is in agreement with plan  They both agree no trach or CNS surgery  NS consult pending with Dr Alarcon still?  Awake, not following  More calm with xanax and haldol, less agitation   DEX 1.5  FENT 200  No Versed  SR 70s  SBp labile  Norepinephrine 7  Midodrine 5 tid  I/Os  Neg 149cc/24hrs  UO adequate  TF goal  Tm 100  No BM  Vent day #11  40% PEEP 14  Zyvox/Zosyn 6/7  SSI 14 units/24 hrs    Serial follow-up finds the patient remaining on pressors  Mechanics of breathing unchanged, patient remains inappropriate for any breathing trials  Neurologic status unchanged  Updated daughter at length again at bedside x2   Case reviewed with length with case management and palliative care team      PFSH:  No change.    Respiratory:  Weeks Vent Mode: APVCMV, Rate (breaths/min): 24, Vt Target (mL): 340, PEEP/CPAP: 12, FiO2: 40, Static Compliance (ml / cm H2O): 22.7, Control VTE (exp VT): 314  Pulse Oximetry: 97 %  Ventilator mechanics and waveforms are reviewed at the bedside with RT again          Exam: unlabored respirations, no intercostal retractions or accessory muscle use on full vent support   Diminished breath sounds at the bases, bilateral coarse rales   Tachypneic but not  labored   Secretions unchanged    no delta       ImagingAvailable data reviewed   Recent Labs      06/19/18   0425  06/20/18   0510  06/21/18   0347   ISTATAPH  7.409  7.441  7.483   ISTATAPCO2  49.6*  50.5*  46.4*   ISTATAPO2  85  72  67   ISTATATCO2  33  36*  36*   [id number]  96  95  94   ISTATARTHCO3  31.3*  34.3*  34.8*   ISTATARTBE  6*  9*  10*   ISTATTEMP  98.5 F  37.4 C  37.5 C   ISTATFIO2  50  40  40   ISTATSPEC  Arterial  Arterial  Arterial   ISTATAPHTC  7.410  7.435  7.475   [id number]  85  74  70       HemoDynamics:  Pulse: 85, Heart Rate (Monitored): 85  NIBP: 103/46  CVP (mm Hg): (!) 210 MM HG     Norepinephrine    Exam: regular rhythm (Sinus), tachycardia, no further AF, no murmur, pulses intact okay capillary refill, unchanged    Imaging: Available data reviewed      Amiodarone infusion stopped  Neuro:  GCS Total [place] Coma Score: 10     sedated on fentanyl and dexmedetomidine     Exam: no focal deficits noted Agitated Delirious Confused, moving all 4 extremities, not purposeful, not following, exam remains unchanged unfortunately again    Imaging: Available data reviewed     MRI brain p.m. 6/17, no contrast  1. Age-related cerebral atrophy.  2. Mild periventricular white matter changes consistent with chronic microvascular ischemic gliosis.  3. Interval development of moderate-sized region of increased T2 signal intensity in the juxtacortical white matter in the left posterior frontal lobe. This could represent an area of venous infarction, gliosis, posterior reversible encephalopathy   syndrome, or possibly primary or metastatic brain neoplasm. Recommend limited contrast enhanced images through the brain.  4. Previous endovascular coiling of bilateral posterior communicating artery aneurysms.    MRI with contrast PM 6/18  1.  14 x 8 x 10 mm faintly enhancing structure centered in a region of T1 signal hypointensity in the left juxta cortical posterior frontal lobe white matter which could  represent primary or metastatic lesion, less likely subacute infarct.  2.  Changes consistent with endovascular repair of bilateral posterior communicating artery aneurysms are again noted.  3.  Mild diffuse cerebral substance loss.  4.  Bilateral mastoid effusions. Findings could be consistent with mastoiditis in the appropriate clinical setting.    Fluids:  Intake/Output       06/19/18 0700 - 06/20/18 0659 06/20/18 0700 - 06/21/18 0659 06/21/18 0700 - 06/22/18 0659      0700-1859 1900-0659 Total 0700-1859 1900-0659 Total 9728-7498 9686-1178 Total       Intake    I.V.  610.1  541.2 1151.3  599.9  361.4 961.3  --  -- --    Precedex Volume 342.1 371.2 713.3 271.9 217.4 489.3 -- -- --    IV Piggyback Volume (IV Piggyback) 100 100 200 200 100 300 -- -- --    IV Volume 120 30 150 80 20 100 -- -- --    IV Volume (Fentanyl) 48 40 88 48 24 72 -- -- --    Other  250  150 400  150  120 270  --  -- --    Medications (P.O./ Enteral Liquids) 250 150 400 150 120 270 -- -- --    Enteral  860  840 1700  810  700 1510  --  -- --    Free Water / Tube Flush 200 180 380 150 150 300 -- -- --    Intake (mL) (Enteral Tube Right Nare Cortrak Gastric Feeding Tube)   -- -- --    Total Intake 1720.1 1531.2 3251.3 1559.9 1181.4 2741.3 -- -- --       Output    Urine  1600  1800 3400  2045  700 2745  --  -- --    Output (mL) (Urinary Catheter Indwelling Catheter 18F) 1600 1800 3400 2045 700 2745 -- -- --    Total Output 1600 1800 3400 2045 700 2745 -- -- --       Net I/O     120.1 -268.8 -148.7 -485.1 481.4 -3.8 -- -- --        Weight: 81.2 kg (179 lb 0.2 oz)  Recent Labs      06/19/18   0443  06/20/18   0530   SODIUM  138  136   POTASSIUM  4.8  4.2   CHLORIDE  103  98   CO2  29  32   BUN  41*  32*   CREATININE  1.11  1.06   CALCIUM  7.9*  7.9*       GI/Nutrition:  Exam: abdomen is soft and non-tender, normal active bowel sounds, no CVAT,  unchanged again    Imaging: Available data reviewed     NPO and tube feed  Tolerated     Liver Function  Recent Labs      183  18   0530   ALTSGPT   --   192*   ASTSGOT   --   150*   ALKPHOSPHAT   --   142*   TBILIRUBIN   --   0.4   GLUCOSE  185*  242*       Heme:  Recent Labs      183  18   0530   RBC  2.64*  2.88*   HEMOGLOBIN  7.6*  8.3*   HEMATOCRIT  24.9*  26.7*   PLATELETCT  335  433       Infectious Disease:  Monitored Temp 2  Av.7 °C (99.9 °F)  Min: 37.1 °C (98.8 °F)  Max: 38.5 °C (101.3 °F)  Temp  Av.1 °C (100.6 °F)  Min: 37.5 °C (99.5 °F)  Max: 38.6 °C (101.5 °F)  Micro: reviewed.    No growth to date  Recent Labs      18   0530   WBC  14.4*  13.2*   NEUTSPOLYS  68.80  69.70   LYMPHOCYTES  11.70*  11.00*   MONOCYTES  6.40  1.90   EOSINOPHILS  7.10*  11.00*   BASOPHILS  0.30  0.00   ASTSGOT   --   150*   ALTSGPT   --   192*   ALKPHOSPHAT   --   142*   TBILIRUBIN   --   0.4     Current Facility-Administered Medications   Medication Dose Frequency Provider Last Rate Last Dose   • ALPRAZolam (XANAX) tablet 0.5 mg  0.5 mg Q8HRS Evan Chen D.O.   0.5 mg at 18   • insulin glargine (LANTUS) injection 10 Units  10 Units Q EVENING Mathew Diez, D.O.   10 Units at 18   • QUEtiapine (SEROQUEL) tablet 50 mg  50 mg Q8HR Evan Segura M.D.   50 mg at 18 0108   • midodrine (PROAMATINE) tablet 10 mg  10 mg TID WITH MEALS Evan Segura M.D.       • haloperidol lactate (HALDOL) injection 1-5 mg  1-5 mg Q4HRS PRN Evan Segura M.D.   5 mg at 18 0745   • piperacillin-tazobactam (ZOSYN) 4.5 g in  mL IVPB  4.5 g Q8HRS Evan Segura M.D. 25 mL/hr at 18 4.5 g at 18   • oxyCODONE immediate-release (ROXICODONE) tablet 5 mg  5 mg Q6HRS MEREDITH Covington.O.   5 mg at 18   • potassium bicarbonate (KLYTE) 25 MEQ effervescent tablet TBEF 25 mEq  25 mEq Q12HRS Evan Chen D.O.   25 mEq at 18   • midazolam (VERSED) 2 MG/2ML  injection 1 mg  1 mg Q HOUR PRN Evan Segura M.D.   1 mg at 06/20/18 2253   • norepinephrine (LEVOPHED) 8 mg in  mL Infusion  0-30 mcg/min Continuous Evan Segura M.D. 16.9 mL/hr at 06/21/18 0252 9 mcg/min at 06/21/18 0252   • linezolid (ZYVOX) tablet 600 mg  600 mg Q12HRS Oliver Carcamo M.D.   600 mg at 06/20/18 2007   • GENTEAL TEARS NIGHT-TIME OINT 1 Application  1 Application Q8HRS Oliver Carcamo M.D.   1 Application at 06/21/18 0600   • famotidine (PEPCID) tablet 20 mg  20 mg DAILY Evan Chen D.O.   20 mg at 06/20/18 0713   • furosemide (LASIX) injection 20 mg  20 mg Q12HRS Oliver Carcamo M.D.   20 mg at 06/20/18 2007   • fentaNYL (SUBLIMAZE) 50 mcg/mL in 50mL (Continuous Infusion)   Continuous Oliver Carcamo M.D. 3 mL/hr at 06/21/18 0415 150 mcg/hr at 06/21/18 0415   • dexmedetomidine (PRECEDEX) 400 mcg in D5W 100 mL infusion  0.1-1.5 mcg/kg/hr Continuous Oliver Carcamo M.D. 13.2 mL/hr at 06/21/18 0518 0.6 mcg/kg/hr at 06/21/18 0518   • labetalol (NORMODYNE,TRANDATE) injection 10-20 mg  10-20 mg Q HOUR PRN Mathew Gama M.D.   20 mg at 06/15/18 1151   • insulin regular (HUMULIN R) injection 3-14 Units  3-14 Units Q6HRS Oliver Carcamo M.D.   4 Units at 06/21/18 0533    And   • glucose 4 g chewable tablet 16 g  16 g Q15 MIN PRN Oliver Carcamo M.D.        And   • dextrose 50% (D50W) injection 25 mL  25 mL Q15 MIN PRN Oliver Carcamo M.D.       • rosuvastatin (CRESTOR) tablet 20 mg  20 mg Q EVENING Oliver Carcamo M.D.   20 mg at 06/20/18 2007   • sertraline (ZOLOFT) tablet 50 mg  50 mg QDAY Oliver Carcamo M.D.   50 mg at 06/20/18 2031   • MD ALERT...Adult ICU Electrolyte Replacement per Pharmacy Protocol   pharmacy to dose Jeremy M Gonda, M.D.       • Pharmacy Consult: Enteral tube feeding - review meds/change route/product selection  1 Each PRN Jeremy M Gonda, M.D.       • fentaNYL (SUBLIMAZE) injection 25 mcg  25 mcg Q HOUR PRN Jeremy M Gonda, M.D.   Stopped at 06/14/18  1719    Or   • fentaNYL (SUBLIMAZE) injection 50 mcg  50 mcg Q HOUR PRN Jeremy M Gonda, M.D.   50 mcg at 06/15/18 1724    Or   • fentaNYL (SUBLIMAZE) injection 100 mcg  100 mcg Q HOUR PRN Jeremy M Gonda, M.D.   100 mcg at 06/18/18 1529   • ipratropium-albuterol (DUONEB) nebulizer solution  3 mL Q2HRS PRN (RT) Jeremy M Gonda, M.D.       • ipratropium-albuterol (DUONEB) nebulizer solution  3 mL Q4HRS (RT) Jeremy M Gonda, M.D.   3 mL at 06/21/18 0222   • aspirin (ASA) chewable tab 81 mg  81 mg DAILY Jeremy M Gonda, M.D.   81 mg at 06/20/18 0713   • gabapentin (NEURONTIN) capsule 300 mg  300 mg TID Jeremy M Gonda, M.D.   300 mg at 06/21/18 0518   • acetaminophen (TYLENOL) tablet 650 mg  650 mg Q6HRS PRN Jeremy M Gonda, M.D.   650 mg at 06/20/18 1712   • guaiFENesin dextromethorphan (ROBITUSSIN DM) 100-10 MG/5ML syrup 10 mL  10 mL Q6HRS PRN Jeremy M Gonda, M.D.       • senna-docusate (PERICOLACE or SENOKOT S) 8.6-50 MG per tablet 2 Tab  2 Tab BID Jeremy M Gonda, M.D.   2 Tab at 06/20/18 2007    And   • polyethylene glycol/lytes (MIRALAX) PACKET 1 Packet  1 Packet QDAY PRN Jeremy M Gonda, M.D.   1 Packet at 06/19/18 1500    And   • magnesium hydroxide (MILK OF MAGNESIA) suspension 30 mL  30 mL QDAY PRN Jeremy M Gonda, M.D.   30 mL at 06/14/18 0737    And   • bisacodyl (DULCOLAX) suppository 10 mg  10 mg QDAY PRN Jeremy M Gonda, M.D.   10 mg at 06/20/18 0713   • Respiratory Care per Protocol   Continuous RT Sherrell T. Higgins, M.D.       • heparin injection 5,000 Units  5,000 Units Q8HRS Sherrell Higgins M.D.   5,000 Units at 06/21/18 0518     Last reviewed on 6/9/2018 10:32 PM by Ana Purcell, [facility]    Quality  Measures:   Medications reviewed, Labs reviewed and Radiology images reviewed                      Assessment/Plan:  Acute hypoxic/hypercarbic respiratory failure    -intubated 6/10    -serial ABG/chest x-ray/ventilator mechanics reviews   -continue full vent support, ventilator settings adjusted today; not  appropriate for liberation     wean FiO2 and PEEP    Tolerating dropped PEEP of 10, not ready for SBT   -RT/O2 protocols   -Minimize sedation, dexmedetomidine and fentanyl as needed   -diuresis as tolerated fluid balance mildly negative    -Discussed the possibility of tracheostomy and LTAC transfer with daughter 6/19, NS eval 1st  Acute pulmonary embolus   -Abnormal CTA 6/21   -NIVT bilaterally ordered   -Heparin via weight-based protocol   -Coumadin therapy  Hypotension suspect related to medications   -Doubt sepsis/other causes of hypotension at this juncture -monitoring    -Monitor fluid balance closely, appears euvolemic to hypervolemic   -Continue midodrine PO but increase dose to 10 mg 3 times daily   -Titrate norepinephrine as needed requiring  high dose at times , add vasopressin as needed   -Limit hypotensive inducing sedation as safe/tolerated  Community-acquired pneumonia    -Febrile, increasing leukocytosis and worsening chest x-ray initially   -Suspicion for new HCAP versus other source of new infection   -Antibiotics escalated to Zosyn and vancomycin - BAL 6/15 light growth yeast   -change vanco to linazolid 6/17; complete 7 day course   Septic shock from pulmonary source with associated cardiac and respiratory failure   -s/p pressorsbut now back on them query medication related or persistent recurrent sepsis    -Lactic acidosis - trend resolved  Abnormal MRI brain with contrast   -Primary brain tumor versus metastatic disease suspected, PRES and CVA much less likely    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   -neurosurgical consultation reviewed 6/21, follow-up MRI in 2 months   -Seizure prophylaxis -  No for now   -Minimal vasogenic edema, hold on Decadron for now  Encephalopathy/agitation   -Suspect toxic metabolic secondary to sepsis, pneumonia   -CT head negative   -Check ammonia levels  ×3 normal   -MRI brain with and without contrast reviewed   -Resume home Xanax, and as needed haloperidol   -Continue to try to avoid propofol but resume if necessary  Normocytic anemia   -Monitor with conservative transfusion strategy    -serial CBC   -Transfuse per conservative policy  Hyperkalemia -improving, monitor  Acute kidney injury, ATN/prerenal   -Avoid nephrotoxins, monitor urine output and renal function  Mildly elevated troponin - trend  Type 2 diabetes - SSI, ADA tube feed formula  Dyslipidemia - statin  Thrombocytosis - improving  History of tobacco use, 40 pack years, quit a year ago   - BDs  Prophylaxis    Anticoagulate with heparin, weight-based protocol  Up midodrine dose, hold diuresis, try to wean off norepinephrine, add vasopressin as needed   Continue Seroquel -monitor QTC   Wean PEEP to 10 and monitor for desaturation, severely desaturated a few days ago with the prior attempt  Fam conference t again tomorrow   Bowel care protocols initiated for constipation  Up Lantus dose with high sliding scale insulin use in the last 24 hours    Updated daughter times several and had a lengthy conversation with hospitalist and palliative care/  Consider trach/LTACH vs current rx vs CC    Patient is critically ill. I have adjusted ventilator settings.  Ongoing critical care management as above.  She remains full treat but partial CODE STATUS - no CPR/defib.  Discussed patient condition and risk of morbidity and/or mortality with Family, RN, RT, Pharmacy, , Charge nurse / hot rounds, hospitalist and neurology and Palliative care team and Dr Kelle Pavon.    The patient remains critically ill.  Critical care time = 45 prophylaxis minutes in directly providing and coordinating critical care and extensive data review.  No time overlap and excludes procedures.

## 2018-06-22 NOTE — CARE PLAN
Problem: Nutritional:  Goal: Nutrition support tolerated and meeting greater than 85% of estimated needs  Outcome: MET Date Met: 06/22/18

## 2018-06-22 NOTE — PALLIATIVE CARE
"Palliative Care follow-up  PC RN met with pt's daughters, Socorro and Iram Dunn (DPOA) to discuss pt's current medical status. Pt continues to require norepinephrine for hypotension. PC RN updated pt's daughter, Iram on pt's current status, as Iram just arrived from the [facility]. CT scan of chest, abdomen, and pelvis earlier today. L3 vertebral body consistent with Schmorl's node as well as bilateral pulmonary emboli. Prior MRI revealed potential prior subacute infarct. Neurosurgeon, Jaime Powell met with pt's daughter's earlier today to discuss the above results. During our discussion, Iram requested wanting to talk further with \"the doctor,\" despite already having multiple conversations with physician/neurosurgeon/palliative. PC RN discussed pt's likelihood of being in a long tern facility with a tracheostomy. At this point, both Socorro and Iram not on board with withdrawing care. Both, Socorro and Iram would like to see how pt does over the next day or two.  PC RN provided emotional support and active listening during family discussion. PC RN provided PC business card and suggested that Socorro/Iram call with any questions.       Updated: Dr. Diez, BS RN, Palliative    Plan: PC to continue to follow pt during hospital course.     Thank you for allowing Palliative Care to participate in this patient's care. Please feel free to call [phone] with any questions or concerns.  "

## 2018-06-22 NOTE — CARE PLAN
Problem: Safety  Goal: Will remain free from injury  Outcome: PROGRESSING AS EXPECTED    Goal: Will remain free from falls  Outcome: PROGRESSING AS EXPECTED      Problem: Bowel/Gastric:  Goal: Normal bowel function is maintained or improved  Outcome: PROGRESSING AS EXPECTED    Goal: Will not experience complications related to bowel motility  Outcome: PROGRESSING AS EXPECTED      Problem: Psychosocial Needs:  Goal: Level of anxiety will decrease  Outcome: PROGRESSING AS EXPECTED

## 2018-06-22 NOTE — DISCHARGE PLANNING
Medical SW    Sw attended AM IDT Rounds.    RN reports, pt nods appropriately, can get agitated, tube feeds, 5-6 BMs since midnight, vent day 13,     Dtr, [place], indicated she would like to decide comfort care today if sister agrees      Plan: Sw to assist w/ d/c planning as needed.

## 2018-06-22 NOTE — CARE PLAN
Problem: Ventilation Defect:  Goal: Ability to achieve and maintain unassisted ventilation or tolerate decreased levels of ventilator support  Outcome: PROGRESSING AS EXPECTED  Adult Ventilation Update    Total Vent Days: 12    Patient Lines/Drains/Airways Status    Active Airway     Name: Placement date: Placement time: Site: Days:    Airway Group ET Tube 7.5 06/10/18   0840      11              Plateau Pressure (Q Shift): 25 (06/21/18 1553)  Static Compliance (ml / cm H2O): 23.8 (06/21/18 1553)    Patient failed trials because of Barriers to Wean: FiO2 >60% or PEEP >10 CM H2O (06/21/18 0654)  Barriers to SBT Weaning Trial Stopped due to:: Abnormal breathing pattern (paradoxical respiratory pattern, use of accessory muscles, etc) (06/16/18 0713)  Length of Weaning Trial      Cough:  (Suction not indicated at this time) (06/21/18 1553)  Sputum Amount: Unable to Evaluate (06/21/18 1040)  Sputum Color: Unable to Evaluate (06/21/18 1040)  Sputum Consistency: Unable to Evaluate (06/21/18 1040)    Mobility  Level of Mobility: Level I (06/20/18 2000)  Activity Performed: Unable to mobilize (06/21/18 0800)  Assistance / Tolerance: Assistance of Two or More (06/17/18 0800)  Pt Calls for Assistance: No (06/20/18 2000)  Staff Present for Mobilization: RN (06/18/18 0800)  Gait: Unable to Ambulate (06/21/18 0400)  Assistive Devices: None (06/18/18 1600)  Reason Not Mobilized: Unstable condition (PEEP 12) (06/21/18 0800)  Mobilization Comments: PEEP/CPOT (06/20/18 1600)    Events/Summary/Plan: Vent check (06/21/18 1553)

## 2018-06-22 NOTE — WOUND TEAM
Wound team note:   Pt seen for placement of BMS. MD order verified. Pt assessed, noted to be incontinent of loose brown stool. Sacrum/buttocks pink and intact, expect for mild IAD. Sphincter tone determined to be adequate. BMS placed without difficulty, 40 mL of tap water placed in retention balloon, irrigated with 120 mL warm tap water. Nursing to irrigate q shift with 60 mL-120 mL warm tap water or until patent.

## 2018-06-22 NOTE — ASSESSMENT & PLAN NOTE
Noted on MRI brain 6/2018.  Neurosurgery wants repeat MRI brain in a few months.  No current recommendation of biopsy as patient now on heparin due to PE  CT Chest, Abd,Pelvis ordered and not showing mass or c/o primary malignancy

## 2018-06-22 NOTE — PROGRESS NOTES
[facility] Hospitalist Progress Note    Date of Service: 2018    Chief Complaint  73 y.o. female admitted 2018 with shortness of breath with a concern of sepsis.    Interval Problem Update  Met with daughter this am and decision made between daughters to go Hospice.  NSR to sinus tach.  On ventilator.  Low urine output overnight despite lasix.  CVP: 4  Given 250cc fluid bolus this am. Six bowel movements since midnight.  Fio2:50% and PEEP:10.      Consultants/Specialty  Pulmonary    Disposition  May leave in ICU.          Review of Systems   Unable to perform ROS: Intubated      Physical Exam  Laboratory/Imaging   Hemodynamics  Temp (24hrs), Av.6 °C (99.6 °F), Min:37.2 °C (99 °F), Max:37.9 °C (100.2 °F)   Temperature: 37.8 °C (100 °F), Monitored Temp: 37.6 °C (99.7 °F)  Pulse  Av.3  Min: 40  Max: 146 Heart Rate (Monitored): (!) 0  NIBP: (!) 50/36 CVP (mm Hg): 5 MM HG    Respiratory  Weeks Vent Mode: APVCMV, Rate (breaths/min): 24, PEEP/CPAP: 10, PEEP/CPAP: 10, FiO2: 40, P Peak (PIP): 28, P MEAN: 16   Respiration: (!) 4, Pulse Oximetry: (!) 25 %     Work Of Breathing / Effort: Vented  RUL Breath Sounds: Fine Crackles, RML Breath Sounds: Fine Crackles, RLL Breath Sounds: Diminished, DEIDRA Breath Sounds: Fine Crackles, LLL Breath Sounds: Diminished    Fluids    Intake/Output Summary (Last 24 hours) at 189  Last data filed at 18 1800   Gross per 24 hour   Intake          2284.14 ml   Output              230 ml   Net          2054.14 ml       Nutrition  Orders Placed This Encounter   Procedures   • Diet NPO     Standing Status:   Standing     Number of Occurrences:   1     Order Specific Question:   Type:     Answer:   Now [1]     Order Specific Question:   Restrict to:     Answer:   Strict [1]     Physical Exam   Constitutional: She appears ill. No distress. She is sedated, intubated and restrained.   HENT:   Head: Normocephalic and atraumatic.   Nose: Nose normal.   Eyes: Conjunctivae are  normal. Right eye exhibits no discharge. Left eye exhibits no discharge. No scleral icterus.   Neck: No tracheal deviation present.   Cardiovascular: Normal rate, regular rhythm, normal heart sounds and intact distal pulses.    No murmur heard.  Pulses:       Radial pulses are 2+ on the right side, and 2+ on the left side.        Dorsalis pedis pulses are 2+ on the right side, and 2+ on the left side.   Pulmonary/Chest: Effort normal and breath sounds normal. She is intubated. No respiratory distress.   Abdominal: Soft. Bowel sounds are normal. She exhibits no distension.   Musculoskeletal: She exhibits edema.   Skin: Skin is warm. She is not diaphoretic.   Vitals reviewed.      Recent Labs      06/20/18   0530  06/21/18   0530  06/22/18   0341   WBC  13.2*  14.9*  17.9*   RBC  2.88*  3.10*  3.05*   HEMOGLOBIN  8.3*  8.7*  8.8*   HEMATOCRIT  26.7*  28.7*  28.0*   MCV  92.7  92.6  91.8   MCH  28.8  28.1  28.9   MCHC  31.1*  30.3*  31.4*   RDW  51.7*  52.9*  53.1*   PLATELETCT  433  490*  497*   MPV  11.3  10.9  11.1     Recent Labs      06/20/18   0530  06/21/18   0530  06/22/18   0341   SODIUM  136  135  137   POTASSIUM  4.2  4.4  5.4   CHLORIDE  98  97  95*   CO2  32  32  31   GLUCOSE  242*  252*  172*   BUN  32*  31*  44*   CREATININE  1.06  1.17  1.97*   CALCIUM  7.9*  8.1*  7.8*     Recent Labs      06/21/18   1451  06/21/18   2125  06/22/18   0341  06/22/18   0918   APTT  30.0  82.6*  74.6*  68.7*   INR  1.26*   --    --    --                   Assessment/Plan     * Severe sepsis (HCC)   Assessment & Plan    zyvox & zosyn  yeast on respiratory culture  Mild decrease in WBC  Monitor vitals, strict I/O's        Brain mass   Assessment & Plan    Noted on MRI brain 6/2018.  Neurosurgery wants repeat MRI brain in a few months.  No current recommendation of biopsy as patient now on heparin due to PE  CT Chest, Abd,Pelvis ordered and not showing mass or c/o primary malignancy        Acute pulmonary embolism (HCC)    Assessment & Plan    Seen on CT chest/abd/pelvis from 6/21/18  Heparin drip   LE DVT U/S ordered        Atrial fibrillation with RVR ([facility])   Assessment & Plan    Monitoring on telemetry  s/p amiodarone         Acute metabolic encephalopathy   Assessment & Plan    MRI brain with contrast concerning for brain mass.  Consulted neurosurgery and no planned surgery. Future MRI brain was considered  Sedation as needed for agitation        Pneumonia due to other specified bacteria ([facility])   Assessment & Plan    zyvox and zosyn  On ventilator.  RT protocol  Pulmonary consulting.        Acute respiratory failure with hypoxia ([facility])   Assessment & Plan    Monitor daily ABG, CXR, vitals, strict I/O's  Pulmonary consulting  RT protocol  Wean sedation as able        Elevated liver enzymes   Assessment & Plan    Monitor cmp  CT abd w/o acute finding  Probable medication adverse effect.        Delirium   Assessment & Plan    neurochecks  Intubated and sedated.  Will assess with weaning off sedation        HTN (hypertension)   Assessment & Plan    Monitor vitals.  Holding outpatient lisinopril initially due to renal function.  Restart once BP improves        SY (acute kidney injury) ([facility])   Assessment & Plan    Likely prerenal from hypovolumia  Improving with IV fluids.  Monitor BMP        Severe protein-calorie malnutrition (HCC)   Assessment & Plan    Enteral feeding  Likely of chronic disease.  Monitoring CMP        Shock ([facility])   Assessment & Plan    Improving with IV fluids  Due to sepsis and dehydration  Monitor I/O's vitals.  Pressors as needed to keep MAP >65 and SBP >90        T2DM (type 2 diabetes mellitus) ([facility])   Assessment & Plan    Monitor accuchecks and cover with SSI        HLD (hyperlipidemia)   Assessment & Plan    Active therapy with rosuvastatin        History of atrial fibrillation without current medication- (present on admission)   Assessment & Plan    Monitoring telemetry        Decision made for comfort care.   Stopping labs/imaging and meds (otherthan that for comfort).  Discussed with Dr Segura and patient's daughter.  Quality-Core Measures   Reviewed items::  Labs reviewed, Medications reviewed and Radiology images reviewed  Jamison catheter::  Critically Ill - Requiring Accurate Measurement of Urinary Output and Unconscious / Sedated Patient on a Ventilator  DVT prophylaxis pharmacological::  Heparin  Antibiotics:  Treating active infection/contamination beyond 24 hours perioperative coverage

## 2018-06-22 NOTE — PROGRESS NOTES
Pulmonary Critical Care Progress Note        Date of Admission:  6/9/2018    Chief Complaint: SOB    History of Present Illness: 73 y.o. female with known past   medical history of type 2 diabetes, questionable bipolar disorder, history of   CVA, dyslipidemia.  The patient indicates that she was in her normal state of   health up until approximately 2 days ago, at which point in time, she had   increasing shortness of breath.  She denied any significant fever, chills.    Denied any headache, visual changes, has not had any cough or sputum   production.  No nausea, vomiting, abdominal pain or discomfort.  Has   intermittent chronic diarrhea, nothing more than usual.  Denies any dysuria or   lower extremity edema.  Further, she denies any sick contacts or recent   travels.  No history of DVTs or PEs in the past.  She indicates that the   shortness of breath has been progressive with increasing dyspnea on exertion.    She does not associate with positioning being any worse or better.  She could   lie flat and just as dyspneic as sitting up forward.  She denies any change   in her medications.  She does have tobacco history, which she quit about 1   year ago, smoking 1 pack a day x40 years.  Denies any alcohol or illicit drug   use.  She further denies any underlying heart disease or history of MI in the   past.       ROS:  Respiratory: unable to perform due to the patient's inability to effectively communicate, Cardiac: unable to perform due to the patient's inability to effectively communicate, GI: unable to perform due to the patient's inability to effectively communicate.      Interval Events:  24 hour interval history reviewed     Vent day #13  CXR improved bilat opacities/edema/atx  fiO2  0.50  PEEP 10  Secretions  Tm 38.6 - DEX fever?  No new + cultures  TF 30/hr - residual better  Stooling  UO low - lasix  Heparin 1050No bleeding clinically  Fent 150  DEX off  NE 10 - SBp remains labile  CVP 3  Rectal tube place  last night       YESTERDAY  Agitated following some  Labile Bp  Norepinephrine 15  Tm 101.2 - better with weaning DEX  Fent 150  DEX 0.6  Xanax/Seroquel  Sister coming in today  CT ABD/chest/pelvis pending  NS to see today  Zyvox/Zosyn 7/7 - WBC 14,9  Lasix  I/Os  SSI 18units/24 hrs  Midodrine    CT scan chest/abdomen/pelvis noted, no obvious tumor, definite bilateral pulmonary emboli    Neuro surgery consultation noted, follow-up MRI in 2 months     Long conversation with local sister times several today longer conversation with both sisters, and children and spouse at bedside at great length outlining current status and prognosis and possible treatment options, family considering options, patient has no capacity to participate in these conversations at this time    Patient tolerating dropping of PEEP to 10, O2 saturations holding  Patient remains on intravenous norepinephrine for hypotension     YESTERDAY  Recurrent hypotension requiring reinitiation of intravenous pressors  Another daughter coming today  D/w daughter who lives in [place] - her sister is in agreement with plan  They both agree no trach or CNS surgery  NS consult pending with Dr Alarcon still?  Awake, not following  More calm with xanax and haldol, less agitation   DEX 1.5  FENT 200  No Versed  SR 70s  SBp labile  Norepinephrine 7  Midodrine 5 tid  I/Os  Neg 149cc/24hrs  UO adequate  TF goal  Tm 100  No BM  Vent day #11  40% PEEP 14  Zyvox/Zosyn 6/7  SSI 14 units/24 hrs    Serial follow-up finds the patient remaining on pressors  Mechanics of breathing unchanged, patient remains inappropriate for any breathing trials  Neurologic status unchanged  Updated daughter at length again at bedside x2   Case reviewed with length with case management and palliative care team      PFSH:  No change.    Respiratory:  Weeks Vent Mode: APVCMV, Rate (breaths/min): 24, Vt Target (mL): 340, PEEP/CPAP: 10, FiO2: 40, Static Compliance (ml / cm H2O): 23.7, Control VTE  (exp VT): 338  Pulse Oximetry: 100 %  Ventilator mechanics and waveforms are reviewed at the bedside with RT again          Exam: unlabored respirations, no intercostal retractions or accessory muscle use on full vent support   Diminished breath sounds at the bases, bilateral coarse rales   Tachypneic but not labored   Secretions unchanged    no delta       ImagingAvailable data reviewed   Recent Labs      06/20/18   0510  06/21/18   0347  06/22/18   0358   ISTATAPH  7.441  7.483  7.479   ISTATAPCO2  50.5*  46.4*  45.9*   ISTATAPO2  72  67  65   ISTATATCO2  36*  36*  36*   [id number]  95  94  94   ISTATARTHCO3  34.3*  34.8*  34.2*   ISTATARTBE  9*  10*  9*   ISTATTEMP  37.4 C  37.5 C  37.2 C   ISTATFIO2  40  40  40   ISTATSPEC  Arterial  Arterial  Arterial   ISTATAPHTC  7.435  7.475  7.476   [id number]  74  70  66       HemoDynamics:  Pulse: 92, Heart Rate (Monitored): 92  NIBP: (!) 95/51  CVP (mm Hg): (!) 11 MM HG     Norepinephrine    Exam: regular rhythm (Sinus), tachycardia, no further AF, no murmur, pulses intact okay capillary refill, unchanged    Imaging: Available data reviewed      Amiodarone infusion stopped  Neuro:  GCS Total Luis Coma Score: 11     sedated on fentanyl and dexmedetomidine     Exam: no focal deficits noted Agitated Delirious Confused, moving all 4 extremities, not purposeful, not following, exam remains unchanged unfortunately again    Imaging: Available data reviewed     MRI brain p.m. 6/17, no contrast  1. Age-related cerebral atrophy.  2. Mild periventricular white matter changes consistent with chronic microvascular ischemic gliosis.  3. Interval development of moderate-sized region of increased T2 signal intensity in the juxtacortical white matter in the left posterior frontal lobe. This could represent an area of venous infarction, gliosis, posterior reversible encephalopathy   syndrome, or possibly primary or metastatic brain neoplasm. Recommend limited contrast enhanced images  through the brain.  4. Previous endovascular coiling of bilateral posterior communicating artery aneurysms.    MRI with contrast PM 6/18  1.  14 x 8 x 10 mm faintly enhancing structure centered in a region of T1 signal hypointensity in the left juxta cortical posterior frontal lobe white matter which could represent primary or metastatic lesion, less likely subacute infarct.  2.  Changes consistent with endovascular repair of bilateral posterior communicating artery aneurysms are again noted.  3.  Mild diffuse cerebral substance loss.  4.  Bilateral mastoid effusions. Findings could be consistent with mastoiditis in the appropriate clinical setting.    Fluids:  Intake/Output       06/20/18 0700 - 06/21/18 0659 06/21/18 0700 - 06/22/18 0659 06/22/18 0700 - 06/23/18 0659      0700-1859 1900-0659 Total 0700-1859 1900-0659 Total [id number] 1900-0659 Total       Intake    I.V.  599.9  511.8 1111.7  1057.7  532.2 1589.9  --  -- --    Precedex Volume 271.9 243.8 515.7 119.8 0 119.8 -- -- --    Norepinephrine Volume -- -- -- 742.6 162.2 904.8 -- -- --    Heparin Volume -- -- -- 59.3 210 269.3 -- -- --    IV Piggyback Volume (IV Piggyback) 200 200 400 100 100 200 -- -- --    IV Volume 80 20 100 -- 30 30 -- -- --    IV Volume (Fentanyl) 48 48 96 36 30 66 -- -- --    Other  150  150 300  300  -- 300  --  -- --    Medications (P.O./ Enteral Liquids) 150 150 300 300 -- 300 -- -- --    Enteral  810  900 1710  175  550 725  --  -- --    Free Water / Tube Flush 150 240 390 120 -- 120 -- -- --    Intake (mL) (Enteral Tube Right Nare Cortrak Gastric Feeding Tube)  55 550 605 -- -- --    Total Intake 1559.9 1561.8 3121.7 1532.7 1082.2 2614.9 -- -- --       Output    Urine  2045  1200 3245  1150  225 1375  --  -- --    Output (mL) (Urinary Catheter Indwelling Catheter 18F) 2045 1200 3245 7656 627 4942 -- -- --    Drains  --  -- --  275  0 275  --  -- --    Residual Amount (ml) (Discarded) -- -- -- 275 0 275 -- -- --    Stool   --  -- --  --  -- --  --  -- --    Number of Times Stooled -- -- -- -- 3 x 3 x -- -- --    Total Output 2045 1200 3245 8730 656 4635 -- -- --       Net I/O     -485.1 361.8 -123.4 107.7 857.2 964.9 -- -- --           Recent Labs      18   0341   SODIUM  136  135  137   POTASSIUM  4.2  4.4  5.4   CHLORIDE  98  97  95*   CO2  32  32  31   BUN  32*  31*  44*   CREATININE  1.06  1.17  1.97*   CALCIUM  7.9*  8.1*  7.8*       GI/Nutrition:  Exam: abdomen is soft and non-tender, normal active bowel sounds, no CVAT,  unchanged again    Imaging: Available data reviewed     NPO and tube feed Tolerated     Liver Function  Recent Labs      18   0341   ALTSGPT  192*   --    --    ASTSGOT  150*   --    --    ALKPHOSPHAT  142*   --    --    TBILIRUBIN  0.4   --    --    GLUCOSE  242*  252*  172*       Heme:  Recent Labs      18   0530  18   0530  18   1451  185  18   0341   RBC  2.88*  3.10*   --    --   3.05*   HEMOGLOBIN  8.3*  8.7*   --    --   8.8*   HEMATOCRIT  26.7*  28.7*   --    --   28.0*   PLATELETCT  433  490*   --    --   497*   PROTHROMBTM   --    --   15.5*   --    --    APTT   --    --   30.0  82.6*  74.6*   INR   --    --   1.26*   --    --        Infectious Disease:  Monitored Temp 2  Av.9 °C (100.3 °F)  Min: 36.9 °C (98.4 °F)  Max: 38.8 °C (101.8 °F)  Temp  Av.4 °C (101.1 °F)  Min: 37.8 °C (100 °F)  Max: 38.7 °C (101.7 °F)  Micro: reviewed.    No growth to date  Recent Labs      18   0530  18   0530  18   0341   WBC  13.2*  14.9*  17.9*   NEUTSPOLYS  69.70  63.00  68.70   LYMPHOCYTES  11.00*  14.70*  14.80*   MONOCYTES  1.90  6.20  3.50   EOSINOPHILS  11.00*  9.50*  12.20*   BASOPHILS  0.00  0.40  0.00   ASTSGOT  150*   --    --    ALTSGPT  192*   --    --    ALKPHOSPHAT  142*   --    --    TBILIRUBIN  0.4   --    --      Current Facility-Administered Medications    Medication Dose Frequency Provider Last Rate Last Dose   • heparin injection 2,600 Units  2,600 Units PRN Mathew Diez D.O.        And   • heparin infusion 25,000 units in 500 ml 0.45% nacl   Continuous Mathew Diez D.O. 21 mL/hr at 06/22/18 0455 1,050 Units/hr at 06/22/18 0455   • ALPRAZolam (XANAX) tablet 0.5 mg  0.5 mg Q8HRS Evan Chen D.O.   0.5 mg at 06/22/18 0537   • insulin glargine (LANTUS) injection 10 Units  10 Units Q EVENING Mathew Diez D.O.   10 Units at 06/21/18 2132   • QUEtiapine (SEROQUEL) tablet 50 mg  50 mg Q8HR Evan Segura M.D.   50 mg at 06/22/18 0218   • midodrine (PROAMATINE) tablet 10 mg  10 mg TID WITH MEALS Evan Segura M.D.   10 mg at 06/21/18 1713   • haloperidol lactate (HALDOL) injection 1-5 mg  1-5 mg Q4HRS PRN Evan Segura M.D.   5 mg at 06/21/18 2349   • piperacillin-tazobactam (ZOSYN) 4.5 g in  mL IVPB  4.5 g Q8HRS Evan Segura M.D. 25 mL/hr at 06/22/18 0523 4.5 g at 06/22/18 0523   • oxyCODONE immediate-release (ROXICODONE) tablet 5 mg  5 mg Q6HRS ROMELIA CovingtonOKimberley   5 mg at 06/22/18 0537   • potassium bicarbonate (KLYTE) 25 MEQ effervescent tablet TBEF 25 mEq  25 mEq Q12HRS Evan Chen D.O.   25 mEq at 06/21/18 2114   • midazolam (VERSED) 2 MG/2ML injection 1 mg  1 mg Q HOUR PRN Evan Segura M.D.   1 mg at 06/22/18 0332   • norepinephrine (LEVOPHED) 8 mg in  mL Infusion  0-30 mcg/min Continuous Evan Segura M.D. 15 mL/hr at 06/22/18 0305 8 mcg/min at 06/22/18 0305   • GENTEAL TEARS NIGHT-TIME OINT 1 Application  1 Application Q8HRS Oliver Carcamo M.D.   1 Application at 06/22/18 0600   • famotidine (PEPCID) tablet 20 mg  20 mg DAILY Evan Chen D.OKimberley   20 mg at 06/21/18 0736   • furosemide (LASIX) injection 20 mg  20 mg Q12HRS Oliver Carcamo M.D.   20 mg at 06/21/18 2114   • fentaNYL (SUBLIMAZE) 50 mcg/mL in 50mL (Continuous Infusion)   Continuous Oliver Carcamo M.D. 3 mL/hr at 06/21/18 0724 150 mcg  at 06/21/18 1408   • dexmedetomidine (PRECEDEX) 400 mcg in D5W 100 mL infusion  0.1-1.5 mcg/kg/hr Continuous Oliver Carcamo M.D.   Stopped at 06/21/18 1725   • labetalol (NORMODYNE,TRANDATE) injection 10-20 mg  10-20 mg Q HOUR PRN Mathew Gama M.D.   20 mg at 06/15/18 1151   • insulin regular (HUMULIN R) injection 3-14 Units  3-14 Units Q6HRS Oliver Carcamo M.D.   3 Units at 06/22/18 0537    And   • glucose 4 g chewable tablet 16 g  16 g Q15 MIN PRN Oliver Carcamo M.D.        And   • dextrose 50% (D50W) injection 25 mL  25 mL Q15 MIN PRN Oliver Carcamo M.D.       • rosuvastatin (CRESTOR) tablet 20 mg  20 mg Q EVENING Oliver Carcamo M.D.   20 mg at 06/21/18 2114   • sertraline (ZOLOFT) tablet 50 mg  50 mg QDAY Oliver Carcamo M.D.   50 mg at 06/21/18 2114   • MD ALERT...Adult ICU Electrolyte Replacement per Pharmacy Protocol   pharmacy to dose Jeremy M Gonda, M.D.       • Pharmacy Consult: Enteral tube feeding - review meds/change route/product selection  1 Each PRN Jeremy M Gonda, M.D.       • fentaNYL (SUBLIMAZE) injection 25 mcg  25 mcg Q HOUR PRN Jeremy M Gonda, M.D.   Stopped at 06/14/18 1719    Or   • fentaNYL (SUBLIMAZE) injection 50 mcg  50 mcg Q HOUR PRN Jeremy M Gonda, M.D.   50 mcg at 06/15/18 1724    Or   • fentaNYL (SUBLIMAZE) injection 100 mcg  100 mcg Q HOUR PRN Jeremy M Gonda, M.D.   100 mcg at 06/18/18 1529   • ipratropium-albuterol (DUONEB) nebulizer solution  3 mL Q2HRS PRN (RT) Jeremy M Gonda, M.D.       • ipratropium-albuterol (DUONEB) nebulizer solution  3 mL Q4HRS (RT) Jeremy M Gonda, M.D.   3 mL at 06/22/18 0204   • aspirin (ASA) chewable tab 81 mg  81 mg DAILY Jeremy M Gonda, M.D.   81 mg at 06/21/18 0737   • gabapentin (NEURONTIN) capsule 300 mg  300 mg TID Jeremy M Gonda, M.D.   300 mg at 06/22/18 0537   • acetaminophen (TYLENOL) tablet 650 mg  650 mg Q6HRS PRN Jeremy M Gonda, M.D.   650 mg at 06/21/18 2113   • guaiFENesin dextromethorphan (ROBITUSSIN DM) 100-10 MG/5ML syrup  10 mL  10 mL Q6HRS PRN Jeremy M Gonda, M.D.       • senna-docusate (PERICOLACE or SENOKOT S) 8.6-50 MG per tablet 2 Tab  2 Tab BID Jeremy M Gonda, M.D.   2 Tab at 06/21/18 2114    And   • polyethylene glycol/lytes (MIRALAX) PACKET 1 Packet  1 Packet QDAY PRN Jeremy M Gonda, M.D.   1 Packet at 06/21/18 2114    And   • magnesium hydroxide (MILK OF MAGNESIA) suspension 30 mL  30 mL QDAY PRN Jeremy M Gonda, M.D.   30 mL at 06/14/18 0737    And   • bisacodyl (DULCOLAX) suppository 10 mg  10 mg QDAY PRN Jeremy M Gonda, M.D.   10 mg at 06/20/18 0713   • Respiratory Care per Protocol   Continuous RT Sherrell Higgins M.D.         Last reviewed on 6/9/2018 10:32 PM by Ana Purcell, [facility]    Quality  Measures:  Medications reviewed, Labs reviewed and Radiology images reviewed                      Assessment/Plan:  Acute hypoxic/hypercarbic respiratory failure    -intubated 6/10    -serial ABG/chest x-ray/ventilator mechanics reviews   -continue full vent support, ventilator settings adjusted today; not appropriate for liberation     wean FiO2 and PEEP    Consider dropping PEEP to 8    Holding lasix for now   -RT/O2 protocols   -Minimize sedation, dexmedetomidine and fentanyl as needed   -Discussed the possibility of tracheostomy and LTAC transfer with daughter 6/19 and 6/21  Acute pulmonary embolus   -Abnormal CTA 6/21   -NIVT bilaterally ordered - pending still   -Heparin via weight-based protocol   -Coumadin therapy to start  Hypotension suspect related to medications   -Doubt sepsis/other causes of hypotension at this juncture -monitoring    -Monitor fluid balance closely, appears euvolemic to hypervolemic   -Continue midodrine PO but increase dose to 10 mg 3 times daily   -Titrate norepinephrine as needed requiring  high dose at times , add vasopressin as needed   -fluid bolus PRN   -Limit hypotensive inducing sedation as safe/tolerated  Community-acquired pneumonia    -Febrile, increasing leukocytosis and worsening  chest x-ray initially   -Suspicion for new HCAP versus other source of new infection   -Antibiotics escalated to Zosyn and vancomycin - BAL 6/15 light growth yeast   -change vanco to linazolid 6/17; complete 7 day course   Septic shock from pulmonary source with associated cardiac and respiratory failure   -s/p pressorsbut now back on them query medication related or persistent recurrent sepsis    -Lactic acidosis - trend resolved  Abnormal MRI brain with contrast   -Primary brain tumor versus metastatic disease suspected, PRES and CVA much less likely    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   -neurosurgical consultation reviewed 6/21, follow-up MRI in 2 months   -Seizure prophylaxis -  No for now   -Minimal vasogenic edema, hold on Decadron for now  Encephalopathy/agitation   -Suspect toxic metabolic secondary to sepsis, pneumonia   -CT head negative   -Check ammonia levels ×3 normal   -MRI brain with and without contrast reviewed -> mass   -Resume home Xanax, and as needed haloperidol   -Continue to try to avoid propofol but resume if necessary  Normocytic anemia   -Monitor with conservative transfusion strategy    -serial CBC   -Transfuse per conservative policy  Hyperkalemia -improving, monitor  Acute kidney injury, ATN/prerenal   -Avoid nephrotoxins, monitor urine output and renal function  Mildly elevated troponin - trend  Type 2 diabetes - SSI, ADA tube feed formula   -Up Lantus dose daily as needed - tracking SSI use  Dyslipidemia - statin  Thrombocytosis - improving  History of tobacco use, 40 pack years, quit a year ago   - BDs  Bowel care protocols initiated for constipation  Prophylaxis    Fluid bolus  Continue NE/Midodrine for hypotension  Continue Seroquel -monitor QTC   Wean PEEP fiO2 as tolerated  Continue heparin - add warfarin  Family conference again later today    Patient is critically  ill. I have adjusted ventilator settings.  Ongoing critical care management as above.  She remains full treat but partial CODE STATUS - no CPR/defib.  Discussed patient condition and risk of morbidity and/or mortality with RN, RT, Pharmacy, , Charge nurse / hot rounds, hospitalist and Palliative care team and Dr Kelle Pavon.    The patient remains critically ill.  Critical care time = 35 prophylaxis minutes in directly providing and coordinating critical care and extensive data review.  No time overlap and excludes procedures.

## 2018-06-22 NOTE — PROGRESS NOTES
RCC    I met with patient's daughters and reviewed the case again at length at the bedside  They are both in agreement now that they believe their mother would want to proceed to comfort care  Will initiate comfort care order set and extubate at 6 PM per their request  There own  is to come by and say a prayer  Case discussed with Dr. Diez who agrees with this plan

## 2018-06-23 NOTE — PROGRESS NOTES
Assumed care of patient. Family at bedside. Pt obtunded at this time. Appears calm and resting with slight intermittent cough. Ativan given for weak cough. Oral suctioning attempted with no sputum obtained. Family needs met at this time. All questions answered. POC and reassurance provided. Family notified to call RN if any needs arise and privacy will be given at this time with minimal interruptions in grieving process.

## 2018-06-23 NOTE — DISCHARGE SUMMARY
Hospital Medicine Death Summary    Date of Note  6/23/2018    Primary Care Physician  RODRIGO Humphries.    Death Date: 06/22/18  Death Time: 2002    Cause of Death  Septic shock secondary to pulmonary source concern of community-acquired pneumonia with unclear bacterial specificity.  Contributing factors acute encephalopathy secondary to toxic metabolic source, new finding of brain lesion on MRI with concern of metastatic disease, acute pulmonary emboli found on CTA 6/21, history of atrial fibrillation, type 2 diabetes, severe protein calorie malnutrition    Comorbid Conditions  Patient Active Problem List   Diagnosis   • History of subarachnoid hemorrhage   • Type 2 diabetes mellitus with hyperglycemia (HCC)   • History of atrial fibrillation without current medication   • Chronic left shoulder pain   • Chronic insomnia   • Neck muscle spasm   • Chronic back pain   • Chronic use of opiate drugs therapeutic purposes ORT=5   • Sedative, hypnotic or anxiolytic dependence (HCC)   • Hyperlipidemia associated with type 2 diabetes mellitus (HCC)   • Depressive disorder   • History of partial small bowel obstruction-no surgical intervention   • Therapeutic opioid induced constipation   • JASMIN (generalized anxiety disorder)   • Severe sepsis (HCC)   • Acute respiratory failure with hypoxia (HCC)   • SY (acute kidney injury) (HCC)   • T2DM (type 2 diabetes mellitus) (HCC)   • HLD (hyperlipidemia)   • HTN (hypertension)   • Pneumonia due to other specified bacteria (HCC)   • Shock (HCC)   • Delirium   • Acute metabolic encephalopathy   • Atrial fibrillation with RVR (HCC)   • Elevated liver enzymes   • Severe protein-calorie malnutrition (HCC)   • Acute pulmonary embolism (HCC)   • Brain mass       History of Presenting Illness and Hospital Course  73 y.o. female admitted 6/9/2018 with shortness of breath and concern of sepsis from pneumonia.  Patient was intubated during her hospitalization and further workup for  acute encephalopathy ensued.  Patient on MRI was found to have a brain lesion and on CAT scan of the chest a an acute pulmonary embolism.  Due to being initiated on anticoagulation patient was unable to have any neurosurgery.  Family and neurosurgeon had decided against surgery and recommended future MRI.  Patient did have a history of dementia per family after further discussion between family myself and Dr. Segura had opted for comfort measures.    Consultants  Neurosurgery Dr Jaime Powell  Pulmonology Dr Barrientos    Procedures  6/15 diagnostic and therapeutic bronchoscopy with BAL  6/10 central line placement right IJ  6/10 intubation

## 2018-06-23 NOTE — PROGRESS NOTES
Two minute asystole strip printed at 2002. 2 RN pronouncement obtained.   Expiration flowsheet followed and  called. Dr. Majano notified. Tissue bank called. Family at bedside. Comfort measures provided.

## 2018-06-23 NOTE — PROGRESS NOTES
Pt was given 5 mg morphine prior to extubation for comfort care. All questions were answered and all family present.

## 2018-06-29 ENCOUNTER — TELEPHONE (OUTPATIENT)
Dept: MEDICAL GROUP | Facility: CLINIC | Age: 73
End: 2018-06-29

## 2018-06-29 NOTE — TELEPHONE ENCOUNTER
1. Caller Name: Socorro (daughter)                                         Call Back Number: [phone]      Patient approves a detailed voicemail message: N\A    Socorro Amber is calling to let you know that her mother passed away.  She wanted you to know that Rosangela loved you so much!  She's wondering if you could call her if you get time.

## 2018-06-29 NOTE — ADDENDUM NOTE
Encounter addended by: Carli Guerrero on: 6/29/2018  2:48 PM<BR>    Actions taken: Flowsheet accepted

## 2018-07-02 NOTE — TELEPHONE ENCOUNTER
Called and spoke with patient's daughter Socorro.    Understandably grieving, but has good support.

## 2018-07-04 NOTE — ADDENDUM NOTE
Encounter addended by: Hailey Conn, RRT on: 7/3/2018  5:08 PM<BR>    Actions taken: Flowsheet accepted

## 2020-04-30 NOTE — PROGRESS NOTES
CC: Cough (SOB, medication refill, feet pain, )        HPI:     Rosangela presents today for the followin. Type 2 diabetes mellitus with hyperglycemia, without long-term current use of insulin (HCC)/Chronic left shoulder pain/Chronic back pain, unspecified back location, unspecified back pain laterality/Neck muscle spasm/Chronic use of opiate drugs therapeutic purposes ORT=5  Chronic pain recheck:   Last dose of controlled substance:  in AM   taken 3-4 times a day    She  reports that she does not drink alcohol.  She  reports that she does not use drugs.    Consequences of Chronic Opiate therapy:  (5 A's)  Analgesia: Compared to no treatment or prior treatment, pain is currently improved  Activity: improved  Adverse Events: She denies constipation, dry mouth, itchy skin, nausea and sedation  Aberrant Behaviors: She reports she is taking medication as prescribed and is not veering from agreed treatment regimen. There have been no inappropriate refills or lost/stolen meds reported.   Affect/Mood: Pain is not impacting patient's mood.  Patient denies depression/anxiety.    Nonnarcotic treatments that are being used: Gabapentin and SSRI/SNRI.   Treatment goals discussed.    Opioid Risk Score: 8    Interpretation of Opioid Risk Score   Score 0-3 = Low risk of abuse. Do UDS at least once per year.  Score 4-7 = Moderate risk of abuse. Do UDS 1-4 times per year.  Score 8+ = High risk of abuse. Refer to specialist.    Last order of CONTROLLED SUBSTANCE TREATMENT AGREEMENT was found on 2018 from Office Visit on 2018     UDS Summary                URINE DRUG SCREEN Next Due 2018      Done 2018 [facility] PAIN MANAGEMENT SCREEN     Patient has more history with this topic...        Most recent UDS reviewed today and is consistent with prescribed medications.    I have reviewed the medical records, the Prescription Monitoring Program and I have determined that controlled substance treatment is  [employer] single icd optivol  optivol elevated since Dec- Janak  Patient coming to [facility] today "medically indicated.    6. PVD (peripheral vascular disease) ([facility])  States for a while now her left calf is bigger than her right.  Concerned there maybe something wrong with it    8. Depressive disorder/JASMIN (generalized anxiety disorder)  Feels assisting on Zoloft 50 mg. Sometimes she still is \"fearful\" and wonders if going up to 100 mg maybe would help be helpful for this.      Current Outpatient Prescriptions   Medication Sig Dispense Refill   • [START ON 7/28/2018] Hydrocodone-Acetaminophen 5-300 MG Tab Take 1-1.5 Tabs by mouth 3 times a day as needed for up to 30 days. 120 Tab 0   • glipiZIDE SR (GLUCOTROL) 2.5 MG TABLET SR 24 HR TAKE ONE TABLET BY MOUTH ONCE DAILY 90 Tab 3   • sertraline (ZOLOFT) 50 MG Tab Take 1 Tab by mouth every day. 90 Tab 0   • gabapentin (NEURONTIN) 300 MG Cap Take 1 Cap by mouth 3 times a day. 270 Cap 3   • lisinopril (PRINIVIL) 2.5 MG Tab TAKE ONE TABLET BY MOUTH ONCE DAILY 90 Tab 3   • metformin (GLUCOPHAGE) 1000 MG tablet TAKE ONE TABLET BY MOUTH TWICE DAILY WITH MEALS 180 Tab 3   • SLAVA CONTOUR NEXT TEST strip USE ONE STRIP TO CHECK GLUCOSE THREE TIMES DAILY 100 Strip 11   • trazodone (DESYREL) 50 MG Tab Take 1 Tab by mouth at bedtime as needed for Sleep. 90 Tab 0   • rosuvastatin (CRESTOR) 20 MG Tab TAKE ONE TABLET BY MOUTH EVERY EVENING 90 Tab 3   • mupirocin calcium (BACTROBAN) 2 % Cream Apply 1 Application to affected area(s) 2 times a day. 1 Tube 5   • baclofen (LIORESAL) 10 MG Tab      • docusate sodium (COLACE) 100 MG Cap Take 1 Cap by mouth 2 times a day as needed for Constipation. 180 Cap 3   • ranitidine (ZANTAC) 150 MG Tab Take 150 mg by mouth 2 times a day.     • Omega-3 Fatty Acids (FISH OIL PO) Take  by mouth.     • vitamin D, Ergocalciferol, (DRISDOL) 23184 UNITS Cap capsule Take 1 Cap by mouth every 7 days. 12 Cap 3   • aspirin (ASA) 81 MG CHEW Take 81 mg by mouth every day.     • therapeutic multivitamin-minerals (THERAGRAN-M) TABS Take 1 Tab by mouth every day.   " "    No current facility-administered medications for this visit.      Social History   Substance Use Topics   • Smoking status: Former Smoker     Packs/day: 1.00     Years: 40.00     Types: Cigarettes     Quit date: 1/1/2006   • Smokeless tobacco: Never Used   • Alcohol use No     I reviewed patients allergies, problem list and medications today in EPIC.    ROS: Any/all pertinent positives listed in the HPI, otherwise all others reviewed are negative today.      /72   Pulse 78   Temp 36 °C (96.8 °F)   Resp 16   Ht 1.6 m (5' 3\")   Wt 76.7 kg (169 lb)   SpO2 92%   BMI 29.94 kg/m²     Exam:   Gen: Alert and oriented, No apparent distress. WDWN  Psych: A+Ox3, normal affect and mood  Skin: Warm, dry and intact. Good turgor   No rashes in visible areas.  Eye: Conjunctiva clear, lids normal  TMs unremarkable bilaterally. No erythema bilateral nasal turbinates. Oropharynx normal  ENMT: Lips without lesions, good dentition  Lungs: Clear to auscultation bilaterally, no rales or rhonchi   Unlabored respiratory effort.   CV: Regular rate and rhythm, S1, S2. No murmurs.   No Edema  Ext: no skin breakdown, right calf is one or 2 inches smaller diameter compared to left. There is decreased hair distribution from the calf down on the right lower leg. No hair on the feet. Feet are warm. Toes are slightly cooler. Decreased pedal pulses right side.       Assessment and Plan.   72 y.o. female with the following issues.    1. Type 2 diabetes mellitus with hyperglycemia, without long-term current use of insulin (HCC)  Stable. Due for labs. She does intermittently check her blood sugar with normal values.  She does have peripheral neuropathy and uses gabapentin for this.  - COMP METABOLIC PANEL; Future  - HEMOGLOBIN A1C; Future  - LIPID PROFILE; Future  - MICROALBUMIN CREAT RATIO URINE (LAB COLLECT); Future    2. Chronic left shoulder pain/ Chronic back pain, unspecified back location, unspecified back pain laterality/Neck " muscle spasm/Chronic use of opiate drugs therapeutic purposes ORT=5  Clinically stable. No reports of new symptoms. Well-controlled on current medication.  obtained/reviewed from state pharmacy database.  Medications found to be medically necessary/appropriate.  3 months of medication supply at this visit. Followup in the office for further refills.    - [facility] PAIN MANAGEMENT SCREEN; Future  - CONSENT FOR OPIATE PRESCRIPTION  - Hydrocodone-Acetaminophen 5-300 MG Tab; Take 1-1.5 Tabs by mouth 3 times a day as needed for up to 30 days.  Dispense: 120 Tab; Refill: 0    6. PVD (peripheral vascular disease) (HCC)/ Decreased pedal pulses  Further evaluated with ultrasound.  - US-EXTREMITY LOWER ARTERY BILATERAL; Future        8. Depressive disorder/JASMIN (generalized anxiety disorder)  Doing well on Zoloft, is able to increase 100 mg she decides to.

## 2021-12-08 NOTE — PROGRESS NOTES
PSYCHIATRY FOLLOW-UP NOTE      Chief Complaint   Patient presents with   • Follow-Up     anxiety, depression         History Of Present Illness:  Rosangela Burnette is a 71 y.o. old female with depressive disorder, anxiety disorder, anxiolytic (Xanax) use disorder, DM, HTN, HLD, cerebral aneurysm, atrial fibrillation, chronic low back and shoulder pain comes in today for follow up, was last seen 3 months ago. She reports doing fine since her last visit here. She was hospitalized last month for constipation and abdominal pain and is doing much better now. Denies any depressive symptoms but continues to report frequent crying whenever she is feeling emotional. Her daughter and her family moved to another state recently and she has been missing them a lot. She still has another daughter who lives in town and has really good support from her family as well. Continues to endorse feeling anxious when she is by herself. She does nothing to the 0.25 mg of Xanax helps her so she has been taking 0.5 mg on an as-needed basis which is better. She states that she has been using Xanax when she is anxious or crying. She is using trazodone for sleep and once in a while uses Xanax in the middle of the night. Denies any recent balance problems or falls. Continues to be compliant with Effexor but is not sure if it helps her with anxiety or not. Sleep has been up and down, does feel the trazodone makes her sleep a little bit better. Denies having passive or active thoughts of wanting to herself or others.    Social History:   Single and lives alone in an apartment in [place].  and  x 1, was  for 27 years and   in . She has 2 daughters and 4 grand kids. 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    Substance Use:  Alcohol - Denies  Nicotine - Denies    Illicit drugs - Denies     Medications:  Current Outpatient  Prescriptions   Medication Sig Dispense Refill   • docusate sodium (COLACE) 100 MG Cap Take 1 Cap by mouth 2 times a day as needed for Constipation. 60 Cap 11   • Hydrocodone-Acetaminophen 5-300 MG Tab Take 1-1.5 Tabs by mouth 3 times a day as needed. 105 Tab 0   • Hydrocodone-Acetaminophen 5-300 MG Tab Take 1-1.5 Tabs by mouth 3 times a day as needed. 105 Tab 0   • Hydrocodone-Acetaminophen 5-300 MG Tab Take 1-1.5 Tabs by mouth 3 times a day as needed. 105 Tab 0   • alprazolam (XANAX) 0.25 MG Tab Take 1 Tab by mouth at bedtime as needed for Sleep. 30 Tab 2   • alprazolam (XANAX) 1 MG Tab Take 1 Tab by mouth 2 times a day as needed for Anxiety. 60 Tab 2   • trazodone (DESYREL) 50 MG Tab Take 1 Tab by mouth at bedtime as needed for Sleep (as needed for sleep). 90 Tab 0   • venlafaxine XR (EFFEXOR XR) 75 MG CAPSULE SR 24 HR Take 1 Cap by mouth every day. To be taken along with 150 mg. Total dose - 225 mg. 90 Cap 0   • venlafaxine (EFFEXOR XR) 150 MG extended-release capsule Take 1 Cap by mouth every day. Take in addition to 37.5mg. Dose is 187.5mg. 90 Cap 0   • metformin (GLUCOPHAGE) 1000 MG tablet TAKE ONE TABLET BY MOUTH TWICE DAILY WITH MEALS 180 Tab 3   • lisinopril (PRINIVIL) 2.5 MG Tab Take 1 Tab by mouth every day. 90 Tab 3   • glipiZIDE SR (GLUCOTROL) 2.5 MG TABLET SR 24 HR Take 1 Tab by mouth every day. 90 Tab 1   • rosuvastatin (CRESTOR) 20 MG Tab Take 1 Tab by mouth every evening. 90 Tab 3   • ONE TOUCH ULTRA TEST strip      • Omega-3 Fatty Acids (FISH OIL PO) Take  by mouth.     • vitamin D, Ergocalciferol, (DRISDOL) 90345 UNITS Cap capsule Take 1 Cap by mouth every 7 days. 12 Cap 3   • aspirin (ASA) 81 MG CHEW Take 81 mg by mouth every day.     • therapeutic multivitamin-minerals (THERAGRAN-M) TABS Take 1 Tab by mouth every day.       No current facility-administered medications for this visit.       Review Of Systems:    Constitutional - Negative for fatigue  HEENT - Positive for nystagmus   Respiratory  "- Negative for shortness of breath, cough  CVS - Negative for chest pain, palpitations  GI - Negative for nausea, vomiting, abdominal pain, diarrhea, constipation  Musculoskeletal - Positive for back and shoulder pain  Neurological - Negative for headaches  Psychiatric - Positive for anxiety (improved), sleep problems    Physical Examination:  Vital signs: /97 mmHg  Pulse 81  Ht 1.651 m (5' 5\")  Wt 74.844 kg (165 lb)  BMI 27.46 kg/m2    Musculoskeletal: Slow but stable gait. No abnormal movements.     Mental Status Evaluation:   General: Elderly white female, teary eyed once, dressed in casual attire, good grooming and hygiene, in no apparent distress, calm and cooperative, good eye contact, no psychomotor agitation or retardation  Orientation: Alert and oriented to person, place and time  Recent and remote memory: Grossly intact  Attention span and concentration: Grossly intact  Speech: Spontaneous, normal rate, rhythm and tone  Thought Process: Linear, logical and goal directed  Thought Content: Denies suicidal or homicidal ideations, intent or plan  Perception: Denies auditory or visual hallucinations. No delusions noted  Associations: Intact  Language: Appropriate  Fund of knowledge and vocabulary: Grossly adequate  Mood: \"am fine\"  Affect: Anxious at times, mood congruent  Insight: Good  Judgment: Good      Impression:  1. Unspecified anxiety disorder  2. Unspecified depressive disorder  3. 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     Plan:  1. Discontinue Xanax 0.25 mg  2. Continue Xanax 1 mg twice daily as needed for anxiety. #60 tabs with 2 refills faxed to her pharmacy. Again encouraged her to use Xanax only for anxiety and not for pain, depression or sleep.   3. Continue Effexor 225 mg daily for anxiety and depression  4. Continue Trazodone 50 mg at bedtime as needed " for sleep.    5. Continue individual psychotherapy with DAVID Johnson.     Return to clinic in 3 months or sooner if symptoms worsen    The proposed treatment plan was discussed with the patient who was provided the opportunity to ask questions and make suggestions regarding alternative treatment. Patient verbalized understanding and expressed agreement with the plan.     Angelica Ogden M.D.  04/26/2017    This note was created using voice recognition software (Dragon). The accuracy of the dictation is limited by the abilities of the software. I have reviewed the note prior to signing, however some errors in grammar and context are still possible. If you have any questions related to this note please do not hesitate to contact our office.          Rituxan Pregnancy And Lactation Text: This medication is Pregnancy Category C and it isn't know if it is safe during pregnancy. It is unknown if this medication is excreted in breast milk but similar antibodies are known to be excreted.